# Patient Record
Sex: MALE | Race: WHITE | Employment: OTHER | URBAN - METROPOLITAN AREA
[De-identification: names, ages, dates, MRNs, and addresses within clinical notes are randomized per-mention and may not be internally consistent; named-entity substitution may affect disease eponyms.]

---

## 2017-03-29 DIAGNOSIS — E78.5 HYPERLIPIDEMIA LDL GOAL <130: ICD-10-CM

## 2017-03-29 RX ORDER — SIMVASTATIN 40 MG/1
TABLET, FILM COATED ORAL
Qty: 90 TAB | Refills: 0 | Status: SHIPPED | OUTPATIENT
Start: 2017-03-29 | End: 2017-06-30 | Stop reason: SDUPTHER

## 2017-06-30 DIAGNOSIS — E78.5 HYPERLIPIDEMIA LDL GOAL <130: ICD-10-CM

## 2017-06-30 RX ORDER — SIMVASTATIN 40 MG/1
TABLET, FILM COATED ORAL
Qty: 90 TAB | Refills: 0 | Status: SHIPPED | OUTPATIENT
Start: 2017-06-30 | End: 2017-07-31 | Stop reason: SDUPTHER

## 2017-06-30 NOTE — TELEPHONE ENCOUNTER
Pharmacy on file verified   *patient states that he has an appointment on 815/2017 but he is going to run out of the medication requested next week, patient Is requesting a gap prescription be sent to the pharmacy on file.

## 2017-07-01 DIAGNOSIS — E78.5 HYPERLIPIDEMIA LDL GOAL <130: ICD-10-CM

## 2017-07-03 RX ORDER — SIMVASTATIN 40 MG/1
TABLET, FILM COATED ORAL
Qty: 90 TAB | Refills: 0 | Status: SHIPPED | OUTPATIENT
Start: 2017-07-03 | End: 2018-02-05 | Stop reason: SDUPTHER

## 2017-07-25 ENCOUNTER — TELEPHONE (OUTPATIENT)
Dept: FAMILY MEDICINE CLINIC | Age: 62
End: 2017-07-25

## 2017-07-25 NOTE — TELEPHONE ENCOUNTER
----- Message from Urszula Garcia sent at 7/25/2017 11:37 AM EDT -----  Regarding: Barr/telephone  Pts wife Roslyn Cueto is requesting an appointment for Monday. Pt went to [facility] today for a fall. He will need stitches removed from his nose and lip on Monday. Wifes number is [phone].

## 2017-07-25 NOTE — TELEPHONE ENCOUNTER
Dr. Madeline Polanco will not be here next week, can you assist with this scheduling please as the patient would only like to see him?

## 2017-07-31 ENCOUNTER — OFFICE VISIT (OUTPATIENT)
Dept: FAMILY MEDICINE CLINIC | Age: 62
End: 2017-07-31

## 2017-07-31 VITALS
HEIGHT: 70 IN | SYSTOLIC BLOOD PRESSURE: 170 MMHG | WEIGHT: 208.6 LBS | OXYGEN SATURATION: 98 % | DIASTOLIC BLOOD PRESSURE: 100 MMHG | BODY MASS INDEX: 29.86 KG/M2 | TEMPERATURE: 98.4 F | HEART RATE: 61 BPM | RESPIRATION RATE: 18 BRPM

## 2017-07-31 DIAGNOSIS — S01.81XA LACERATION OF FACE, INITIAL ENCOUNTER: Primary | ICD-10-CM

## 2017-07-31 DIAGNOSIS — R03.0 ELEVATED BLOOD PRESSURE READING: ICD-10-CM

## 2017-07-31 NOTE — PROGRESS NOTES
HISTORY OF PRESENT ILLNESS  Ramona Chapman is a 59 y.o. male. HPI  Presents for suture removal.  Reports he fell down steps at home, striking face on the wall. Went to [facility] ED 6 days ago, treated with sutures. Denies any wound pain or drainage. No fever/chills. BP noted to be elevated today in office. States he has white coat HTN for years. Has BP monitor at home, inconsistent with monitoring. Patient Active Problem List   Diagnosis Code    Vitamin D deficiency E55.9    DDD,DJD cervicalC5-6,6-7foraminal encroachment on X ray-4/2013 M50.30    Hyperlipidemia LDL goal <130 E78.5    Chronic pain of both shoulders- worse in AM and loosens up with activity. M25.512, G89.29, M25.511    Allergic rhinitis due to pollen J30.1     Current Outpatient Prescriptions   Medication Sig    SAW PALMETTO FRUIT, BULK, by Does Not Apply route.  simvastatin (ZOCOR) 40 mg tablet TAKE 1 TABLET BY MOUTH EVERY DAY AT NIGHT *NEEDS FASTING OFFICE VISIT*    Cholecalciferol, Vitamin D3, (VITAMIN D3) 1,000 unit cap Take 2,000 Units by mouth daily (after breakfast).  aspirin 81 mg tablet Take  by mouth.  OTHER Indications: Super beta prostate     No current facility-administered medications for this visit. Social History   Substance Use Topics    Smoking status: Never Smoker    Smokeless tobacco: Never Used    Alcohol use Yes      Comment: 12oz beer1-2/wk     Visit Vitals    BP (!) 170/100 (BP 1 Location: Left arm, BP Patient Position: Sitting)    Pulse 61    Temp 98.4 °F (36.9 °C) (Oral)    Resp 18    Ht 5' 10\" (1.778 m)    Wt 208 lb 9.6 oz (94.6 kg)    SpO2 98%    BMI 29.93 kg/m2         Review of Systems   Constitutional: Negative for chills, fever and malaise/fatigue. Eyes: Negative. Respiratory: Negative for cough and shortness of breath. Cardiovascular: Negative for chest pain, palpitations and leg swelling. Skin:        Laceration of nose and upper lip.    Neurological: Negative for headaches. All other systems reviewed and are negative. Physical Exam   Constitutional: No distress. Cardiovascular: Normal rate, regular rhythm and normal heart sounds. Pulmonary/Chest: Effort normal and breath sounds normal.   Skin:   Laceration of bridge of nose. Small area about 4mm in center of laceration not fully approximated. No drainage. Laceration of right upper lip clean and dry. Wound edges approximated. No erythema. Mild edema of upper lip. ASSESSMENT and PLAN  Diagnoses and all orders for this visit:    1. Laceration of face, initial encounter  Sutures removed without difficulty. Wound edges approximated except for 4mm superficial open area bridge of nose. No drainage noted. Clean daily with soap and water. Monitor for signs of infection which were reviewed. 2. Elevated blood pressure reading  Recommend interim BP monitoring and record. Report if > 140/80 consistentl    Follow up as scheduled with PCP next month for BP recheck.

## 2017-07-31 NOTE — PROGRESS NOTES
\"Reviewed record in preparation for visit and have obtained the necessary documentation\"  Chief Complaint   Patient presents with    Suture Removal     lip and nose        Patient presents in the office today for a suture removal to nose and lip areas     Patient advises he was evaluated in [facility], last Tuesday 0725 for a fall and had sutures placed in nose and lip areas, advised to have the sutures emoved after 5 days     1. Have you been to the ER, urgent care clinic since your last visit? Hospitalized since your last visit? No    2. Have you seen or consulted any other health care providers outside of the [address] [place] [address] since your last visit? Include any pap smears or colon screening.  Yes When: 07/25/2017 Where: [facility] Reason for visit: Fall

## 2017-08-15 ENCOUNTER — OFFICE VISIT (OUTPATIENT)
Dept: FAMILY MEDICINE CLINIC | Age: 62
End: 2017-08-15

## 2017-08-15 VITALS
DIASTOLIC BLOOD PRESSURE: 84 MMHG | TEMPERATURE: 97.2 F | HEART RATE: 61 BPM | OXYGEN SATURATION: 97 % | WEIGHT: 206.6 LBS | SYSTOLIC BLOOD PRESSURE: 134 MMHG | BODY MASS INDEX: 29.58 KG/M2 | HEIGHT: 70 IN | RESPIRATION RATE: 18 BRPM

## 2017-08-15 DIAGNOSIS — M25.512 CHRONIC PAIN OF BOTH SHOULDERS: Chronic | ICD-10-CM

## 2017-08-15 DIAGNOSIS — M50.30 DDD (DEGENERATIVE DISC DISEASE), CERVICAL: ICD-10-CM

## 2017-08-15 DIAGNOSIS — E55.9 VITAMIN D DEFICIENCY: ICD-10-CM

## 2017-08-15 DIAGNOSIS — M25.511 CHRONIC PAIN OF BOTH SHOULDERS: Chronic | ICD-10-CM

## 2017-08-15 DIAGNOSIS — J30.1 SEASONAL ALLERGIC RHINITIS DUE TO POLLEN: ICD-10-CM

## 2017-08-15 DIAGNOSIS — N40.0 BENIGN PROSTATIC HYPERPLASIA WITHOUT LOWER URINARY TRACT SYMPTOMS, UNSPECIFIED MORPHOLOGY: ICD-10-CM

## 2017-08-15 DIAGNOSIS — G89.29 CHRONIC PAIN OF BOTH SHOULDERS: Chronic | ICD-10-CM

## 2017-08-15 DIAGNOSIS — E78.5 HYPERLIPIDEMIA LDL GOAL <130: ICD-10-CM

## 2017-08-15 DIAGNOSIS — Z00.00 ROUTINE PHYSICAL EXAMINATION: Primary | ICD-10-CM

## 2017-08-15 NOTE — PATIENT INSTRUCTIONS

## 2017-08-15 NOTE — PROGRESS NOTES
\"Reviewed record in preparation for visit and have obtained the necessary documentation\"  Chief Complaint   Patient presents with    Complete Physical    Rash     bilateral calfs x 3 days        1. Have you been to the ER, urgent care clinic since your last visit? Hospitalized since your last visit? No    2. Have you seen or consulted any other health care providers outside of the [address] [place] [address] since your last visit? Include any pap smears or colon screening.  No

## 2017-08-15 NOTE — PROGRESS NOTES
HISTORY OF PRESENT ILLNESS  HPI  Ramona Chapman is a 59 y.o. Male with history of hyperlipidemia and vitamin D deficiency who presents to office today for a complete physical. Pt reports an average BP of below 140/90. Pt states that he had a prostate biopsy with urologist Dr. Ky Wild due to an elevated PSA, which revealed inflammation but was negative for cancer. He is scheduled to follow up with Dr. Sharon Quinonez in 11/2017. Pt complains of intermittent urinary frequency and difficulty fully emptying his bladder. He states that he usually has a good urine stream.  Pt states that he hit his head three weeks ago. He received sutures at [facility] ER which were removed six days later in office. He denies syncope and concussion-type symptoms, and he states that a CT was negative. Pt complains of a non-itchy bilateral lower leg rash since 8/13/17. He notes that he did injure both knees in his fall three weeks ago. Past Medical History:   Diagnosis Date    Allergic rhinitis 2/27/2010    Chronic pain of both shoulders- worse in AM and loosens up with activity. 3/20/2016    DDD,DJD cervicalC5-6,6-7foraminal encroachment on X ray-4/2013 5/17/2013    Other and unspecified hyperlipidemia 2/27/2010    Other and unspecified hyperlipidemia 2/27/2010     Past Surgical History:   Procedure Laterality Date    HX CATARACT REMOVAL  87-88    inocente    HX CATARACT REMOVAL      HX TONSIL AND ADENOIDECTOMY  1960    SINUS SURGERY PROC UNLISTED  2001     Current Outpatient Prescriptions on File Prior to Visit   Medication Sig Dispense Refill    SAW PALMETTO FRUIT, BULK, by Does Not Apply route.  simvastatin (ZOCOR) 40 mg tablet TAKE 1 TABLET BY MOUTH EVERY DAY AT NIGHT *NEEDS FASTING OFFICE VISIT* 90 Tab 0    OTHER Indications: Super beta prostate      Cholecalciferol, Vitamin D3, (VITAMIN D3) 1,000 unit cap Take 2,000 Units by mouth daily (after breakfast).  aspirin 81 mg tablet Take  by mouth.        No current facility-administered medications on file prior to visit. Allergies   Allergen Reactions    Pcn [Penicillins] Rash     Family History   Problem Relation Age of Onset    Cancer Mother      ovarian    Cancer Father      prostate?  Elevated Lipids Father      Social History     Social History    Marital status:      Spouse name: N/A    Number of children: N/A    Years of education: N/A     Social History Main Topics    Smoking status: Never Smoker    Smokeless tobacco: Never Used    Alcohol use Yes      Comment: 12oz beer1-2/wk    Drug use: No    Sexual activity: Yes     Partners: Female     Other Topics Concern    None     Social History Narrative             Review of Systems   Constitutional: Negative for chills, diaphoresis, fever, malaise/fatigue and weight loss. HENT: Negative for congestion, ear discharge, ear pain, hearing loss, nosebleeds, sore throat and tinnitus. Eyes: Negative for blurred vision, double vision, photophobia, pain, discharge and redness. Respiratory: Negative for cough, hemoptysis, sputum production, shortness of breath, wheezing and stridor. Cardiovascular: Negative for chest pain, palpitations, orthopnea, claudication, leg swelling and PND. Gastrointestinal: Negative for abdominal pain, blood in stool, constipation, diarrhea, heartburn, melena, nausea and vomiting. Genitourinary: Positive for frequency. Negative for dysuria, flank pain, hematuria and urgency. Musculoskeletal: Negative for back pain, falls, joint pain, myalgias and neck pain. Skin: Positive for rash (bilateral lower legs). Negative for itching. Neurological: Negative for dizziness, tingling, tremors, sensory change, speech change, focal weakness, seizures, loss of consciousness, weakness and headaches. Endo/Heme/Allergies: Negative for environmental allergies and polydipsia. Does not bruise/bleed easily.    Psychiatric/Behavioral: Negative for depression, hallucinations, memory loss, substance abuse and suicidal ideas. The patient is not nervous/anxious and does not have insomnia. Results for orders placed or performed in visit on 08/15/17   LIPID PANEL   Result Value Ref Range    Cholesterol, total 160 100 - 199 mg/dL    Triglyceride 110 0 - 149 mg/dL    HDL Cholesterol 58 >39 mg/dL    VLDL, calculated 22 5 - 40 mg/dL    LDL, calculated 80 0 - 99 mg/dL   METABOLIC PANEL, COMPREHENSIVE   Result Value Ref Range    Glucose 92 65 - 99 mg/dL    BUN 14 8 - 27 mg/dL    Creatinine 1.18 0.76 - 1.27 mg/dL    GFR est non-AA 65 >59 mL/min/1.73    GFR est AA 75 >59 mL/min/1.73    BUN/Creatinine ratio 12 10 - 24    Sodium 143 134 - 144 mmol/L    Potassium 4.7 3.5 - 5.2 mmol/L    Chloride 104 96 - 106 mmol/L    CO2 22 18 - 29 mmol/L    Calcium 9.6 8.6 - 10.2 mg/dL    Protein, total 6.9 6.0 - 8.5 g/dL    Albumin 4.6 3.6 - 4.8 g/dL    GLOBULIN, TOTAL 2.3 1.5 - 4.5 g/dL    A-G Ratio 2.0 1.2 - 2.2    Bilirubin, total 0.5 0.0 - 1.2 mg/dL    Alk.  phosphatase 80 39 - 117 IU/L    AST (SGOT) 26 0 - 40 IU/L    ALT (SGPT) 18 0 - 44 IU/L   PSA W/ REFLX FREE PSA   Result Value Ref Range    Prostate Specific Ag 4.2 (H) 0.0 - 4.0 ng/mL    Reflex Criteria Comment    URINALYSIS W/MICROSCOPIC   Result Value Ref Range    Specific Gravity 1.023 1.005 - 1.030    pH (UA) 5.5 5.0 - 7.5    Color Yellow Yellow    Appearance Clear Clear    Leukocyte Esterase Negative Negative    Protein Negative Negative/Trace    Glucose Negative Negative    Ketone Negative Negative    Blood Negative Negative    Bilirubin Negative Negative    Urobilinogen 0.2 0.2 - 1.0 mg/dL    Nitrites Negative Negative    Microscopic Examination Comment     Microscopic exam See additional order    VITAMIN D, 25 HYDROXY   Result Value Ref Range    VITAMIN D, 25-HYDROXY 43.5 30.0 - 100.0 ng/mL   MICROSCOPIC EXAMINATION   Result Value Ref Range    WBC 0-5 0 - 5 /hpf    RBC 0-2 0 - 2 /hpf    Epithelial cells None seen 0 - 10 /hpf    Casts None seen None seen /lpf    Mucus Present Not Estab. Bacteria Few None seen/Few   PSA, FREE   Result Value Ref Range    PSA, Free 0.48 N/A ng/mL    % Free PSA 11.4 %   CVD REPORT   Result Value Ref Range    INTERPRETATION Note                Physical Exam  Visit Vitals    /84 (BP 1 Location: Left arm, BP Patient Position: Sitting)    Pulse 61    Temp 97.2 °F (36.2 °C) (Oral)    Resp 18    Ht 5' 10\" (1.778 m)    Wt 206 lb 9.6 oz (93.7 kg)    SpO2 97%    BMI 29.64 kg/m2     General:  Alert, cooperative, no distress, appears stated age. Head:  Normocephalic, without obvious abnormality, atraumatic. Eyes:  Conjunctivae/corneas clear. PERRL, EOMs intact. Fundi benign   Ears:  Normal TMs and external ear canals both ears. Nose: Nares normal. Septum midline. Mucosa normal. No drainage or sinus tenderness. Throat: Lips, mucosa, and tongue normal. Teeth and gums normal.   Neck: Supple, symmetrical, trachea midline, no adenopathy, thyroid: no enlargement/tenderness/nodules, no carotid bruit and no JVD. Back:   Symmetric, no curvature. ROM normal. No CVA tenderness. Lungs:   Clear to auscultation bilaterally. Chest wall:  No tenderness or deformity. Heart:  Regular rate and rhythm, S1, S2 normal, no murmur, click, rub or gallop. Abdomen:   Soft, non-tender. Bowel sounds normal. No masses,  No organomegaly. Genitalia:  Normal male without lesion, discharge or tenderness. Rectal:  Deferred to urologist Dr. Melodie quintana. Extremities: Extremities normal, atraumatic, no cyanosis or edema. Pulses: 2+ and symmetric all extremities. Skin: Skin color, texture, turgor normal. He has a non-blanching rash on his leg, some of it appears to be a reddish-purple discoloration that may be from blood seeping below the skin from his injuries; both knees have scabs that are healing, no signs of infection.  Down around the left ankle the rash is erythematous and does mendel a little bit, and it's slightly raised, whereas the purplish rash was not raised   Lymph nodes: Cervical, supraclavicular, and axillary nodes normal.   Neurologic: CNII-XII intact. Normal strength, sensation and reflexes throughout. ASSESSMENT and PLAN    ICD-10-CM ICD-9-CM    1. Routine physical examination Z00.00 V70.0 LIPID PANEL      METABOLIC PANEL, COMPREHENSIVE      PSA W/ REFLX FREE PSA      URINALYSIS W/MICROSCOPIC      AMB POC EKG ROUTINE W/ 12 LEADS, INTER & REP      VITAMIN D, 25 HYDROXY      MICROSCOPIC EXAMINATION      CVD REPORT   2. Hyperlipidemia LDL goal <130 E78.5 272.4    3. DDD,DJD cervicalC5-6,6-7foraminal encroachment on X ray-4/2013 M50.30 722.4    4. Vitamin D deficiency E55.9 268.9 VITAMIN D, 25 HYDROXY   5. Seasonal allergic rhinitis due to pollen J30.1 477.0    6. Chronic pain of both shoulders- worse in AM and loosens up with activity. M25.512 719.41     G89.29 338.29     M25.511     7. Benign prostatic hyperplasia without lower urinary tract symptoms, unspecified morphology N40.0 600.00      Diagnoses and all orders for this visit:    1. Routine physical examination  -     LIPID PANEL  -     METABOLIC PANEL, COMPREHENSIVE  -     PSA W/ REFLX FREE PSA  -     URINALYSIS W/MICROSCOPIC  -     AMB POC EKG ROUTINE W/ 12 LEADS, INTER & REP  -     VITAMIN D, 25 HYDROXY  -     MICROSCOPIC EXAMINATION  -     CVD REPORT    2. Hyperlipidemia LDL goal <130    3. DDD,DJD cervicalC5-6,6-7foraminal encroachment on X ray-4/2013    4. Vitamin D deficiency  -     VITAMIN D, 25 HYDROXY    5. Seasonal allergic rhinitis due to pollen    6. Chronic pain of both shoulders- worse in AM and loosens up with activity. 7. Benign prostatic hyperplasia without lower urinary tract symptoms, unspecified morphology    Other orders  -     PSA, FREE      Follow-up Disposition:  Return in about 6 months (around 2/15/2018), or if leg rashes/symptoms worsen or fail to improve, for F/U HTN and CHOL.      lab results and schedule of future lab studies reviewed with patient  reviewed diet, exercise and weight control  cardiovascular risk and specific lipid/LDL goals reviewed  reviewed medications and side effects in detail  Please call my office if there are any questions- [phone]. I will arrange for follow up after the lab tests done from today return    Call for refills on medications as needed. Discussed expected course/resolution/complications of diagnosis in detail with patient. Medication risks/benefits/costs/interactions/alternatives discussed with patient. Pt was given an after visit summary which includes diagnoses, current medications & vitals. Pt expressed understanding with the diagnosis and plan. Total 45 minutes,60 % counseling re: I encouraged him to eliminate citrus and tomato-based food from his diet, as well as artificial sweeteners and caffeine. As far as his prostate, I did not do the rectal exam- deferred to urology. We did do the PSA, and we'll send that to the urologist.     As far as the rash, since it's not bothering him we won't treat it. It's probably from some bleeding below the skin related to his recent injuries/ fall. and the erythematous rash may be a mild case of eczema. He'll call us back if it starts to itch or not resolve. Recommended a weekly \"heart check. \" I went into detail how to do this. He needs to check his BP a few more times, at least 4-5 more readings, but he should check 2-3 readings each time he does it and write down the reading that it drops to. He can send those readings to us through [address] Reviewed in detail the proper technique of checking the blood pressure- check it on an average day only, not on a stressful day, sitting, no exercise for at least 1 hour and not experiencing any new pain( chronic pain is OK).  Patient encouraged to check BP sitting and standing at least once a month and to report these readings to me if > 140/ 90 on average , or if the standing BP is >  15 points lower than the sitting. Reviewed symptoms, or lack thereof, of hypertension and elevated cholesterol. Also, discussed symptoms of concern that were noted today in the note above, treatment options( including doing nothing), when to follow up before recommended time frame. Also, answered all questions. This document was written by Julianna Chin, as dictated by Matty Palmer MD.  I have reviewed and agree with the above note and have made corrections where appropriate Ancelmo Cárdenas M.D.

## 2017-08-15 NOTE — MR AVS SNAPSHOT
Visit Information Date & Time Provider Department Dept. Phone Encounter #  
 8/15/2017  9:00 AM Janey Selby MD [address] [place] [address] [phone] [id number] Upcoming Health Maintenance Date Due INFLUENZA AGE 9 TO ADULT 8/1/2017 COLONOSCOPY 10/13/2024 DTaP/Tdap/Td series (3 - Td) 7/28/2025 Allergies as of 8/15/2017  Review Complete On: 8/15/2017 By: Damaris Biggs LPN Severity Noted Reaction Type Reactions Pcn [Penicillins]  02/27/2010    Rash Current Immunizations  Reviewed on 6/20/2013 Name Date TDAP Vaccine 4/27/2010 Tdap 7/28/2015  
 dT Vaccine 1/27/1999 Not reviewed this visit You Were Diagnosed With   
  
 Codes Comments Routine physical examination    -  Primary ICD-10-CM: Z00.00 ICD-9-CM: V70.0 Hyperlipidemia LDL goal <130     ICD-10-CM: E78.5 ICD-9-CM: 272.4 DDD (degenerative disc disease), cervical     ICD-10-CM: M50.30 ICD-9-CM: 722.4 Vitamin D deficiency     ICD-10-CM: E55.9 ICD-9-CM: 268.9 Seasonal allergic rhinitis due to pollen     ICD-10-CM: J30.1 ICD-9-CM: 477.0 Chronic pain of both shoulders     ICD-10-CM: M25.512, G89.29, M25.511 ICD-9-CM: 719.41, 338.29 Vitals BP Pulse Temp Resp Height(growth percentile) Weight(growth percentile) 134/84 (BP 1 Location: Left arm, BP Patient Position: Sitting) 61 97.2 °F (36.2 °C) (Oral) 18 5' 10\" (1.778 m) 206 lb 9.6 oz (93.7 kg) SpO2 BMI Smoking Status 97% 29.64 kg/m2 Never Smoker Vitals History BMI and BSA Data Body Mass Index Body Surface Area  
 29.64 kg/m 2 2.15 m 2 Preferred Pharmacy Pharmacy Name Phone [id number] [address] [phone] Your Updated Medication List  
  
   
This list is accurate as of: 8/15/17 10:50 AM.  Always use your most recent med list.  
  
  
  
  
 aspirin 81 mg tablet Take  by mouth. OTHER Indications: Super beta prostate SAW PALMETTO FRUIT (BULK)  
by Does Not Apply route. simvastatin 40 mg tablet Commonly known as:  ZOCOR  
TAKE 1 TABLET BY MOUTH EVERY DAY AT NIGHT *NEEDS FASTING OFFICE VISIT* VITAMIN D3 1,000 unit Cap Generic drug:  cholecalciferol Take 2,000 Units by mouth daily (after breakfast). We Performed the Following AMB POC EKG ROUTINE W/ 12 LEADS, INTER & REP [47971 CPT(R)] LIPID PANEL [76740 CPT(R)] METABOLIC PANEL, COMPREHENSIVE [46163 CPT(R)] PSA W/ REFLX FREE PSA [91158 CPT(R)] URINALYSIS W/MICROSCOPIC [78248 CPT(R)] VITAMIN D, 25 HYDROXY [id number] CPT(R)] Patient Instructions Well Visit, Men 48 to 72: Care Instructions Your Care Instructions Physical exams can help you stay healthy. Your doctor has checked your overall health and may have suggested ways to take good care of yourself. He or she also may have recommended tests. At home, you can help prevent illness with healthy eating, regular exercise, and other steps. Follow-up care is a key part of your treatment and safety. Be sure to make and go to all appointments, and call your doctor if you are having problems. It's also a good idea to know your test results and keep a list of the medicines you take. How can you care for yourself at home? · Reach and stay at a healthy weight. This will lower your risk for many problems, such as obesity, diabetes, heart disease, and high blood pressure. · Get at least 30 minutes of exercise on most days of the week. Walking is a good choice. You also may want to do other activities, such as running, swimming, cycling, or playing tennis or team sports. · Do not smoke. Smoking can make health problems worse. If you need help quitting, talk to your doctor about stop-smoking programs and medicines. These can increase your chances of quitting for good. · Protect your skin from too much sun. When you're outdoors from 10 a.m. to 4 p.m., stay in the shade or cover up with clothing and a hat with a wide brim. Wear sunglasses that block UV rays. Even when it's cloudy, put broad-spectrum sunscreen (SPF 30 or higher) on any exposed skin. · See a dentist one or two times a year for checkups and to have your teeth cleaned. · Wear a seat belt in the car. · Limit alcohol to 2 drinks a day. Too much alcohol can cause health problems. Follow your doctor's advice about when to have certain tests. These tests can spot problems early. · Cholesterol. Your doctor will tell you how often to have this done based on your overall health and other things that can increase your risk for heart attack and stroke. · Blood pressure. Have your blood pressure checked during a routine doctor visit. Your doctor will tell you how often to check your blood pressure based on your age, your blood pressure results, and other factors. · Prostate exam. Talk to your doctor about whether you should have a blood test (called a PSA test) for prostate cancer. Experts disagree on whether men should have this test. Some experts recommend that you discuss the benefits and risks of the test with your doctor. · Diabetes. Ask your doctor whether you should have tests for diabetes. · Vision. Some experts recommend that you have yearly exams for glaucoma and other age-related eye problems starting at age 48. · Hearing. Tell your doctor if you notice any change in your hearing. You can have tests to find out how well you hear. · Colon cancer. You should begin tests for colon cancer at age 48. You may have one of several tests. Your doctor will tell you how often to have tests based on your age and risk. Risks include whether you already had a precancerous polyp removed from your colon or whether your parent, brother, sister, or child has had colon cancer. · Heart attack and stroke risk. At least every 4 to 6 years, you should have your risk for heart attack and stroke assessed. Your doctor uses factors such as your age, blood pressure, cholesterol, and whether you smoke or have diabetes to show what your risk for a heart attack or stroke is over the next 10 years. · Abdominal aortic aneurysm. Ask your doctor whether you should have a test to check for an aneurysm. You may need a test if you ever smoked or if your parent, brother, sister, or child has had an aneurysm. When should you call for help? Watch closely for changes in your health, and be sure to contact your doctor if you have any problems or symptoms that concern you. Where can you learn more? Go to http://laly-malcolm.info/. Enter I400 in the search box to learn more about \"Well Visit, Men 48 to 72: Care Instructions. \" Current as of: July 19, 2016 Content Version: 11.3 © 2700-2355 [facility]. Care instructions adapted under license by [facility] (which disclaims liability or warranty for this information). If you have questions about a medical condition or this instruction, always ask your healthcare professional. [place] [address] any warranty or liability for your use of this information. Introducing [facility] & HEALTH SERVICES! Dear Sandoval Turner: Thank you for requesting a [employer] account. Our records indicate that you already have an active [employer] account. You can access your account anytime at [email or web address] Did you know that you can access your hospital and ER discharge instructions at any time in [employer]? You can also review all of your test results from your hospital stay or ER visit. Additional Information If you have questions, please visit the Frequently Asked Questions section of the [employer] website at [email or web address] Remember, MyChart is NOT to be used for urgent needs. For medical emergencies, dial 911. Now available from your iPhone and Android! Please provide this summary of care documentation to your next provider. Your primary care clinician is listed as [place] [address], [facility]/s . If you have any questions after today's visit, please call [phone].

## 2017-08-16 LAB
25(OH)D3+25(OH)D2 SERPL-MCNC: 43.5 NG/ML (ref 30–100)
ALBUMIN SERPL-MCNC: 4.6 G/DL (ref 3.6–4.8)
ALBUMIN/GLOB SERPL: 2 {RATIO} (ref 1.2–2.2)
ALP SERPL-CCNC: 80 IU/L (ref 39–117)
ALT SERPL-CCNC: 18 IU/L (ref 0–44)
APPEARANCE UR: CLEAR
AST SERPL-CCNC: 26 IU/L (ref 0–40)
BACTERIA #/AREA URNS HPF: NORMAL /[HPF]
BILIRUB SERPL-MCNC: 0.5 MG/DL (ref 0–1.2)
BILIRUB UR QL STRIP: NEGATIVE
BUN SERPL-MCNC: 14 MG/DL (ref 8–27)
BUN/CREAT SERPL: 12 (ref 10–24)
CALCIUM SERPL-MCNC: 9.6 MG/DL (ref 8.6–10.2)
CASTS URNS QL MICRO: NORMAL /LPF
CHLORIDE SERPL-SCNC: 104 MMOL/L (ref 96–106)
CHOLEST SERPL-MCNC: 160 MG/DL (ref 100–199)
CO2 SERPL-SCNC: 22 MMOL/L (ref 18–29)
COLOR UR: YELLOW
CREAT SERPL-MCNC: 1.18 MG/DL (ref 0.76–1.27)
EPI CELLS #/AREA URNS HPF: NORMAL /HPF
GLOBULIN SER CALC-MCNC: 2.3 G/DL (ref 1.5–4.5)
GLUCOSE SERPL-MCNC: 92 MG/DL (ref 65–99)
GLUCOSE UR QL: NEGATIVE
HDLC SERPL-MCNC: 58 MG/DL
HGB UR QL STRIP: NEGATIVE
INTERPRETATION, 910389: NORMAL
KETONES UR QL STRIP: NEGATIVE
LDLC SERPL CALC-MCNC: 80 MG/DL (ref 0–99)
LEUKOCYTE ESTERASE UR QL STRIP: NEGATIVE
MICRO URNS: NORMAL
MICRO URNS: NORMAL
MUCOUS THREADS URNS QL MICRO: PRESENT
NITRITE UR QL STRIP: NEGATIVE
PH UR STRIP: 5.5 [PH] (ref 5–7.5)
POTASSIUM SERPL-SCNC: 4.7 MMOL/L (ref 3.5–5.2)
PROT SERPL-MCNC: 6.9 G/DL (ref 6–8.5)
PROT UR QL STRIP: NEGATIVE
PSA FREE MFR SERPL: 11.4 %
PSA FREE SERPL-MCNC: 0.48 NG/ML
PSA SERPL-MCNC: 4.2 NG/ML (ref 0–4)
RBC #/AREA URNS HPF: NORMAL /HPF
REFLEX CRITERIA: ABNORMAL
SODIUM SERPL-SCNC: 143 MMOL/L (ref 134–144)
SP GR UR: 1.02 (ref 1–1.03)
TRIGL SERPL-MCNC: 110 MG/DL (ref 0–149)
UROBILINOGEN UR STRIP-MCNC: 0.2 MG/DL (ref 0.2–1)
VLDLC SERPL CALC-MCNC: 22 MG/DL (ref 5–40)
WBC #/AREA URNS HPF: NORMAL /HPF

## 2017-08-18 NOTE — PROGRESS NOTES
He called about the PSA so please call this to him. Thanks! PSA is unchanged which is good news. All of your recent lab tests are normal or at goal. I would continue everything the same and schedule your next fasting office visit in 6 months. In addition, a physical is recommended every year after the age of 61.

## 2017-08-28 ENCOUNTER — OFFICE VISIT (OUTPATIENT)
Dept: FAMILY MEDICINE CLINIC | Age: 62
End: 2017-08-28

## 2017-08-28 VITALS
TEMPERATURE: 98 F | OXYGEN SATURATION: 100 % | RESPIRATION RATE: 18 BRPM | HEIGHT: 70 IN | BODY MASS INDEX: 30.18 KG/M2 | HEART RATE: 60 BPM | SYSTOLIC BLOOD PRESSURE: 168 MMHG | WEIGHT: 210.8 LBS | DIASTOLIC BLOOD PRESSURE: 95 MMHG

## 2017-08-28 DIAGNOSIS — L08.9 SKIN INFECTION: Primary | ICD-10-CM

## 2017-08-28 DIAGNOSIS — R03.0 ELEVATED BLOOD PRESSURE READING: ICD-10-CM

## 2017-08-28 RX ORDER — DOXYCYCLINE 100 MG/1
100 TABLET ORAL 2 TIMES DAILY
Qty: 20 TAB | Refills: 0 | Status: SHIPPED | OUTPATIENT
Start: 2017-08-28 | End: 2017-09-07

## 2017-08-28 NOTE — PROGRESS NOTES
1. Have you been to the ER, urgent care clinic since your last visit? Hospitalized since your last visit? No    2. Have you seen or consulted any other health care providers outside of the [address] [place] [address] since your last visit? Include any pap smears or colon screening. No     Chief Complaint   Patient presents with    Leg Pain     PATIENT here for sore on leg not painful     Learning assessment complete  Abuse Screening Questionnaire 6/29/2016   Do you ever feel afraid of your partner? N   Are you in a relationship with someone who physically or mentally threatens you? N   Is it safe for you to go home? Y       Fall Risk Assessment, last 12 mths 6/29/2016   Able to walk? Yes   Fall in past 12 months?  No

## 2017-08-28 NOTE — PROGRESS NOTES
HISTORY OF PRESENT ILLNESS  Julia Corrales is a 59 y.o. male. HPI: Pt reports he fell down steps striking face and knees a month ago. Had cut on his face and minor abrasions on his both knees. Went to [facility] [place] and his face was sutured. His knee were healing until he went under the water in [facility]. Two days later he noticed redness and fluid filled bubbles in his left knee. Right knee is normal  His blood pressure is elevated, he reports he has white coat syndrome and that his blood pressures are low at him. He brought in three readings they were 138/75, 142/83 and 134/80. He doesn't want to start on blood pressure medication. Past Medical History:   Diagnosis Date    Allergic rhinitis 2/27/2010    Chronic pain of both shoulders- worse in AM and loosens up with activity. 3/20/2016    DDD,DJD cervicalC5-6,6-7foraminal encroachment on X ray-4/2013 5/17/2013    Other and unspecified hyperlipidemia 2/27/2010    Other and unspecified hyperlipidemia 2/27/2010     Past Surgical History:   Procedure Laterality Date    HX CATARACT REMOVAL  87-88    inocente    HX CATARACT REMOVAL      HX TONSIL AND ADENOIDECTOMY  1960    SINUS SURGERY PROC UNLISTED  2001     Allergies   Allergen Reactions    Pcn [Penicillins] Rash     Current Outpatient Prescriptions:     doxycycline (ADOXA) 100 mg tablet, Take 1 Tab by mouth two (2) times a day for 10 days. , Disp: 20 Tab, Rfl: 0    SAW PALMETTO FRUIT, BULK,, by Does Not Apply route., Disp: , Rfl:     simvastatin (ZOCOR) 40 mg tablet, TAKE 1 TABLET BY MOUTH EVERY DAY AT NIGHT *NEEDS FASTING OFFICE VISIT*, Disp: 90 Tab, Rfl: 0    OTHER, Indications: Super beta prostate, Disp: , Rfl:     Cholecalciferol, Vitamin D3, (VITAMIN D3) 1,000 unit cap, Take 2,000 Units by mouth daily (after breakfast). , Disp: , Rfl:     aspirin 81 mg tablet, Take  by mouth., Disp: , Rfl:   Review of Systems   Constitutional: Negative. Respiratory: Negative. Cardiovascular: Negative. Gastrointestinal: Negative. Blood pressure (!) 168/95, pulse 60, temperature 98 °F (36.7 °C), temperature source Oral, resp. rate 18, height 5' 10\" (1.778 m), weight 210 lb 12.8 oz (95.6 kg), SpO2 100 %. Physical Exam   Constitutional: No distress. HENT:   Mouth/Throat: Oropharynx is clear and moist.   Neck: Normal range of motion. Neck supple. Cardiovascular: Normal rate and regular rhythm. No murmur heard. Pulmonary/Chest: Effort normal and breath sounds normal.   Abdominal: Soft. Bowel sounds are normal.   Skin:   Redness with fluid filled bubbles in left knee   Nursing note and vitals reviewed. ASSESSMENT and PLAN    ICD-10-CM ICD-9-CM    1. Skin infection L08.9 686.9 doxycycline (ADOXA) 100 mg tablet   2. Elevated blood pressure reading R03.0 796.2    Advised to clean the knee with hydrogen peroxide  Check blood pressure readings and report.   Pt was given an after visit summary which includes diagnosis, current medicines and vital and voiced understanding of treatment plan

## 2017-08-28 NOTE — MR AVS SNAPSHOT
Visit Information Date & Time Provider Department Dept. Phone Encounter #  
 8/28/2017 10:30 AM Jeronimo Trinidad  [facility] [address] [phone] [id number] Your Appointments 8/16/2018  9:00 AM  
COMPLETE PHYSICAL with Chrissie Cosby MD  
[facility]) Appt Note: MyChart- CPE, w/ fasting labs / 8/17 bw  
 [address] [address]  
[phone]  
  
   
 [address] [address] Upcoming Health Maintenance Date Due INFLUENZA AGE 9 TO ADULT 8/1/2017 COLONOSCOPY 10/13/2024 DTaP/Tdap/Td series (3 - Td) 7/28/2025 Allergies as of 8/28/2017  Review Complete On: 8/28/2017 By: Jeronimo Trinidad NP Severity Noted Reaction Type Reactions Pcn [Penicillins]  02/27/2010    Rash Current Immunizations  Reviewed on 6/20/2013 Name Date TDAP Vaccine 4/27/2010 Tdap 7/28/2015  
 dT Vaccine 1/27/1999 Not reviewed this visit You Were Diagnosed With   
  
 Codes Comments Skin infection    -  Primary ICD-10-CM: L08.9 ICD-9-CM: 854. 9 Vitals BP Pulse Temp Resp Height(growth percentile) Weight(growth percentile) (!) 168/95 (BP 1 Location: Left arm, BP Patient Position: Sitting) (!) 53 98 °F (36.7 °C) (Oral) 18 5' 10\" (1.778 m) 210 lb 12.8 oz (95.6 kg) SpO2 BMI Smoking Status 100% 30.25 kg/m2 Never Smoker Vitals History BMI and BSA Data Body Mass Index Body Surface Area  
 30.25 kg/m 2 2.17 m 2 Preferred Pharmacy Pharmacy Name Phone [id number] [id number] [address] [facility] [phone] Your Updated Medication List  
  
   
This list is accurate as of: 8/28/17 11:00 AM.  Always use your most recent med list.  
  
  
  
  
 aspirin 81 mg tablet Take  by mouth. doxycycline 100 mg tablet Commonly known as:  ADOXA Take 1 Tab by mouth two (2) times a day for 10 days. OTHER Indications: Super beta prostate SAW PALMETTO FRUIT (BULK)  
by Does Not Apply route. simvastatin 40 mg tablet Commonly known as:  ZOCOR  
TAKE 1 TABLET BY MOUTH EVERY DAY AT NIGHT *NEEDS FASTING OFFICE VISIT* VITAMIN D3 1,000 unit Cap Generic drug:  cholecalciferol Take 2,000 Units by mouth daily (after breakfast). Prescriptions Sent to Pharmacy Refills  
 doxycycline (ADOXA) 100 mg tablet 0 Sig: Take 1 Tab by mouth two (2) times a day for 10 days. Class: Normal  
 Pharmacy: [facility] #[id number], [address] [place] [address] #: [phone] Route: Oral  
  
Introducing [facility] & [facility] SERVICES! Dear Toni Grande: Thank you for requesting a Blip account. Our records indicate that you already have an active Blip account. You can access your account anytime at [email or web address] Did you know that you can access your hospital and ER discharge instructions at any time in Blip? You can also review all of your test results from your hospital stay or ER visit. Additional Information If you have questions, please visit the Frequently Asked Questions section of the Blip website at [email or web address] Remember, Blip is NOT to be used for urgent needs. For medical emergencies, dial 911. Now available from your iPhone and Android! Please provide this summary of care documentation to your next provider. Your primary care clinician is listed as [place] [address], [facility]/s . If you have any questions after today's visit, please call [phone].

## 2018-01-02 DIAGNOSIS — E78.5 HYPERLIPIDEMIA LDL GOAL <130: ICD-10-CM

## 2018-01-02 RX ORDER — SIMVASTATIN 40 MG/1
TABLET, FILM COATED ORAL
Qty: 90 TAB | Refills: 0 | Status: SHIPPED | OUTPATIENT
Start: 2018-01-02 | End: 2018-02-05 | Stop reason: SDUPTHER

## 2018-02-05 ENCOUNTER — OFFICE VISIT (OUTPATIENT)
Dept: FAMILY MEDICINE CLINIC | Age: 63
End: 2018-02-05

## 2018-02-05 VITALS
OXYGEN SATURATION: 97 % | HEART RATE: 66 BPM | TEMPERATURE: 97.8 F | DIASTOLIC BLOOD PRESSURE: 100 MMHG | BODY MASS INDEX: 31.01 KG/M2 | WEIGHT: 216.6 LBS | RESPIRATION RATE: 18 BRPM | HEIGHT: 70 IN | SYSTOLIC BLOOD PRESSURE: 167 MMHG

## 2018-02-05 DIAGNOSIS — M50.30 DDD (DEGENERATIVE DISC DISEASE), CERVICAL: ICD-10-CM

## 2018-02-05 DIAGNOSIS — E66.9 OBESITY (BMI 30.0-34.9): ICD-10-CM

## 2018-02-05 DIAGNOSIS — E78.5 HYPERLIPIDEMIA LDL GOAL <130: Primary | ICD-10-CM

## 2018-02-05 DIAGNOSIS — E55.9 VITAMIN D DEFICIENCY: ICD-10-CM

## 2018-02-05 DIAGNOSIS — R97.20 ELEVATED PSA MEASUREMENT: ICD-10-CM

## 2018-02-05 DIAGNOSIS — J30.1 CHRONIC SEASONAL ALLERGIC RHINITIS DUE TO POLLEN: ICD-10-CM

## 2018-02-05 RX ORDER — SIMVASTATIN 40 MG/1
TABLET, FILM COATED ORAL
Qty: 90 TAB | Refills: 3 | Status: SHIPPED | OUTPATIENT
Start: 2018-02-05 | End: 2019-01-22 | Stop reason: SDUPTHER

## 2018-02-05 RX ORDER — ACETAMINOPHEN 500 MG
1000 TABLET ORAL
COMMUNITY
End: 2018-05-23 | Stop reason: ALTCHOICE

## 2018-02-05 NOTE — MR AVS SNAPSHOT
[address] [facility] [address] 
 
 
 [address] [place] [address] 
[phone] Patient: Sincere Wellington MRN: [id number] YEM:[id number] Visit Information Date & Time Provider Department Dept. Phone Encounter #  
 2/5/2018  8:30 AM Daja Arroyo MD [address] [place] [address] [phone] [id number] Your Appointments 8/16/2018  9:00 AM  
COMPLETE PHYSICAL with Daja Arroyo MD  
[facility]) Appt Note: MyChart- CPE, w/ fasting labs / 8/17 bw  
 [address] [address]  
[phone]  
  
   
 [address] [address] Upcoming Health Maintenance Date Due COLONOSCOPY 10/13/2024 DTaP/Tdap/Td series (3 - Td) 7/28/2025 Allergies as of 2/5/2018  Review Complete On: 2/5/2018 By: Fariha Young LPN Severity Noted Reaction Type Reactions Pcn [Penicillins]  02/27/2010    Rash Current Immunizations  Reviewed on 6/20/2013 Name Date TDAP Vaccine 4/27/2010 Tdap 2/3/2018, 7/28/2015  
 dT Vaccine 1/27/1999 Not reviewed this visit You Were Diagnosed With   
  
 Codes Comments Hyperlipidemia LDL goal <130    -  Primary ICD-10-CM: E78.5 ICD-9-CM: 272.4 Vitamin D deficiency     ICD-10-CM: E55.9 ICD-9-CM: 268.9 Chronic seasonal allergic rhinitis due to pollen     ICD-10-CM: J30.1 ICD-9-CM: 477.0 Vitals BP Pulse Temp Resp Height(growth percentile) Weight(growth percentile) (!) 167/100 (BP 1 Location: Right arm, BP Patient Position: Sitting) 66 97.8 °F (36.6 °C) (Oral) 18 5' 10\" (1.778 m) 216 lb 9.6 oz (98.2 kg) SpO2 BMI Smoking Status 97% 31.08 kg/m2 Never Smoker Vitals History BMI and BSA Data Body Mass Index Body Surface Area 31.08 kg/m 2 2.2 m 2 Preferred Pharmacy Pharmacy Name Phone  [id number] [place] [facility] [place] [phone] Your Updated Medication List  
  
   
This list is accurate as of: 2/5/18  9:44 AM.  Always use your most recent med list.  
  
  
  
  
 aspirin 81 mg tablet Take  by mouth. OTHER Indications: Super beta prostate SAW PALMETTO FRUIT (BULK)  
by Does Not Apply route. simvastatin 40 mg tablet Commonly known as:  ZOCOR  
TAKE 1 TABLET BY MOUTH EVERY DAY AT NIGHT *NEEDS FASTING OFFICE VISIT* VITAMIN D3 1,000 unit Cap Generic drug:  cholecalciferol Take 2,000 Units by mouth daily (after breakfast). Prescriptions Sent to Pharmacy Refills  
 simvastatin (ZOCOR) 40 mg tablet 3 Sig: TAKE 1 TABLET BY MOUTH EVERY DAY AT NIGHT *NEEDS FASTING OFFICE VISIT* Class: Normal  
 Pharmacy: [facility]/pharmacy #[id number], [address] [place] [address] #: [phone] We Performed the Following LIPID PANEL [44580 CPT(R)] METABOLIC PANEL, COMPREHENSIVE [04502 CPT(R)] Introducing [facility] & HEALTH SERVICES! Dear Lesley Vizcaino: Thank you for requesting a [employer] account. Our records indicate that you already have an active [employer] account. You can access your account anytime at [email or web address] Did you know that you can access your hospital and ER discharge instructions at any time in [employer]? You can also review all of your test results from your hospital stay or ER visit. Additional Information If you have questions, please visit the Frequently Asked Questions section of the [employer] website at [email or web address] Remember, [employer] Systems is NOT to be used for urgent needs. For medical emergencies, dial 911. Now available from your iPhone and Android! Please provide this summary of care documentation to your next provider. Your primary care clinician is listed as [place] [address], [facility]/s . If you have any questions after today's visit, please call [phone].

## 2018-02-05 NOTE — PROGRESS NOTES
Chief Complaint   Patient presents with    Cholesterol Problem     1. Have you been to the ER, urgent care clinic since your last visit? Hospitalized since your last visit? No    2. Have you seen or consulted any other health care providers outside of the [address] [place] [address] since your last visit? Include any pap smears or colon screening.  No

## 2018-02-05 NOTE — LETTER
4/19/2018 10:28 AM 
 
Mr. Jayne Serrato 
[address] [facility] [id number] Dear Jayne Serrato: 
 
Please find your most recent results below. Resulted Orders METABOLIC PANEL, COMPREHENSIVE Result Value Ref Range Glucose 94 65 - 99 mg/dL BUN 12 8 - 27 mg/dL Creatinine 1.09 0.76 - 1.27 mg/dL GFR est non-AA 71 >59 mL/min/1.73 GFR est AA 82 >59 mL/min/1.73  
 BUN/Creatinine ratio 11 10 - 24 Sodium 141 134 - 144 mmol/L Potassium 4.8 3.5 - 5.2 mmol/L Chloride 103 96 - 106 mmol/L  
 CO2 24 18 - 29 mmol/L Calcium 9.5 8.6 - 10.2 mg/dL Protein, total 6.5 6.0 - 8.5 g/dL Albumin 4.3 3.6 - 4.8 g/dL GLOBULIN, TOTAL 2.2 1.5 - 4.5 g/dL A-G Ratio 2.0 1.2 - 2.2 Bilirubin, total 0.4 0.0 - 1.2 mg/dL Alk. phosphatase 74 39 - 117 IU/L  
 AST (SGOT) 33 0 - 40 IU/L  
 ALT (SGPT) 30 0 - 44 IU/L Narrative Performed at:  [address]  [id number] : Santhosh Jose MD, Phone:  [phone] LIPID PANEL Result Value Ref Range Cholesterol, total 158 100 - 199 mg/dL Triglyceride 138 0 - 149 mg/dL HDL Cholesterol 47 >39 mg/dL VLDL, calculated 28 5 - 40 mg/dL LDL, calculated 83 0 - 99 mg/dL Narrative Performed at:  [address]  [id number] : Santhosh Jose MD, Phone:  [phone] CVD REPORT Result Value Ref Range INTERPRETATION Note Comment:  
   Supplemental report is available. Narrative Performed at:  [address] [place] [address]  [id number] : Deb Brandt PhD, Phone:  [phone] RECOMMENDATIONS: 
 
GREAT NEWS!! All of your recent lab tests are normal or at goal. I would continue everything the same and schedule your next fasting office visit in 6 months.  In addition, a physical is recommended every year after the age of 61.  Don't forget to do your \"heart check\" weekly. Please call me if you have any questions: [phone] Sincerely, 
 
 
Erendira Hernandez MD

## 2018-02-05 NOTE — PROGRESS NOTES
HISTORY OF PRESENT ILLNESS  HPI  July Peck is a 57 y.o. male with history of DDD/DJD, hyperlipidemia, and vitamin D deficiency who presents to office today for fasting follow-up. He denies unusual SOB, chest pain, and any recent ER visits or hospitalizations. Pt saw Dr. Fanta Nguyễn at [facility] on 10/18 to evaluate his prostate inflammation and elevated PSA; his PSA was 4.1, decreased from 4.2 on 8/15. He believes he will be following up in one year. He notes a family history of prostate cancer with his father. Pt notes that he is still experiencing some head pain from his fall in July. Past Medical History:   Diagnosis Date    Allergic rhinitis 2/27/2010    Chronic pain of both shoulders- worse in AM and loosens up with activity. 3/20/2016    DDD,DJD cervicalC5-6,6-7foraminal encroachment on X ray-4/2013 5/17/2013    Obesity (BMI 30.0-34.9) 2/5/2018    Other and unspecified hyperlipidemia 2/27/2010    Other and unspecified hyperlipidemia 2/27/2010     Past Surgical History:   Procedure Laterality Date    HX CATARACT REMOVAL  87-88    inocente    HX CATARACT REMOVAL      HX TONSIL AND ADENOIDECTOMY  1960    SINUS SURGERY PROC UNLISTED  2001     Current Outpatient Prescriptions on File Prior to Visit   Medication Sig Dispense Refill    Cholecalciferol, Vitamin D3, (VITAMIN D3) 1,000 unit cap Take 2,000 Units by mouth daily (after breakfast).  aspirin 81 mg tablet Take  by mouth. No current facility-administered medications on file prior to visit. Allergies   Allergen Reactions    Pcn [Penicillins] Rash     Family History   Problem Relation Age of Onset    Cancer Mother      ovarian    Cancer Father      prostate?     Elevated Lipids Father      Social History     Social History    Marital status:      Spouse name: N/A    Number of children: N/A    Years of education: N/A     Social History Main Topics    Smoking status: Never Smoker    Smokeless tobacco: Never Used  Alcohol use Yes      Comment: 12oz beer once monthly     Drug use: No    Sexual activity: Yes     Partners: Female     Other Topics Concern    None     Social History Narrative             Review of Systems   Constitutional: Negative for chills, diaphoresis, fever, malaise/fatigue and weight loss. Eyes: Negative for blurred vision, double vision, pain and redness. Respiratory: Negative for cough, shortness of breath and wheezing. Cardiovascular: Negative for chest pain, palpitations, orthopnea, claudication, leg swelling and PND. Skin: Negative for itching and rash. Neurological: Negative for dizziness, tingling, tremors, sensory change, speech change, focal weakness, seizures, loss of consciousness, weakness and headaches. Results for orders placed or performed in visit on 08/15/17   LIPID PANEL   Result Value Ref Range    Cholesterol, total 160 100 - 199 mg/dL    Triglyceride 110 0 - 149 mg/dL    HDL Cholesterol 58 >39 mg/dL    VLDL, calculated 22 5 - 40 mg/dL    LDL, calculated 80 0 - 99 mg/dL   METABOLIC PANEL, COMPREHENSIVE   Result Value Ref Range    Glucose 92 65 - 99 mg/dL    BUN 14 8 - 27 mg/dL    Creatinine 1.18 0.76 - 1.27 mg/dL    GFR est non-AA 65 >59 mL/min/1.73    GFR est AA 75 >59 mL/min/1.73    BUN/Creatinine ratio 12 10 - 24    Sodium 143 134 - 144 mmol/L    Potassium 4.7 3.5 - 5.2 mmol/L    Chloride 104 96 - 106 mmol/L    CO2 22 18 - 29 mmol/L    Calcium 9.6 8.6 - 10.2 mg/dL    Protein, total 6.9 6.0 - 8.5 g/dL    Albumin 4.6 3.6 - 4.8 g/dL    GLOBULIN, TOTAL 2.3 1.5 - 4.5 g/dL    A-G Ratio 2.0 1.2 - 2.2    Bilirubin, total 0.5 0.0 - 1.2 mg/dL    Alk.  phosphatase 80 39 - 117 IU/L    AST (SGOT) 26 0 - 40 IU/L    ALT (SGPT) 18 0 - 44 IU/L   PSA W/ REFLX FREE PSA   Result Value Ref Range    Prostate Specific Ag 4.2 (H) 0.0 - 4.0 ng/mL    Reflex Criteria Comment    URINALYSIS W/MICROSCOPIC   Result Value Ref Range    Specific Gravity 1.023 1.005 - 1.030    pH (UA) 5.5 5.0 - 7.5 Color Yellow Yellow    Appearance Clear Clear    Leukocyte Esterase Negative Negative    Protein Negative Negative/Trace    Glucose Negative Negative    Ketone Negative Negative    Blood Negative Negative    Bilirubin Negative Negative    Urobilinogen 0.2 0.2 - 1.0 mg/dL    Nitrites Negative Negative    Microscopic Examination Comment     Microscopic exam See additional order    VITAMIN D, 25 HYDROXY   Result Value Ref Range    VITAMIN D, 25-HYDROXY 43.5 30.0 - 100.0 ng/mL   MICROSCOPIC EXAMINATION   Result Value Ref Range    WBC 0-5 0 - 5 /hpf    RBC 0-2 0 - 2 /hpf    Epithelial cells None seen 0 - 10 /hpf    Casts None seen None seen /lpf    Mucus Present Not Estab. Bacteria Few None seen/Few   PSA, FREE   Result Value Ref Range    PSA, Free 0.48 N/A ng/mL    % Free PSA 11.4 %   CVD REPORT   Result Value Ref Range    INTERPRETATION Note                Physical Exam  Visit Vitals    BP (!) 167/100 (BP 1 Location: Right arm, BP Patient Position: Sitting)    Pulse 66    Temp 97.8 °F (36.6 °C) (Oral)    Resp 18    Ht 5' 10\" (1.778 m)    Wt 216 lb 9.6 oz (98.2 kg)    SpO2 97%    BMI 31.08 kg/m2     Head: Normocephalic, without obvious abnormality, atraumatic  Eyes: conjunctivae/corneas clear. PERRL, EOM's intact. Neck: supple, symmetrical, trachea midline, no adenopathy, thyroid: not enlarged, symmetric, no tenderness/mass/nodules, no carotid bruit and no JVD  Lungs: clear to auscultation bilaterally  Heart: regular rate and rhythm, S1, S2 normal, no murmur, click, rub or gallop  Extremities: extremities normal, atraumatic, no cyanosis or edema  Pulses: 2+ and symmetric  Lymph nodes: Cervical, supraclavicular, and axillary nodes normal.  Neurologic: Grossly normal            ASSESSMENT and PLAN    ICD-10-CM ICD-9-CM    1. Hyperlipidemia LDL goal <130 L16.2 939.6 METABOLIC PANEL, COMPREHENSIVE      LIPID PANEL      simvastatin (ZOCOR) 40 mg tablet   2. Vitamin D deficiency E55.9 268.9    3.  Chronic seasonal allergic rhinitis due to pollen J30.1 477.0    4. Elevated PSA measurement R97.20 790.93    5. DDD,DJD cervicalC5-6,6-7foraminal encroachment on X ray-4/2013 M50.30 722.4 acetaminophen (TYLENOL EXTRA STRENGTH) 500 mg tablet   6. Obesity (BMI 30.0-34. 9) E66.9 278.00      Diagnoses and all orders for this visit:    1. Hyperlipidemia LDL goal <462  -     METABOLIC PANEL, COMPREHENSIVE  -     LIPID PANEL  -     simvastatin (ZOCOR) 40 mg tablet; TAKE 1 TABLET BY MOUTH EVERY DAY AT NIGHT *NEEDS FASTING OFFICE VISIT*    2. Vitamin D deficiency    3. Chronic seasonal allergic rhinitis due to pollen    4. Elevated PSA measurement    5. DDD,DJD cervicalC5-6,6-7foraminal encroachment on X ray-4/2013    6. Obesity (BMI 30.0-34. 9)      Follow-up Disposition:  Return in about 6 months (around 8/5/2018), or if joint pain or any BPH symptoms worsen or fail to improve, for F/U cholesterol. lab results and schedule of future lab studies reviewed with patient  reviewed diet, exercise and weight control  cardiovascular risk and specific lipid/LDL goals reviewed  reviewed medications and side effects in detail  Please call my office if there are any questions- [phone]. I will arrange for follow up after the lab tests done from today return  Recommended a weekly \"heart check. \" I went into detail how to do this. Call for refills on medications as needed. Discussed expected course/resolution/complications of diagnosis in detail with patient. Medication risks/benefits/costs/interactions/alternatives discussed with patient. Pt was given an after visit summary which includes diagnoses, current medications & vitals. Pt expressed understanding with the diagnosis and plan. Reviewed symptoms, or lack thereof, of  elevated cholesterol. I encouraged pt to work a \"heart check\" into his routine once a week. He has done it in the past but has gotten lazy about doing it recently.      He's gained a few lbs; he's aware of that and is going to work on that as well. He asked about his PSA. I told him that whatever Dr. Melodie quintana recommended we would follow, but we'd be glad to check his prostate in August when he comes in for his physical.    BMI is significantly elevated- in the obese range. I reviewed diet, exercise and weight control. Discussed weight control in detail, the importance of mainly decreased carbs, and for weight maintenance, exercise; discussed different diets and that it isn't as important to watch the type of foods as it is to decrease calorie intake no matter what type of diet you do, etc.     This document was written by Alexandro Bennett, as dictated by Donell Castro MD.  I have reviewed and agree with the above note and have made corrections where appropriate Ancelmo Mitchell M.D.

## 2018-02-06 LAB
ALBUMIN SERPL-MCNC: 4.3 G/DL (ref 3.6–4.8)
ALBUMIN/GLOB SERPL: 2 {RATIO} (ref 1.2–2.2)
ALP SERPL-CCNC: 74 IU/L (ref 39–117)
ALT SERPL-CCNC: 30 IU/L (ref 0–44)
AST SERPL-CCNC: 33 IU/L (ref 0–40)
BILIRUB SERPL-MCNC: 0.4 MG/DL (ref 0–1.2)
BUN SERPL-MCNC: 12 MG/DL (ref 8–27)
BUN/CREAT SERPL: 11 (ref 10–24)
CALCIUM SERPL-MCNC: 9.5 MG/DL (ref 8.6–10.2)
CHLORIDE SERPL-SCNC: 103 MMOL/L (ref 96–106)
CHOLEST SERPL-MCNC: 158 MG/DL (ref 100–199)
CO2 SERPL-SCNC: 24 MMOL/L (ref 18–29)
CREAT SERPL-MCNC: 1.09 MG/DL (ref 0.76–1.27)
GFR SERPLBLD CREATININE-BSD FMLA CKD-EPI: 71 ML/MIN/1.73
GFR SERPLBLD CREATININE-BSD FMLA CKD-EPI: 82 ML/MIN/1.73
GLOBULIN SER CALC-MCNC: 2.2 G/DL (ref 1.5–4.5)
GLUCOSE SERPL-MCNC: 94 MG/DL (ref 65–99)
HDLC SERPL-MCNC: 47 MG/DL
INTERPRETATION, 910389: NORMAL
LDLC SERPL CALC-MCNC: 83 MG/DL (ref 0–99)
POTASSIUM SERPL-SCNC: 4.8 MMOL/L (ref 3.5–5.2)
PROT SERPL-MCNC: 6.5 G/DL (ref 6–8.5)
SODIUM SERPL-SCNC: 141 MMOL/L (ref 134–144)
TRIGL SERPL-MCNC: 138 MG/DL (ref 0–149)
VLDLC SERPL CALC-MCNC: 28 MG/DL (ref 5–40)

## 2018-04-19 NOTE — PROGRESS NOTES
GREAT NEWS!! All of your recent lab tests are normal or at goal. I would continue everything the same and schedule your next fasting office visit in 6 months. In addition, a physical is recommended every year after the age of 61. Don't forget to do your \"heart check\" weekly.

## 2018-05-22 ENCOUNTER — OFFICE VISIT (OUTPATIENT)
Dept: FAMILY MEDICINE CLINIC | Age: 63
End: 2018-05-22

## 2018-05-22 VITALS
HEIGHT: 70 IN | DIASTOLIC BLOOD PRESSURE: 97 MMHG | TEMPERATURE: 97.9 F | OXYGEN SATURATION: 96 % | BODY MASS INDEX: 30.55 KG/M2 | SYSTOLIC BLOOD PRESSURE: 151 MMHG | WEIGHT: 213.4 LBS | HEART RATE: 76 BPM | RESPIRATION RATE: 18 BRPM

## 2018-05-22 DIAGNOSIS — M25.511 CHRONIC PAIN OF BOTH SHOULDERS: Chronic | ICD-10-CM

## 2018-05-22 DIAGNOSIS — S76.212A STRAIN OF ADDUCTOR MAGNUS MUSCLE OF LEFT LOWER EXTREMITY, INITIAL ENCOUNTER: Primary | ICD-10-CM

## 2018-05-22 DIAGNOSIS — G89.29 CHRONIC PAIN OF BOTH SHOULDERS: Chronic | ICD-10-CM

## 2018-05-22 DIAGNOSIS — E55.9 VITAMIN D DEFICIENCY: ICD-10-CM

## 2018-05-22 DIAGNOSIS — E66.9 OBESITY (BMI 30.0-34.9): ICD-10-CM

## 2018-05-22 DIAGNOSIS — E78.5 HYPERLIPIDEMIA LDL GOAL <130: ICD-10-CM

## 2018-05-22 DIAGNOSIS — M25.512 CHRONIC PAIN OF BOTH SHOULDERS: Chronic | ICD-10-CM

## 2018-05-22 RX ORDER — IBUPROFEN 200 MG
200 TABLET ORAL
COMMUNITY
End: 2019-08-23

## 2018-05-22 NOTE — MR AVS SNAPSHOT
[address] [facility] 
 
 
 [address] [place] [address] 
[phone] Patient: Jordan Blount MRN: [id number]:[id number] Visit Information Date & Time Provider Department Dept. Phone Encounter #  
 5/22/2018 12:15 PM Chrissie Cosby MD [address] [place] [address] [phone] [id number] Your Appointments 8/16/2018  9:00 AM  
Complete Physical with Chrissie Cosby MD  
[facility]) Appt Note: MyChart- CPE, w/ fasting labs / 8/17 bw  
 [address] [address]  
[phone]  
  
   
 [address] [address] Upcoming Health Maintenance Date Due Influenza Age 5 to Adult 8/1/2018 COLONOSCOPY 10/13/2024 DTaP/Tdap/Td series (4 - Td) 2/3/2028 Allergies as of 5/22/2018  Review Complete On: 5/22/2018 By: Darrian Helms LPN Severity Noted Reaction Type Reactions Pcn [Penicillins]  02/27/2010    Rash Current Immunizations  Reviewed on 6/20/2013 Name Date TDAP Vaccine 4/27/2010 Tdap 2/3/2018, 7/28/2015  
 dT Vaccine 1/27/1999 Not reviewed this visit Vitals BP Pulse Temp Resp Height(growth percentile) Weight(growth percentile) (!) 151/97 (BP 1 Location: Left arm, BP Patient Position: Sitting) 76 97.9 °F (36.6 °C) (Oral) 18 5' 10\" (1.778 m) 213 lb 6.4 oz (96.8 kg) SpO2 BMI Smoking Status 96% 30.62 kg/m2 Never Smoker Vitals History BMI and BSA Data Body Mass Index Body Surface Area  
 30.62 kg/m 2 2.19 m 2 Preferred Pharmacy Pharmacy Name Phone [id number] [place], [address] [facility] [phone] Your Updated Medication List  
  
   
This list is accurate as of 5/22/18 12:57 PM.  Always use your most recent med list. ADVIL 200 mg tablet Generic drug:  ibuprofen Take  by mouth. aspirin 81 mg tablet Take  by mouth. simvastatin 40 mg tablet Commonly known as:  ZOCOR  
TAKE 1 TABLET BY MOUTH EVERY DAY AT NIGHT *NEEDS FASTING OFFICE VISIT* TYLENOL EXTRA STRENGTH 500 mg tablet Generic drug:  acetaminophen Take 2 Tabs by mouth every six (6) hours as needed for Pain. VITAMIN D3 1,000 unit Cap Generic drug:  cholecalciferol Take 2,000 Units by mouth daily (after breakfast). Introducing [facility]! Dear Valery Lewis: Thank you for requesting a [employer] account. Our records indicate that you already have an active [employer] account. You can access your account anytime at [email or web address]. [email or web address] Did you know that you can access your hospital and ER discharge instructions at any time in [employer]? You can also review all of your test results from your hospital stay or ER visit. Additional Information If you have questions, please visit the Frequently Asked Questions section of the [employer] website at [email or web address] Remember, [employer] is NOT to be used for urgent needs. For medical emergencies, dial 911. Now available from your iPhone and Android! Please provide this summary of care documentation to your next provider. Your primary care clinician is listed as [address] [id number], [facility]/s . If you have any questions after today's visit, please call [phone].

## 2018-05-22 NOTE — PROGRESS NOTES
Chief Complaint   Patient presents with    Groin Pain     Patient states that he pulled his groin - left - in April and aggravated it a week ago. 1. Have you been to the ER, urgent care clinic since your last visit? Hospitalized since your last visit? No    2. Have you seen or consulted any other health care providers outside of the [address] [place] [address] since your last visit? Include any pap smears or colon screening.  No

## 2018-05-22 NOTE — PROGRESS NOTES
HISTORY OF PRESENT ILLNESS  HPI  Yefri Carter is a 57 y.o. Male with a history of DDD, DJD, vitamin D deficiency and hyperlipidemia with LDL goal <130, who presents at the office today for groin pain. Pt pulled his groin on the left side on 4/16. He states that the pain was mostly on the left of his groin and did not seem to radiate. Pt aggravated the pain 1 week ago. Past Medical History:   Diagnosis Date    Allergic rhinitis 2/27/2010    Chronic pain of both shoulders- worse in AM and loosens up with activity. 3/20/2016    DDD,DJD cervicalC5-6,6-7foraminal encroachment on X ray-4/2013 5/17/2013    Obesity (BMI 30.0-34.9) 2/5/2018    Other and unspecified hyperlipidemia 2/27/2010    Other and unspecified hyperlipidemia 2/27/2010     Past Surgical History:   Procedure Laterality Date    HX CATARACT REMOVAL  87-88    inocente    HX CATARACT REMOVAL      HX TONSIL AND ADENOIDECTOMY  1960    SINUS SURGERY PROC UNLISTED  2001     Current Outpatient Prescriptions on File Prior to Visit   Medication Sig Dispense Refill    simvastatin (ZOCOR) 40 mg tablet TAKE 1 TABLET BY MOUTH EVERY DAY AT NIGHT *NEEDS FASTING OFFICE VISIT* 90 Tab 3    Cholecalciferol, Vitamin D3, (VITAMIN D3) 1,000 unit cap Take 2,000 Units by mouth daily (after breakfast).  aspirin 81 mg tablet Take  by mouth. No current facility-administered medications on file prior to visit. Allergies   Allergen Reactions    Pcn [Penicillins] Rash     Family History   Problem Relation Age of Onset    Cancer Mother      ovarian    Cancer Father      prostate?     Elevated Lipids Father      Social History     Social History    Marital status:      Spouse name: N/A    Number of children: N/A    Years of education: N/A     Social History Main Topics    Smoking status: Never Smoker    Smokeless tobacco: Never Used    Alcohol use Yes      Comment: 12oz beer once monthly     Drug use: No    Sexual activity: Yes Partners: Female     Other Topics Concern    None     Social History Narrative             Review of Systems   Constitutional: Negative for chills, diaphoresis, fever, malaise/fatigue and weight loss. Eyes: Negative for blurred vision, double vision, pain and redness. Respiratory: Negative for cough, shortness of breath and wheezing. Cardiovascular: Negative for chest pain, palpitations, orthopnea, claudication, leg swelling and PND. Musculoskeletal:        Pain at left groin   Skin: Negative for itching and rash. Neurological: Negative for dizziness, tingling, tremors, sensory change, speech change, focal weakness, seizures, loss of consciousness, weakness and headaches. Results for orders placed or performed in visit on 30/42/98   METABOLIC PANEL, COMPREHENSIVE   Result Value Ref Range    Glucose 94 65 - 99 mg/dL    BUN 12 8 - 27 mg/dL    Creatinine 1.09 0.76 - 1.27 mg/dL    GFR est non-AA 71 >59 mL/min/1.73    GFR est AA 82 >59 mL/min/1.73    BUN/Creatinine ratio 11 10 - 24    Sodium 141 134 - 144 mmol/L    Potassium 4.8 3.5 - 5.2 mmol/L    Chloride 103 96 - 106 mmol/L    CO2 24 18 - 29 mmol/L    Calcium 9.5 8.6 - 10.2 mg/dL    Protein, total 6.5 6.0 - 8.5 g/dL    Albumin 4.3 3.6 - 4.8 g/dL    GLOBULIN, TOTAL 2.2 1.5 - 4.5 g/dL    A-G Ratio 2.0 1.2 - 2.2    Bilirubin, total 0.4 0.0 - 1.2 mg/dL    Alk.  phosphatase 74 39 - 117 IU/L    AST (SGOT) 33 0 - 40 IU/L    ALT (SGPT) 30 0 - 44 IU/L   LIPID PANEL   Result Value Ref Range    Cholesterol, total 158 100 - 199 mg/dL    Triglyceride 138 0 - 149 mg/dL    HDL Cholesterol 47 >39 mg/dL    VLDL, calculated 28 5 - 40 mg/dL    LDL, calculated 83 0 - 99 mg/dL   CVD REPORT   Result Value Ref Range    INTERPRETATION Note            Physical Exam  Visit Vitals    BP (!) 151/97 (BP 1 Location: Left arm, BP Patient Position: Sitting)    Pulse 76    Temp 97.9 °F (36.6 °C) (Oral)    Resp 18    Ht 5' 10\" (1.778 m)    Wt 213 lb 6.4 oz (96.8 kg)    SpO2 96%  BMI 30.62 kg/m2     Left Groin: There is bruising at the crease where the left leg meets the torso medially, and extends outward for about 4-5 cm. Slightly tender to palpation. Has full ROM of the hip with only mild discomfort to abduction. Use of the muscle in different positions causes only mild discomfort. ASSESSMENT and PLAN    ICD-10-CM ICD-9-CM    1. Strain of adductor josué muscle of left lower extremity, initial encounter S76.212A 843.8 ibuprofen (ADVIL) 200 mg tablet   2. Chronic pain of both shoulders- worse in AM and loosens up with activity. M25.511 719.41     G89.29 338.29     M25.512     3. Obesity (BMI 30.0-34. 9) E66.9 278.00    4. Hyperlipidemia LDL goal <130 E78.5 272.4    5. Vitamin D deficiency E55.9 268.9      Diagnoses and all orders for this visit:    1. Strain of adductor josué muscle of left lower extremity, initial encounter    2. Chronic pain of both shoulders- worse in AM and loosens up with activity. 3. Obesity (BMI 30.0-34.9)    4. Hyperlipidemia LDL goal <130    5. Vitamin D deficiency      Follow-up Disposition:  Return in about 3 months (around 8/22/2018), or if symptoms worsen or fail to improve, for F/U cholesterol. reviewed medications and side effects in detail  Please call my office if there are any questions- [phone]. Discussed expected course/resolution/complications of diagnosis in detail with patient. Medication risks/benefits/costs/interactions/alternatives discussed with patient. Pt was given an after visit summary which includes diagnoses, current medications & vitals. Pt expressed understanding with the diagnosis and plan. Patient to call if no better in 3 -4 days and prn new problems. Told pt that he has strained the muscle of his adductor. He has some exercises that someone has given him, so I reviewed them and determined that they are strengthening and stretching exercises of this muscle.  Encouraged him to continue with those and gave additional exercises to do. Suggested as he gets better to incorporate hitting a tennis ball against the wall into his routine and to slowly increase the speed of his change in direction from right to left and left to right before he gets back into tennis. This document was written by Kristy Jimenes, as dictated by Karen Lawrence MD.  I have reviewed and agree with the above note and have made corrections where appropriate Ancelmo Bowling M.D.

## 2018-08-08 ENCOUNTER — OFFICE VISIT (OUTPATIENT)
Dept: FAMILY MEDICINE CLINIC | Age: 63
End: 2018-08-08

## 2018-08-08 VITALS
BODY MASS INDEX: 30.92 KG/M2 | HEART RATE: 63 BPM | TEMPERATURE: 97.5 F | RESPIRATION RATE: 16 BRPM | SYSTOLIC BLOOD PRESSURE: 142 MMHG | WEIGHT: 216 LBS | OXYGEN SATURATION: 97 % | DIASTOLIC BLOOD PRESSURE: 80 MMHG | HEIGHT: 70 IN

## 2018-08-08 DIAGNOSIS — E66.9 OBESITY (BMI 30.0-34.9): ICD-10-CM

## 2018-08-08 DIAGNOSIS — M25.512 CHRONIC PAIN OF BOTH SHOULDERS: Chronic | ICD-10-CM

## 2018-08-08 DIAGNOSIS — M25.511 CHRONIC PAIN OF BOTH SHOULDERS: Chronic | ICD-10-CM

## 2018-08-08 DIAGNOSIS — G89.29 CHRONIC PAIN OF BOTH SHOULDERS: Chronic | ICD-10-CM

## 2018-08-08 DIAGNOSIS — E78.5 HYPERLIPIDEMIA LDL GOAL <130: ICD-10-CM

## 2018-08-08 DIAGNOSIS — M50.30 DDD (DEGENERATIVE DISC DISEASE), CERVICAL: ICD-10-CM

## 2018-08-08 DIAGNOSIS — E55.9 VITAMIN D DEFICIENCY: ICD-10-CM

## 2018-08-08 DIAGNOSIS — Z00.00 ANNUAL PHYSICAL EXAM: Primary | ICD-10-CM

## 2018-08-08 DIAGNOSIS — J30.1 SEASONAL ALLERGIC RHINITIS DUE TO POLLEN: ICD-10-CM

## 2018-08-08 NOTE — PROGRESS NOTES
HISTORY OF PRESENT ILLNESS  MADELYN Mckeon is a 57 y.o. Male with a history of DDD, DJD, vitamin D deficiency and hyperlipidemia with LDL goal <130, who presents at the office today for a complete physical. Pt's groin injury has improved, but notes that he still occasionally has some pain when walking. Past Medical History:   Diagnosis Date    Allergic rhinitis 2/27/2010    Chronic pain of both shoulders- worse in AM and loosens up with activity. 3/20/2016    DDD,DJD cervicalC5-6,6-7foraminal encroachment on X ray-4/2013 5/17/2013    Obesity (BMI 30.0-34.9) 2/5/2018    Other and unspecified hyperlipidemia 2/27/2010    Other and unspecified hyperlipidemia 2/27/2010     Past Surgical History:   Procedure Laterality Date    HX CATARACT REMOVAL  87-88    inocente    HX CATARACT REMOVAL      HX TONSIL AND ADENOIDECTOMY  1960    SINUS SURGERY PROC UNLISTED  2001     Current Outpatient Prescriptions on File Prior to Visit   Medication Sig Dispense Refill    ibuprofen (ADVIL) 200 mg tablet Take  by mouth.  simvastatin (ZOCOR) 40 mg tablet TAKE 1 TABLET BY MOUTH EVERY DAY AT NIGHT *NEEDS FASTING OFFICE VISIT* 90 Tab 3    Cholecalciferol, Vitamin D3, (VITAMIN D3) 1,000 unit cap Take 2,000 Units by mouth daily (after breakfast).  aspirin 81 mg tablet Take  by mouth. No current facility-administered medications on file prior to visit. Allergies   Allergen Reactions    Pcn [Penicillins] Rash     Family History   Problem Relation Age of Onset    Cancer Mother      ovarian    Cancer Father      prostate?     Elevated Lipids Father      Social History     Social History    Marital status:      Spouse name: N/A    Number of children: N/A    Years of education: N/A     Social History Main Topics    Smoking status: Never Smoker    Smokeless tobacco: Never Used    Alcohol use Yes      Comment: 12oz beer once monthly     Drug use: No    Sexual activity: Yes     Partners: Female     Other Topics Concern    None     Social History Narrative             Review of Systems   Constitutional: Negative for chills, diaphoresis, fever, malaise/fatigue and weight loss. HENT: Negative for congestion, ear discharge, ear pain, hearing loss, nosebleeds, sore throat and tinnitus. Eyes: Negative for blurred vision, double vision, photophobia, pain, discharge and redness. Respiratory: Negative for cough, hemoptysis, sputum production, shortness of breath, wheezing and stridor. Cardiovascular: Negative for chest pain, palpitations, orthopnea, claudication, leg swelling and PND. Gastrointestinal: Negative for abdominal pain, blood in stool, constipation, diarrhea, heartburn, melena, nausea and vomiting. Genitourinary: Positive for urgency. Negative for dysuria, flank pain, frequency and hematuria. Musculoskeletal: Negative for back pain, falls, joint pain, myalgias and neck pain. Occasional groin pain   Skin: Negative for itching and rash. Neurological: Negative for dizziness, tingling, tremors, sensory change, speech change, focal weakness, seizures, loss of consciousness, weakness and headaches. Endo/Heme/Allergies: Negative for environmental allergies and polydipsia. Does not bruise/bleed easily. Psychiatric/Behavioral: Negative for depression, hallucinations, memory loss, substance abuse and suicidal ideas. The patient is not nervous/anxious and does not have insomnia.       Results for orders placed or performed in visit on 08/08/18   URINALYSIS W/ RFLX MICROSCOPIC   Result Value Ref Range    Specific Gravity 1.019 1.005 - 1.030    pH (UA) 5.5 5.0 - 7.5    Color Yellow Yellow    Appearance Clear Clear    Leukocyte Esterase Negative Negative    Protein Negative Negative/Trace    Glucose Negative Negative    Ketone Negative Negative    Blood Negative Negative    Bilirubin Negative Negative    Urobilinogen 0.2 0.2 - 1.0 mg/dL    Nitrites Negative Negative    Microscopic Examination Comment          Physical Exam  Visit Vitals    /80 (BP 1 Location: Right arm, BP Patient Position: Sitting)    Pulse 63    Temp 97.5 °F (36.4 °C) (Oral)    Resp 16    Ht 5' 10\" (1.778 m)    Wt 216 lb (98 kg)    SpO2 97%    BMI 30.99 kg/m2     General:  Alert, cooperative, no distress, appears stated age. Head:  Normocephalic, without obvious abnormality, atraumatic. Eyes:  Conjunctivae/corneas clear. PERRL, EOMs intact. Fundi benign   Ears:  Normal TMs and external ear canals both ears. Nose: Nares normal. Septum midline. Mucosa normal. No drainage or sinus tenderness. Throat: Lips, mucosa, and tongue normal. Teeth and gums normal.   Neck: Supple, symmetrical, trachea midline, no adenopathy, thyroid: no enlargement/tenderness/nodules, no carotid bruit and no JVD. Back:   Symmetric, no curvature. ROM normal. No CVA tenderness. Lungs:   Clear to auscultation bilaterally. Chest wall:  No tenderness or deformity. Heart:  Regular rate and rhythm, S1, S2 normal, no murmur, click, rub or gallop. Abdomen:   Soft, non-tender. Bowel sounds normal. No masses,  No organomegaly. Genitalia:  Normal male without lesion, discharge or tenderness. Rectal:  Normal tone, trace enlarged prostate, no masses or tenderness  Guaiac negative stool. Extremities: Extremities normal, atraumatic, no cyanosis or edema. Pulses: 2+ and symmetric all extremities. Skin: Skin color, texture, turgor normal. No rashes or lesions   Lymph nodes: Cervical, supraclavicular, and axillary nodes normal.   Neurologic: CNII-XII intact. Normal strength, sensation and reflexes throughout. ASSESSMENT and PLAN    ICD-10-CM ICD-9-CM    1. Annual physical exam Z00.00 V70.0 LIPID PANEL      CBC W/O DIFF      METABOLIC PANEL, COMPREHENSIVE      URINALYSIS W/ RFLX MICROSCOPIC      PSA, DIAGNOSTIC (PROSTATE SPECIFIC AG)      VITAMIN D, 25 HYDROXY      AMB POC EKG ROUTINE W/ 12 LEADS, INTER & REP   2. Hyperlipidemia LDL goal <130 E78.5 272.4 LIPID PANEL      METABOLIC PANEL, COMPREHENSIVE   3. Vitamin D deficiency E55.9 268.9 VITAMIN D, 25 HYDROXY   4. Chronic pain of both shoulders- worse in AM and loosens up with activity. M25.511 719.41     G89.29 338.29     M25.512     5. Obesity (BMI 30.0-34. 9) E66.9 278.00    6. Seasonal allergic rhinitis due to pollen J30.1 477.0    7. DDD,DJD cervicalC5-6,6-7foraminal encroachment on X ray-4/2013 M50.30 722.4      Diagnoses and all orders for this visit:    1. Annual physical exam  -     LIPID PANEL  -     CBC W/O DIFF  -     METABOLIC PANEL, COMPREHENSIVE  -     URINALYSIS W/ RFLX MICROSCOPIC  -     PROSTATE SPECIFIC AG  -     VITAMIN D, 25 HYDROXY  -     AMB POC EKG ROUTINE W/ 12 LEADS, INTER & REP    2. Hyperlipidemia LDL goal <130  -     LIPID PANEL  -     METABOLIC PANEL, COMPREHENSIVE    3. Vitamin D deficiency  -     VITAMIN D, 25 HYDROXY    4. Chronic pain of both shoulders- worse in AM and loosens up with activity. 5. Obesity (BMI 30.0-34.9)    6. Seasonal allergic rhinitis due to pollen    7. DDD,DJD cervicalC5-6,6-7foraminal encroachment on X ray-4/2013      Follow-up Disposition:  Return in about 6 months (around 2/8/2019), or if bladder symptoms worsen or fail to improve, for F/U cholesterol, F/U of obesity. lab results and schedule of future lab studies reviewed with patient  reviewed diet, exercise and weight control  cardiovascular risk and specific lipid/LDL goals reviewed  reviewed medications and side effects in detail  Please call my office if there are any questions- [phone]. I will arrange for follow up after the lab tests done from today return    Call for refills on medications as needed. Discussed expected course/resolution/complications of diagnosis in detail with patient. Medication risks/benefits/costs/interactions/alternatives discussed with patient.    Pt was given an after visit summary which includes diagnoses, current medications & vitals. Pt expressed understanding with the diagnosis and plan. Total 25 minutes,60 % counseling re: . BMI is significantly elevated- in the obese range. I reviewed diet, exercise and weight control. Discussed weight control in detail, the importance of mainly decreased carbs, and for weight maintenance,  The importance of exercise; discussed different diets and that it isn't as important to watch the type of foods as it is to decrease calorie intake no matter what type of diet you do, etc.      Regular exercise is very important to your health; it helps mentally, physically, socially; it prevents injuries if done properly. Exercise, even as simple as walking 20-30 minutes daily has major benefits to your health even though your \"numbers\" are the same in the lab. See if you can add this into your daily regimen and after a few months it will become a regular habit-\"just something you do,\" like brushing your teeth. A combination of aerobic exercise and strengthening and stretching is felt to be the best for you, so this should be your ultimate goal.   This can be done in the privacy of your home or in a group setting as at the gym  Some prefer having a , others prefer to do exercise in groups or individually. Do what \"works\" for you. You need to make it simple and \"fun,\" or you most likely you will not continue it. Reviewed symptoms, or lack thereof, of hypertension and elevated cholesterol. His BP in the past has been elevated due to white coat syndrome, but he admits to not checking his BP at home recently. I encouraged him to start doing that. Reviewed in detail the proper technique of checking the blood pressure- check it on an average day only, not on a stressful day, sitting, no exercise for at least 1 hour and not experiencing any new pain( chronic pain is OK).  Patient encouraged to check BP sitting and standing at least once a month and to report these readings to me if > 130/ 80 on average , or if the standing BP is >  15 points lower than the sitting. Try stopping bladder irritants( artificial sweeteners, caffeine, citrus and tomato-based products) for 3 days to see if it improves the bladder symptoms he is having. Discussed the importance of weight loss, as well as the difference between calorie restriction and exercise as far as weight loss and as far as overall health. He also has intermittent bladder urgency that is severe enough for him to immediately stop to use the bathroom, otherwise he will have incontinence. He will follow up with us if his bladder symptoms do not improve with his changes to his diet. Also, discussed symptoms of concern that were noted today in the note above, treatment options( including doing nothing), when to follow up before recommended time frame. Also, answered all questions. Recommended a weekly \"heart check. \" I went into detail how to do this. This document was written by Uzma Powell, as dictated by Sera Brand MD.  I have reviewed and agree with the above note and have made corrections where appropriate Ancelmo Mckeon M.D.

## 2018-08-08 NOTE — PROGRESS NOTES
1. Have you been to the ER, urgent care clinic since your last visit? Hospitalized since your last visit? No    2. Have you seen or consulted any other health care providers outside of the [address] [place] [address] since your last visit? Include any pap smears or colon screening.  No     Chief Complaint   Patient presents with    Complete Physical    Cholesterol Problem     follow up    Vitamin D Deficiency     fasting

## 2018-08-08 NOTE — MR AVS SNAPSHOT
[facility] 
 
 
 [address] [place] [address] 
[phone] Patient: Jonathon Neal MRN: [id number] Visit Information Date & Time Provider Department Dept. Phone Encounter #  
 8/8/2018 11:00 AM Aydee Mckee  [facility] [phone] [id number] Your Appointments 2/11/2019  8:45 AM  
ROUTINE CARE with Aydee Mckee MD  
[facility]) Appt Note: 6 month chol follow up  
 [address] [address]  
[phone]  
  
   
 [address] [address] Upcoming Health Maintenance Date Due Influenza Age 5 to Adult 8/1/2018 COLONOSCOPY 10/13/2024 DTaP/Tdap/Td series (4 - Td) 2/3/2028 Allergies as of 8/8/2018  Review Complete On: 8/8/2018 By: Stephanie Bingham LPN Severity Noted Reaction Type Reactions Pcn [Penicillins]  02/27/2010    Rash Current Immunizations  Reviewed on 6/20/2013 Name Date TDAP Vaccine 4/27/2010 Tdap 2/3/2018, 7/28/2015  
 dT Vaccine 1/27/1999 Not reviewed this visit You Were Diagnosed With   
  
 Codes Comments Annual physical exam    -  Primary ICD-10-CM: Z00.00 ICD-9-CM: V70.0 Hyperlipidemia LDL goal <130     ICD-10-CM: E78.5 ICD-9-CM: 272.4 Vitamin D deficiency     ICD-10-CM: E55.9 ICD-9-CM: 268.9 Chronic pain of both shoulders     ICD-10-CM: M25.511, G89.29, M25.512 ICD-9-CM: 719.41, 338.29 Obesity (BMI 30.0-34.9)     ICD-10-CM: H42.1 ICD-9-CM: 278.00 Seasonal allergic rhinitis due to pollen     ICD-10-CM: J30.1 ICD-9-CM: 477.0   
 DDD (degenerative disc disease), cervical     ICD-10-CM: M50.30 ICD-9-CM: 722.4 Vitals BP Pulse Temp Resp Height(growth percentile) Weight(growth percentile) 142/80 (BP 1 Location: Right arm, BP Patient Position: Sitting) 63 97.5 °F (36.4 °C) (Oral) 16 5' 10\" (1.778 m) 216 lb (98 kg) SpO2 BMI Smoking Status 97% 30.99 kg/m2 Never Smoker Vitals History BMI and BSA Data Body Mass Index Body Surface Area 30.99 kg/m 2 2.2 m 2 Preferred Pharmacy Pharmacy Name Phone [id number] [address] [facility] [phone] Your Updated Medication List  
  
   
This list is accurate as of 8/8/18  1:14 PM.  Always use your most recent med list. ADVIL 200 mg tablet Generic drug:  ibuprofen Take  by mouth. aspirin 81 mg tablet Take  by mouth. simvastatin 40 mg tablet Commonly known as:  ZOCOR  
TAKE 1 TABLET BY MOUTH EVERY DAY AT NIGHT *NEEDS FASTING OFFICE VISIT* VITAMIN D3 1,000 unit Cap Generic drug:  cholecalciferol Take 2,000 Units by mouth daily (after breakfast). We Performed the Following AMB POC EKG ROUTINE W/ 12 LEADS, INTER & REP [55230 CPT(R)] CBC W/O DIFF [87154 CPT(R)] LIPID PANEL [27091 CPT(R)] METABOLIC PANEL, COMPREHENSIVE [33919 CPT(R)] PSA, DIAGNOSTIC (PROSTATE SPECIFIC AG) [id number] CPT(R)] URINALYSIS W/ RFLX MICROSCOPIC [23432 CPT(R)] VITAMIN D, 25 HYDROXY [id number] CPT(R)] Introducing [facility]! Dear Dominic Almanza: Thank you for requesting a [employer] account. Our records indicate that you already have an active [employer] account. You can access your account anytime at [email or web address] Did you know that you can access your hospital and ER discharge instructions at any time in [employer]? You can also review all of your test results from your hospital stay or ER visit. Additional Information If you have questions, please visit the Frequently Asked Questions section of the [employer] website at [email or web address] Remember, [employer] is NOT to be used for urgent needs. For medical emergencies, dial 911. Now available from your iPhone and Android! Please provide this summary of care documentation to your next provider. Your primary care clinician is listed as [address] [address], [facility]/s . If you have any questions after today's visit, please call [phone].

## 2018-08-09 LAB
25(OH)D3+25(OH)D2 SERPL-MCNC: 44.4 NG/ML (ref 30–100)
ALBUMIN SERPL-MCNC: 4.6 G/DL (ref 3.6–4.8)
ALBUMIN/GLOB SERPL: 2.1 {RATIO} (ref 1.2–2.2)
ALP SERPL-CCNC: 81 IU/L (ref 39–117)
ALT SERPL-CCNC: 18 IU/L (ref 0–44)
APPEARANCE UR: CLEAR
AST SERPL-CCNC: 22 IU/L (ref 0–40)
BILIRUB SERPL-MCNC: 0.5 MG/DL (ref 0–1.2)
BILIRUB UR QL STRIP: NEGATIVE
BUN SERPL-MCNC: 10 MG/DL (ref 8–27)
BUN/CREAT SERPL: 10 (ref 10–24)
CALCIUM SERPL-MCNC: 9.3 MG/DL (ref 8.6–10.2)
CHLORIDE SERPL-SCNC: 104 MMOL/L (ref 96–106)
CHOLEST SERPL-MCNC: 154 MG/DL (ref 100–199)
CO2 SERPL-SCNC: 20 MMOL/L (ref 20–29)
COLOR UR: YELLOW
CREAT SERPL-MCNC: 1.04 MG/DL (ref 0.76–1.27)
ERYTHROCYTE [DISTWIDTH] IN BLOOD BY AUTOMATED COUNT: 14.5 % (ref 12.3–15.4)
GLOBULIN SER CALC-MCNC: 2.2 G/DL (ref 1.5–4.5)
GLUCOSE SERPL-MCNC: 78 MG/DL (ref 65–99)
GLUCOSE UR QL: NEGATIVE
HCT VFR BLD AUTO: 45.2 % (ref 37.5–51)
HDLC SERPL-MCNC: 51 MG/DL
HGB BLD-MCNC: 15.3 G/DL (ref 13–17.7)
HGB UR QL STRIP: NEGATIVE
INTERPRETATION, 910389: NORMAL
KETONES UR QL STRIP: NEGATIVE
LDLC SERPL CALC-MCNC: 80 MG/DL (ref 0–99)
LEUKOCYTE ESTERASE UR QL STRIP: NEGATIVE
MCH RBC QN AUTO: 29.4 PG (ref 26.6–33)
MCHC RBC AUTO-ENTMCNC: 33.8 G/DL (ref 31.5–35.7)
MCV RBC AUTO: 87 FL (ref 79–97)
MICRO URNS: NORMAL
NITRITE UR QL STRIP: NEGATIVE
PH UR STRIP: 5.5 [PH] (ref 5–7.5)
PLATELET # BLD AUTO: 237 X10E3/UL (ref 150–379)
POTASSIUM SERPL-SCNC: 4.5 MMOL/L (ref 3.5–5.2)
PROT SERPL-MCNC: 6.8 G/DL (ref 6–8.5)
PROT UR QL STRIP: NEGATIVE
PSA SERPL-MCNC: 3.1 NG/ML (ref 0–4)
RBC # BLD AUTO: 5.2 X10E6/UL (ref 4.14–5.8)
SODIUM SERPL-SCNC: 142 MMOL/L (ref 134–144)
SP GR UR: 1.02 (ref 1–1.03)
TRIGL SERPL-MCNC: 116 MG/DL (ref 0–149)
UROBILINOGEN UR STRIP-MCNC: 0.2 MG/DL (ref 0.2–1)
VLDLC SERPL CALC-MCNC: 23 MG/DL (ref 5–40)
WBC # BLD AUTO: 6.3 X10E3/UL (ref 3.4–10.8)

## 2018-10-05 ENCOUNTER — OFFICE VISIT (OUTPATIENT)
Dept: FAMILY MEDICINE CLINIC | Age: 63
End: 2018-10-05

## 2018-10-05 VITALS
WEIGHT: 213 LBS | OXYGEN SATURATION: 98 % | RESPIRATION RATE: 14 BRPM | TEMPERATURE: 97.6 F | HEART RATE: 66 BPM | DIASTOLIC BLOOD PRESSURE: 80 MMHG | SYSTOLIC BLOOD PRESSURE: 128 MMHG | HEIGHT: 70 IN | BODY MASS INDEX: 30.49 KG/M2

## 2018-10-05 DIAGNOSIS — J01.10 ACUTE NON-RECURRENT FRONTAL SINUSITIS: ICD-10-CM

## 2018-10-05 DIAGNOSIS — J06.9 VIRAL URI WITH COUGH: Primary | ICD-10-CM

## 2018-10-05 RX ORDER — AZITHROMYCIN 250 MG/1
TABLET, FILM COATED ORAL
Qty: 6 TAB | Refills: 0 | Status: SHIPPED | OUTPATIENT
Start: 2018-10-05 | End: 2018-10-10

## 2018-10-05 NOTE — PROGRESS NOTES
Chief Complaint   Patient presents with    Cold Symptoms     1. Have you been to the ER, urgent care clinic since your last visit? Hospitalized since your last visit? No    2. Have you seen or consulted any other health care providers outside of the [facility] since your last visit? Include any pap smears or colon screening.  No     Flu Vaccine- Pt would like to recieve    Shingrex Vaccine- Overdue

## 2018-10-05 NOTE — PROGRESS NOTES
HISTORY OF PRESENT ILLNESS  HPI  Aly Rankin is a 57 y.o. Male with a history of DDD, DJD, vitamin D deficiency and hyperlipidemia with LDL goal <130, who presents at the office today for cold symptoms. Pt states that he started coughing on 9/26, and that he has since started having symptoms of nasal drainage and sore throat. He notes that the sore throat has remained mostly resolved. Pt has started developing a productive cough. Pt admits that he has been refurbishing a room in his house. Pt had surgery for sinusitis approximately 20 years ago. Pt denies ear ache or fever. Past Medical History:   Diagnosis Date    Allergic rhinitis 2/27/2010    Chronic pain of both shoulders- worse in AM and loosens up with activity. 3/20/2016    DDD,DJD cervicalC5-6,6-7foraminal encroachment on X ray-4/2013 5/17/2013    Obesity (BMI 30.0-34.9) 2/5/2018    Other and unspecified hyperlipidemia 2/27/2010    Other and unspecified hyperlipidemia 2/27/2010     Past Surgical History:   Procedure Laterality Date    HX CATARACT REMOVAL  87-88    inocente    HX CATARACT REMOVAL      HX TONSIL AND ADENOIDECTOMY  1960    SINUS SURGERY PROC UNLISTED  2001     Current Outpatient Prescriptions on File Prior to Visit   Medication Sig Dispense Refill    ibuprofen (ADVIL) 200 mg tablet Take  by mouth.  simvastatin (ZOCOR) 40 mg tablet TAKE 1 TABLET BY MOUTH EVERY DAY AT NIGHT *NEEDS FASTING OFFICE VISIT* 90 Tab 3    Cholecalciferol, Vitamin D3, (VITAMIN D3) 1,000 unit cap Take 2,000 Units by mouth daily (after breakfast).  aspirin 81 mg tablet Take  by mouth. No current facility-administered medications on file prior to visit. Allergies   Allergen Reactions    Pcn [Penicillins] Rash     Family History   Problem Relation Age of Onset    Cancer Mother      ovarian    Cancer Father      prostate?     Elevated Lipids Father      Social History     Social History    Marital status:      Spouse name: N/A    Number of children: N/A    Years of education: N/A     Social History Main Topics    Smoking status: Never Smoker    Smokeless tobacco: Never Used    Alcohol use Yes      Comment: 12oz beer once monthly     Drug use: No    Sexual activity: Yes     Partners: Female     Other Topics Concern    None     Social History Narrative             Review of Systems   Constitutional: Negative for chills, diaphoresis, fever, malaise/fatigue and weight loss. HENT: Positive for sore throat. Negative for ear pain. Nasal drainage   Eyes: Negative for blurred vision, double vision, pain and redness. Respiratory: Positive for cough and sputum production (green). Negative for shortness of breath and wheezing. Cardiovascular: Negative for chest pain, palpitations, orthopnea, claudication, leg swelling and PND. Skin: Negative for itching and rash. Neurological: Negative for dizziness, tingling, tremors, sensory change, speech change, focal weakness, seizures, loss of consciousness, weakness and headaches. Results for orders placed or performed in visit on 08/08/18   LIPID PANEL   Result Value Ref Range    Cholesterol, total 154 100 - 199 mg/dL    Triglyceride 116 0 - 149 mg/dL    HDL Cholesterol 51 >39 mg/dL    VLDL, calculated 23 5 - 40 mg/dL    LDL, calculated 80 0 - 99 mg/dL   CBC W/O DIFF   Result Value Ref Range    WBC 6.3 3.4 - 10.8 x10E3/uL    RBC 5.20 4. 14 - 5.80 x10E6/uL    HGB 15.3 13.0 - 17.7 g/dL    HCT 45.2 37.5 - 51.0 %    MCV 87 79 - 97 fL    MCH 29.4 26.6 - 33.0 pg    MCHC 33.8 31.5 - 35.7 g/dL    RDW 14.5 12.3 - 15.4 %    PLATELET 844 586 - 316 K01V8/HD   METABOLIC PANEL, COMPREHENSIVE   Result Value Ref Range    Glucose 78 65 - 99 mg/dL    BUN 10 8 - 27 mg/dL    Creatinine 1.04 0.76 - 1.27 mg/dL    GFR est non-AA 76 >59 mL/min/1.73    GFR est AA 87 >59 mL/min/1.73    BUN/Creatinine ratio 10 10 - 24    Sodium 142 134 - 144 mmol/L    Potassium 4.5 3.5 - 5.2 mmol/L    Chloride 104 96 - 106 mmol/L    CO2 20 20 - 29 mmol/L    Calcium 9.3 8.6 - 10.2 mg/dL    Protein, total 6.8 6.0 - 8.5 g/dL    Albumin 4.6 3.6 - 4.8 g/dL    GLOBULIN, TOTAL 2.2 1.5 - 4.5 g/dL    A-G Ratio 2.1 1.2 - 2.2    Bilirubin, total 0.5 0.0 - 1.2 mg/dL    Alk. phosphatase 81 39 - 117 IU/L    AST (SGOT) 22 0 - 40 IU/L    ALT (SGPT) 18 0 - 44 IU/L   URINALYSIS W/ RFLX MICROSCOPIC   Result Value Ref Range    Specific Gravity 1.019 1.005 - 1.030    pH (UA) 5.5 5.0 - 7.5    Color Yellow Yellow    Appearance Clear Clear    Leukocyte Esterase Negative Negative    Protein Negative Negative/Trace    Glucose Negative Negative    Ketone Negative Negative    Blood Negative Negative    Bilirubin Negative Negative    Urobilinogen 0.2 0.2 - 1.0 mg/dL    Nitrites Negative Negative    Microscopic Examination Comment    PSA, DIAGNOSTIC (PROSTATE SPECIFIC AG)   Result Value Ref Range    Prostate Specific Ag 3.1 0.0 - 4.0 ng/mL   VITAMIN D, 25 HYDROXY   Result Value Ref Range    VITAMIN D, 25-HYDROXY 44.4 30.0 - 100.0 ng/mL   CVD REPORT   Result Value Ref Range    INTERPRETATION Note          Physical Exam  Visit Vitals    /80 (BP 1 Location: Left arm, BP Patient Position: Sitting)    Pulse 66    Temp 97.6 °F (36.4 °C) (Oral)    Resp 14    Ht 5' 10\" (1.778 m)    Wt 213 lb (96.6 kg)    SpO2 98%    BMI 30.56 kg/m2     General appearance: alert, cooperative, no distress, appears stated age  Head: Normocephalic, without obvious abnormality, atraumatic  Eyes: conjunctivae/corneas clear. PERRL, EOM's intact.    Ears: normal TM's and external ear canals AU  Nose: purulent, scant discharge, moderate congestion, turbinates red, swollen, inflamed, no sinus tenderness, no polyps, no crusting or bleeding points  Throat: Lips, mucosa, and tongue normal. Teeth and gums normal  Neck: supple, symmetrical, trachea midline, no adenopathy, thyroid: not enlarged, symmetric, no tenderness/mass/nodules, no carotid bruit and no JVD  Lungs: clear to auscultation bilaterally  Heart: regular rate and rhythm, S1, S2 normal, no murmur, click, rub or gallop  Extremities: extremities normal, atraumatic, no cyanosis or edema  Lymph nodes: Cervical, supraclavicular, and axillary nodes normal.  Neurologic: Grossly normal      ASSESSMENT and PLAN    ICD-10-CM ICD-9-CM    1. Viral URI with cough J06.9 465.9     B97.89     2. Acute non-recurrent frontal sinusitis J01.10 461.1 azithromycin (ZITHROMAX) 250 mg tablet     Diagnoses and all orders for this visit:    1. Viral URI with cough    2. Acute non-recurrent frontal sinusitis  -     azithromycin (ZITHROMAX) 250 mg tablet; Take 2 tablets today, then take 1 tablet daily      Follow-up Disposition:  Return in about 4 months (around 2/5/2019), or if symptoms worsen or fail to improve, for f/u Vitamin D deficiency, F/U cholesterol. reviewed medications and side effects in detail  Please call my office if there are any questions- [phone]. Discussed expected course/resolution/complications of diagnosis in detail with patient. Medication risks/benefits/costs/interactions/alternatives discussed with patient. Pt was given an after visit summary which includes diagnoses, current medications & vitals. Pt expressed understanding with the diagnosis and plan. Patient to call if no better in 3 -4 days and prn new problems. Informed pt that this initially sounds like a viral infection, though it is possible that it is due to allergies. He is on the verge of a sinus infection as well. I suggested that he try Afrin BID for no more than 3 days. If it is not resolved at that point he can try OTC Sudafed and saline nose spray. If the drainage becomes thicker or has a bad taste to it he should start the Z-deanne.     Saline nose spray regularly, at least 4 times a day    This document was written by Artice Homans, as dictated by Samira Chand MD.  I have reviewed and agree with the above note and have made corrections where appropriate Ancelmo Acevedo M.D.

## 2018-10-28 NOTE — PROGRESS NOTES
GREAT NEWS!! All of your recent lab tests are normal or at goal.   No diabetes, normal liver, Vitamin D and kidney tests. I would continue everything the same and schedule your next fasting office visit in 6 months. In addition, a physical/ Annual Wellness Visit is recommended every year after the age of 61. Pt has viewed these results in [employer] and we also discussed at most recent office visit 10/5; recheck every 6 months fasting office visit.  He has appt inn Feb.

## 2019-01-22 DIAGNOSIS — E78.5 HYPERLIPIDEMIA LDL GOAL <130: ICD-10-CM

## 2019-01-22 NOTE — TELEPHONE ENCOUNTER
Pharmacy requesting medication refill for a 90 day supply     Requested Prescriptions     Pending Prescriptions Disp Refills    simvastatin (ZOCOR) 40 mg tablet 90 Tab 3     Sig: TAKE 1 TABLET BY MOUTH EVERY DAY AT NIGHT *NEEDS FASTING OFFICE VISIT*     Pharmacy on file verified

## 2019-01-23 RX ORDER — SIMVASTATIN 40 MG/1
TABLET, FILM COATED ORAL
Qty: 90 TAB | Refills: 0 | Status: SHIPPED | OUTPATIENT
Start: 2019-01-23 | End: 2019-04-25 | Stop reason: SDUPTHER

## 2019-02-11 ENCOUNTER — HOSPITAL ENCOUNTER (OUTPATIENT)
Dept: LAB | Age: 64
Discharge: HOME | End: 2019-02-11
Payer: MEDICARE

## 2019-02-11 ENCOUNTER — OFFICE VISIT (OUTPATIENT)
Dept: FAMILY MEDICINE CLINIC | Age: 64
End: 2019-02-11

## 2019-02-11 VITALS
SYSTOLIC BLOOD PRESSURE: 138 MMHG | BODY MASS INDEX: 31.35 KG/M2 | HEIGHT: 70 IN | OXYGEN SATURATION: 99 % | HEART RATE: 70 BPM | RESPIRATION RATE: 18 BRPM | TEMPERATURE: 97.5 F | WEIGHT: 219 LBS | DIASTOLIC BLOOD PRESSURE: 76 MMHG

## 2019-02-11 DIAGNOSIS — M25.512 CHRONIC PAIN OF BOTH SHOULDERS: Chronic | ICD-10-CM

## 2019-02-11 DIAGNOSIS — M25.511 CHRONIC PAIN OF BOTH SHOULDERS: Chronic | ICD-10-CM

## 2019-02-11 DIAGNOSIS — G89.29 CHRONIC PAIN OF BOTH SHOULDERS: Chronic | ICD-10-CM

## 2019-02-11 DIAGNOSIS — E66.9 OBESITY (BMI 30.0-34.9): ICD-10-CM

## 2019-02-11 DIAGNOSIS — R35.1 NOCTURIA MORE THAN TWICE PER NIGHT: ICD-10-CM

## 2019-02-11 DIAGNOSIS — M50.30 DDD (DEGENERATIVE DISC DISEASE), CERVICAL: ICD-10-CM

## 2019-02-11 DIAGNOSIS — E55.9 VITAMIN D DEFICIENCY: ICD-10-CM

## 2019-02-11 DIAGNOSIS — E78.5 HYPERLIPIDEMIA LDL GOAL <130: Primary | ICD-10-CM

## 2019-02-11 DIAGNOSIS — J30.1 SEASONAL ALLERGIC RHINITIS DUE TO POLLEN: ICD-10-CM

## 2019-02-11 PROCEDURE — 80053 COMPREHEN METABOLIC PANEL: CPT

## 2019-02-11 PROCEDURE — 80061 LIPID PANEL: CPT

## 2019-02-11 NOTE — PROGRESS NOTES
HISTORY OF PRESENT ILLNESS 
HPI Yefri Carter is a 70 y.o. Male with a history of cervical DDD and DJD, bilateral shoulder pain, vitamin D deficiency and hyperlipidemia with LDL goal <130, who presents at the office today for a follow up. Pt is taking 40 mg simvastatin every day. Pt denies unusual SOB, chest pain, and any recent ER visits or hospitalizations. Pt complains of frequent nocturia, 1-3 times per night. Pt admits that he has cut out some bladder irritants, but is still eating tomato based products. Pt denies weak stream.  
 
 
 
 
Past Medical History:  
Diagnosis Date  Allergic rhinitis 2/27/2010  Chronic pain of both shoulders- worse in AM and loosens up with activity. 3/20/2016  DDD,DJD cervicalC5-6,6-7foraminal encroachment on X ray-4/2013 5/17/2013  Obesity (BMI 30.0-34.9) 2/5/2018  Other and unspecified hyperlipidemia 2/27/2010 Past Surgical History:  
Procedure Laterality Date  HX CATARACT REMOVAL  87-88  
 inocente  HX CATARACT REMOVAL    
 HX TONSIL AND ADENOIDECTOMY  1960  
 SINUS SURGERY [address] UNLISTED  2001 Current Outpatient Medications on File Prior to Visit Medication Sig Dispense Refill  simvastatin (ZOCOR) 40 mg tablet TAKE 1 TABLET BY MOUTH EVERY DAY AT NIGHT *NEEDS FASTING OFFICE VISIT* 90 Tab 0  ibuprofen (ADVIL) 200 mg tablet Take  by mouth.  Cholecalciferol, Vitamin D3, (VITAMIN D3) 1,000 unit cap Take 2,000 Units by mouth daily (after breakfast).  aspirin 81 mg tablet Take  by mouth. No current facility-administered medications on file prior to visit. Allergies Allergen Reactions  Pcn [Penicillins] Rash Family History Problem Relation Age of Onset  Cancer Mother   
     ovarian  Cancer Father   
     prostate?  Elevated Lipids Father Social History Socioeconomic History  Marital status:  Spouse name: Not on file  Number of children: Not on file  Years of education: Not on file  Highest education level: Not on file Tobacco Use  Smoking status: Never Smoker  Smokeless tobacco: Never Used Substance and Sexual Activity  Alcohol use: Yes Comment: 12oz beer once monthly  Drug use: No  
 Sexual activity: Yes  
  Partners: Female Review of Systems Constitutional: Negative for chills, diaphoresis, fever, malaise/fatigue and weight loss. Eyes: Negative for blurred vision, double vision, pain and redness. Respiratory: Negative for cough, shortness of breath and wheezing. Cardiovascular: Negative for chest pain, palpitations, orthopnea, claudication, leg swelling and PND. Genitourinary: Positive for frequency (nocturia). Skin: Negative for itching and rash. Neurological: Negative for dizziness, tingling, tremors, sensory change, speech change, focal weakness, seizures, loss of consciousness, weakness and headaches. Results for orders placed or performed in visit on 02/11/19 LIPID PANEL Result Value Ref Range Cholesterol, total 149 100 - 199 mg/dL Triglyceride 104 0 - 149 mg/dL HDL Cholesterol 47 >39 mg/dL VLDL, calculated 21 5 - 40 mg/dL LDL, calculated 81 0 - 99 mg/dL METABOLIC PANEL, COMPREHENSIVE Result Value Ref Range Glucose 83 65 - 99 mg/dL BUN 12 8 - 27 mg/dL Creatinine 1.12 0.76 - 1.27 mg/dL GFR est non-AA 69 >59 mL/min/1.73 GFR est AA 79 >59 mL/min/1.73  
 BUN/Creatinine ratio 11 10 - 24 Sodium 142 134 - 144 mmol/L Potassium 4.7 3.5 - 5.2 mmol/L Chloride 106 96 - 106 mmol/L  
 CO2 22 20 - 29 mmol/L Calcium 9.2 8.6 - 10.2 mg/dL Protein, total 6.5 6.0 - 8.5 g/dL Albumin 4.1 3.6 - 4.8 g/dL GLOBULIN, TOTAL 2.4 1.5 - 4.5 g/dL A-G Ratio 1.7 1.2 - 2.2 Bilirubin, total 0.5 0.0 - 1.2 mg/dL Alk. phosphatase 70 39 - 117 IU/L  
 AST (SGOT) 24 0 - 40 IU/L  
 ALT (SGPT) 23 0 - 44 IU/L  
CVD REPORT Result Value Ref Range INTERPRETATION Note Physical Exam 
Visit Vitals /76 (BP 1 Location: Right arm, BP Patient Position: Sitting) Pulse 70 Temp 97.5 °F (36.4 °C) (Oral) Resp 18 Ht 5' 10\" (1.778 m) Wt 219 lb (99.3 kg) SpO2 99% BMI 31.42 kg/m² Head: Normocephalic, without obvious abnormality, atraumatic Eyes: conjunctivae/corneas clear. PERRL, EOM's intact. Neck: supple, symmetrical, trachea midline, no adenopathy, thyroid: not enlarged, symmetric, no tenderness/mass/nodules, no carotid bruit and no JVD Lungs: clear to auscultation bilaterally Heart: regular rate and rhythm, S1, S2 normal, no murmur, click, rub or gallop Extremities: extremities normal, atraumatic, no cyanosis or edema Pulses: 2+ and symmetric Lymph nodes: Cervical, supraclavicular, and axillary nodes normal. 
Neurologic: Grossly normal 
 
 
ASSESSMENT and PLAN 
  ICD-10-CM ICD-9-CM 1. Hyperlipidemia LDL goal <130 E78.5 272.4 LIPID PANEL  
   METABOLIC PANEL, COMPREHENSIVE 2. Vitamin D deficiency E55.9 268.9 3. Obesity (BMI 30.0-34. 9) E66.9 278.00   
4. Seasonal allergic rhinitis due to pollen J30.1 477.0 5. DDD,DJD cervicalC5-6,6-7foraminal encroachment on X ray-4/2013 M50.30 722.4 6. Chronic pain of both shoulders- worse in AM and loosens up with activity. M25.511 719.41   
 G89.29 338.29   
 M25.512    
7. Nocturia more than twice per night R35.1 788.43 Diagnoses and all orders for this visit: 
 
1. Hyperlipidemia LDL goal <130 
-     LIPID PANEL 
-     METABOLIC PANEL, COMPREHENSIVE 2. Vitamin D deficiency 3. Obesity (BMI 30.0-34.9) 4. Seasonal allergic rhinitis due to pollen 5. DDD,DJD cervicalC5-6,6-7foraminal encroachment on X ray-4/2013 6. Chronic pain of both shoulders- worse in AM and loosens up with activity. 7. Nocturia more than twice per night Other orders -     CVD REPORT Follow-up Disposition: 
Return in about 6 months (around 8/11/2019), or BP > 140/90, for F/U cholesterol, F/U of obesity. lab results and schedule of future lab studies reviewed with patient 
reviewed diet, exercise and weight control 
cardiovascular risk and specific lipid/LDL goals reviewed 
reviewed medications and side effects in detail Please call my office if there are any questions- [phone]. I will arrange for follow up after the lab tests done from today return Recommended a weekly \"heart check. \" I went into detail how to do this. Call for refills on medications as needed. Discussed expected course/resolution/complications of diagnosis in detail with patient. Medication risks/benefits/costs/interactions/alternatives discussed with patient. Pt was given an after visit summary which includes diagnoses, current medications & vitals. Pt expressed understanding with the diagnosis and plan. Total 25 minutes,60 % counseling re:  
Recommended a weekly \"heart check. \" I went into detail how to do this. Regular exercise is very important to your health; it helps mentally, physically, socially; it prevents injuries if done properly. Exercise, even as simple as walking 20-30 minutes daily has major benefits to your health even though your \"numbers\" are the same in the lab. See if you can add this into your daily regimen and after a few months it will become a regular habit-\"just something you do,\" like brushing your teeth. A combination of aerobic exercise and strengthening and stretching is felt to be the best for you, so this should be your ultimate goal.  
This can be done in the privacy of your home or in a group setting as at the gym  Some prefer having a , others prefer to do exercise in groups or individually. Do what \"works\" for you. You need to make it simple and \"fun,\" or you most likely you will not continue it. Reviewed symptoms, or lack thereof, of hypertension and elevated cholesterol. Try stopping bladder irritants( artificial sweeteners, caffeine, citrus and tomato-based products) To prevent arthritis pain, keep moving: for the knees: need to cycle regularly( indoor exercise bike or outside biking are both effective), working your way up to 30 minutes 3 times a week and continue lifelong. It is very important to put the seat up high enough that your leg is almost straight at the bottom of each stroke of the pedal. 
For the back: Back exercise sheet given with instructions to do 2 sets of 10 of each twice a day laying on a flat/ firm surface. Discussed his weight gain and the importance of getting that under control. We talked about various diets, such as [employer]kins, [employer] and [employer]. Also, discussed symptoms of concern that were noted today in the note above, treatment options( including doing nothing), when to follow up before recommended time frame. Also, answered all questions. This document was written by Mahad Molina, as dictated by Darby Hidalgo MD. 
I have reviewed and agree with the above note and have made corrections where appropriate Ancelmo Michele M.D.

## 2019-02-11 NOTE — PROGRESS NOTES
Chief Complaint Patient presents with  Cholesterol Problem  
  fasting follow up 1. Have you been to the ER, urgent care clinic since your last visit? Hospitalized since your last visit? No 
 
2. Have you seen or consulted any other health care providers outside of the [address] [place] [address] since your last visit? Include any pap smears or colon screening.  No

## 2019-02-12 LAB
ALBUMIN SERPL-MCNC: 4.1 G/DL (ref 3.6–4.8)
ALBUMIN/GLOB SERPL: 1.7 {RATIO} (ref 1.2–2.2)
ALP SERPL-CCNC: 70 IU/L (ref 39–117)
ALT SERPL-CCNC: 23 IU/L (ref 0–44)
AST SERPL-CCNC: 24 IU/L (ref 0–40)
BILIRUB SERPL-MCNC: 0.5 MG/DL (ref 0–1.2)
BUN SERPL-MCNC: 12 MG/DL (ref 8–27)
BUN/CREAT SERPL: 11 (ref 10–24)
CALCIUM SERPL-MCNC: 9.2 MG/DL (ref 8.6–10.2)
CHLORIDE SERPL-SCNC: 106 MMOL/L (ref 96–106)
CHOLEST SERPL-MCNC: 149 MG/DL (ref 100–199)
CO2 SERPL-SCNC: 22 MMOL/L (ref 20–29)
CREAT SERPL-MCNC: 1.12 MG/DL (ref 0.76–1.27)
GLOBULIN SER CALC-MCNC: 2.4 G/DL (ref 1.5–4.5)
GLUCOSE SERPL-MCNC: 83 MG/DL (ref 65–99)
HDLC SERPL-MCNC: 47 MG/DL
INTERPRETATION, 910389: NORMAL
LDLC SERPL CALC-MCNC: 81 MG/DL (ref 0–99)
POTASSIUM SERPL-SCNC: 4.7 MMOL/L (ref 3.5–5.2)
PROT SERPL-MCNC: 6.5 G/DL (ref 6–8.5)
SODIUM SERPL-SCNC: 142 MMOL/L (ref 134–144)
TRIGL SERPL-MCNC: 104 MG/DL (ref 0–149)
VLDLC SERPL CALC-MCNC: 21 MG/DL (ref 5–40)

## 2019-03-19 ENCOUNTER — OFFICE VISIT (OUTPATIENT)
Dept: FAMILY MEDICINE CLINIC | Age: 64
End: 2019-03-19

## 2019-03-19 VITALS
DIASTOLIC BLOOD PRESSURE: 88 MMHG | OXYGEN SATURATION: 98 % | WEIGHT: 218.8 LBS | BODY MASS INDEX: 31.32 KG/M2 | RESPIRATION RATE: 16 BRPM | HEART RATE: 60 BPM | TEMPERATURE: 97.4 F | HEIGHT: 70 IN | SYSTOLIC BLOOD PRESSURE: 136 MMHG

## 2019-03-19 DIAGNOSIS — J30.1 SEASONAL ALLERGIC RHINITIS DUE TO POLLEN: ICD-10-CM

## 2019-03-19 DIAGNOSIS — E78.5 HYPERLIPIDEMIA LDL GOAL <130: ICD-10-CM

## 2019-03-19 DIAGNOSIS — E55.9 VITAMIN D DEFICIENCY: ICD-10-CM

## 2019-03-19 DIAGNOSIS — J01.90 ACUTE BACTERIAL SINUSITIS: Primary | ICD-10-CM

## 2019-03-19 DIAGNOSIS — B96.89 ACUTE BACTERIAL SINUSITIS: Primary | ICD-10-CM

## 2019-03-19 RX ORDER — AZITHROMYCIN 250 MG/1
TABLET, FILM COATED ORAL
Qty: 6 TAB | Refills: 0 | Status: SHIPPED | OUTPATIENT
Start: 2019-03-19 | End: 2019-05-02 | Stop reason: ALTCHOICE

## 2019-03-19 NOTE — PROGRESS NOTES
Randee Farris is a 70 y.o. male Chief Complaint Patient presents with  Sore Throat  
  for the past 4 days and has been coughing and sneezing. Visit Vitals /88 (BP 1 Location: Left arm, BP Patient Position: Sitting) Pulse 60 Temp 97.4 °F (36.3 °C) (Oral) Resp 16 Ht 5' 10\" (1.778 m) Wt 218 lb 12.8 oz (99.2 kg) SpO2 98% BMI 31.39 kg/m² Health Maintenance Due Topic Date Due  Shingrix Vaccine Age 50> (1 of 2) 11/09/2003  GLAUCOMA SCREENING Q2Y  11/09/2018  MEDICARE YEARLY EXAM  02/11/2019 1. Have you been to the ER, urgent care clinic since your last visit? Hospitalized since your last visit? No 
 
2. Have you seen or consulted any other health care providers outside of the [address] [place] [address] since your last visit? Include any pap smears or colon screening.  No

## 2019-03-19 NOTE — PROGRESS NOTES
HISTORY OF PRESENT ILLNESS 
MADELYN Suero is a 70 y.o. Male with a history of cervical DDD and DJD, bilateral shoulder pain, vitamin D deficiency and hyperlipidemia with LDL goal <130, who presents at the office today for cold symptoms. Pt started having sore throat around 4 days ago, followed by symptoms of sneezing and occasionally productive cough. Pt was taking Sudafed. Pt denies unusual SOB, chest pain, and any recent ER visits or hospitalizations. Past Medical History:  
Diagnosis Date  Allergic rhinitis 2/27/2010  Chronic pain of both shoulders- worse in AM and loosens up with activity. 3/20/2016  DDD,DJD cervicalC5-6,6-7foraminal encroachment on X ray-4/2013 5/17/2013  Obesity (BMI 30.0-34.9) 2/5/2018  Other and unspecified hyperlipidemia 2/27/2010 Past Surgical History:  
Procedure Laterality Date  HX CATARACT REMOVAL  87-88  
 inocente  HX CATARACT REMOVAL    
 HX TONSIL AND ADENOIDECTOMY  1960  
 SINUS SURGERY [address] UNLISTED  2001 Current Outpatient Medications on File Prior to Visit Medication Sig Dispense Refill  simvastatin (ZOCOR) 40 mg tablet TAKE 1 TABLET BY MOUTH EVERY DAY AT NIGHT *NEEDS FASTING OFFICE VISIT* 90 Tab 0  ibuprofen (ADVIL) 200 mg tablet Take  by mouth.  Cholecalciferol, Vitamin D3, (VITAMIN D3) 1,000 unit cap Take 2,000 Units by mouth daily (after breakfast).  aspirin 81 mg tablet Take  by mouth. No current facility-administered medications on file prior to visit. Allergies Allergen Reactions  Pcn [Penicillins] Rash Family History Problem Relation Age of Onset  Cancer Mother   
     ovarian  Cancer Father   
     prostate?  Elevated Lipids Father Social History Socioeconomic History  Marital status:  Spouse name: Not on file  Number of children: Not on file  Years of education: Not on file  Highest education level: Not on file Tobacco Use  
  Smoking status: Never Smoker  Smokeless tobacco: Never Used Substance and Sexual Activity  Alcohol use: Yes Comment: 12oz beer once monthly  Drug use: No  
 Sexual activity: Yes  
  Partners: Female Review of Systems Constitutional: Negative for chills, diaphoresis, fever, malaise/fatigue and weight loss. HENT: Positive for sore throat. Eyes: Negative for blurred vision, double vision, pain and redness. Respiratory: Positive for cough (occasionally productive). Negative for shortness of breath and wheezing. Cardiovascular: Negative for chest pain, palpitations, orthopnea, claudication, leg swelling and PND. Skin: Negative for itching and rash. Neurological: Negative for dizziness, tingling, tremors, sensory change, speech change, focal weakness, seizures, loss of consciousness, weakness and headaches. Results for orders placed or performed in visit on 02/11/19 LIPID PANEL Result Value Ref Range Cholesterol, total 149 100 - 199 mg/dL Triglyceride 104 0 - 149 mg/dL HDL Cholesterol 47 >39 mg/dL VLDL, calculated 21 5 - 40 mg/dL LDL, calculated 81 0 - 99 mg/dL METABOLIC PANEL, COMPREHENSIVE Result Value Ref Range Glucose 83 65 - 99 mg/dL BUN 12 8 - 27 mg/dL Creatinine 1.12 0.76 - 1.27 mg/dL GFR est non-AA 69 >59 mL/min/1.73 GFR est AA 79 >59 mL/min/1.73  
 BUN/Creatinine ratio 11 10 - 24 Sodium 142 134 - 144 mmol/L Potassium 4.7 3.5 - 5.2 mmol/L Chloride 106 96 - 106 mmol/L  
 CO2 22 20 - 29 mmol/L Calcium 9.2 8.6 - 10.2 mg/dL Protein, total 6.5 6.0 - 8.5 g/dL Albumin 4.1 3.6 - 4.8 g/dL GLOBULIN, TOTAL 2.4 1.5 - 4.5 g/dL A-G Ratio 1.7 1.2 - 2.2 Bilirubin, total 0.5 0.0 - 1.2 mg/dL Alk. phosphatase 70 39 - 117 IU/L  
 AST (SGOT) 24 0 - 40 IU/L  
 ALT (SGPT) 23 0 - 44 IU/L  
CVD REPORT Result Value Ref Range INTERPRETATION Note Physical Exam 
Visit Vitals /88 (BP 1 Location: Left arm, BP Patient Position: Sitting) Pulse 60 Temp 97.4 °F (36.3 °C) (Oral) Resp 16 Ht 5' 10\" (1.778 m) Wt 218 lb 12.8 oz (99.2 kg) SpO2 98% BMI 31.39 kg/m² General appearance: alert, cooperative, no distress, appears stated age Head: Normocephalic, without obvious abnormality, atraumatic Eyes: conjunctivae/corneas clear. PERRL, EOM's intact. Ears: normal TM's and external ear canals AU Nose: purulent, scant discharge, moderate congestion, turbinates red, swollen, inflamed, no sinus tenderness, no polyps, no crusting or bleeding points Throat: Lips, mucosa, and tongue normal. Teeth and gums normal. Moderate erythema of the posterior pharynx, but nowhere else. No exudate. Neck: supple, symmetrical, trachea midline, no adenopathy, thyroid: not enlarged, symmetric, no tenderness/mass/nodules, no carotid bruit and no JVD Lungs: clear to auscultation bilaterally Heart: regular rate and rhythm, S1, S2 normal, no murmur, click, rub or gallop Extremities: extremities normal, atraumatic, no cyanosis or edema Lymph nodes: Cervical, supraclavicular, and axillary nodes normal. 
Neurologic: Grossly normal 
 
 
ASSESSMENT and PLAN 
  ICD-10-CM ICD-9-CM 1. Acute bacterial sinusitis J01.90 461.9 B96.89    
2. Seasonal allergic rhinitis due to pollen J30.1 477.0 3. Hyperlipidemia LDL goal <130 E78.5 272.4 4. Vitamin D deficiency E55.9 268.9 Diagnoses and all orders for this visit: 
 
1. Acute bacterial sinusitis 2. Seasonal allergic rhinitis due to pollen 3. Hyperlipidemia LDL goal <130 
 
4. Vitamin D deficiency Other orders 
-     azithromycin (ZITHROMAX) 250 mg tablet; Take 2 today and then 1 a day for the next 4 days Follow-up and Dispositions · Return if symptoms worsen or fail to improve. reviewed medications and side effects in detail Please call my office if there are any questions- [phone]. Discussed expected course/resolution/complications of diagnosis in detail with patient. Medication risks/benefits/costs/interactions/alternatives discussed with patient. Pt was given an after visit summary which includes diagnoses, current medications & vitals. Pt expressed understanding with the diagnosis and plan. Patient to call if no better in 3 -4 days and prn new problems. Saline nose spray regularly, at least 4 times a day I recommended that he gargle with warm salt water and use chloraseptic spray. Because he is starting to notice some thick drainage in the back of his throat, we started him on a Z-deanne today, which he has taken before. This document was written by Javed Mccrary, as dictated by Krishna Mak MD. 
I have reviewed and agree with the above note and have made corrections where appropriate Ancelmo Bishop M.D.

## 2019-04-25 DIAGNOSIS — E78.5 HYPERLIPIDEMIA LDL GOAL <130: ICD-10-CM

## 2019-04-26 RX ORDER — SIMVASTATIN 40 MG/1
TABLET, FILM COATED ORAL
Qty: 90 TAB | Refills: 1 | Status: SHIPPED | OUTPATIENT
Start: 2019-04-26 | End: 2019-07-31 | Stop reason: SDUPTHER

## 2019-05-02 ENCOUNTER — OFFICE VISIT (OUTPATIENT)
Dept: FAMILY MEDICINE CLINIC | Age: 64
End: 2019-05-02

## 2019-05-02 VITALS
RESPIRATION RATE: 18 BRPM | SYSTOLIC BLOOD PRESSURE: 146 MMHG | BODY MASS INDEX: 30.55 KG/M2 | OXYGEN SATURATION: 98 % | TEMPERATURE: 97.7 F | HEART RATE: 64 BPM | HEIGHT: 70 IN | WEIGHT: 213.4 LBS | DIASTOLIC BLOOD PRESSURE: 90 MMHG

## 2019-05-02 DIAGNOSIS — R25.2 MUSCLE CRAMPING: Primary | ICD-10-CM

## 2019-05-02 RX ORDER — CHOLECALCIFEROL (VITAMIN D3) 125 MCG
220 CAPSULE ORAL AS NEEDED
COMMUNITY
End: 2019-08-23

## 2019-05-02 NOTE — PROGRESS NOTES
Chief Complaint   Patient presents with    Leg Pain     left calf/left buttocks x 2-3 weeks     1. Have you been to the ER, urgent care clinic since your last visit? Hospitalized since your last visit? No    2. Have you seen or consulted any other health care providers outside of the [address] [place] [address] since your last visit? Include any pap smears or colon screening.  No

## 2019-05-02 NOTE — PROGRESS NOTES
Patient Name: Maryjane Melendez   MRN: [id number]    [address] Rina Arvizu is a 70 y.o. male who presents with the following:     Reports 3-week history of left calf pain as well as left buttock pain. States that he plays tennis about 3 times a week and thought he may have pulled a muscle which he has done so in the past; however, pain is not improving as fast as it normally does. Has been taking PRN naproxen which has been helpful and drinking lots of water. States that his brother has factor V Leiden and was diagnosed with a blood clot in his leg several years ago once his legs appeared to be very swollen. Patient has no personal history of blood clots. Has been using a sleeve to wear around his left thigh while playing tennis which is helped. Review of Systems   Constitutional: Negative for fever, malaise/fatigue and weight loss. Respiratory: Negative for cough, hemoptysis, shortness of breath and wheezing. Cardiovascular: Negative for chest pain, palpitations, leg swelling and PND. Gastrointestinal: Negative for abdominal pain, constipation, diarrhea, nausea and vomiting. Musculoskeletal: Positive for myalgias. The patient's medications, allergies, past medical history, surgical history, family history and social history were reviewed and updated where appropriate. Prior to Admission medications    Medication Sig Start Date End Date Taking? Authorizing Provider   naproxen sodium (ALEVE) 220 mg cap Take 220 mg by mouth as needed. Yes Provider, Historical   simvastatin (ZOCOR) 40 mg tablet TAKE 1 TABLET BY MOUTH EVERY DAY AT NIGHT 4/26/19  Yes Kolby Cisneros MD   ibuprofen (ADVIL) 200 mg tablet Take 200 mg by mouth every six (6) hours as needed. Yes Provider, Historical   Cholecalciferol, Vitamin D3, (VITAMIN D3) 1,000 unit cap Take 2,000 Units by mouth daily (after breakfast). 2/20/13  Yes Kolby Cisneros MD   aspirin 81 mg tablet Take 81 mg by mouth daily.    Yes Provider, Historical       Allergies   Allergen Reactions    Pcn [Penicillins] Rash           OBJECTIVE    Visit Vitals  /90 (BP 1 Location: Right arm, BP Patient Position: Sitting)   Pulse 64   Temp 97.7 °F (36.5 °C) (Oral)   Resp 18   Ht 5' 10\" (1.778 m)   Wt 213 lb 6.4 oz (96.8 kg)   SpO2 98%   BMI 30.62 kg/m²       Physical Exam   Constitutional: He is oriented to person, place, and time and well-developed, well-nourished, and in no distress. No distress. Eyes: Pupils are equal, round, and reactive to light. Conjunctivae and EOM are normal.   Musculoskeletal:        Left hip: He exhibits normal range of motion, normal strength, no tenderness, no bony tenderness, no swelling and no crepitus. Right lower leg: Normal.        Left lower leg: He exhibits tenderness (pt points out that cramping occurs along lateral aspect of posterior calf). He exhibits no bony tenderness, no swelling and no edema. Bilateral calf circumferences are equal   Neurological: He is alert and oriented to person, place, and time. Gait normal.   Skin: Skin is warm and dry. He is not diaphoretic. Psychiatric: Mood, memory, affect and judgment normal.   Nursing note and vitals reviewed. ASSESSMENT AND PLAN  Dionne Sanchez is a 70 y.o. male who presents today for:    1. Muscle cramping  Likely muscle strain. Encourage patient to remain well-hydrated and may use PRN NSAIDs as well as compression stockings. Exercises given to patient. Discussed that if symptoms do not improve, come by the lab to obtain some blood work to rule out statin induced myalgias versus electrolyte abnormalities. Will score is low therefore DVT is less likely; exam appears to be otherwise normal today. Reviewed signs and symptoms that would indicate a worsening medical condition which would require immediate evaluation and treatment; patient expressed understanding of plan.   - CBC W/O DIFF  - METABOLIC PANEL, COMPREHENSIVE  - MAGNESIUM  - PHOSPHORUS  - CK       Medications Discontinued During This Encounter   Medication Reason    azithromycin (ZITHROMAX) 250 mg tablet Therapy Completed     Medication risks/benefits/costs/interactions/alternatives discussed with patient. Advised patient to call back or return to office if symptoms worsen/change/persist. If patient cannot reach us or should anything more severe/urgent arise he/she should proceed directly to the nearest emergency department. Discussed expected course/resolution/complications of diagnosis in detail with patient. Patient given a written after visit summary which includes his/her diagnoses, current medications and vitals. Patient expressed understanding with the diagnosis and plan. Bel Whitt M.D.

## 2019-05-02 NOTE — PATIENT INSTRUCTIONS
Piriformis Syndrome: Exercises  Your Care Instructions  Here are some examples of typical rehabilitation exercises for your condition. Start each exercise slowly. Ease off the exercise if you start to have pain. Your doctor or physical therapist will tell you when you can start these exercises and which ones will work best for you. How to do the exercises  Hip rotator stretch    1. Lie on your back with both knees bent and your feet flat on the floor. 2. Put the ankle of your affected leg on your opposite thigh near your knee. 3. Use your hand to gently push your knee (on your affected leg) away from your body until you feel a gentle stretch around your hip. 4. Hold the stretch for 15 to 30 seconds. 5. Repeat 2 to 4 times. 6. Switch legs and repeat steps 1 through 5. Piriformis stretch    1. Lie on your back with your legs straight. 2. Lift your affected leg and bend your knee. With your opposite hand, reach across your body, and then gently pull your knee toward your opposite shoulder. 3. Hold the stretch for 15 to 30 seconds. 4. Repeat with your other leg. 5. Repeat 2 to 4 times on each side. Lower abdominal strengthening    1. Lie on your back with your knees bent and your feet flat on the floor. 2. Tighten your belly muscles by pulling your belly button in toward your spine. 3. Lift one foot off the floor and bring your knee toward your chest, so that your knee is straight above your hip and your leg is bent like the letter \"L. \"  4. Lift the other knee up to the same position. 5. Lower one leg at a time to the starting position. 6. Keep alternating legs until you have lifted each leg 8 to 12 times. 7. Be sure to keep your belly muscles tight and your back still as you are moving your legs. Be sure to breathe normally. Follow-up care is a key part of your treatment and safety. Be sure to make and go to all appointments, and call your doctor if you are having problems.  It's also a good idea to know your test results and keep a list of the medicines you take. Current as of: September 20, 2018  Content Version: 11.9  © 2006-2018 [employer]. Care instructions adapted under license by [employer] (which disclaims liability or warranty for this information). If you have questions about a medical condition or this instruction, always ask your healthcare professional. [address] any warranty or liability for your use of this information. Calf Strain: Rehab Exercises  Your Care Instructions  Here are some examples of typical rehabilitation exercises for your condition. Start each exercise slowly. Ease off the exercise if you start to have pain. Your doctor or physical therapist will tell you when you can start these exercises and which ones will work best for you. How to do the exercises  Calf wall stretch (back knee straight)    1. Stand facing a wall with your hands on the wall at about eye level. Put your affected leg about a step behind your other leg. 2. Keeping your back leg straight and your back heel on the floor, bend your front knee and gently bring your hip and chest toward the wall until you feel a stretch in the calf of your back leg. 3. Hold the stretch for at least 15 to 30 seconds. 4. Repeat 2 to 4 times. Calf wall stretch (knees bent)    1. Stand facing a wall with your hands on the wall at about eye level. Put your affected leg about a step behind your other leg. 2. Keeping both heels on the floor, bend both knees. Then gently bring your hip and chest toward the wall until you feel a stretch in the calf of your back leg. 3. Hold the stretch for at least 15 to 30 seconds. 4. Repeat 2 to 4 times. Bilateral calf stretch (knees straight)    1. Place a book on the floor a few inches from a wall or countertop, and put the balls of your feet on it. Your heels should be on the floor.  The book needs to be thick enough so that you can feel a gentle stretch in each calf. If you are not steady on your feet, hold on to a chair, counter, or wall while you do this stretch. 2. Keep your knees straight, and lean forward until you feel a stretch in each calf. 3. To get more stretch, add another book or use a thicker book, such as a phone book, a dictionary, or an encyclopedia. 4. Hold the stretch for at least 15 to 30 seconds. 5. Repeat 2 to 4 times. Bilateral calf stretch (knees bent)    1. Place a book on the floor a few inches from a wall or countertop, and put the balls of your feet on it. Your heels should be on the floor. The book needs to be thick enough so that you can feel a gentle stretch in each calf. If you are not steady on your feet, hold on to a chair, counter, or wall while you do this stretch. 2. Bend your knees, and lean forward until you feel a stretch in each calf. 3. To get more stretch, add another book or use a thicker book, such as a phone book, a dictionary, or an encyclopedia. 4. Hold the stretch for at least 15 to 30 seconds. 5. Repeat 2 to 4 times. Ankle plantarflexion    1. Sit with your affected leg straight and supported on the floor. Your other leg should be bent, with that foot flat on the floor. 2. Keeping your affected leg straight, gently flex your foot downward so your toes are pointed away from your body. Then slowly relax your foot to the starting position. 3. Repeat 8 to 12 times. Ankle dorsiflexion    1. Sit with your affected leg straight and supported on the floor. Your other leg should be bent, with that foot flat on the floor. 2. Keeping your leg straight, gently flex your foot back so your toes point upward. Then slowly relax your foot to the starting position. 3. Repeat 8 to 12 times. Bilateral heel raises on step    1. Stand on the bottom step of a staircase, facing up toward the stairs. Put the balls of your feet on the step.  If you are not steady on your feet, hold on to the banister or wall. 2. Keeping both knees straight, slowly lift your heels above the step so that you are standing on your toes. Then slowly lower your heels below the step and toward the floor. 3. Return to the starting position, with your feet even with the step. 4. Repeat 8 to 12 times. Follow-up care is a key part of your treatment and safety. Be sure to make and go to all appointments, and call your doctor if you are having problems. It's also a good idea to know your test results and keep a list of the medicines you take. Where can you learn more? Go to http://laly-malcolm.info/. Enter W526 in the search box to learn more about \"Calf Strain: Rehab Exercises. \"  Current as of: September 20, 2018  Content Version: 11.9  © 2327-7782 [employer]. Care instructions adapted under license by [employer] (which disclaims liability or warranty for this information). If you have questions about a medical condition or this instruction, always ask your healthcare professional. [address] any warranty or liability for your use of this information.

## 2019-05-13 ENCOUNTER — HOSPITAL ENCOUNTER (OUTPATIENT)
Dept: LAB | Age: 64
Discharge: HOME | End: 2019-05-13
Payer: MEDICARE

## 2019-05-13 ENCOUNTER — TELEPHONE (OUTPATIENT)
Dept: FAMILY MEDICINE CLINIC | Age: 64
End: 2019-05-13

## 2019-05-13 ENCOUNTER — OFFICE VISIT (OUTPATIENT)
Dept: FAMILY MEDICINE CLINIC | Age: 64
End: 2019-05-13

## 2019-05-13 VITALS
RESPIRATION RATE: 18 BRPM | BODY MASS INDEX: 30.75 KG/M2 | OXYGEN SATURATION: 98 % | SYSTOLIC BLOOD PRESSURE: 156 MMHG | WEIGHT: 214.8 LBS | HEIGHT: 70 IN | HEART RATE: 75 BPM | TEMPERATURE: 98 F | DIASTOLIC BLOOD PRESSURE: 88 MMHG

## 2019-05-13 DIAGNOSIS — R20.2 NUMBNESS AND TINGLING OF LEFT LEG: Primary | ICD-10-CM

## 2019-05-13 DIAGNOSIS — R20.0 NUMBNESS AND TINGLING OF LEFT LEG: Primary | ICD-10-CM

## 2019-05-13 PROCEDURE — 80053 COMPREHEN METABOLIC PANEL: CPT

## 2019-05-13 PROCEDURE — 83735 ASSAY OF MAGNESIUM: CPT

## 2019-05-13 PROCEDURE — 84100 ASSAY OF PHOSPHORUS: CPT

## 2019-05-13 PROCEDURE — 82550 ASSAY OF CK (CPK): CPT

## 2019-05-13 PROCEDURE — 85027 COMPLETE CBC AUTOMATED: CPT

## 2019-05-13 NOTE — TELEPHONE ENCOUNTER
----- Message from Jordan Kaye sent at 5/13/2019  8:29 AM EDT -----  Regarding:  Dr. Mary Bradley       /   Telephone  Patient called to schedule an  appointment with Dr. Mary Bradley for  5/15/19 for  back pain radiating down his leg. There is no appointment available for Dr. Mary Bradley. Scheduled same day appointment with Dr. Keith Saini at 1:15pm.  Patient would like for Dr. Jose Ventura nurse to return his call. Best contact number for patient is [phone].

## 2019-05-13 NOTE — PROGRESS NOTES
Chief Complaint   Patient presents with    Leg Pain     pain/tingling on left calf      1. Have you been to the ER, urgent care clinic since your last visit? Hospitalized since your last visit? No    2. Have you seen or consulted any other health care providers outside of the [address] [place] [address] since your last visit? Include any pap smears or colon screening.  No

## 2019-05-13 NOTE — PROGRESS NOTES
Patient Name: Alisa Ramires   MRN: [id number]    Dion Luevano is a 70 y.o. male who presents with the following:     Continues to have a cramping sensation along his left buttock and left calf. States that he felt like there was here prickling along his left calf which reminded him of nerve pain. 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 Has not had updated lumbar x-rays done. Occasionally has back pain after playing tennis. Review of Systems   Constitutional: Negative for fever, malaise/fatigue and weight loss. Respiratory: Negative for cough, hemoptysis, shortness of breath and wheezing. Cardiovascular: Negative for chest pain, palpitations, leg swelling and PND. Gastrointestinal: Negative for abdominal pain, constipation, diarrhea, nausea and vomiting. Musculoskeletal: Positive for myalgias. The patient's medications, allergies, past medical history, surgical history, family history and social history were reviewed and updated where appropriate. Prior to Admission medications    Medication Sig Start Date End Date Taking? Authorizing Provider   naproxen sodium (ALEVE) 220 mg cap Take 220 mg by mouth as needed. Yes Provider, Historical   simvastatin (ZOCOR) 40 mg tablet TAKE 1 TABLET BY MOUTH EVERY DAY AT NIGHT 4/26/19  Yes Tatiana Barrett MD   ibuprofen (ADVIL) 200 mg tablet Take 200 mg by mouth every six (6) hours as needed. Yes Provider, Historical   Cholecalciferol, Vitamin D3, (VITAMIN D3) 1,000 unit cap Take 2,000 Units by mouth daily (after breakfast). 2/20/13  Yes Tatiana Barrett MD   aspirin 81 mg tablet Take 81 mg by mouth daily.    Yes Provider, Historical       Allergies   Allergen Reactions    Pcn [Penicillins] Rash           OBJECTIVE    Visit Vitals  /88 (BP 1 Location: Left arm, BP Patient Position: Sitting)   Pulse 75   Temp 98 °F (36.7 °C) (Oral)   Resp 18   Ht 5' 10\" (1.778 m)   Wt 214 lb 12.8 oz (97.4 kg)   SpO2 98%   BMI 30.82 kg/m²       Physical Exam   Constitutional: He is oriented to person, place, and time and well-developed, well-nourished, and in no distress. No distress. Musculoskeletal: Normal range of motion. He exhibits no edema, tenderness or deformity. Neurological: He is alert and oriented to person, place, and time. Gait normal.   Reflex Scores:       Patellar reflexes are 2+ on the right side and 2+ on the left side. Straight leg raise was Negative in Bilateral LE. Strength is 5/5 in lower extremities. Sensation is intact to light touch in lower extremities. Skin: He is not diaphoretic. Psychiatric: Mood, memory, affect and judgment normal.   Nursing note and vitals reviewed. ASSESSMENT AND PLAN  Americo Merrill is a 70 y.o. male who presents today for:    1. Numbness and tingling of left leg  Will obtain updated lumbar x-ray to evaluate presence of DJD lumbar spine which may be contributing to possible sciatica. Also recommend patient complete prior blood work to rule out other etiologies. Could consider physical therapy if symptoms persist.  - XR SPINE LUMB 2 OR 3 V; Future       There are no discontinued medications. Medication risks/benefits/costs/interactions/alternatives discussed with patient. Advised patient to call back or return to office if symptoms worsen/change/persist. If patient cannot reach us or should anything more severe/urgent arise he/she should proceed directly to the nearest emergency department. Discussed expected course/resolution/complications of diagnosis in detail with patient. Patient given a written after visit summary which includes his/her diagnoses, current medications and vitals. Patient expressed understanding with the diagnosis and plan. Bel Martinez M.D.

## 2019-05-13 NOTE — PATIENT INSTRUCTIONS

## 2019-05-14 DIAGNOSIS — M54.32 SCIATICA, LEFT SIDE: Primary | ICD-10-CM

## 2019-05-14 LAB
ALBUMIN SERPL-MCNC: 4.3 G/DL (ref 3.6–4.8)
ALBUMIN/GLOB SERPL: 2 {RATIO} (ref 1.2–2.2)
ALP SERPL-CCNC: 84 IU/L (ref 39–117)
ALT SERPL-CCNC: 26 IU/L (ref 0–44)
AST SERPL-CCNC: 26 IU/L (ref 0–40)
BILIRUB SERPL-MCNC: 0.5 MG/DL (ref 0–1.2)
BUN SERPL-MCNC: 13 MG/DL (ref 8–27)
BUN/CREAT SERPL: 13 (ref 10–24)
CALCIUM SERPL-MCNC: 9.1 MG/DL (ref 8.6–10.2)
CHLORIDE SERPL-SCNC: 106 MMOL/L (ref 96–106)
CK SERPL-CCNC: 63 U/L (ref 24–204)
CO2 SERPL-SCNC: 23 MMOL/L (ref 20–29)
CREAT SERPL-MCNC: 1.03 MG/DL (ref 0.76–1.27)
ERYTHROCYTE [DISTWIDTH] IN BLOOD BY AUTOMATED COUNT: 13.9 % (ref 12.3–15.4)
GLOBULIN SER CALC-MCNC: 2.2 G/DL (ref 1.5–4.5)
GLUCOSE SERPL-MCNC: 91 MG/DL (ref 65–99)
HCT VFR BLD AUTO: 49.4 % (ref 37.5–51)
HGB BLD-MCNC: 16.3 G/DL (ref 13–17.7)
MAGNESIUM SERPL-MCNC: 2.1 MG/DL (ref 1.6–2.3)
MCH RBC QN AUTO: 29.3 PG (ref 26.6–33)
MCHC RBC AUTO-ENTMCNC: 33 G/DL (ref 31.5–35.7)
MCV RBC AUTO: 89 FL (ref 79–97)
PHOSPHATE SERPL-MCNC: 2.4 MG/DL (ref 2.5–4.5)
PLATELET # BLD AUTO: 240 X10E3/UL (ref 150–379)
POTASSIUM SERPL-SCNC: 4.3 MMOL/L (ref 3.5–5.2)
PROT SERPL-MCNC: 6.5 G/DL (ref 6–8.5)
RBC # BLD AUTO: 5.56 X10E6/UL (ref 4.14–5.8)
SODIUM SERPL-SCNC: 142 MMOL/L (ref 134–144)
WBC # BLD AUTO: 6.1 X10E3/UL (ref 3.4–10.8)

## 2019-05-20 ENCOUNTER — HOSPITAL ENCOUNTER (OUTPATIENT)
Dept: PHYSICAL THERAPY | Age: 64
Discharge: HOME | End: 2019-05-20
Payer: MEDICARE

## 2019-05-20 PROCEDURE — 97161 PT EVAL LOW COMPLEX 20 MIN: CPT | Performed by: PHYSICAL THERAPIST

## 2019-05-20 PROCEDURE — 97110 THERAPEUTIC EXERCISES: CPT | Performed by: PHYSICAL THERAPIST

## 2019-05-20 PROCEDURE — 97140 MANUAL THERAPY 1/> REGIONS: CPT | Performed by: PHYSICAL THERAPIST

## 2019-05-20 NOTE — PROGRESS NOTES
[employer] [facility] [address] [facility], [address] Phone: [phone]  Fax: [phone] PT INITIAL EVALUATION NOTE - [facility] 2-15 Patient Name: Tanja Kaur Date:2019 : 1955 [x]  Patient  Verified Payor: [place] MED[place]ARE / Plan: [address] [place], [place] [address]y / Product Type: Medicare / In time: 200 PM  Out time:300 PM 
Total Treatment Time (min): 60 Total Timed Codes (min): 25 
1:1 Treatment Time ( only): 60 Visit #: 1 Treatment Area: Low back pain [M54.5] SUBJECTIVE Any medication changes, allergies to medications, adverse drug reactions, diagnosis change, or new procedure performed?: [] No    [x] Yes (see summary sheet for update) Current symptoms/chief complaint and date of onset/injury: Pt presents with referral for L sciatica. He states he pulled his \"groin\" 2018 on L-- states he had a bruise-- points from L glute to posterior L knee. He currently wears a sleeve over his L thigh when he plays tennis-- states he gets around the court without any pain. 1 month ago he started having inc'd numbness/tingling in lateral, posterior aspect of L calf, also a \"sorenes\" in L glute. \"feels nerve-like\". He will occasionally get lower back pain after playing tennis. Aleve helps with pain relief. He states minimal to no symptoms after playing tennis. Aggravated by:  Prolonged sitting- \"I know I have bad posture\" Eased by: Aleve Pain Level (0-10 scale): 8/10 max  1-2/min  2/10 today Location of symptoms: lower back, calf tightness, glute soreness, numbness lat aspect of L calf PMH: Significant for n/a. 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 Enjoys playing tennis, golf Prior level of function: inc'd L LE radicular symptoms approx 1 month ago Patient goal(s): \"get back to 100%\" Objective:   
 
Posture: Kyphosis: [x] Increased [] Decreased   []  WNL Lordosis:  [] Increased [x] Decreased   [] WNL Poor standing and sitting posture, forward head posture Gait:  
inc'd trunk flexion, wide TAE, slight knee varus bilat Lumbar Active Movements: ROM  AROM Comments:pain, area Forward flexion  Fingers to ankles \"tight\" through bilat HS-- pt continued to demonstrate slight knee flex despite therapist cueing Extension  25% No change in symptoms SB right 50% No change in symptoms SB left 50% inc'd symptoms in L calf Strength:    
  R (0-5) L (0-5) Hip Flexion (L1,2) 5 4 Knee Extension (L3,4) 5 5 Knee Flexion (S1,2) 5 5 Ankle Dorsiflexion (L4) 5 5 Sidelying hip abduction 4 4 Pt able to perform supine bridge without inc'd symptoms Unilat HR on R x10: 75% of full motion Unilat HR on L x10: 75% of full motion \"feels harder on this side\" SLS on R: >10 sec SLS on L: 8-9 sec Neurosensory: 
Sensory examination reveals dec'd sensation to light touch lateral, postertior aspect of L calf (consistent with L5, S1 dermatomal pattern) Palpation:   
No TTP L glute or lumbar paraspinals Special Tests: 
 
Dural Mobility: SLR Supine: [] R    [x] L    [x] +    [] -   (inc'd symptoms in L calf) Crossed SLR  [] R    [] L    [] +    [x] - Slump Test: [] R    [x] L    [x] +    [] - Stabilization Tests ASLR Test:   [x] Pos  [] Neg 
 
Prone pressup x10: pt reports slight relief of symptoms- \"better\" : + for complete relief of symptoms in L calf Hip internal and external rotators tight bilat Outcome Measure: Patient presents with a FOTO score of next visit. 15 min Therapeutic Exercise:  [x] See flow sheet : instructed in HEP Rationale: increase ROM, increase strength, improve coordination, improve balance and increase proprioception to improve the patients ability to perform all ADL's with dec'd radicular symptoms 10 min Manual Therapy:   Supine bilat LE long axis distraction Rationale: decrease pain and pain to improve the patients ability to play tennis, exercise, walk with dec'd symptoms With 
 [x] TE 
 [] TA 
 [] neuro 
 [] other: Patient Education: [x] Review HEP [] Progressed/Changed HEP based on:  
[] positioning   [] body mechanics   [] transfers   [x] heat/ice application   
[x] other: camille/phys; PT POC; importance of performing HEP; advised on importance of proper posture Pain Level (0-10 scale) post treatment: not reported Assessment:  
[x] See POC [] Other: 
Plan:  
[x] See POC [] Other 
[] Discharge due to:  
 
Aparna Grant, PT, DPT    
5/20/2019     2:06 PM

## 2019-05-20 NOTE — PROGRESS NOTES
[facility] Physical Therapy [address] [place], [address] [facility] Phone: [phone]  Fax: [phone] Plan of Care/Statement of Necessity for Physical Therapy Services  2-15 Patient name: Esha Collier  : 1955  Provider#: [id number] Referral source: Stas Leyva MD     
Medical/Treatment Diagnosis: Low back pain [M54.5] Prior Hospitalization: see medical history Comorbidities: see evaluation Prior Level of Function: see evaluation Medications: Verified on Patient Summary List 
Start of Care: 19      Onset Date: 1 month ago The Plan of Care and following information is based on the information from the initial evaluation. Assessment/ key information: Pt is a 70year old male presenting with occasional LBP, L LE radicular symptoms. He will benefit from PT to address problem list below Problem List: pain affecting function, decrease ROM, decrease strength, impaired gait/ balance, decrease ADL/ functional abilitiies, decrease activity tolerance and decrease flexibility/ joint mobility Treatment Plan may include any combination of the following: Therapeutic exercise, Therapeutic activities, Neuromuscular re-education, Physical agent/modality, Gait/balance training, Manual therapy, Patient education, Self Care training, Functional mobility training, Home safety training and Stair training Patient / Family readiness to learn indicated by: asking questions, trying to perform skills and interest 
Persons(s) to be included in education: patient (P) Barriers to Learning/Limitations: None Patient Goal (s): see evaluation Patient Self Reported Health Status: good Rehabilitation Potential: good Short Term Goals: To be accomplished in 2-3 weeks: 
1) Pt will be independent in initial HEP 
2) Pt will report >/=25% decrease in exacerbation of symptoms 3) Pt will report compliance with ice regimen Long Term Goals: To be accomplished in 6-8 weeks: 1) Pt will report >/=75% relief of L calf symptoms 2) Pt will improve L hip abduction strength to >/=4+/5 in order to aid in exercise routine 3) Pt will perform lumbar AROM all directions without inc'd symptoms Frequency / Duration: Patient to be seen 1-2 times per week for 6-8 weeks. Patient/ Caregiver education and instruction: self care, activity modification and exercises 
 
[x]  Plan of care has been reviewed with PTA Certification Period: 5/20/19-8/15/19 Live Ross, PT 5/20/2019 
 
________________________________________________________________________ I certify that the above Therapy Services are being furnished while the patient is under my care. I agree with the treatment plan and certify that this therapy is necessary. [de-identified] Signature:____________________  Date:____________Time: _________

## 2019-05-22 ENCOUNTER — HOSPITAL ENCOUNTER (OUTPATIENT)
Dept: PHYSICAL THERAPY | Age: 64
Discharge: HOME | End: 2019-05-22
Payer: MEDICARE

## 2019-05-22 PROCEDURE — 97110 THERAPEUTIC EXERCISES: CPT | Performed by: PHYSICAL THERAPIST

## 2019-05-22 PROCEDURE — 97140 MANUAL THERAPY 1/> REGIONS: CPT | Performed by: PHYSICAL THERAPIST

## 2019-05-22 NOTE — PROGRESS NOTES
PT DAILY TREATMENT NOTE - [facility] 2-15    Patient Name: Tc Stern  Date:2019  : 1955  [x]  Patient  Verified  Payor: Alexei Richardson / Plan: [place] MEDICARE PART A & B / Product Type: Medicare /    In time:100 P  Out time:200 P  Total Treatment Time (min): 60  Total Timed Codes (min): 60  1:1 Treatment Time (MC/Tenkiller): 60  Visit #: 2    Treatment Area: Low back pain [M54.5]    SUBJECTIVE  Pain Level (0-10 scale): 0  Any medication changes, allergies to medications, adverse drug reactions, diagnosis change, or new procedure performed?: [x] No    [] Yes (see summary sheet for update)  Subjective functional status/changes:     Pt reports he has been doing his exercises and icing. Has been feeling better. Still complaining of numbness along lateral aspect of gastroc    OBJECTIVE  [x] Skin assessment post-treatment:  [x]intact []redness- no adverse reaction    []redness  adverse reaction:   declined min []  Ice     []  Heat  Position:  Location:    Rationale: decrease edema, decrease inflammation, decrease pain and reduce soreness post therapy in order to improve the patients ability to perform ADL's. 45 min Therapeutic Exercise:  [x] See flow sheet :   Rationale: increase ROM, increase strength and improve functional mobility to improve the patients ability to get in and out of car, play tennis. 15 min Manual Therapy:supine long axis distraction through left LE using towel   Rationale: decrease pain, increase ROM, increase tissue extensibility, decrease trigger points and improve joint mobility to improve the patients ability to sit without pain    With   [x] TE   [] manual   [] self care Patient Education: [x] Review HEP    [] Progressed/Changed HEP based on:   [] positioning   [] body mechanics   [] transfers   [] heat/ice application    [x] other: pt with questions on lying prone. Advised it should be ok for him to sleep prone so long as it does not exacerbate LE radicular symptoms. Other Objective/Functional Measures:     Pain Level (0-10 scale) post treatment: 0    ASSESSMENT    Patient will continue to benefit from skilled PT services to modify and progress therapeutic interventions, address functional mobility deficits, address ROM deficits, address strength deficits and analyze and address soft tissue restrictions to attain remaining goals. Progress towards goals / Updated goals:      PLAN  []  Upgrade activities as tolerated     [x]  Continue plan of care  []  Update interventions per flow sheet       []  Discharge due to:_  [x]  Other:_ progress core program      Rossana Rome, PT, DPT, CMTPT    4/82/0211    PT License Number: [id number]

## 2019-05-29 ENCOUNTER — HOSPITAL ENCOUNTER (OUTPATIENT)
Dept: PHYSICAL THERAPY | Age: 64
Discharge: HOME | End: 2019-05-29
Payer: MEDICARE

## 2019-05-29 PROCEDURE — 97140 MANUAL THERAPY 1/> REGIONS: CPT | Performed by: PHYSICAL THERAPIST

## 2019-05-29 PROCEDURE — 97110 THERAPEUTIC EXERCISES: CPT | Performed by: PHYSICAL THERAPIST

## 2019-05-29 NOTE — PROGRESS NOTES
PT DAILY TREATMENT NOTE - [facility] 2-15    Patient Name: Tanja Kaur  Date:2019  : 1955  [x]  Patient  Verified  Payor: Cait Perish / Plan: [place] & [place] / Product Type: Medicare /    In time:200 P  Out time:  250 P  Total Treatment Time (min): 50  Total Timed Codes (min):50  1:1 Treatment Time (MC/[place]): 40  Visit #: 3    Treatment Area: Low back pain [M54.5]    SUBJECTIVE  Pain Level (0-10 scale): 0  Any medication changes, allergies to medications, adverse drug reactions, diagnosis change, or new procedure performed?: [x] No    [] Yes (see summary sheet for update)  Subjective functional status/changes:     Pt reports he always feels better after doing the exercises. Noticing he is having longer bouts without the sharpness in his left lower leg. OBJECTIVE  [x] Skin assessment post-treatment:  [x]intact []redness- no adverse reaction    []redness  adverse reaction:   declined min []  Ice     []  Heat  Position:  Location:    Rationale: decrease edema, decrease inflammation, decrease pain and reduce soreness post therapy in order to improve the patients ability to perform ADL's. 35 min Therapeutic Exercise:  [x] See flow sheet :   Rationale: increase ROM, increase strength and improve functional mobility to improve the patients ability to get in and out of car, play tennis. 15 min Manual Therapy:Prone MFR left glute med, glute max, glute min. Lumbar CPA grade III-IV L1-L5 and grade III-IV inocente sacral base. Rationale: decrease pain, increase ROM, increase tissue extensibility, decrease trigger points and improve joint mobility to improve the patients ability to sit without pain    With   [x] TE   [] manual   [] self care Patient Education: [x] Review HEP    [] Progressed/Changed HEP based on:   [] positioning   [] body mechanics   [] transfers   [] heat/ice application    [x] other: discussion re: vertebral and disc mechanics in relation to DDD.   Pt advised to continue HEP as directed by primary therapist, Rosangela Stephens, PT, DPT     Other Objective/Functional Measures:   TTP (L) glute max, glute med  Dec jt mobility L1-L5 and inocente sacral base, no pain with testing. Pain Level (0-10 scale) post treatment: 0    ASSESSMENT    Patient will continue to benefit from skilled PT services to modify and progress therapeutic interventions, address functional mobility deficits, address ROM deficits, address strength deficits and analyze and address soft tissue restrictions to attain remaining goals. Progress towards goals / Updated goals:      PLAN  []  Upgrade activities as tolerated     [x]  Continue plan of care  []  Update interventions per flow sheet       []  Discharge due to:_  [x]  Other:_assess addition of joint mobs and STM as well as three new exercises. Luz Soto.  Wild PT, DPT, CMTPT    1/91/0995    PT License Number: [id number]

## 2019-05-31 ENCOUNTER — HOSPITAL ENCOUNTER (OUTPATIENT)
Dept: PHYSICAL THERAPY | Age: 64
Discharge: HOME | End: 2019-05-31
Payer: MEDICARE

## 2019-05-31 PROCEDURE — 97110 THERAPEUTIC EXERCISES: CPT | Performed by: PHYSICAL THERAPIST

## 2019-05-31 PROCEDURE — 97140 MANUAL THERAPY 1/> REGIONS: CPT | Performed by: PHYSICAL THERAPIST

## 2019-05-31 NOTE — PROGRESS NOTES
PT DAILY TREATMENT NOTE - [facility] 2-15    Patient Name: Americo Press  Date:2019  : 1955  [x]  Patient  Verified  Payor: Jose Arora / Plan: [place] MEDICARE PART A & B / Product Type: Medicare /    In time: 9867 P  Out time:  1:45 P  Total Treatment Time (min): 70  Total Timed Codes (min): 70  1:1 Treatment Time (MC/[place]): 40  Visit #: 4    Treatment Area: Low back pain [M54.5]    SUBJECTIVE  Pain Level (0-10 scale): 3  Any medication changes, allergies to medications, adverse drug reactions, diagnosis change, or new procedure performed?: [x] No    [] Yes (see summary sheet for update)  Subjective functional status/changes:     Pt states that most of his symptoms in AM when he wakes up. He feels best after tennis (although also takes Aleve before playing so is unsure if the Aleve is what is causing dec'd symptoms). He reports overall dec'd radicular symptoms after performing exercises    OBJECTIVE  [x] Skin assessment post-treatment:  [x]intact []redness- no adverse reaction    []redness  adverse reaction:   declined min []  Ice     []  Heat  Position:  Location:    Rationale: decrease edema, decrease inflammation, decrease pain and reduce soreness post therapy in order to improve the patients ability to perform ADL's.     55 min Therapeutic Exercise:  [x] See flow sheet :   Rationale: increase ROM, increase strength and improve functional mobility to improve the patients ability to get in and out of car, play tennis. 15 min Manual Therapy:Prone MFR left glute med, glute max, glute min.   Lumbar CPA grade III-IV L1-L5 and grade III-IV inocente sacral base   Supine bilat LE long axis distraction   Rationale: decrease pain, increase ROM, increase tissue extensibility, decrease trigger points and improve joint mobility to improve the patients ability to sit without pain    With   [x] TE   [] manual   [] self care Patient Education: [x] Review HEP    [] Progressed/Changed HEP based on:   [] positioning [] body mechanics   [] transfers   [] heat/ice application    [x] other:      Other Objective/Functional Measures:   No inc'd symptoms during MFR of glutes  Complete relief of symptoms with supine long axis distraction    Pain Level (0-10 scale) post treatment: 0    ASSESSMENT  Continue to progress core strengthening as blu. Overall tolt therapy session well-- dec'd symptoms at end of visit    Patient will continue to benefit from skilled PT services to modify and progress therapeutic interventions, address functional mobility deficits, address ROM deficits, address strength deficits and analyze and address soft tissue restrictions to attain remaining goals.          Progress towards goals / Updated goals:      PLAN  []  Upgrade activities as tolerated     [x]  Continue plan of care  []  Update interventions per flow sheet       []  Discharge due to:_  [x]  Other:_    Rusty Kaye PT, DPT  5/31/2019

## 2019-06-03 ENCOUNTER — HOSPITAL ENCOUNTER (OUTPATIENT)
Dept: PHYSICAL THERAPY | Age: 64
Discharge: HOME | End: 2019-06-03
Payer: MEDICARE

## 2019-06-03 PROCEDURE — 97140 MANUAL THERAPY 1/> REGIONS: CPT | Performed by: PHYSICAL THERAPIST

## 2019-06-03 PROCEDURE — 97110 THERAPEUTIC EXERCISES: CPT | Performed by: PHYSICAL THERAPIST

## 2019-06-03 NOTE — PROGRESS NOTES
PT DAILY TREATMENT NOTE - [facility] 2-15    Patient Name: George Richmond  Date:6/3/2019  : 1955  [x]  Patient  Verified  Payor: [place] MEDICARE / Plan: [place] MEDICARE PART A & B / Product Type: Medicare /    In time: 200 P  Out time: 300 P  Total Treatment Time (min): 60  Total Timed Codes (min): 60  1:1 Treatment Time (MC/[place]): 60  Visit #: 5    Treatment Area: Low back pain [M54.5]    SUBJECTIVE  Pain Level (0-10 scale): 5  Any medication changes, allergies to medications, adverse drug reactions, diagnosis change, or new procedure performed?: [x] No    [] Yes (see summary sheet for update)  Subjective functional status/changes:     Pt is unsure whether or not he has a change in symptoms since beginning PT. \"It goes away when I take two Aleve. \" He states \"I'm not convinced this is just going to go away\"    OBJECTIVE  [x] Skin assessment post-treatment:  [x]intact []redness- no adverse reaction    []redness  adverse reaction:   declined min []  Ice     []  Heat  Position:  Location:    Rationale: decrease edema, decrease inflammation, decrease pain and reduce soreness post therapy in order to improve the patients ability to perform ADL's. 50 min Therapeutic Exercise:  [x] See flow sheet :   Rationale: increase ROM, increase strength and improve functional mobility to improve the patients ability to get in and out of car, play tennis. 10 min Manual Therapy:Prone MFR left glute med, glute max, glute min.   Lumbar CPA grade III-IV L1-L5 and grade III-IV inocente sacral base   Supine bilat LE long axis distraction   Rationale: decrease pain, increase ROM, increase tissue extensibility, decrease trigger points and improve joint mobility to improve the patients ability to sit without pain    With   [x] TE   [] manual   [] self care Patient Education: [x] Review HEP    [] Progressed/Changed HEP based on:   [] positioning   [] body mechanics   [] transfers   [] heat/ice application    [x] other:      Other Objective/Functional Measures:   n/a    Pain Level (0-10 scale) post treatment: \"a little better\" states he still has some symptoms in L calf    ASSESSMENT  Pt with inconsistent subjective information in regards to improvement of symptoms. Progress core strengthening as blu. Continues to report relief of symptoms during bilat LE long axis distraction    Patient will continue to benefit from skilled PT services to modify and progress therapeutic interventions, address functional mobility deficits, address ROM deficits, address strength deficits and analyze and address soft tissue restrictions to attain remaining goals.          Progress towards goals / Updated goals:      PLAN  []  Upgrade activities as tolerated     [x]  Continue plan of care  []  Update interventions per flow sheet       []  Discharge due to:_  [x]  Other:_    Dariusz Morrison, PT, DPT  6/3/2019

## 2019-06-05 ENCOUNTER — HOSPITAL ENCOUNTER (OUTPATIENT)
Dept: PHYSICAL THERAPY | Age: 64
Discharge: HOME | End: 2019-06-05
Payer: MEDICARE

## 2019-06-05 PROCEDURE — 97140 MANUAL THERAPY 1/> REGIONS: CPT | Performed by: PHYSICAL THERAPIST

## 2019-06-05 PROCEDURE — 97110 THERAPEUTIC EXERCISES: CPT | Performed by: PHYSICAL THERAPIST

## 2019-06-05 NOTE — PROGRESS NOTES
PT DAILY TREATMENT NOTE - [facility] 2-15    Patient Name: Tanja Kaur  Date:2019  : 1955  [x]  Patient  Verified  Payor: [place] MEDICARE / Plan: [place] MEDICARE PART A & B / Product Type: Medicare /    In time: 205 P  Out time: 2:55 P  Total Treatment Time (min): 50  Total Timed Codes (min): 50  1:1 Treatment Time (MC/[place]): 45  Visit #: 6    Treatment Area: Low back pain [M54.5]    SUBJECTIVE  Pain Level (0-10 scale): 7-8  Any medication changes, allergies to medications, adverse drug reactions, diagnosis change, or new procedure performed?: [x] No    [] Yes (see summary sheet for update)  Subjective functional status/changes:     Pt reports inc'd symptoms in calf, upper leg today; unable to attribute to any specific activity. He does not feel that PT has been reducing his symptoms- \"some short term relief right after I do the exercises but that's about it\" He has MD appointment scheduled for next Friday    OBJECTIVE  [x] Skin assessment post-treatment:  [x]intact []redness- no adverse reaction    []redness  adverse reaction:   declined min []  Ice     []  Heat  Position:  Location:    Rationale: decrease edema, decrease inflammation, decrease pain and reduce soreness post therapy in order to improve the patients ability to perform ADL's. 40 min Therapeutic Exercise:  [x] See flow sheet :   Rationale: increase ROM, increase strength and improve functional mobility to improve the patients ability to get in and out of car, play tennis. 10 min Manual Therapy:Prone MFR left glute med, glute max, glute min.   Lumbar CPA grade III-IV L1-L5 and grade III-IV inocente sacral base   Supine bilat LE long axis distraction   Rationale: decrease pain, increase ROM, increase tissue extensibility, decrease trigger points and improve joint mobility to improve the patients ability to sit without pain    With   [x] TE   [] manual   [] self care Patient Education: [x] Review HEP    [] Progressed/Changed HEP based on: [] positioning   [] body mechanics   [] transfers   [] heat/ice application    [x] other:      Other Objective/Functional Measures:   N/a    Pain Level (0-10 scale) post treatment: no change    ASSESSMENT  Pt has completed 6 skilled PT visits. Pt continuing to have inc'd L LE radicular symptoms despite manual techniques, progression of therapeutic exercises, and compliance with HEP. Recommend hold on PT (pt to continue HEP) until after MD visit to discuss next steps in POC    Patient will continue to benefit from skilled PT services to modify and progress therapeutic interventions, address functional mobility deficits, address ROM deficits, address strength deficits and analyze and address soft tissue restrictions to attain remaining goals. Progress towards goals / Updated goals:  Short Term Goals: To be accomplished in 2-3 weeks:  1) Pt will be independent in initial HEP MET  2) Pt will report >/=25% decrease in exacerbation of symptoms not met  3) Pt will report compliance with ice regimen MET     Long Term Goals:  To be accomplished in 6-8 weeks:  1) Pt will report >/=75% relief of L calf symptoms not met  2) Pt will improve L hip abduction strength to >/=4+/5 in order to aid in exercise routine  3) Pt will perform lumbar AROM all directions without inc'd symptoms not met    PLAN  []  Upgrade activities as tolerated     [x]  Continue plan of care  []  Update interventions per flow sheet       []  Discharge due to:_  [x]  Other:_ Return to MD, hold on Elver Headley PT, DPT  6/5/2019

## 2019-06-10 ENCOUNTER — APPOINTMENT (OUTPATIENT)
Dept: PHYSICAL THERAPY | Age: 64
End: 2019-06-10
Payer: MEDICARE

## 2019-06-12 ENCOUNTER — APPOINTMENT (OUTPATIENT)
Dept: PHYSICAL THERAPY | Age: 64
End: 2019-06-12
Payer: MEDICARE

## 2019-06-14 ENCOUNTER — TELEPHONE (OUTPATIENT)
Dept: FAMILY MEDICINE CLINIC | Age: 64
End: 2019-06-14

## 2019-06-14 ENCOUNTER — OFFICE VISIT (OUTPATIENT)
Dept: FAMILY MEDICINE CLINIC | Age: 64
End: 2019-06-14

## 2019-06-14 VITALS
DIASTOLIC BLOOD PRESSURE: 82 MMHG | TEMPERATURE: 97.8 F | BODY MASS INDEX: 30.92 KG/M2 | HEART RATE: 69 BPM | SYSTOLIC BLOOD PRESSURE: 136 MMHG | RESPIRATION RATE: 18 BRPM | WEIGHT: 216 LBS | HEIGHT: 70 IN | OXYGEN SATURATION: 98 %

## 2019-06-14 DIAGNOSIS — E78.5 HYPERLIPIDEMIA LDL GOAL <130: ICD-10-CM

## 2019-06-14 DIAGNOSIS — E55.9 VITAMIN D DEFICIENCY: ICD-10-CM

## 2019-06-14 DIAGNOSIS — R20.0 NUMBNESS AND TINGLING OF LEFT LEG: Primary | ICD-10-CM

## 2019-06-14 DIAGNOSIS — J30.1 SEASONAL ALLERGIC RHINITIS DUE TO POLLEN: ICD-10-CM

## 2019-06-14 DIAGNOSIS — E66.9 OBESITY (BMI 30.0-34.9): ICD-10-CM

## 2019-06-14 DIAGNOSIS — R20.2 NUMBNESS AND TINGLING OF LEFT LEG: Primary | ICD-10-CM

## 2019-06-14 NOTE — PROGRESS NOTES
Chief Complaint   Patient presents with    Back Pain     f/u from LOV- Numbness and tingling of left leg     1. Have you been to the ER, urgent care clinic since your last visit? Hospitalized since your last visit? No    2. Have you seen or consulted any other health care providers outside of the [address] [place] [address] since your last visit? Include any pap smears or colon screening.  No

## 2019-06-14 NOTE — PROGRESS NOTES
HISTORY OF PRESENT ILLNESS  HPI  Alexei Mckenna is a 70 y.o. Male with a history of DDD and DJD of cervical spine, bilateral shoulder pain, vitamin D deficiency and hyperlipidemia with LDL goal <130, who presents at the office today for a follow up of his back pain. Pt saw Dr. Natali Hart on 5/13 for pain in his left buttock and calf, with occasional tingling in left calf. Pt had lumbar spine XR done on 5/13, which showed DDD in the lumbar spine. Pt states that he has some slight discomfort before going to bed, but his pain is greatest when he gets out of bed in the morning. Pt notes that the pain started suddenly when getting out of bed approximately 6 weeks ago. Pt reports that the pain is similar to cramping, but is not as severe. Pt has had little relief from PT. Pt denies unusual SOB, chest pain, and any recent ER visits or hospitalizations. Until this year, no problems with Lumbar spine; does have a long history of intermittent cervical spine symptoms, mainly DJD with radiculopathy to the scalp. Past Medical History:   Diagnosis Date    Allergic rhinitis 2/27/2010    Chronic pain of both shoulders- worse in AM and loosens up with activity. 3/20/2016    DDD,DJD cervicalC5-6,6-7foraminal encroachment on X ray-4/2013 5/17/2013    Obesity (BMI 30.0-34.9) 2/5/2018    Other and unspecified hyperlipidemia 2/27/2010     Past Surgical History:   Procedure Laterality Date    HX CATARACT REMOVAL  87-88    inocente    HX CATARACT REMOVAL      HX TONSIL AND ADENOIDECTOMY  1960    SINUS SURGERY PROC UNLISTED  2001     Current Outpatient Medications on File Prior to Visit   Medication Sig Dispense Refill    naproxen sodium (ALEVE) 220 mg cap Take 220 mg by mouth as needed.  simvastatin (ZOCOR) 40 mg tablet TAKE 1 TABLET BY MOUTH EVERY DAY AT NIGHT 90 Tab 1    ibuprofen (ADVIL) 200 mg tablet Take 200 mg by mouth every six (6) hours as needed.       Cholecalciferol, Vitamin D3, (VITAMIN D3) 1,000 unit cap Take 2,000 Units by mouth daily (after breakfast).  aspirin 81 mg tablet Take 81 mg by mouth daily. No current facility-administered medications on file prior to visit. Allergies   Allergen Reactions    Pcn [Penicillins] Rash     Family History   Problem Relation Age of Onset    Cancer Mother         ovarian    Cancer Father         prostate?  Elevated Lipids Father     Other Brother         factor 5      Social History     Socioeconomic History    Marital status:      Spouse name: Not on file    Number of children: Not on file    Years of education: Not on file    Highest education level: Not on file   Tobacco Use    Smoking status: Never Smoker    Smokeless tobacco: Never Used   Substance and Sexual Activity    Alcohol use: Yes     Comment: 12oz beer once monthly     Drug use: No    Sexual activity: Yes     Partners: Female           Review of Systems   Constitutional: Negative for chills, diaphoresis, fever, malaise/fatigue and weight loss. Eyes: Negative for blurred vision, double vision, pain and redness. Respiratory: Negative for cough, shortness of breath and wheezing. Cardiovascular: Negative for chest pain, palpitations, orthopnea, claudication, leg swelling and PND. Musculoskeletal:        Pain in left buttock and left calf   Skin: Negative for itching and rash. Neurological: Negative for dizziness, tingling, tremors, sensory change, speech change, focal weakness, seizures, loss of consciousness, weakness and headaches.      Results for orders placed or performed in visit on 05/02/19   CBC W/O DIFF   Result Value Ref Range    WBC 6.1 3.4 - 10.8 x10E3/uL    RBC 5.56 4.14 - 5.80 x10E6/uL    HGB 16.3 13.0 - 17.7 g/dL    HCT 49.4 37.5 - 51.0 %    MCV 89 79 - 97 fL    MCH 29.3 26.6 - 33.0 pg    MCHC 33.0 31.5 - 35.7 g/dL    RDW 13.9 12.3 - 15.4 %    PLATELET 765 199 - 035 U40R8/FX   METABOLIC PANEL, COMPREHENSIVE   Result Value Ref Range    Glucose 91 65 - 99 mg/dL    BUN 13 8 - 27 mg/dL    Creatinine 1.03 0.76 - 1.27 mg/dL    GFR est non-AA 76 >59 mL/min/1.73    GFR est AA 88 >59 mL/min/1.73    BUN/Creatinine ratio 13 10 - 24    Sodium 142 134 - 144 mmol/L    Potassium 4.3 3.5 - 5.2 mmol/L    Chloride 106 96 - 106 mmol/L    CO2 23 20 - 29 mmol/L    Calcium 9.1 8.6 - 10.2 mg/dL    Protein, total 6.5 6.0 - 8.5 g/dL    Albumin 4.3 3.6 - 4.8 g/dL    GLOBULIN, TOTAL 2.2 1.5 - 4.5 g/dL    A-G Ratio 2.0 1.2 - 2.2    Bilirubin, total 0.5 0.0 - 1.2 mg/dL    Alk. phosphatase 84 39 - 117 IU/L    AST (SGOT) 26 0 - 40 IU/L    ALT (SGPT) 26 0 - 44 IU/L   MAGNESIUM   Result Value Ref Range    Magnesium 2.1 1.6 - 2.3 mg/dL   PHOSPHORUS   Result Value Ref Range    Phosphorus 2.4 (L) 2.5 - 4.5 mg/dL   CK   Result Value Ref Range    Creatine Kinase,Total 63 24 - 204 U/L         Physical Exam  Visit Vitals  /82 (BP 1 Location: Right arm, BP Patient Position: Sitting)   Pulse 69   Temp 97.8 °F (36.6 °C) (Oral)   Resp 18   Ht 5' 10\" (1.778 m)   Wt 216 lb (98 kg)   SpO2 98%   BMI 30.99 kg/m²       Back: symmetric, no curvature. ROM normal. No CVA tenderness. Negative SLR. Some mild discomfort in left calf and less discomfort in left buttock area when he moves his leg from one position to another, mainly with the leg extended and calf flexed. ASSESSMENT and PLAN    ICD-10-CM ICD-9-CM    1. Numbness and tingling of left leg R20.0 782.0     R20.2     2. Vitamin D deficiency E55.9 268.9    3. Seasonal allergic rhinitis due to pollen J30.1 477.0    4. Hyperlipidemia LDL goal <130 E78.5 272.4    5. Obesity (BMI 30.0-34. 9) E66.9 278.00      Diagnoses and all orders for this visit:    1. Numbness and tingling of left leg    2. Vitamin D deficiency    3. Seasonal allergic rhinitis due to pollen    4. Hyperlipidemia LDL goal <130    5. Obesity (BMI 30.0-34. 9)      Follow-up and Dispositions    · Return in about 2 months (around 8/14/2019), or if left buttock and thigh discomfort worsen or fail to improve, for F/U cholesterol, f/u Vitamin D deficiency, F/U of obesity. reviewed medications and side effects in detail  Please call my office if there are any questions- [phone]. Discussed expected course/resolution/complications of diagnosis in detail with patient. Medication risks/benefits/costs/interactions/alternatives discussed with patient. Pt was given an after visit summary which includes diagnoses, current medications & vitals. Pt expressed understanding with the diagnosis and plan. Patient to call if no better in 3 -4 days and prn new problems. Total 25 minutes,60 % counseling re: Reviewed his lumbosacral spine XR that was done recently, which shows a mild amount of arthritis, as well as anterolisthesis of the L4 on L5 of 4 mm. We discussed options, and since medication and PT are not helping I suggested an orthopedic referral, chiropractic manipulation, or even an inversion table. Pt decided that he will try an inversion table, and if not improving will see a chiropractor or orthopedist. I encouraged him to take a copy of LS spine XR to his appointments. Extra Strength Tylenol 500 mg 1-2 every 6 hours as needed for pain. Candance Huger NSAID OTC of choice- aleve 1-2 every 12 hrs or ibuprofen 200 mg 2, 3, or 4 every 6 hrs ( preferably after food to protect the stomach against ulcers ) as needed for pain  Not relieved by Tylenol. Go ahead with back ROM exercises given to hi by PT. Also, discussed symptoms of concern that were noted today in the note above, treatment options( including doing nothing), when to follow up before recommended time frame. Also, answered all questions. This document was written by Judy Ramirez, as dictated by Jonathan Bynum MD.  I have reviewed and agree with the above note and have made corrections where appropriate Mark C. Autry Heimlich M.D.

## 2019-06-14 NOTE — TELEPHONE ENCOUNTER
Patient is calling stating that he called Dr. Lobo Cyr office, who he was referred too. Patient states that they stated they do not work on the back, only the hip. Pt states that he needs to be referred to a specialist that will help with the patient's needs.        Best callback:[phone]      LOV:  Friday, June 14, 2019

## 2019-06-17 NOTE — TELEPHONE ENCOUNTER
Let him know the 2 chiropractors he can try - Dr Charlene Gonsales -[phone] or Benji Britt [phone]. Orthopedist for his back- Dr. Evelyne Mcnally- [phone].

## 2019-06-17 NOTE — TELEPHONE ENCOUNTER
----- Message from Elyse Callahan sent at 6/17/2019  9:43 AM EDT -----  Regarding: Dr. Wade Godinez: [phone]  Patient is requesting to speak with Dr. Jenn Smith in regards to a referral for another Orthopedic provider.

## 2019-06-19 NOTE — ANCILLARY DISCHARGE INSTRUCTIONS
[facility] [address] [place], [address] Phone: [phone]  Fax: [phone] Discharge Summary  2-15 Patient name: Shanel Larry  : 1955  Provider#:[id number] Referral source: Marva Morrissey MD     
Medical/Treatment Diagnosis: Low back pain [M54.5] Prior Hospitalization: see medical history Comorbidities: see evaluation Prior Level of Function:see evaluation Medications: Verified on Patient Summary List 
 
Start of Care: 19      Onset Date:see evaluation Visits from Start of Care: 6     Missed Visits: see CC Reporting Period : 19 to 19 Summary of care:  
Pt has completed 6 skilled PT visits. Pt continuing to have inc'd L LE radicular symptoms despite manual techniques, progression of therapeutic exercises, and compliance with HEP. Recommend hold on PT (pt to continue HEP) until after MD visit to discuss next steps in POC Progress towards goals / Updated goals: 
Short Term Goals: To be accomplished in 2-3 weeks: 
1) Pt will be independent in initial HEP MET 2) Pt will report >/=25% decrease in exacerbation of symptoms not met 3) Pt will report compliance with ice regimen MET 
  
Long Term Goals: To be accomplished in 6-8 weeks: 
1) Pt will report >/=75% relief of L calf symptoms not met 2) Pt will improve L hip abduction strength to >/=4+/5 in order to aid in exercise routine 3) Pt will perform lumbar AROM all directions without inc'd symptoms not met 
  
 
 
RECOMMENDATIONS: 
[x]Discontinue therapy: []Patient has reached or is progressing toward set goals []Patient is non-compliant or has abdicated 
    [x]Due to lack of appreciable progress towards set goals Dariusz Morrison, PT 2019 4:16 PM

## 2019-07-31 ENCOUNTER — OFFICE VISIT (OUTPATIENT)
Dept: FAMILY MEDICINE CLINIC | Age: 64
End: 2019-07-31

## 2019-07-31 ENCOUNTER — HOSPITAL ENCOUNTER (OUTPATIENT)
Dept: LAB | Age: 64
Discharge: HOME | End: 2019-07-31
Payer: MEDICARE

## 2019-07-31 DIAGNOSIS — M25.512 CHRONIC PAIN OF BOTH SHOULDERS: ICD-10-CM

## 2019-07-31 DIAGNOSIS — Z00.00 MEDICARE ANNUAL WELLNESS VISIT, SUBSEQUENT: ICD-10-CM

## 2019-07-31 DIAGNOSIS — R35.1 NOCTURIA: ICD-10-CM

## 2019-07-31 DIAGNOSIS — J30.1 SEASONAL ALLERGIC RHINITIS DUE TO POLLEN: ICD-10-CM

## 2019-07-31 DIAGNOSIS — M25.511 CHRONIC PAIN OF BOTH SHOULDERS: ICD-10-CM

## 2019-07-31 DIAGNOSIS — E66.9 OBESITY (BMI 30.0-34.9): ICD-10-CM

## 2019-07-31 DIAGNOSIS — E78.5 HYPERLIPIDEMIA LDL GOAL <130: Primary | ICD-10-CM

## 2019-07-31 DIAGNOSIS — Z79.01 LONG TERM CURRENT USE OF ANTICOAGULANT: ICD-10-CM

## 2019-07-31 DIAGNOSIS — G89.29 CHRONIC PAIN OF BOTH SHOULDERS: ICD-10-CM

## 2019-07-31 DIAGNOSIS — R97.20 ELEVATED PSA MEASUREMENT: ICD-10-CM

## 2019-07-31 DIAGNOSIS — R73.09 ELEVATED GLUCOSE: ICD-10-CM

## 2019-07-31 DIAGNOSIS — E55.9 VITAMIN D DEFICIENCY: ICD-10-CM

## 2019-07-31 DIAGNOSIS — M50.30 DDD (DEGENERATIVE DISC DISEASE), CERVICAL: ICD-10-CM

## 2019-07-31 DIAGNOSIS — Z01.818 PREOP EXAMINATION: ICD-10-CM

## 2019-07-31 PROCEDURE — 85027 COMPLETE CBC AUTOMATED: CPT

## 2019-07-31 PROCEDURE — 36415 COLL VENOUS BLD VENIPUNCTURE: CPT

## 2019-07-31 PROCEDURE — 85610 PROTHROMBIN TIME: CPT

## 2019-07-31 PROCEDURE — 80053 COMPREHEN METABOLIC PANEL: CPT

## 2019-07-31 PROCEDURE — 84154 ASSAY OF PSA FREE: CPT

## 2019-07-31 PROCEDURE — 83036 HEMOGLOBIN GLYCOSYLATED A1C: CPT

## 2019-07-31 PROCEDURE — 84153 ASSAY OF PSA TOTAL: CPT

## 2019-07-31 PROCEDURE — 81003 URINALYSIS AUTO W/O SCOPE: CPT

## 2019-07-31 PROCEDURE — 80061 LIPID PANEL: CPT

## 2019-07-31 RX ORDER — SIMVASTATIN 40 MG/1
TABLET, FILM COATED ORAL
Qty: 90 TAB | Refills: 1 | Status: SHIPPED | OUTPATIENT
Start: 2019-07-31 | End: 2020-04-17

## 2019-07-31 NOTE — PROGRESS NOTES
HISTORY OF PRESENT ILLNESS  HPI  Chris Colbert is a 70 y.o. Male with a history of cervical DDD and DJD, bilateral shoulder pain, vitamin D deficiency and hyperlipidemia with LDL goal <130, who presents to the office today for a pre-op visit. Pt is scheduled to have a lumbar laminectomy of L3-5 on 8/22 at [facility] with Dr. Donell Li. Pt says the pain is in the sciatic nerve, with most of the pain in his left calf and buttocks with occasional lower back pain. Pt reports having severe degeneration of L4-5, with the pain coming and going. Pt mentions the pain sometimes radiates to his left big toe. Pt denies unusual SOB, chest pain, and any recent ER visits or hospitalizations. Past Medical History:   Diagnosis Date    Allergic rhinitis 2/27/2010    Chronic pain of both shoulders- worse in AM and loosens up with activity. 3/20/2016    DDD,DJD cervicalC5-6,6-7foraminal encroachment on X ray-4/2013 5/17/2013    Obesity (BMI 30.0-34.9) 2/5/2018    Other and unspecified hyperlipidemia 2/27/2010     Past Surgical History:   Procedure Laterality Date    HX CATARACT REMOVAL  87-88    inocente    HX CATARACT REMOVAL      HX TONSIL AND ADENOIDECTOMY  1960    SINUS SURGERY PROC UNLISTED  2001     Current Outpatient Medications on File Prior to Visit   Medication Sig Dispense Refill    naproxen sodium (ALEVE) 220 mg cap Take 220 mg by mouth as needed.  ibuprofen (ADVIL) 200 mg tablet Take 200 mg by mouth every six (6) hours as needed.  Cholecalciferol, Vitamin D3, (VITAMIN D3) 1,000 unit cap Take 2,000 Units by mouth daily (after breakfast).  aspirin 81 mg tablet Take 81 mg by mouth daily. No current facility-administered medications on file prior to visit. Allergies   Allergen Reactions    Pcn [Penicillins] Rash     Family History   Problem Relation Age of Onset    Cancer Mother         ovarian    Cancer Father         prostate?     Elevated Lipids Father     Other Brother factor 5      Social History     Socioeconomic History    Marital status:      Spouse name: Not on file    Number of children: Not on file    Years of education: Not on file    Highest education level: Not on file   Tobacco Use    Smoking status: Never Smoker    Smokeless tobacco: Never Used   Substance and Sexual Activity    Alcohol use: Yes     Comment: 12oz beer once monthly     Drug use: No    Sexual activity: Yes     Partners: Female             Review of Systems   Constitutional: Negative for chills, diaphoresis, fever, malaise/fatigue and weight loss. HENT: Negative for congestion, ear discharge, ear pain, hearing loss, nosebleeds, sore throat and tinnitus. Eyes: Negative for blurred vision, double vision, photophobia, pain, discharge and redness. Respiratory: Negative for cough, hemoptysis, sputum production, shortness of breath, wheezing and stridor. Cardiovascular: Negative for chest pain, palpitations, orthopnea, claudication, leg swelling and PND. Gastrointestinal: Negative for abdominal pain, blood in stool, constipation, diarrhea, heartburn, melena, nausea and vomiting. Genitourinary: Negative for dysuria, flank pain, frequency, hematuria and urgency. Musculoskeletal: Negative for back pain, falls, joint pain, myalgias and neck pain. Skin: Negative for itching and rash. Neurological: Negative for dizziness, tingling, tremors, sensory change, speech change, focal weakness, seizures, loss of consciousness, weakness and headaches. Endo/Heme/Allergies: Negative for environmental allergies and polydipsia. Does not bruise/bleed easily. Psychiatric/Behavioral: Negative for depression, hallucinations, memory loss, substance abuse and suicidal ideas. The patient is not nervous/anxious and does not have insomnia.       Results for orders placed or performed in visit on 19/45/[birth year]   METABOLIC PANEL, COMPREHENSIVE   Result Value Ref Range    Glucose 80 65 - 99 mg/dL    BUN 12 8 - 27 mg/dL    Creatinine 1.12 0.76 - 1.27 mg/dL    GFR est non-AA 69 >59 mL/min/1.73    GFR est AA 79 >59 mL/min/1.73    BUN/Creatinine ratio 11 10 - 24    Sodium 143 134 - 144 mmol/L    Potassium 4.3 3.5 - 5.2 mmol/L    Chloride 104 96 - 106 mmol/L    CO2 23 20 - 29 mmol/L    Calcium 9.2 8.6 - 10.2 mg/dL    Protein, total 6.7 6.0 - 8.5 g/dL    Albumin 4.6 3.6 - 4.8 g/dL    GLOBULIN, TOTAL 2.1 1.5 - 4.5 g/dL    A-G Ratio 2.2 1.2 - 2.2    Bilirubin, total 0.7 0.0 - 1.2 mg/dL    Alk.  phosphatase 76 39 - 117 IU/L    AST (SGOT) 26 0 - 40 IU/L    ALT (SGPT) 22 0 - 44 IU/L   LIPID PANEL   Result Value Ref Range    Cholesterol, total 151 100 - 199 mg/dL    Triglyceride 144 0 - 149 mg/dL    HDL Cholesterol 47 >39 mg/dL    VLDL, calculated 29 5 - 40 mg/dL    LDL, calculated 75 0 - 99 mg/dL   URINALYSIS W/ RFLX MICROSCOPIC   Result Value Ref Range    Specific Gravity 1.029 1.005 - 1.030    pH (UA) 6.0 5.0 - 7.5    Color Yellow Yellow    Appearance Clear Clear    Leukocyte Esterase Negative Negative    Protein Negative Negative/Trace    Glucose Negative Negative    Ketone Negative Negative    Blood Negative Negative    Bilirubin Negative Negative    Urobilinogen 0.2 0.2 - 1.0 mg/dL    Nitrites Negative Negative    Microscopic Examination Comment    CBC W/O DIFF   Result Value Ref Range    WBC 6.6 3.4 - 10.8 x10E3/uL    RBC 5.52 4.14 - 5.80 x10E6/uL    HGB 16.1 13.0 - 17.7 g/dL    HCT 47.5 37.5 - 51.0 %    MCV 86 79 - 97 fL    MCH 29.2 26.6 - 33.0 pg    MCHC 33.9 31.5 - 35.7 g/dL    RDW 13.3 12.3 - 15.4 %    PLATELET 256 640 - 976 x10E3/uL   PROTHROMBIN TIME + INR   Result Value Ref Range    INR 1.1 0.8 - 1.2    Prothrombin time 11.2 9.1 - 12.0 sec   HEMOGLOBIN A1C WITH EAG   Result Value Ref Range    Hemoglobin A1c 5.4 4.8 - 5.6 %    Estimated average glucose 108 mg/dL   PSA W/ REFLX FREE PSA   Result Value Ref Range    Prostate Specific Ag 4.4 (H) 0.0 - 4.0 ng/mL    Reflex Criteria Comment    PSA, FREE   Result Value Ref Range    PSA, Free 0.55 N/A ng/mL    % Free PSA 12.5 %   CVD REPORT   Result Value Ref Range    INTERPRETATION Note          Physical Exam  Visit Vitals  /88 (BP 1 Location: Left arm, BP Patient Position: Sitting)   Pulse 82   Temp 97.8 °F (36.6 °C) (Oral)   Resp 18   Ht 5' 10\" (1.778 m)   Wt 213 lb (96.6 kg)   SpO2 97%   BMI 30.56 kg/m²       No orthostatic changes. General:  Alert, cooperative, no distress, appears stated age. Head:  Normocephalic, without obvious abnormality, atraumatic. Eyes:  Conjunctivae/corneas clear. PERRL, EOMs intact. Fundi benign   Ears:  Normal TMs and external ear canals both ears. Nose: Nares normal. Septum midline. Mucosa normal. No drainage or sinus tenderness. Throat: Lips, mucosa, and tongue normal. Teeth and gums normal.   Neck: Supple, symmetrical, trachea midline, no adenopathy, thyroid: no enlargement/tenderness/nodules, no carotid bruit and no JVD. Back:   Symmetric, no curvature. ROM normal. No CVA tenderness. Lungs:   Clear to auscultation bilaterally. Chest wall:  No tenderness or deformity. Heart:  Regular rate and rhythm, S1, S2 normal, no murmur, click, rub or gallop. Abdomen:   Soft, non-tender. Bowel sounds normal. No masses,  No organomegaly. Genitalia:  deferred   Rectal:  Normal tone, normal prostate, no masses or tenderness  Guaiac negative stool. Extremities: Extremities normal, atraumatic, no cyanosis or edema. Pulses: 2+ and symmetric all extremities. Skin: Skin color, texture, turgor normal. No rashes or lesions   Lymph nodes: Cervical, supraclavicular, and axillary nodes normal.   Neurologic: CNII-XII intact. Normal strength, sensation and reflexes throughout. ASSESSMENT and PLAN    ICD-10-CM ICD-9-CM    1. Hyperlipidemia LDL goal <130 E78.5 272.4 simvastatin (ZOCOR) 40 mg tablet      LIPID PANEL   2.  Preop examination J00.793 U89.46 METABOLIC PANEL, COMPREHENSIVE      LIPID PANEL      URINALYSIS W/ RFLX MICROSCOPIC CBC W/O DIFF      PROTHROMBIN TIME + INR      HEMOGLOBIN A1C WITH EAG      AMB POC EKG ROUTINE W/ 12 LEADS, INTER & REP   3. Nocturia R35.1 788.43 PSA W/ REFLX FREE PSA   4. Medicare annual wellness visit, subsequent M50.00 B12.9 METABOLIC PANEL, COMPREHENSIVE      LIPID PANEL      URINALYSIS W/ RFLX MICROSCOPIC      CBC W/O DIFF      PROTHROMBIN TIME + INR      HEMOGLOBIN A1C WITH EAG   5. Long term current use of anticoagulant  Z79.01 V58.61 PROTHROMBIN TIME + INR   6. Elevated glucose R73.09 790.29 HEMOGLOBIN A1C WITH EAG   7. Vitamin D deficiency E55.9 268.9    8. Obesity (BMI 30.0-34. 9) E66.9 278.00    9. Seasonal allergic rhinitis due to pollen J30.1 477.0    10. Chronic pain of both shoulders- worse in AM and loosens up with activity. M25.511 719.41     G89.29 338.29     M25.512     11. DDD,DJD cervicalC5-6,6-7foraminal encroachment on X ray-4/2013 M50.30 722.4    12. Elevated PSA measurement R97.20 790.93      Diagnoses and all orders for this visit:    1. Hyperlipidemia LDL goal <130  -     simvastatin (ZOCOR) 40 mg tablet; TAKE 1 TABLET BY MOUTH EVERY DAY AT NIGHT  -     LIPID PANEL    2. Preop examination  -     METABOLIC PANEL, COMPREHENSIVE  -     LIPID PANEL  -     URINALYSIS W/ RFLX MICROSCOPIC  -     CBC W/O DIFF  -     PROTHROMBIN TIME + INR  -     HEMOGLOBIN A1C WITH EAG  -     AMB POC EKG ROUTINE W/ 12 LEADS, INTER & REP    3. Nocturia  -     PSA W/ REFLX FREE PSA    4. Medicare annual wellness visit, subsequent  -     METABOLIC PANEL, COMPREHENSIVE  -     LIPID PANEL  -     URINALYSIS W/ RFLX MICROSCOPIC  -     CBC W/O DIFF  -     PROTHROMBIN TIME + INR  -     HEMOGLOBIN A1C WITH EAG    5. Long term current use of anticoagulant   -     PROTHROMBIN TIME + INR    6. Elevated glucose  -     HEMOGLOBIN A1C WITH EAG    7. Vitamin D deficiency    8. Obesity (BMI 30.0-34.9)    9. Seasonal allergic rhinitis due to pollen    10.  Chronic pain of both shoulders- worse in AM and loosens up with activity. 11. DDD,DJD cervicalC5-6,6-7foraminal encroachment on X ray-4/2013    12. Elevated PSA measurement    Other orders  -     PSA, FREE  -     CVD REPORT      Follow-up and Dispositions    · Return in about 6 months (around 1/31/2020), or if symptoms worsen or fail to improve, for F/U cholesterol, f/u Vitamin D deficiency, F/U of obesity. lab results and schedule of future lab studies reviewed with patient  reviewed diet, exercise and weight control  cardiovascular risk and specific lipid/LDL goals reviewed  reviewed medications and side effects in detail  Please call my office if there are any questions- [phone]. I will arrange for follow up after the lab tests done from today return  Recommended a weekly \"heart check. \" I went into detail how to do this. Call for refills on medications as needed. Discussed expected course/resolution/complications of diagnosis in detail with patient. Medication risks/benefits/costs/interactions/alternatives discussed with patient. Pt was given an after visit summary which includes diagnoses, current medications & vitals. Pt expressed understanding with the diagnosis and plan. Total 45 minutes,60 % counseling re: Pt is cleared for surgery on 8/22 with Dr. Charly Cruz. Checked his A1c and PT/INR at the request of Dr. Charly Cruz for his pre-op. Recommended a weekly \"heart check. \" I went into detail how to do this. BMI is significantly elevated- in the obese range. I reviewed diet, exercise and weight control. Discussed weight control in detail, the importance of mainly decreased carbs, and for weight maintenance, exercise; discussed different diets and that it isn't as important to watch the type of foods as it is to decrease calorie intake no matter what type of diet you do, etc.       Reviewed symptoms, or lack thereof, of hypertension and elevated cholesterol.       Regular exercise is very important to your health; it helps mentally, physically, socially; it prevents injuries if done properly. Exercise, even as simple as walking 20-30 minutes daily has major benefits to your health even though your \"numbers\" are the same in the lab. See if you can add this into your daily regimen and after a few months it will become a regular habit-\"just something you do,\" like brushing your teeth. A combination of aerobic exercise and strengthening and stretching is felt to be the best for you, so this should be your ultimate goal.   This can be done in the privacy of your home or in a group setting as at the gym  Some prefer having a , others prefer to do exercise in groups or individually. Do what \"works\" for you. You need to make it simple and \"fun,\" or you most likely you will not continue it. Due to his elevated BP, pt needs to check it at home and inform us of the results. Pt's diastolic BP is 94 but ideally should be below 90. Encouraged him to work on weight loss and implement a low sodium diet, which should hopefully get the diastolic under 90 before his surgery. Also, discussed symptoms of concern that were noted today in the note above, treatment options( including doing nothing), when to follow up before recommended time frame. Also, answered all questions. This document was written by Ebony Goddard, as dictated by Wenceslao Watson MD.  I have reviewed and agree with the above note and have made corrections where appropriate Ancelmo Bajwa M.D.

## 2019-07-31 NOTE — PROGRESS NOTES
Chief Complaint   Patient presents with    Pre-op Exam     back sx      1. Have you been to the ER, urgent care clinic since your last visit? Hospitalized since your last visit? No    2. Have you seen or consulted any other health care providers outside of the [address] [place] [address] since your last visit? Include any pap smears or colon screening.  No      Pt would like his prostate checked as well

## 2019-08-01 LAB
ALBUMIN SERPL-MCNC: 4.6 G/DL (ref 3.6–4.8)
ALBUMIN/GLOB SERPL: 2.2 {RATIO} (ref 1.2–2.2)
ALP SERPL-CCNC: 76 IU/L (ref 39–117)
ALT SERPL-CCNC: 22 IU/L (ref 0–44)
APPEARANCE UR: CLEAR
AST SERPL-CCNC: 26 IU/L (ref 0–40)
BILIRUB SERPL-MCNC: 0.7 MG/DL (ref 0–1.2)
BILIRUB UR QL STRIP: NEGATIVE
BUN SERPL-MCNC: 12 MG/DL (ref 8–27)
BUN/CREAT SERPL: 11 (ref 10–24)
CALCIUM SERPL-MCNC: 9.2 MG/DL (ref 8.6–10.2)
CHLORIDE SERPL-SCNC: 104 MMOL/L (ref 96–106)
CHOLEST SERPL-MCNC: 151 MG/DL (ref 100–199)
CO2 SERPL-SCNC: 23 MMOL/L (ref 20–29)
COLOR UR: YELLOW
CREAT SERPL-MCNC: 1.12 MG/DL (ref 0.76–1.27)
ERYTHROCYTE [DISTWIDTH] IN BLOOD BY AUTOMATED COUNT: 13.3 % (ref 12.3–15.4)
EST. AVERAGE GLUCOSE BLD GHB EST-MCNC: 108 MG/DL
GLOBULIN SER CALC-MCNC: 2.1 G/DL (ref 1.5–4.5)
GLUCOSE SERPL-MCNC: 80 MG/DL (ref 65–99)
GLUCOSE UR QL: NEGATIVE
HBA1C MFR BLD: 5.4 % (ref 4.8–5.6)
HCT VFR BLD AUTO: 47.5 % (ref 37.5–51)
HDLC SERPL-MCNC: 47 MG/DL
HGB BLD-MCNC: 16.1 G/DL (ref 13–17.7)
HGB UR QL STRIP: NEGATIVE
INR PPP: 1.1 (ref 0.8–1.2)
INTERPRETATION, 910389: NORMAL
KETONES UR QL STRIP: NEGATIVE
LDLC SERPL CALC-MCNC: 75 MG/DL (ref 0–99)
LEUKOCYTE ESTERASE UR QL STRIP: NEGATIVE
MCH RBC QN AUTO: 29.2 PG (ref 26.6–33)
MCHC RBC AUTO-ENTMCNC: 33.9 G/DL (ref 31.5–35.7)
MCV RBC AUTO: 86 FL (ref 79–97)
MICRO URNS: NORMAL
NITRITE UR QL STRIP: NEGATIVE
PH UR STRIP: 6 [PH] (ref 5–7.5)
PLATELET # BLD AUTO: 256 X10E3/UL (ref 150–450)
POTASSIUM SERPL-SCNC: 4.3 MMOL/L (ref 3.5–5.2)
PROT SERPL-MCNC: 6.7 G/DL (ref 6–8.5)
PROT UR QL STRIP: NEGATIVE
PROTHROMBIN TIME: 11.2 SEC (ref 9.1–12)
PSA FREE MFR SERPL: 12.5 %
PSA FREE SERPL-MCNC: 0.55 NG/ML
PSA SERPL-MCNC: 4.4 NG/ML (ref 0–4)
RBC # BLD AUTO: 5.52 X10E6/UL (ref 4.14–5.8)
REFLEX CRITERIA: ABNORMAL
SODIUM SERPL-SCNC: 143 MMOL/L (ref 134–144)
SP GR UR: 1.03 (ref 1–1.03)
TRIGL SERPL-MCNC: 144 MG/DL (ref 0–149)
UROBILINOGEN UR STRIP-MCNC: 0.2 MG/DL (ref 0.2–1)
VLDLC SERPL CALC-MCNC: 29 MG/DL (ref 5–40)
WBC # BLD AUTO: 6.6 X10E3/UL (ref 3.4–10.8)

## 2019-08-01 NOTE — PROGRESS NOTES
GREAT NEWS!! All of your recent lab tests are normal or at goal.  No diabetes, normal liver and kidney tests. Great cholesterol numbers. Preop lab tests are acceptable so you are cleared for surgery. Your PSA is slightly elevated which is probably normal for your age- we will discuss that further at your phsical in a few weeks.

## 2019-08-03 VITALS
WEIGHT: 213 LBS | OXYGEN SATURATION: 97 % | DIASTOLIC BLOOD PRESSURE: 88 MMHG | HEART RATE: 82 BPM | RESPIRATION RATE: 18 BRPM | BODY MASS INDEX: 30.49 KG/M2 | HEIGHT: 70 IN | SYSTOLIC BLOOD PRESSURE: 136 MMHG | TEMPERATURE: 97.8 F

## 2019-08-03 PROBLEM — Z79.01 LONG TERM CURRENT USE OF ANTICOAGULANT: Status: ACTIVE | Noted: 2019-08-03

## 2019-08-16 NOTE — PERIOP NOTES
PAT PREOP PHONE INTERVIEW COMPLETED WITH: [facility], PATIENT. PATIENT STATES THAT ALL PREOP LABS/EKG WERE COMPLETED BY PCP: DR Connie Martin AND SENT TO SURGEON: DR BISWAS. PATIENT ADVISED NOT TO EAT ANYTHING PAST MIDNIGHT EVENING PRIOR TO SURGERY,  NOTHING TO EAT MORNING OF SURGERY;  MAY DRINK CLEAR LIQUIDS (12 OZ/4 HOURS) UP UNTIL ONE HOUR PRIOR TO ARRIVAL DOS;     VERBAL INSTRUCTIONS PROVIDED FOR CHG (HIBICLENS) SOAP CLEANSING PREOP PER PROTOCOL. LEAVE ALL VALUABLES AT HOME; DO BRING PICTURE ID, INSURANCE CARD AND ANY COPAY; WEAR COMFORTABLE CLOTHING;  NO PERFUMES, POWDERS, LOTIONS; NO ALCOHOL 24 HOURS BEFORE OR AFTER SURGERY;      WILL NEED TO BE DRIVEN HOME BY FAMILY OR FRIEND;      AVOID TAKING NSAIDS FOR 5 DAYS PREOP; AVOID TAKING ASPIRIN, FISH OIL, VITAMIN E OR GLUCOSAMINE/CHONDROITIN, VITAMINS OR HERBALS FOR 7 DAYS PRIOR TO SURGERY;  MAY TAKE TYLENOL. INSTRUCTED TO REPORT TO [address]

## 2019-08-22 ENCOUNTER — ANESTHESIA (OUTPATIENT)
Dept: SURGERY | Age: 64
End: 2019-08-22
Payer: MEDICARE

## 2019-08-22 ENCOUNTER — ANESTHESIA EVENT (OUTPATIENT)
Dept: SURGERY | Age: 64
End: 2019-08-22
Payer: MEDICARE

## 2019-08-22 ENCOUNTER — HOSPITAL ENCOUNTER (OUTPATIENT)
Age: 64
Setting detail: OBSERVATION
Discharge: HOME | End: 2019-08-23
Attending: ORTHOPAEDIC SURGERY | Admitting: ORTHOPAEDIC SURGERY
Payer: MEDICARE

## 2019-08-22 DIAGNOSIS — M48.061 SPINAL STENOSIS OF LUMBAR REGION, UNSPECIFIED WHETHER NEUROGENIC CLAUDICATION PRESENT: Primary | ICD-10-CM

## 2019-08-22 PROBLEM — M48.00 SPINAL STENOSIS: Status: ACTIVE | Noted: 2019-08-22

## 2019-08-22 LAB
ABO + RH BLD: NORMAL
BLOOD GROUP ANTIBODIES SERPL: NORMAL
SPECIMEN EXP DATE BLD: NORMAL

## 2019-08-22 PROCEDURE — 77030029099 HC BN WAX SSPC -A: Performed by: ORTHOPAEDIC SURGERY

## 2019-08-22 PROCEDURE — 77030031139 HC SUT VCRL2 J&J -A: Performed by: ORTHOPAEDIC SURGERY

## 2019-08-22 PROCEDURE — 77030020782 HC GWN BAIR PAWS FLX 3M -B

## 2019-08-22 PROCEDURE — 77030008771 HC TU NG SALEM SUMP -A: Performed by: NURSE ANESTHETIST, CERTIFIED REGISTERED

## 2019-08-22 PROCEDURE — 77030014647 HC SEAL FBRN TISSL BAXT -D: Performed by: ORTHOPAEDIC SURGERY

## 2019-08-22 PROCEDURE — 74011250637 HC RX REV CODE- 250/637: Performed by: ANESTHESIOLOGY

## 2019-08-22 PROCEDURE — 74011000250 HC RX REV CODE- 250: Performed by: PHYSICIAN ASSISTANT

## 2019-08-22 PROCEDURE — 51798 US URINE CAPACITY MEASURE: CPT

## 2019-08-22 PROCEDURE — 99218 HC RM OBSERVATION: CPT

## 2019-08-22 PROCEDURE — 77030014007 HC SPNG HEMSTAT J&J -B: Performed by: ORTHOPAEDIC SURGERY

## 2019-08-22 PROCEDURE — 74011250636 HC RX REV CODE- 250/636: Performed by: NURSE ANESTHETIST, CERTIFIED REGISTERED

## 2019-08-22 PROCEDURE — 77030011943

## 2019-08-22 PROCEDURE — 74011250637 HC RX REV CODE- 250/637: Performed by: PHYSICIAN ASSISTANT

## 2019-08-22 PROCEDURE — 74011250636 HC RX REV CODE- 250/636

## 2019-08-22 PROCEDURE — 77030012893

## 2019-08-22 PROCEDURE — 86900 BLOOD TYPING SEROLOGIC ABO: CPT

## 2019-08-22 PROCEDURE — 76210000016 HC OR PH I REC 1 TO 1.5 HR: Performed by: ORTHOPAEDIC SURGERY

## 2019-08-22 PROCEDURE — 74011250636 HC RX REV CODE- 250/636: Performed by: PHYSICIAN ASSISTANT

## 2019-08-22 PROCEDURE — 36415 COLL VENOUS BLD VENIPUNCTURE: CPT

## 2019-08-22 PROCEDURE — 77030008684 HC TU ET CUF COVD -B: Performed by: NURSE ANESTHETIST, CERTIFIED REGISTERED

## 2019-08-22 PROCEDURE — 76010000171 HC OR TIME 2 TO 2.5 HR INTENSV-TIER 1: Performed by: ORTHOPAEDIC SURGERY

## 2019-08-22 PROCEDURE — 74011000250 HC RX REV CODE- 250: Performed by: ORTHOPAEDIC SURGERY

## 2019-08-22 PROCEDURE — 74011000250 HC RX REV CODE- 250: Performed by: NURSE ANESTHETIST, CERTIFIED REGISTERED

## 2019-08-22 PROCEDURE — 77030013079 HC BLNKT BAIR HGGR 3M -A: Performed by: NURSE ANESTHETIST, CERTIFIED REGISTERED

## 2019-08-22 PROCEDURE — 76060000035 HC ANESTHESIA 2 TO 2.5 HR: Performed by: ORTHOPAEDIC SURGERY

## 2019-08-22 PROCEDURE — 77030038600 HC TU BPLR IRR DISP STRY -B: Performed by: ORTHOPAEDIC SURGERY

## 2019-08-22 PROCEDURE — 77030026438 HC STYL ET INTUB CARD -A: Performed by: NURSE ANESTHETIST, CERTIFIED REGISTERED

## 2019-08-22 PROCEDURE — 74011000272 HC RX REV CODE- 272: Performed by: ORTHOPAEDIC SURGERY

## 2019-08-22 PROCEDURE — 77030012406 HC DRN WND PENRS BARD -A: Performed by: ORTHOPAEDIC SURGERY

## 2019-08-22 PROCEDURE — 74011250636 HC RX REV CODE- 250/636: Performed by: ANESTHESIOLOGY

## 2019-08-22 PROCEDURE — 77030018836 HC SOL IRR NACL ICUM -A: Performed by: ORTHOPAEDIC SURGERY

## 2019-08-22 PROCEDURE — 77030037400 HC ADH TISS HI VISC EXOFIN CHMP -B: Performed by: ORTHOPAEDIC SURGERY

## 2019-08-22 RX ORDER — SODIUM CHLORIDE 0.9 % (FLUSH) 0.9 %
5-40 SYRINGE (ML) INJECTION AS NEEDED
Status: DISCONTINUED | OUTPATIENT
Start: 2019-08-22 | End: 2019-08-22 | Stop reason: HOSPADM

## 2019-08-22 RX ORDER — FENTANYL CITRATE 50 UG/ML
50 INJECTION, SOLUTION INTRAMUSCULAR; INTRAVENOUS AS NEEDED
Status: DISCONTINUED | OUTPATIENT
Start: 2019-08-22 | End: 2019-08-22 | Stop reason: HOSPADM

## 2019-08-22 RX ORDER — OXYCODONE HYDROCHLORIDE 5 MG/1
10 TABLET ORAL
Status: DISCONTINUED | OUTPATIENT
Start: 2019-08-22 | End: 2019-08-23

## 2019-08-22 RX ORDER — LIDOCAINE HYDROCHLORIDE 10 MG/ML
0.1 INJECTION, SOLUTION EPIDURAL; INFILTRATION; INTRACAUDAL; PERINEURAL AS NEEDED
Status: DISCONTINUED | OUTPATIENT
Start: 2019-08-22 | End: 2019-08-22 | Stop reason: HOSPADM

## 2019-08-22 RX ORDER — SUCCINYLCHOLINE CHLORIDE 20 MG/ML
INJECTION INTRAMUSCULAR; INTRAVENOUS AS NEEDED
Status: DISCONTINUED | OUTPATIENT
Start: 2019-08-22 | End: 2019-08-22 | Stop reason: HOSPADM

## 2019-08-22 RX ORDER — OXYCODONE HYDROCHLORIDE 5 MG/1
5 TABLET ORAL
Status: DISCONTINUED | OUTPATIENT
Start: 2019-08-22 | End: 2019-08-23

## 2019-08-22 RX ORDER — FACIAL-BODY WIPES
10 EACH TOPICAL DAILY PRN
Status: DISCONTINUED | OUTPATIENT
Start: 2019-08-24 | End: 2019-08-23 | Stop reason: HOSPADM

## 2019-08-22 RX ORDER — DEXAMETHASONE SODIUM PHOSPHATE 4 MG/ML
INJECTION, SOLUTION INTRA-ARTICULAR; INTRALESIONAL; INTRAMUSCULAR; INTRAVENOUS; SOFT TISSUE AS NEEDED
Status: DISCONTINUED | OUTPATIENT
Start: 2019-08-22 | End: 2019-08-22 | Stop reason: HOSPADM

## 2019-08-22 RX ORDER — LIDOCAINE HYDROCHLORIDE 20 MG/ML
INJECTION, SOLUTION EPIDURAL; INFILTRATION; INTRACAUDAL; PERINEURAL AS NEEDED
Status: DISCONTINUED | OUTPATIENT
Start: 2019-08-22 | End: 2019-08-22 | Stop reason: HOSPADM

## 2019-08-22 RX ORDER — POLYETHYLENE GLYCOL 3350 17 G/17G
17 POWDER, FOR SOLUTION ORAL DAILY
Status: DISCONTINUED | OUTPATIENT
Start: 2019-08-23 | End: 2019-08-23 | Stop reason: HOSPADM

## 2019-08-22 RX ORDER — HYDROMORPHONE HYDROCHLORIDE 2 MG/ML
INJECTION, SOLUTION INTRAMUSCULAR; INTRAVENOUS; SUBCUTANEOUS AS NEEDED
Status: DISCONTINUED | OUTPATIENT
Start: 2019-08-22 | End: 2019-08-22 | Stop reason: HOSPADM

## 2019-08-22 RX ORDER — ACETAMINOPHEN 325 MG/1
650 TABLET ORAL ONCE
Status: COMPLETED | OUTPATIENT
Start: 2019-08-22 | End: 2019-08-22

## 2019-08-22 RX ORDER — SODIUM CHLORIDE 0.9 % (FLUSH) 0.9 %
5-40 SYRINGE (ML) INJECTION AS NEEDED
Status: DISCONTINUED | OUTPATIENT
Start: 2019-08-22 | End: 2019-08-23 | Stop reason: HOSPADM

## 2019-08-22 RX ORDER — SODIUM CHLORIDE 9 MG/ML
125 INJECTION, SOLUTION INTRAVENOUS CONTINUOUS
Status: DISPENSED | OUTPATIENT
Start: 2019-08-22 | End: 2019-08-23

## 2019-08-22 RX ORDER — SODIUM CHLORIDE, SODIUM LACTATE, POTASSIUM CHLORIDE, CALCIUM CHLORIDE 600; 310; 30; 20 MG/100ML; MG/100ML; MG/100ML; MG/100ML
INJECTION, SOLUTION INTRAVENOUS
Status: DISCONTINUED | OUTPATIENT
Start: 2019-08-22 | End: 2019-08-22 | Stop reason: HOSPADM

## 2019-08-22 RX ORDER — SODIUM CHLORIDE 0.9 % (FLUSH) 0.9 %
5-40 SYRINGE (ML) INJECTION EVERY 8 HOURS
Status: DISCONTINUED | OUTPATIENT
Start: 2019-08-22 | End: 2019-08-23 | Stop reason: HOSPADM

## 2019-08-22 RX ORDER — MIDAZOLAM HYDROCHLORIDE 1 MG/ML
1 INJECTION, SOLUTION INTRAMUSCULAR; INTRAVENOUS AS NEEDED
Status: DISCONTINUED | OUTPATIENT
Start: 2019-08-22 | End: 2019-08-22 | Stop reason: HOSPADM

## 2019-08-22 RX ORDER — DIAZEPAM 5 MG/1
5 TABLET ORAL
Status: DISCONTINUED | OUTPATIENT
Start: 2019-08-22 | End: 2019-08-23 | Stop reason: HOSPADM

## 2019-08-22 RX ORDER — NALOXONE HYDROCHLORIDE 0.4 MG/ML
0.4 INJECTION, SOLUTION INTRAMUSCULAR; INTRAVENOUS; SUBCUTANEOUS AS NEEDED
Status: DISCONTINUED | OUTPATIENT
Start: 2019-08-22 | End: 2019-08-23 | Stop reason: HOSPADM

## 2019-08-22 RX ORDER — ROPIVACAINE HYDROCHLORIDE 5 MG/ML
30 INJECTION, SOLUTION EPIDURAL; INFILTRATION; PERINEURAL ONCE
Status: DISCONTINUED | OUTPATIENT
Start: 2019-08-22 | End: 2019-08-22 | Stop reason: HOSPADM

## 2019-08-22 RX ORDER — ROCURONIUM BROMIDE 10 MG/ML
INJECTION, SOLUTION INTRAVENOUS AS NEEDED
Status: DISCONTINUED | OUTPATIENT
Start: 2019-08-22 | End: 2019-08-22 | Stop reason: HOSPADM

## 2019-08-22 RX ORDER — ONDANSETRON 2 MG/ML
4 INJECTION INTRAMUSCULAR; INTRAVENOUS AS NEEDED
Status: DISCONTINUED | OUTPATIENT
Start: 2019-08-22 | End: 2019-08-22 | Stop reason: HOSPADM

## 2019-08-22 RX ORDER — PROPOFOL 10 MG/ML
INJECTION, EMULSION INTRAVENOUS AS NEEDED
Status: DISCONTINUED | OUTPATIENT
Start: 2019-08-22 | End: 2019-08-22 | Stop reason: HOSPADM

## 2019-08-22 RX ORDER — SODIUM CHLORIDE 0.9 % (FLUSH) 0.9 %
5-40 SYRINGE (ML) INJECTION EVERY 8 HOURS
Status: DISCONTINUED | OUTPATIENT
Start: 2019-08-22 | End: 2019-08-22 | Stop reason: HOSPADM

## 2019-08-22 RX ORDER — SIMVASTATIN 40 MG/1
40 TABLET, FILM COATED ORAL
Status: DISCONTINUED | OUTPATIENT
Start: 2019-08-22 | End: 2019-08-23 | Stop reason: HOSPADM

## 2019-08-22 RX ORDER — SODIUM CHLORIDE, SODIUM LACTATE, POTASSIUM CHLORIDE, CALCIUM CHLORIDE 600; 310; 30; 20 MG/100ML; MG/100ML; MG/100ML; MG/100ML
75 INJECTION, SOLUTION INTRAVENOUS CONTINUOUS
Status: DISCONTINUED | OUTPATIENT
Start: 2019-08-22 | End: 2019-08-22 | Stop reason: HOSPADM

## 2019-08-22 RX ORDER — SODIUM CHLORIDE, SODIUM LACTATE, POTASSIUM CHLORIDE, CALCIUM CHLORIDE 600; 310; 30; 20 MG/100ML; MG/100ML; MG/100ML; MG/100ML
125 INJECTION, SOLUTION INTRAVENOUS CONTINUOUS
Status: DISCONTINUED | OUTPATIENT
Start: 2019-08-22 | End: 2019-08-22 | Stop reason: HOSPADM

## 2019-08-22 RX ORDER — DIPHENHYDRAMINE HYDROCHLORIDE 50 MG/ML
12.5 INJECTION, SOLUTION INTRAMUSCULAR; INTRAVENOUS
Status: ACTIVE | OUTPATIENT
Start: 2019-08-22 | End: 2019-08-23

## 2019-08-22 RX ORDER — MORPHINE SULFATE 10 MG/ML
2 INJECTION, SOLUTION INTRAMUSCULAR; INTRAVENOUS
Status: DISCONTINUED | OUTPATIENT
Start: 2019-08-22 | End: 2019-08-22 | Stop reason: HOSPADM

## 2019-08-22 RX ORDER — HYDROMORPHONE HCL/0.9% NACL/PF 0.5 MG/ML
PLASTIC BAG, INJECTION (ML) INTRAVENOUS CONTINUOUS
Status: DISCONTINUED | OUTPATIENT
Start: 2019-08-22 | End: 2019-08-23

## 2019-08-22 RX ORDER — NEOSTIGMINE METHYLSULFATE 1 MG/ML
INJECTION INTRAVENOUS AS NEEDED
Status: DISCONTINUED | OUTPATIENT
Start: 2019-08-22 | End: 2019-08-22 | Stop reason: HOSPADM

## 2019-08-22 RX ORDER — HYDROMORPHONE HYDROCHLORIDE 1 MG/ML
0.2 INJECTION, SOLUTION INTRAMUSCULAR; INTRAVENOUS; SUBCUTANEOUS
Status: DISCONTINUED | OUTPATIENT
Start: 2019-08-22 | End: 2019-08-22 | Stop reason: HOSPADM

## 2019-08-22 RX ORDER — PHENYLEPHRINE HCL IN 0.9% NACL 0.4MG/10ML
SYRINGE (ML) INTRAVENOUS
Status: DISCONTINUED | OUTPATIENT
Start: 2019-08-22 | End: 2019-08-22 | Stop reason: HOSPADM

## 2019-08-22 RX ORDER — ONDANSETRON 2 MG/ML
INJECTION INTRAMUSCULAR; INTRAVENOUS AS NEEDED
Status: DISCONTINUED | OUTPATIENT
Start: 2019-08-22 | End: 2019-08-22 | Stop reason: HOSPADM

## 2019-08-22 RX ORDER — ONDANSETRON 2 MG/ML
4 INJECTION INTRAMUSCULAR; INTRAVENOUS
Status: DISCONTINUED | OUTPATIENT
Start: 2019-08-22 | End: 2019-08-23 | Stop reason: HOSPADM

## 2019-08-22 RX ORDER — FENTANYL CITRATE 50 UG/ML
INJECTION, SOLUTION INTRAMUSCULAR; INTRAVENOUS AS NEEDED
Status: DISCONTINUED | OUTPATIENT
Start: 2019-08-22 | End: 2019-08-22 | Stop reason: HOSPADM

## 2019-08-22 RX ORDER — MIDAZOLAM HYDROCHLORIDE 1 MG/ML
0.5 INJECTION, SOLUTION INTRAMUSCULAR; INTRAVENOUS
Status: DISCONTINUED | OUTPATIENT
Start: 2019-08-22 | End: 2019-08-22 | Stop reason: HOSPADM

## 2019-08-22 RX ORDER — SODIUM CHLORIDE 9 MG/ML
25 INJECTION, SOLUTION INTRAVENOUS CONTINUOUS
Status: DISCONTINUED | OUTPATIENT
Start: 2019-08-22 | End: 2019-08-22 | Stop reason: HOSPADM

## 2019-08-22 RX ORDER — FENTANYL CITRATE 50 UG/ML
25 INJECTION, SOLUTION INTRAMUSCULAR; INTRAVENOUS
Status: DISCONTINUED | OUTPATIENT
Start: 2019-08-22 | End: 2019-08-22 | Stop reason: HOSPADM

## 2019-08-22 RX ORDER — MIDAZOLAM HYDROCHLORIDE 1 MG/ML
INJECTION, SOLUTION INTRAMUSCULAR; INTRAVENOUS AS NEEDED
Status: DISCONTINUED | OUTPATIENT
Start: 2019-08-22 | End: 2019-08-22 | Stop reason: HOSPADM

## 2019-08-22 RX ORDER — KETAMINE HYDROCHLORIDE 10 MG/ML
INJECTION, SOLUTION INTRAMUSCULAR; INTRAVENOUS AS NEEDED
Status: DISCONTINUED | OUTPATIENT
Start: 2019-08-22 | End: 2019-08-22 | Stop reason: HOSPADM

## 2019-08-22 RX ORDER — DEXMEDETOMIDINE HYDROCHLORIDE 100 UG/ML
INJECTION, SOLUTION INTRAVENOUS AS NEEDED
Status: DISCONTINUED | OUTPATIENT
Start: 2019-08-22 | End: 2019-08-22 | Stop reason: HOSPADM

## 2019-08-22 RX ORDER — ACETAMINOPHEN 325 MG/1
650 TABLET ORAL
Status: DISCONTINUED | OUTPATIENT
Start: 2019-08-22 | End: 2019-08-23

## 2019-08-22 RX ORDER — AMOXICILLIN 250 MG
1 CAPSULE ORAL 2 TIMES DAILY
Status: DISCONTINUED | OUTPATIENT
Start: 2019-08-22 | End: 2019-08-23 | Stop reason: HOSPADM

## 2019-08-22 RX ORDER — CEFAZOLIN SODIUM/WATER 2 G/20 ML
2 SYRINGE (ML) INTRAVENOUS EVERY 8 HOURS
Status: COMPLETED | OUTPATIENT
Start: 2019-08-22 | End: 2019-08-22

## 2019-08-22 RX ORDER — GLYCOPYRROLATE 0.2 MG/ML
INJECTION INTRAMUSCULAR; INTRAVENOUS AS NEEDED
Status: DISCONTINUED | OUTPATIENT
Start: 2019-08-22 | End: 2019-08-22 | Stop reason: HOSPADM

## 2019-08-22 RX ORDER — CEFAZOLIN SODIUM/WATER 2 G/20 ML
2 SYRINGE (ML) INTRAVENOUS ONCE
Status: COMPLETED | OUTPATIENT
Start: 2019-08-22 | End: 2019-08-22

## 2019-08-22 RX ORDER — CYANOCOBALAMIN (VITAMIN B-12) 500 MCG
2000 TABLET ORAL EVERY OTHER DAY
Status: DISCONTINUED | OUTPATIENT
Start: 2019-08-23 | End: 2019-08-23 | Stop reason: HOSPADM

## 2019-08-22 RX ORDER — DIPHENHYDRAMINE HYDROCHLORIDE 50 MG/ML
12.5 INJECTION, SOLUTION INTRAMUSCULAR; INTRAVENOUS AS NEEDED
Status: DISCONTINUED | OUTPATIENT
Start: 2019-08-22 | End: 2019-08-22 | Stop reason: HOSPADM

## 2019-08-22 RX ADMIN — MIDAZOLAM 2 MG: 1 INJECTION INTRAMUSCULAR; INTRAVENOUS at 07:25

## 2019-08-22 RX ADMIN — Medication 10 ML: at 21:04

## 2019-08-22 RX ADMIN — Medication 10 ML: at 23:40

## 2019-08-22 RX ADMIN — DEXMEDETOMIDINE HYDROCHLORIDE 8 MCG: 100 INJECTION, SOLUTION, CONCENTRATE INTRAVENOUS at 07:41

## 2019-08-22 RX ADMIN — GLYCOPYRROLATE 0.6 MG: 0.2 INJECTION, SOLUTION INTRAMUSCULAR; INTRAVENOUS at 09:30

## 2019-08-22 RX ADMIN — PHENYLEPHRINE HYDROCHLORIDE 80 MCG: 10 INJECTION INTRAVENOUS at 09:06

## 2019-08-22 RX ADMIN — FENTANYL CITRATE 50 MCG: 50 INJECTION, SOLUTION INTRAMUSCULAR; INTRAVENOUS at 07:31

## 2019-08-22 RX ADMIN — PROPOFOL 150 MG: 10 INJECTION, EMULSION INTRAVENOUS at 07:32

## 2019-08-22 RX ADMIN — ONDANSETRON HYDROCHLORIDE 4 MG: 2 INJECTION, SOLUTION INTRAMUSCULAR; INTRAVENOUS at 07:37

## 2019-08-22 RX ADMIN — ACETAMINOPHEN 650 MG: 325 TABLET, FILM COATED ORAL at 07:04

## 2019-08-22 RX ADMIN — ROCURONIUM BROMIDE 15 MG: 10 SOLUTION INTRAVENOUS at 07:48

## 2019-08-22 RX ADMIN — ROCURONIUM BROMIDE 5 MG: 10 SOLUTION INTRAVENOUS at 07:31

## 2019-08-22 RX ADMIN — SIMVASTATIN 40 MG: 40 TABLET, FILM COATED ORAL at 21:02

## 2019-08-22 RX ADMIN — SODIUM CHLORIDE, POTASSIUM CHLORIDE, SODIUM LACTATE AND CALCIUM CHLORIDE: 600; 310; 30; 20 INJECTION, SOLUTION INTRAVENOUS at 09:12

## 2019-08-22 RX ADMIN — PHENYLEPHRINE HYDROCHLORIDE 80 MCG: 10 INJECTION INTRAVENOUS at 07:57

## 2019-08-22 RX ADMIN — SODIUM CHLORIDE 125 ML/HR: 900 INJECTION, SOLUTION INTRAVENOUS at 21:32

## 2019-08-22 RX ADMIN — NEOSTIGMINE METHYLSULFATE 3 MG: 1 INJECTION, SOLUTION INTRAMUSCULAR; INTRAVENOUS; SUBCUTANEOUS at 09:30

## 2019-08-22 RX ADMIN — ROCURONIUM BROMIDE 30 MG: 10 SOLUTION INTRAVENOUS at 07:39

## 2019-08-22 RX ADMIN — PROPOFOL 50 MG: 10 INJECTION, EMULSION INTRAVENOUS at 07:33

## 2019-08-22 RX ADMIN — Medication 2 G: at 16:12

## 2019-08-22 RX ADMIN — Medication: at 10:13

## 2019-08-22 RX ADMIN — DEXAMETHASONE SODIUM PHOSPHATE 4 MG: 4 INJECTION, SOLUTION INTRAMUSCULAR; INTRAVENOUS at 07:37

## 2019-08-22 RX ADMIN — Medication 2 G: at 07:55

## 2019-08-22 RX ADMIN — SUCCINYLCHOLINE CHLORIDE 160 MG: 20 INJECTION, SOLUTION INTRAMUSCULAR; INTRAVENOUS at 07:34

## 2019-08-22 RX ADMIN — DEXMEDETOMIDINE HYDROCHLORIDE 8 MCG: 100 INJECTION, SOLUTION, CONCENTRATE INTRAVENOUS at 07:39

## 2019-08-22 RX ADMIN — SENNOSIDES, DOCUSATE SODIUM 1 TABLET: 50; 8.6 TABLET, FILM COATED ORAL at 18:38

## 2019-08-22 RX ADMIN — HYDROMORPHONE HYDROCHLORIDE 0.6 MG: 2 INJECTION, SOLUTION INTRAMUSCULAR; INTRAVENOUS; SUBCUTANEOUS at 09:26

## 2019-08-22 RX ADMIN — SODIUM CHLORIDE 125 ML/HR: 900 INJECTION, SOLUTION INTRAVENOUS at 10:17

## 2019-08-22 RX ADMIN — PROPOFOL 30 MG: 10 INJECTION, EMULSION INTRAVENOUS at 09:33

## 2019-08-22 RX ADMIN — Medication 15 MCG/MIN: at 08:18

## 2019-08-22 RX ADMIN — SODIUM CHLORIDE, POTASSIUM CHLORIDE, SODIUM LACTATE AND CALCIUM CHLORIDE: 600; 310; 30; 20 INJECTION, SOLUTION INTRAVENOUS at 07:25

## 2019-08-22 RX ADMIN — LIDOCAINE HYDROCHLORIDE 100 MG: 20 INJECTION, SOLUTION EPIDURAL; INFILTRATION; INTRACAUDAL; PERINEURAL at 07:31

## 2019-08-22 RX ADMIN — PROPOFOL 50 MG: 10 INJECTION, EMULSION INTRAVENOUS at 08:47

## 2019-08-22 RX ADMIN — PHENYLEPHRINE HYDROCHLORIDE 80 MCG: 10 INJECTION INTRAVENOUS at 08:27

## 2019-08-22 RX ADMIN — SODIUM CHLORIDE, SODIUM LACTATE, POTASSIUM CHLORIDE, AND CALCIUM CHLORIDE 125 ML/HR: 600; 310; 30; 20 INJECTION, SOLUTION INTRAVENOUS at 07:14

## 2019-08-22 RX ADMIN — Medication 2 G: at 23:33

## 2019-08-22 RX ADMIN — DEXMEDETOMIDINE HYDROCHLORIDE 8 MCG: 100 INJECTION, SOLUTION, CONCENTRATE INTRAVENOUS at 09:32

## 2019-08-22 RX ADMIN — DEXMEDETOMIDINE HYDROCHLORIDE 4 MCG: 100 INJECTION, SOLUTION, CONCENTRATE INTRAVENOUS at 07:37

## 2019-08-22 RX ADMIN — KETAMINE HYDROCHLORIDE 20 MG: 10 INJECTION, SOLUTION INTRAMUSCULAR; INTRAVENOUS at 08:05

## 2019-08-22 NOTE — H&P
PRE-OP HISTORY AND PHYSICAL    Subjective:     Patient is a 70 y.o. male presented with a history of back and left leg pain, numbness and tingling. Onset of symptoms was gradual with gradually worsening course since that time. He is being admitted for surgical management of this condition. The indications for the procedure include pain despite conservative management and MRI findings which demonstrate severe stenosis L3-L5. After discussion of risks, benefits and alternatives to surgery the patient has elected to proceed with lumbar laminectomy at this time. Patient Active Problem List    Diagnosis Date Noted    Long term current use of anticoagulant  08/03/2019    Obesity (BMI 30.0-34.9) 02/05/2018    Chronic pain of both shoulders- worse in AM and loosens up with activity. 03/20/2016    Seasonal allergic rhinitis due to pollen 03/20/2016    Hyperlipidemia LDL goal <130 10/06/2015    DDD,DJD cervicalC5-6,6-7foraminal encroachment on X ray-4/2013 05/17/2013    Vitamin D deficiency 07/25/2012     Past Medical History:   Diagnosis Date    Allergic rhinitis 2/27/2010    Cancer ([facility] [address] 2004    SKIN, LIP (MOHS PROCEDURE)    DDD,DJD cervicalC5-6,6-7foraminal encroachment on X ray-4/2013 5/17/2013    Hypertension     BORDERLINE; NO MEDS    Obesity (BMI 30.0-34.9) 2/5/2018    Other and unspecified hyperlipidemia 2/27/2010      Past Surgical History:   Procedure Laterality Date    ENDOSCOPY, COLON, DIAGNOSTIC      X2    HX CATARACT REMOVAL Bilateral 87-88    CONGENITAL CATARACTS; W/ IOL    HX TONSIL AND ADENOIDECTOMY  1960    HX VASECTOMY  1989    SINUS SURGERY [address] UNLISTED  2001      Prior to Admission medications    Medication Sig Start Date End Date Taking? Authorizing Provider   simvastatin (ZOCOR) 40 mg tablet TAKE 1 TABLET BY MOUTH EVERY DAY AT NIGHT 7/31/19  Yes Swetha Haas MD   naproxen sodium (ALEVE) 220 mg cap Take 220 mg by mouth as needed.    Yes Provider, Historical   ibuprofen (ADVIL) 200 mg tablet Take 200 mg by mouth every six (6) hours as needed. Yes Provider, Historical   Cholecalciferol, Vitamin D3, (VITAMIN D3) 1,000 unit cap Take 2,000 Units by mouth every other day. 2/20/13  Yes Shawn Asif MD     Allergies   Allergen Reactions    Pcn [Penicillins] Rash      Social History     Tobacco Use    Smoking status: Never Smoker    Smokeless tobacco: Never Used   Substance Use Topics    Alcohol use: Yes     Comment: 12oz beer once monthly       Family History   Problem Relation Age of Onset    Cancer Mother         ovarian    Cancer Father         prostate?  Elevated Lipids Father     No Known Problems Sister     Other Brother         factor 5       Review of Systems  A comprehensive review of systems was negative except for that written in the HPI. Objective:     No data found. Visit Vitals  Ht 5' 10\" (1.778 m)   Wt 94.3 kg (208 lb)   BMI 29.84 kg/m²     General:  Alert, cooperative, no distress, appears stated age. Head:  Normocephalic, without obvious abnormality, atraumatic. Eyes:  Conjunctivae/corneas clear. PERRL, EOMs intact. Fundi benign   Ears:  Normal TMs and external ear canals both ears. Nose: Nares normal. Septum midline. Mucosa normal. No drainage or sinus tenderness. Throat: Lips, mucosa, and tongue normal. Teeth and gums normal.   Neck: Supple, symmetrical, trachea midline, no adenopathy, thyroid: no enlargement/tenderness/nodules, no carotid bruit and no JVD. Back:   Symmetric, no curvature. ROM normal. No CVA tenderness. Lungs:   Clear to auscultation bilaterally. Chest wall:  No tenderness or deformity. Heart:  Regular rate and rhythm, S1, S2 normal, no murmur, click, rub or gallop. Abdomen:   Soft, non-tender. Bowel sounds normal. No masses,  No organomegaly. Extremities: Extremities normal, atraumatic, no cyanosis or edema. Pulses: 2+ and symmetric all extremities.    Skin: Skin color, texture, turgor normal. No rashes or lesions Lymph nodes: Cervical, supraclavicular, and axillary nodes normal.   Neurologic: CNII-XII intact. Normal strength, sensation and reflexes throughout. Results for Papi Beltran (MRN [id number]) as of 8/22/2019 00:51   Ref. Range 2/11/2019 09:59 5/13/2019 14:03 7/31/2019 12:41   Sodium Latest Ref Range: 134 - 144 mmol/L 142 142 143   Potassium Latest Ref Range: 3.5 - 5.2 mmol/L 4.7 4.3 4.3   Chloride Latest Ref Range: 96 - 106 mmol/L 106 106 104   CO2 Latest Ref Range: 20 - 29 mmol/L 22 23 23   Glucose Latest Ref Range: 65 - 99 mg/dL 83 91 80   BUN Latest Ref Range: 8 - 27 mg/dL 12 13 12   Creatinine Latest Ref Range: 0.76 - 1.27 mg/dL 1.12 1.03 1.12   BUN/Creatinine ratio Latest Ref Range: 10 - 24  11 13 11   Calcium Latest Ref Range: 8.6 - 10.2 mg/dL 9.2 9.1 9.2   Phosphorus Latest Ref Range: 2.5 - 4.5 mg/dL  2.4 (L)    Magnesium Latest Ref Range: 1.6 - 2.3 mg/dL  2.1    GFR est non-AA Latest Ref Range: >59 mL/min/1.73 69 76 69   GFR est AA Latest Ref Range: >59 mL/min/1.73 79 88 79   Bilirubin, total Latest Ref Range: 0.0 - 1.2 mg/dL 0.5 0.5 0.7   Protein, total Latest Ref Range: 6.0 - 8.5 g/dL 6.5 6.5 6.7   Albumin Latest Ref Range: 3.6 - 4.8 g/dL 4.1 4.3 4.6   A-G Ratio Latest Ref Range: 1.2 - 2.2  1.7 2.0 2.2   ALT (SGPT) Latest Ref Range: 0 - 44 IU/L 23 26 22   AST Latest Ref Range: 0 - 40 IU/L 24 26 26   Alk. phosphatase Latest Ref Range: 39 - 117 IU/L 70 84 76   Triglyceride Latest Ref Range: 0 - 149 mg/dL 104  144   Cholesterol, total Latest Ref Range: 100 - 199 mg/dL 149  151   HDL Cholesterol Latest Ref Range: >39 mg/dL 47  47   LDL, calculated Latest Ref Range: 0 - 99 mg/dL 81  75   VLDL, calculated Latest Ref Range: 5 - 40 mg/dL 21  29   Creatine Kinase,Total Latest Ref Range: 24 - 204 U/L  63    Hemoglobin A1c, (calculated) Latest Ref Range: 4.8 - 5.6 %   5.4   Estimated average glucose Latest Units: mg/dL   108     Results for Papi Current (MRN [id number]) as of 8/22/2019 00:51   Ref. Range 8/8/2018 12:39 5/13/2019 14:03 7/31/2019 12:41   WBC Latest Ref Range: 3.4 - 10.8 x10E3/uL 6.3 6.1 6.6   RBC Latest Ref Range: 4.14 - 5.80 x10E6/uL 5.20 5.56 5.52   HGB Latest Ref Range: 13.0 - 17.7 g/dL 15.3 16.3 16.1   HCT Latest Ref Range: 37.5 - 51.0 % 45.2 49.4 47.5   MCV Latest Ref Range: 79 - 97 fL 87 89 86   MCH Latest Ref Range: 26.6 - 33.0 pg 29.4 29.3 29.2   MCHC Latest Ref Range: 31.5 - 35.7 g/dL 33.8 33.0 33.9   RDW Latest Ref Range: 12.3 - 15.4 % 14.5 13.9 13.3   PLATELET Latest Ref Range: 150 - 450 x10E3/uL 237 240 256       Assessment:     Lumbar Spinal Stenosis      Plan:     The various methods of treatment have been discussed with the patient and family. After consideration of risks, benefits and other options for treatment, the patient has consented to surgical interventions. There are no contraindications to the proposed surgery. Cymro Justice

## 2019-08-22 NOTE — ANESTHESIA PREPROCEDURE EVALUATION
Relevant Problems   No relevant active problems       Anesthetic History   No history of anesthetic complications            Review of Systems / Medical History  Patient summary reviewed, nursing notes reviewed and pertinent labs reviewed    Pulmonary  Within defined limits                 Neuro/Psych   Within defined limits           Cardiovascular    Hypertension: well controlled                   GI/Hepatic/Renal  Within defined limits              Endo/Other        Arthritis  Pertinent negatives: No morbid obesity   Other Findings              Physical Exam    Airway  Mallampati: II  TM Distance: > 6 cm  Neck ROM: normal range of motion   Mouth opening: Normal     Cardiovascular  Regular rate and rhythm,  S1 and S2 normal,  no murmur, click, rub, or gallop             Dental  No notable dental hx       Pulmonary  Breath sounds clear to auscultation               Abdominal  GI exam deferred       Other Findings            Anesthetic Plan    ASA: 2  Anesthesia type: general          Induction: Intravenous  Anesthetic plan and risks discussed with: Patient

## 2019-08-22 NOTE — OP NOTES
[address]   OPERATIVE REPORT    Name:  Kitty Thakur  MR#:  [id number]  :  1955  ACCOUNT #:  [de-identified]  DATE OF SERVICE:  2019    PREOPERATIVE DIAGNOSIS:  Lumbar spinal stenosis, L3-S1. POSTOPERATIVE DIAGNOSIS:  Lumbar spinal stenosis, L3-S1. PROCEDURES PERFORMED:  Lumbar laminectomy, L3 to S1, bilateral decompression of the L4, 5 and S1 nerve roots with associated foraminotomy. SURGEON:  Josie Dillon MD    ASSISTANT:  Monica Browning PA-C    ANESTHESIA:  General.    COMPLICATIONS:  None    SPECIMENS REMOVED:  None    IMPLANTS:  None    ESTIMATED BLOOD LOSS:  Documented on Brief Op Note    INDICATIONS:  The patient is a 25-year-old gentleman with symptomatic lumbar radiculopathy to the left side. Pain had been severe for a period of time. It is somewhat reduced to more tolerable level, but continues to be limited functionially in what he can do. Imaging demonstrates that there is no change, particularly L3-4, L4-5 producing degrees of spinal stenosis. Has synovial cyst on the left side at L4-5, but I think it was the symptomatic nerve root. Given the degree and extent of pathology, a lumbar decompression offered. Potential benefits and complications reviewed. PROCEDURE:  The patient was brought to the operating room in the supine position. General anesthetic administered. The patient was then placed in the prone position on the Micah-type frame and the low back region was then prepped and draped in normal fashion. Preoperative antibiotics administered. Thigh-high AMIE hose and sequential pumps applied to the patient's lower extremity. An incision made, extending from the upper border of L3 down to the sacrum. Subcutaneous tissue was then divided in line with the incision down to the level of the fascia. The fascia was incised in the midline and a subperiosteal dissection continued over the spinous process and laminar surfaces.   A complete laminectomy of L4 and 5 completed. The inferior portion of L3 as well resected. A fdfzvom-po-yuilusu decompression ensued which obviously included a partial medial facetectomy at L3-4, L4-5 and to a lesser extent L5-S1. The L4 to S1 nerve root identified and decompressed. Partial foraminotomies over the L4-5 roots. Once completed, a large ball probe passed well into the neural foraminal zone without difficulty. Ball probe was passed easily. Good hemostasis was obtained. Drain was utilized. The fascia closed using #1 Vicryl. Subcutaneous tissues and the skin closed using 2-0 and 3-0 Vicryl. The patient was awoken from the anesthetic, extubated, and taken to recovery in stable condition.         Kathleen Newell MD      [id number]  D:  08/22/2019 12:39  T:  08/22/2019 15:28  JOB #:  [id number]

## 2019-08-22 NOTE — BRIEF OP NOTE
BRIEF OPERATIVE NOTE    Date of Procedure: 8/22/2019   Preoperative Diagnosis: SPINAL STENOSIS L3-5  Postoperative Diagnosis: SPINAL STENOSIS L3-5    Procedure(s):  LUMBAR LAMINECTOMY L3-5  Surgeon(s) and Role:     * Zainab Fuller MD - Primary         Surgical Assistant: Ronda Carrington PA-C  Surgical Staff:  Circ-1: Donavan Flores RN  Circ-Relief: Janet Joaquin RN  Physician Assistant: Gurvinder Del Real PA-C  Scrub Tech-1: Arminda Best  Event Time In Time Out   Incision Start 0805    Incision Close       Anesthesia: General   Estimated Blood Loss: 100 ml  Specimens: * No specimens in log *   Findings: SPINAL STENOSIS G2-4   Complications: None  Implants: * No implants in log *

## 2019-08-22 NOTE — PROGRESS NOTES
TRANSFER - IN REPORT:    Verbal report received from Wanda(name) on Cata Cottrell  being received from Pacu(unit) for routine post - op      Report consisted of patients Situation, Background, Assessment and   Recommendations(SBAR). Information from the following report(s) SBAR was reviewed with the receiving nurse. Opportunity for questions and clarification was provided. Assessment completed upon patients arrival to unit and care assumed.

## 2019-08-22 NOTE — ANESTHESIA POSTPROCEDURE EVALUATION
Post-Anesthesia Evaluation and Assessment    Patient: Jose Antonio Kam MRN: [id number]  SSN: [id number]    YOB: 1955  Age: 70 y.o. Sex: male      I have evaluated the patient and they are stable and ready for discharge from the PACU. Cardiovascular Function/Vital Signs  Visit Vitals  /88   Pulse 68   Temp 36.7 °C (98 °F)   Resp 18   Ht 5' 10\" (1.778 m)   Wt 94.3 kg (208 lb)   SpO2 97%   BMI 29.84 kg/m²       Patient is status post General anesthesia for Procedure(s):  LUMBAR LAMINECTOMY L3-5. Nausea/Vomiting: None    Postoperative hydration reviewed and adequate. Pain:  Pain Scale 1: (patient states pain is at tolerable level) (08/22/19 1045)  Pain Intensity 1: 0 (08/22/19 0950)   Managed    Neurological Status:   Neuro (WDL): Within Defined Limits (08/22/19 1045)  Neuro  Neurologic State: Alert; Appropriate for age (08/22/19 1045)  Orientation Level: Oriented X4 (08/22/19 1045)  Cognition: Follows commands (08/22/19 1045)  Speech: Clear (08/22/19 1045)  LUE Motor Response: Purposeful (08/22/19 1045)  LLE Motor Response: Purposeful (08/22/19 1045)  RUE Motor Response: Purposeful (08/22/19 1045)  RLE Motor Response: Purposeful (08/22/19 1045)   At baseline    Mental Status, Level of Consciousness: Alert and  oriented to person, place, and time    Pulmonary Status:   O2 Device: Nasal cannula (08/22/19 1045)   Adequate oxygenation and airway patent    Complications related to anesthesia: None    Post-anesthesia assessment completed. No concerns    Signed By: Sun Jasmine MD     August 22, 2019              Procedure(s):  LUMBAR LAMINECTOMY L3-5.    general    <BSHSIANPOST>    Vitals Value Taken Time   /88 8/22/2019 10:45 AM   Temp 36.7 °C (98 °F) 8/22/2019 10:45 AM   Pulse 62 8/22/2019 10:53 AM   Resp 11 8/22/2019 10:53 AM   SpO2 97 % 8/22/2019 10:53 AM   Vitals shown include unvalidated device data.

## 2019-08-22 NOTE — PROGRESS NOTES
Primary Nurse Romaine Mayes RN and Amelia Reyes RN performed a dual skin assessment on this patient Impairment noted- see wound doc flow sheet  Jeff score is 23

## 2019-08-22 NOTE — PERIOP NOTES
TRANSFER - OUT REPORT:    Verbal report given to [facility] (name) on Elvi Mckeon  being transferred to  (unit) for routine post - op       Report consisted of patients Situation, Background, Assessment and   Recommendations(SBAR). Time Pre op antibiotic given:0755  Anesthesia Stop time: 1710  Garrett Present on Transfer to floor:no  Order for Garrett on Chart:yes  Discharge Prescriptions with Chart:no    Information from the following report(s) SBAR, OR Summary, Procedure Summary, Intake/Output, MAR and Cardiac Rhythm NSR was reviewed with the receiving nurse. Opportunity for questions and clarification was provided. Is the patient on 02? NO    Is the patient on a monitor? NO    Is the nurse transporting with the patient? NO    Surgical Waiting Area notified of patient's transfer from PACU?  YES      The following personal items collected during your admission accompanied patient upon transfer:   Dental Appliance: Dental Appliances: None  Vision: Visual Aid: Other (comment)(with wife)  Hearing Aid:    Jewelry:    Clothing: Clothing: (clothes bag to [employer])  Other Valuables:    Valuables sent to safe:

## 2019-08-23 VITALS
SYSTOLIC BLOOD PRESSURE: 132 MMHG | RESPIRATION RATE: 16 BRPM | WEIGHT: 208 LBS | TEMPERATURE: 99.2 F | HEART RATE: 80 BPM | OXYGEN SATURATION: 96 % | BODY MASS INDEX: 29.78 KG/M2 | DIASTOLIC BLOOD PRESSURE: 84 MMHG | HEIGHT: 70 IN

## 2019-08-23 LAB
ANION GAP SERPL CALC-SCNC: 7 MMOL/L (ref 5–15)
BUN SERPL-MCNC: 13 MG/DL (ref 6–20)
BUN/CREAT SERPL: 11 (ref 12–20)
CALCIUM SERPL-MCNC: 8.2 MG/DL (ref 8.5–10.1)
CHLORIDE SERPL-SCNC: 108 MMOL/L (ref 97–108)
CO2 SERPL-SCNC: 25 MMOL/L (ref 21–32)
CREAT SERPL-MCNC: 1.2 MG/DL (ref 0.7–1.3)
GLUCOSE SERPL-MCNC: 118 MG/DL (ref 65–100)
HGB BLD-MCNC: 13.5 G/DL (ref 12.1–17)
POTASSIUM SERPL-SCNC: 4 MMOL/L (ref 3.5–5.1)
SODIUM SERPL-SCNC: 140 MMOL/L (ref 136–145)

## 2019-08-23 PROCEDURE — 97161 PT EVAL LOW COMPLEX 20 MIN: CPT

## 2019-08-23 PROCEDURE — 77010033678 HC OXYGEN DAILY

## 2019-08-23 PROCEDURE — 51701 INSERT BLADDER CATHETER: CPT

## 2019-08-23 PROCEDURE — 74011250637 HC RX REV CODE- 250/637: Performed by: PHYSICIAN ASSISTANT

## 2019-08-23 PROCEDURE — 97165 OT EVAL LOW COMPLEX 30 MIN: CPT

## 2019-08-23 PROCEDURE — 97535 SELF CARE MNGMENT TRAINING: CPT

## 2019-08-23 PROCEDURE — 36415 COLL VENOUS BLD VENIPUNCTURE: CPT

## 2019-08-23 PROCEDURE — 74011250636 HC RX REV CODE- 250/636: Performed by: PHYSICIAN ASSISTANT

## 2019-08-23 PROCEDURE — 80048 BASIC METABOLIC PNL TOTAL CA: CPT

## 2019-08-23 PROCEDURE — 97530 THERAPEUTIC ACTIVITIES: CPT

## 2019-08-23 PROCEDURE — 85018 HEMOGLOBIN: CPT

## 2019-08-23 PROCEDURE — 99218 HC RM OBSERVATION: CPT

## 2019-08-23 PROCEDURE — 74011250637 HC RX REV CODE- 250/637: Performed by: NURSE PRACTITIONER

## 2019-08-23 PROCEDURE — 51798 US URINE CAPACITY MEASURE: CPT

## 2019-08-23 PROCEDURE — 97116 GAIT TRAINING THERAPY: CPT

## 2019-08-23 RX ORDER — TAMSULOSIN HYDROCHLORIDE 0.4 MG/1
0.4 CAPSULE ORAL DAILY
Qty: 30 CAP | Refills: 0 | Status: SHIPPED | OUTPATIENT
Start: 2019-08-23 | End: 2019-09-18 | Stop reason: SDUPTHER

## 2019-08-23 RX ORDER — HYDROCODONE BITARTRATE AND ACETAMINOPHEN 5; 325 MG/1; MG/1
1 TABLET ORAL
Status: DISCONTINUED | OUTPATIENT
Start: 2019-08-23 | End: 2019-08-23 | Stop reason: HOSPADM

## 2019-08-23 RX ORDER — POLYETHYLENE GLYCOL 3350 17 G/17G
17 POWDER, FOR SOLUTION ORAL
Qty: 14 PACKET | Refills: 0 | Status: SHIPPED | OUTPATIENT
Start: 2019-08-23 | End: 2020-09-21 | Stop reason: ALTCHOICE

## 2019-08-23 RX ORDER — TRAMADOL HYDROCHLORIDE 50 MG/1
50 TABLET ORAL
Qty: 20 TAB | Refills: 0 | Status: SHIPPED | OUTPATIENT
Start: 2019-08-23 | End: 2019-08-28

## 2019-08-23 RX ORDER — TAMSULOSIN HYDROCHLORIDE 0.4 MG/1
0.4 CAPSULE ORAL DAILY
Status: DISCONTINUED | OUTPATIENT
Start: 2019-08-23 | End: 2019-08-23 | Stop reason: HOSPADM

## 2019-08-23 RX ORDER — AMOXICILLIN 250 MG
1 CAPSULE ORAL 2 TIMES DAILY
Qty: 60 TAB | Refills: 0 | Status: SHIPPED | OUTPATIENT
Start: 2019-08-23 | End: 2020-09-21 | Stop reason: ALTCHOICE

## 2019-08-23 RX ORDER — OXYCODONE HYDROCHLORIDE 5 MG/1
5 TABLET ORAL
Qty: 60 TAB | Refills: 0 | Status: SHIPPED | OUTPATIENT
Start: 2019-08-23 | End: 2019-08-23

## 2019-08-23 RX ORDER — TRAMADOL HYDROCHLORIDE 50 MG/1
50 TABLET ORAL
Status: DISCONTINUED | OUTPATIENT
Start: 2019-08-23 | End: 2019-08-23 | Stop reason: HOSPADM

## 2019-08-23 RX ADMIN — TAMSULOSIN HYDROCHLORIDE 0.4 MG: 0.4 CAPSULE ORAL at 09:06

## 2019-08-23 RX ADMIN — POLYETHYLENE GLYCOL 3350 17 G: 17 POWDER, FOR SOLUTION ORAL at 09:06

## 2019-08-23 RX ADMIN — Medication 10 ML: at 06:29

## 2019-08-23 RX ADMIN — SENNOSIDES, DOCUSATE SODIUM 1 TABLET: 50; 8.6 TABLET, FILM COATED ORAL at 09:06

## 2019-08-23 RX ADMIN — Medication 10 ML: at 06:28

## 2019-08-23 RX ADMIN — SODIUM CHLORIDE 125 ML/HR: 900 INJECTION, SOLUTION INTRAVENOUS at 06:27

## 2019-08-23 RX ADMIN — OXYCODONE HYDROCHLORIDE 5 MG: 5 TABLET ORAL at 11:20

## 2019-08-23 RX ADMIN — TRAMADOL HYDROCHLORIDE 50 MG: 50 TABLET, FILM COATED ORAL at 13:28

## 2019-08-23 NOTE — DISCHARGE INSTRUCTIONS
After Hospital Care Plan:  Discharge Instructions Lumbar Fusion Surgery   Dr. Geena Adames  Patient Name:  Rita Beard    Date of procedure:  8/22/2019  Date of discharge: 8/23/2019    Procedure:  Procedure(s):  LUMBAR LAMINECTOMY L3-5    PCP: Shawna Hudson MD    Follow up appointments  -Follow up with Dr. Geena Adames in 2 weeks. Call Dr. Devra Kanner at [phone], Ext [phone], once you get home from the hospital to make an appointment for your 2 week postoperative visit. Home Health Agency: ____________________   phone: _______________________  The agency will contact you to arrange dates/times for visits. Please call them if you do not hear from them within 24 hours after you are discharged  Physical therapy 3 times a week for 3 weeks  Nursing-initial assessment and as needed    When to call your Orthopaedic Surgeon:  -Signs of infection-if your incision is red; continues to have drainage; drainage has a foul odor or if you have a persistent fever over 101 degrees for 24 hours  -Nausea or vomiting, severe headache  -Loss of bowel or bladder function, inability to urinate  -Changes in sensation in your arms or legs (numbness, tingling, loss of color)  -Increased weakness-greater than before your surgery  -Severe pain or pain not relieved by medications  -Signs of a blood clot in your leg-calf pain, tenderness, redness, swelling of lower leg    When to call your Primary Care Physician:  -Concerns about medical conditions such as diabetes, high blood pressure, asthma, congestive heart failure  -Call if blood sugars are elevated, persistent headache or dizziness, coughing or congestion, constipation or diarrhea, burning with urination, abnormal heart rate    When to call 911 and go to the nearest emergency room:  -Acute onset of chest pain, shortness of breath, difficulty breathing    Activity  -You are going home a well person, be as active as possible. Your only exercise should be walking. Start with short frequent walks and increase your walking distance each day.  -Limit the amount of time you sit to 20-30 minute intervals. Sitting for prolonged periods of time will be uncomfortable for you following surgery.  -Do NOT lift anything over 5 pounds  -Do NOT do any straining, twisting or bending  -When you are in bed, you may lay on your back or on either side. Do NOT lie on your stomach    Brace  -If you have a back brace, you should wear your brace at all times when you are out of bed. Do not wear the brace while in bed or showering.  -Remember to always wear a cotton t-shirt underneath your brace.  -Do not bend or twist when your brace is off. Diet  -Resume usual diet; drink plenty of fluids; eat foods high in fiber.  -It is important to have regular bowel movements. Pain medications may cause constipation. You may want to take a stool softener (such as Senokot-S or Colace) to prevent constipation.   -If constipation occurs, take a laxative (such as Dulcolax tablets, Milk of Magnesia, or a suppository). Laxatives should only be used if the above preventable measures have failed and you still have not had a bowel movement after three days. Driving  -You may not drive or return to work until instructed by your physician. However, you may ride in the car for short periods of time. Incision Care  -You may take brief showers but do not run the water run directly onto the wound. After showering or bathing, remove the wet dressing and place a new dressinl.  -Do not rub or apply any lotions or ointments to your incision site.   -Do not soak or scrub your wound  -A new dressing has been applied to your incision prior to discharge. Showering  -You may shower in approximately 4 days after your surgery.    -Leave the dressing on during your shower. Do NOT allow the water to run directly onto your dressing.    Once you get out of the shower, place a new dressing.  -Reminder- your brace can be removed while showering. Remember to not bend or twist while your brace is off.    -Do not take a tub bath. Preventing blood clots  -You have been given T.E.D. stockings to wear. Continue to wear these for 7 days after your discharge. Put them on in the morning and take them off at night.    -They are used to increase your circulation and prevent blood clots from forming in your legs  -T. E.D. stockings can be machine washed, temperature not to exceed 160° F (71°C) and machine dried for 15 to 20 minutes, temperature not to exceed 250° F (121°C). Pain management  -Take pain medication as prescribed; decrease the amount you use as your pain lessens. -DO not wait until you are in extreme pain to take your medication.  -Avoid alcoholic beverages while taking pain medication    Pain Medication Safety  DO:  -Read the Medication Guide   -Take your medicine exactly as prescribed   -Store your medicine away from children and in a safe place   -Flush unused medicine down the toilet   -Call your healthcare provider for medical advice about side effects. You may report side effects to FDA at 2-739-FDA-7796.   -Please be aware that many medications contain Tylenol. We do not want you to over medicate so please read the information below as a guide. Do not take more than 4 Grams of Tylenol in a 24 hour period.   (There are 1000 milligrams in one Gram)                                                                                                                                                                                                                                                                                                                                                                  Percocet contains 325 mg of Tylenol per tablet (do not take more than 12 tablets in 24 hours)  Lortab contains 500 mg of Tylenol per tablet (do not take more than 8 tablets in 24 hours)  Norco contains 325 mg of Tylenol per tablet (do not take more than 12 tablets in 24 hours). DO NOT:  -Do not give your medicine to others   -Do not take medicine unless it was prescribed for you   -Do not stop taking your medicine without talking to your healthcare provider   -Do not break, chew, crush, dissolve, or inject your medicine. If you cannot swallow your medicine whole, talk to your healthcare provider.  -Do not drink alcohol while taking this medicine  -Do not take anti-inflammatory medications or aspirin unless instructed by your     physician.

## 2019-08-23 NOTE — PROGRESS NOTES
Patient had \"full body chills, nasal congestion, and flushing 20 minutes after 1st Oxycodone 5mg given. Oxycodone listed as Allergen now. Carl Silvestre notified. Prescriptions changed to Tramadol and [place] for patient to try.

## 2019-08-23 NOTE — PROGRESS NOTES
PHYSICAL THERAPY EVALUATION/DISCHARGE  Patient: Rossi Alcala (43 y.o. male)  Date: 8/23/2019  Primary Diagnosis: Spinal stenosis [M48.00]  Procedure(s) (LRB):  LUMBAR LAMINECTOMY L3-5 (N/A) 1 Day Post-Op   Precautions: No bending, no lifting greater than 5 lbs, no twisting, log-roll technique, repositioning every 20-30 min except when sleeping, brace when OOB           ASSESSMENT  Based on the objective data described below, the patient presents with decreased mobility minimally and mild pain levels. Pt demos increased anxiety regarding movement, lengthy discussion with patient on the benefits of moving within suggested parameters, positioning, precautions. Pt wife present and appears to understand precautions/manage well. Pt tolerates gait well with no assistance/SBA for 300ft with near normal gait pattern, able to ascend/descend 12 steps with x1 handrail reciprocal stepping, and demos initial difficulty with log rolling however does not appear to need further assistance. Pt is cleared to [place] home without PT services and transition to outpatient. Functional Outcome Measure: The patient scored 100 on the barthel outcome measure which is indicative of baseline function. Other factors to consider for discharge:      Further skilled acute physical therapy is not indicated at this time. PLAN :  Recommendation for discharge: (in order for the patient to meet his/her long term goals)  No skilled physical therapy/ follow up rehabilitation needs identified at this time. This discharge recommendation:  Has been made in collaboration with the attending provider and/or case management    Equipment recommendations for successful discharge: none       SUBJECTIVE:   Patient stated Ana Maria Mcclendon can I go walking for a few miles? Rosie Aldana extensive education regarding pacing, expectations, when to perform tasks/awaiting MD clearance     OBJECTIVE DATA SUMMARY:   HISTORY:    Past Medical History:   Diagnosis Date    Allergic rhinitis 2/27/2010    Cancer ([facility] [address] 2004    SKIN, LIP (MOHS PROCEDURE)    DDD,DJD cervicalC5-6,6-7foraminal encroachment on X ray-4/2013 5/17/2013    Hypertension     BORDERLINE; NO MEDS    Obesity (BMI 30.0-34.9) 2/5/2018    Other and unspecified hyperlipidemia 2/27/2010     Past Surgical History:   Procedure Laterality Date    ENDOSCOPY, COLON, DIAGNOSTIC      X2    HX CATARACT REMOVAL Bilateral 87-88    CONGENITAL CATARACTS; W/ IOL    HX TONSIL AND ADENOIDECTOMY  1960    HX VASECTOMY  1989    SINUS SURGERY [address] UNLISTED  2001       Prior level of function: ind   Personal factors and/or comorbidities impacting plan of care:     Home Situation  Home Environment: Private residence  # Steps to Enter: 6  One/Two Story Residence: Two story  # of Interior Steps: 15  Interior Rails: Right  Lift Chair Available: No  Living Alone: No  Support Systems: Child(dexter), Spouse/Significant Other/Partner, Family member(s)  Patient Expects to be Discharged to[de-identified] Private residence  Current DME Used/Available at Home: Blood pressure cuff    EXAMINATION/PRESENTATION/DECISION MAKING:   Critical Behavior:  Neurologic State: Alert, Appropriate for age  Orientation Level: Appropriate for age, Oriented X4  Cognition: Appropriate decision making, Appropriate for age attention/concentration, Appropriate safety awareness, Follows commands     Hearing:   Auditory  Auditory Impairment: None  Skin:  intact  Edema: none noted  Range Of Motion:  AROM: Generally decreased, functional           PROM: Generally decreased, functional           Strength:    Strength: Generally decreased, functional                    Tone & Sensation:   Tone: Normal              Sensation: Intact               Coordination:  Coordination: Generally decreased, functional  Vision:      Functional Mobility:  Bed Mobility:  Rolling: Minimum assistance  Supine to Sit: Minimum assistance(for instruction)     Scooting: Modified independent  Transfers:  Sit to Stand: Supervision  Stand to Sit: Supervision        Bed to Chair: Supervision              Balance:   Sitting: Intact  Standing: Intact  Ambulation/Gait Training:  Distance (ft): 300 Feet (ft)  Assistive Device: (none)  Ambulation - Level of Assistance: Supervision;Modified independent     Gait Description (WDL): Exceptions to WDL  Gait Abnormalities: Other        Base of Support: Widened     Speed/Sue: Slow;Shuffled  Step Length: Right shortened;Left shortened                     Stairs:  Number of Stairs Trained: 12  Stairs - Level of Assistance: Supervision   Rail Use: Left     Functional Measure:  Barthel Index:    Bathin  Bladder: 10  Bowels: 10  Groomin  Dressing: 10  Feeding: 10  Mobility: 15  Stairs: 10  Toilet Use: 10  Transfer (Bed to Chair and Back): 15  Total: 100/100       The Barthel ADL Index: Guidelines  1. The index should be used as a record of what a patient does, not as a record of what a patient could do. 2. The main aim is to establish degree of independence from any help, physical or verbal, however minor and for whatever reason. 3. The need for supervision renders the patient not independent. 4. A patient's performance should be established using the best available evidence. Asking the patient, friends/relatives and nurses are the usual sources, but direct observation and common sense are also important. However direct testing is not needed. 5. Usually the patient's performance over the preceding 24-48 hours is important, but occasionally longer periods will be relevant. 6. Middle categories imply that the patient supplies over 50 per cent of the effort. 7. Use of aids to be independent is allowed. Raina Anderson., Barthel, D.W. (0283). Functional evaluation: the Barthel Index. [facility] (14)2. [facility] ALMA Stubbs, Lulu Fitch., Rachele Palacios., Andre, [address] ().  Measuring the change indisability after inpatient rehabilitation; comparison of the responsiveness of the Barthel Index and Functional Nelson Measure. Journal of Neurology, Neurosurgery, and Psychiatry, 66(4), 656-430. CHAU Graves, JAY De Leon, & John Diop M.A. (2004.) Assessment of post-stroke quality of life in cost-effectiveness studies: The usefulness of the Barthel Index and the EuroQoL-5D. Quality of Life Research, 15, 168-56            Physical Therapy Evaluation Charge Determination   History Examination Presentation Decision-Making   MEDIUM  Complexity : 1-2 comorbidities / personal factors will impact the outcome/ POC  MEDIUM Complexity : 3 Standardized tests and measures addressing body structure, function, activity limitation and / or participation in recreation  LOW Complexity : Stable, uncomplicated  Other outcome measures barthel  LOW       Based on the above components, the patient evaluation is determined to be of the following complexity level: LOW     Pain Ratin/10post tx    Activity Tolerance: WNL  Please refer to the flowsheet for vital signs taken during this treatment. After treatment patient left in no apparent distress:   Sitting in chair    COMMUNICATION/EDUCATION:   The patients plan of care was discussed with: Registered Nurse and . Fall prevention education was provided and the patient/caregiver indicated understanding. and Patient/family have participated as able in goal setting and plan of care.     Thank you for this referral.  Sheryl Kimble, PT   Time Calculation: 57 mins

## 2019-08-23 NOTE — PROGRESS NOTES
OCCUPATIONAL THERAPY EVALUATION/DISCHARGE  Patient: Aliyah Mcclure (1 y.o. male)  Date: 8/23/2019  Primary Diagnosis: Spinal stenosis [M48.00]  Procedure(s) (LRB):  LUMBAR LAMINECTOMY L3-5 (N/A) 1 Day Post-Op   Precautions:  Back (stand erect, log roll, sitting 30 minutes or less)    ASSESSMENT  Based on the objective data described below, the patient presents with intact balance, moderate pain, good ADL performance, and good mobility POD 1 s/p L3-L5 laminectomy. Patient is received in bed agreeable to participate with wife present for duration of session. Participates in AE training and is receptive to all education. Patient lives at home with his wife who will be able to assist as needed. Patient is cleared from OT for discharge. Current Level of Function (ADLs/self-care): patient requires SBA to supervision for standing ADLs at sink; SBA to min A for lower body dressing with AE, independent upper body ADLs, and SBA for toileting. SBA for OOB mobility without AD. Functional Outcome Measure: The patient scored 95/100 on the Barthel outcome measure which is indicative of 5% impairment in ADLs and mobility. Other factors to consider for discharge: independent at baseline; wife able to assist as needed     PLAN :  Recommendation for discharge: (in order for the patient to meet his/her long term goals)  No skilled occupational therapy/ follow up rehabilitation needs identified at this time. This discharge recommendation:  A follow-up discussion with the attending provider and/or case management is planned    Equipment recommendations for successful discharge: patient owns DME required for discharge; Discussed AE and where to purchase. SUBJECTIVE:   Patient stated I'm her HMM - high . My wife will be able to help when I need it.  RN cleared patient for therapy. Patient pleasant and agreeable to participate.  Wife present for duration of session and for all education provided. OBJECTIVE DATA SUMMARY:   HISTORY:   Past Medical History:   Diagnosis Date    Allergic rhinitis 2/27/2010    Cancer ([facility] [address] 2004    SKIN, LIP (MOHS PROCEDURE)    DDD,DJD cervicalC5-6,6-7foraminal encroachment on X ray-4/2013 5/17/2013    Hypertension     BORDERLINE; NO MEDS    Obesity (BMI 30.0-34.9) 2/5/2018    Other and unspecified hyperlipidemia 2/27/2010     Past Surgical History:   Procedure Laterality Date    ENDOSCOPY, COLON, DIAGNOSTIC      X2    HX CATARACT REMOVAL Bilateral 87-88    CONGENITAL CATARACTS; W/ IOL    HX TONSIL AND ADENOIDECTOMY  1960    HX VASECTOMY  1989    SINUS SURGERY [address] UNLISTED  2001       Prior Level of Function/Environment/Context: Patient lives in [facility] with his wife who is able to assist as needed. Patient drives at baseline and reports independence with ADLs and mobility. Expanded or extensive additional review of patient history:     Home Situation  Home Environment: Private residence  # Steps to Enter: 6  One/Two Story Residence: Two story  # of Interior Steps: 15  Interior Rails: Right  Lift Chair Available: No  Living Alone: No(lives with wife)  Support Systems: Spouse/Significant Other/Partner, Child(dexter), Family member(s)  Patient Expects to be Discharged to[de-identified] Private residence  Current DME Used/Available at Home: Blood pressure cuff, Shower chair  Tub or Shower Type: Shower    EXAMINATION OF PERFORMANCE DEFICITS:  Cognitive/Behavioral Status:  Neurologic State: Appropriate for age; Alert  Orientation Level: Oriented X4  Cognition: Appropriate decision making; Appropriate for age attention/concentration; Appropriate safety awareness; Follows commands  Perception: Appears intact  Perseveration: No perseveration noted  Safety/Judgement: Awareness of environment; Fall prevention;Home safety; Insight into deficits;Good awareness of safety precautions    Skin: observed skin intact. Dressing intact on back.     Edema: no abnormal swelling noted.    Hearing: Auditory  Auditory Impairment: None    Vision/Perceptual:    Diplopia: No      Range of Motion:  AROM: Within functional limits In bilateral UEs    Strength:  Strength: Generally decreased, functional In bilateral UEs    Coordination:  Coordination: Within functional limits In bilateral UEs  Fine Motor Skills-Upper: Left Intact; Right Intact    Gross Motor Skills-Upper: Left Intact; Right Intact    Tone & Sensation:  Tone: Normal  Sensation: Intact    Balance:  Sitting: Intact  Standing: Intact    Functional Mobility and Transfers for ADLs:  Bed Mobility:  Rolling: Minimum assistance  Supine to Sit: Minimum assistance(cues for log roll technique)  Sit to Supine: (Pt seated up in chair with RN and wife )  Scooting: Modified independent    Transfers:  Sit to Stand: Supervision  Stand to Sit: Supervision  Bed to Chair: Supervision  Bathroom Mobility: Supervision/set up  Toilet Transfer : Supervision(infer based on observed mobility)    ADL Assessment:  Feeding: Independent    Oral Facial Hygiene/Grooming: Supervision;Setup(in standing)    Bathing: Moderate assistance(due to back precautions; unable to tailor sit)    Upper Body Dressing: Independent    Lower Body Dressing: Moderate assistance(due to back precautions; unable to tailor sit)    Toileting: Stand by assistance    ADL Intervention and task modifications:  Grooming  Grooming Assistance: (standing at sink)  Washing Hands: Supervision  Brushing Teeth: Compensatory technique training(verbal instruction on techniiques to prevent bending/twisting)    [address]: (seated)  Pullover Shirt: Independent    Lower Body Dressing Assistance  Underpants: Stand-by assistance; Compensatory technique training(with reacher)  Pants With Elastic Waist: Stand-by assistance; Compensatory technique training(with reacher)  Socks: Supervision; Compensatory technique training(reacher to doff; sock aid to don)  Shoes with Elastic Laces: Minimum assistance; Compensatory technique training(with long handled shoe horn; cues for technique)  Leg Crossed Method Used: No  Position Performed: Seated in chair  Cues: Don;Verbal cues provided;Physical assistance  Adaptive Equipment Used: Long handled shoe horn;Reacher;Sock aid    Cognitive Retraining  Safety/Judgement: Awareness of environment; Fall prevention;Home safety; Insight into deficits;Good awareness of safety precautions    Patient instructed and demonstrated 3/3 spine precautions with verbal cues. Patient instructed and indicated understanding the benefits of maintaining activity tolerance, functional mobility, and independence with self care tasks during acute stay  to ensure safe return home and to baseline. Encouraged patient to increase frequency and duration OOB, not sitting longer than 30 mins without marching/walking with staff, be out of bed for all meals, perform daily ADLs (as approved by RN/MD regarding bathing etc), and performing functional mobility to/from bathroom. Patient instruction and indicated understanding on body mechanics, ergonomics and gravitational force on the spine during different body positions to plan activities in prep for return home to complete basic ADLs, instrumental ADLs and back to work safely. Bathing: Patient instructed and indicated understanding when bathing to not submerge wound in water, stand to shower or sponge bathe, cover wound with plastic and tape to ensure no water reaches bandage/wound without cues. Instructed patient to perform shower transfer (when ready) dry for safety prior to attempting wet for safety and fall prevention. Instructed patient to have supervision from family member/significant other when performing wet shower transfer for safety. Dressing lower body: Patient instructed on the benefits to remain seated to don all clothing to increase independence with precautions and pain management.  Patient unable to perform tailor sitting, therefore participated in AE training for lower body dressing. Demonstrated use of reacher, sock aid, and long handled shoe horn with overall min A. Instructed on where to purchase items with wife and patient indicating understanding of same. Toileting: Patient instructed on the benefits of using flushable wet wipes and toilet tongs if decreased reach or pain for monty care. Also, the benefits of a reacher to aid in clothing management. Home safety: Patient instructed and indicated understanding on home modifications and safety [raise height of ADL objects (i.e. clothing, sink items, fridge items, items to mouth when grooming), appropriate height of chair surfaces, recliner safety, change of floor surfaces, clear pathways] to increase independence and fall prevention. Standing: Patient instructed and indicated understanding to walk up to sink/counter top/surfaces, step into walker, square off while using objects, slide objects along surfaces, to increase adherence to back precautions and fall prevention. Patient instructed to increase amount of time standing in order to increase independence and tolerance with ADLs. During prolonged standing, can open cabinet door or place foot on stool to decrease spinal pressure/increase pain. Functional Measure:  Barthel Index:    Bathin  Bladder: 10  Bowels: 10  Groomin  Dressing: 10  Feeding: 10  Mobility: 15  Stairs: 10  Toilet Use: 10  Transfer (Bed to Chair and Back): 15  Total: 95/100        Percentage of impairment   0%   1-19%   20-39%   40-59%   60-79%   80-99%   100%   Barthel Score 0-100 100 99-80 79-60 59-40 20-39 1-19   0     The Barthel ADL Index: Guidelines  1. The index should be used as a record of what a patient does, not as a record of what a patient could do. 2. The main aim is to establish degree of independence from any help, physical or verbal, however minor and for whatever reason.   3. The need for supervision renders the patient not independent. 4. A patient's performance should be established using the best available evidence. Asking the patient, friends/relatives and nurses are the usual sources, but direct observation and common sense are also important. However direct testing is not needed. 5. Usually the patient's performance over the preceding 24-48 hours is important, but occasionally longer periods will be relevant. 6. Middle categories imply that the patient supplies over 50 per cent of the effort. 7. Use of aids to be independent is allowed. Kumar Kaye., Barthel, ELAINEW. (3735). Functional evaluation: the Barthel Index. [address] [place] [employer] (14)2. Kiko Bhagat justino JASON StubbsF, Yony Boo., Judy Lion., Andre, [address] (1999). Measuring the change indisability after inpatient rehabilitation; comparison of the responsiveness of the Barthel Index and Functional [place] Measure. Journal of Neurology, Neurosurgery, and Psychiatry, 66(4), 266-138. Elissa Sauceda, N.J.A, JAY De Leon, & Lobo Alexandre M.A. (2004.) Assessment of post-stroke quality of life in cost-effectiveness studies: The usefulness of the Barthel Index and the EuroQoL-5D. Quality of Life Research, 15, 559-57     Occupational Therapy Evaluation Charge Determination   History Examination Decision-Making   LOW Complexity : Brief history review  LOW Complexity : 1-3 performance deficits relating to physical, cognitive , or psychosocial skils that result in activity limitations and / or participation restrictions  LOW Complexity : No comorbidities that affect functional and no verbal or physical assistance needed to complete eval tasks       Based on the above components, the patient evaluation is determined to be of the following complexity level: LOW   Pain Rating:  Patient reporting 2-3/10 pain this session. RN present to manage medications as appropriate.     Activity Tolerance:   Good, tolerates ADLs without rest breaks, SpO2 stable on RA and denied feeling lightheaded or dizzy during session  Please refer to the flowsheet for vital signs taken during this treatment. After treatment patient left in no apparent distress:    Sitting in chair, Call bell within reach, Caregiver / family present and RN present    COMMUNICATION/EDUCATION:   The patients plan of care was discussed with: Physical Therapist and Registered Nurse.     Thank you for this referral.  Layvonne Kehr, OT  Time Calculation: 46 mins

## 2019-08-23 NOTE — PROGRESS NOTES
Care Management Interventions  PCP Verified by CM: Yes  Palliative Care Criteria Met (RRAT>21 & CHF Dx)?: No  Mode of Transport at Discharge: Other (see comment)  Transition of Care Consult (CM Consult): Discharge Planning  MyChart Signup: Yes  Discharge Durable Medical Equipment: No  Health Maintenance Reviewed: Yes  Physical Therapy Consult: Yes  Occupational Therapy Consult: Yes  Speech Therapy Consult: No  Current Support Network: Lives with Spouse, Own Home  Confirm Follow Up Transport: Family  Plan discussed with Pt/Family/Caregiver: Yes  [place] Resource Information Provided?: No  Discharge Location  Discharge Placement: Home with family assistance    Reason for Admission:   Lumbar Laminectomy L3-5                   RRAT Score:          10           Plan for utilizing home health:      Not appropriate at this time. Current Advanced Directive/Advance Care Plan:  Not on file. Transition of Care Plan:                      Reviewed chart for transitions of care, and discussed in rounds. CM met with patient at bedside to explain role and offer support. His wife is present at bedside and will transport him home at discharge. Patient does not need any home health needs at this time, his wife will be at home for assistance. He is independent with ADLs and IADLs prior to admission. CM available if any needs arise. Observation notice provided in writing to patient and/or caregiver as well as verbal explanation of the policy. Patients who are in outpatient status also receive the Observation notice.     Armando Arriaga [facility]

## 2019-08-23 NOTE — PROGRESS NOTES
Ortho Spine Daily Progress Note    Date of Surgery:  2019      Patient: Singh Pérez   YOB: 1955  Age: 70 y.o. SUBJECTIVE:   1 Day Post-Op following LUMBAR LAMINECTOMY L3-5    The patient states moderate back pain this morning. The patient states that their pre-operative lower extremity symptoms appear to be significantly  Improved Was able to ambulate around the floor last evening. The patient rates their current level of pain as 7- 8/10. The patient's post operative pain is adequately controlled. Positive urinary retention and straight cath last evening. The patient denies CP, SOB, N/V, dizziness, abdominal pain, HA. OBJECTIVE:     Vital Signs:    Temp (24hrs), Av °F (36.7 °C), Min:96.5 °F (35.8 °C), Max:98.8 °F (37.1 °C)      Patient Vitals for the past 24 hrs:   BP Temp Pulse Resp SpO2   19 0406 137/76 98 °F (36.7 °C) 70 16 97 %   19 0134 158/81 98.1 °F (36.7 °C) 70 16 99 %   19 0011 163/90 98.3 °F (36.8 °C) 69 16 99 %   19 1952 138/79 98.8 °F (37.1 °C) 76 16 96 %   19 1659 145/79 98.4 °F (36.9 °C) 73 16 98 %   19 1600 158/86 98.4 °F (36.9 °C) 71 16 98 %   19 1514 151/85 98 °F (36.7 °C) 71 16 97 %   19 1417 154/88 97.5 °F (36.4 °C) 73 16 98 %   19 1340   71 16 97 %   19 1339   76 19 95 %   19 1300 137/84  80 20 97 %   19 1200 129/77  60 17 95 %   19 1045 155/88 98 °F (36.7 °C) 68 18 97 %   19 1030 151/86  67 13 98 %   19 1015 150/88  66 15 97 %   19 1000 101/64  63 18 97 %   19 0955 124/77  67 18 91 %   19 0950 108/45 96.5 °F (35.8 °C) 69 19 92 %           Physical Exam:  General: A&Ox3, NAD. Respiratory: Respirations are unlabored.   Abdomen: S/NT  Surgical site: C,D and I.  Musculoskeletal: Calves are S/NT, Narendra dorsi/plantar flexion/EHL/quads/hip flexion intact, Narendra DP 2+  Neurological:  Narendra LE's NVI    Laboratory Values:             Recent Labs 08/23/19  0147   HGB 13.5   CREA 1.20   *     Lab Results   Component Value Date/Time    Sodium 140 08/23/2019 01:47 AM    Potassium 4.0 08/23/2019 01:47 AM    Chloride 108 08/23/2019 01:47 AM    CO2 25 08/23/2019 01:47 AM    Glucose 118 (H) 08/23/2019 01:47 AM    BUN 13 08/23/2019 01:47 AM    Creatinine 1.20 08/23/2019 01:47 AM    Calcium 8.2 (L) 08/23/2019 01:47 AM       Hemovac Output:   150 ml in the last 12 hours. PLAN:     S/P LUMBAR LAMINECTOMY L3-5 Mobilize with PT/nursing until discharged. Hemodynamics Stable. Wound D/C drain and change dressing prior to discharge. Post Operative Pain Pain Control: D/C PCA, PO pain meds. DVT Prophylaxis Continue with SCD'S, Encourage Ankle Pump Exercises, Mobilize. Discharge Disposition Discharge plan: Will focus on pain control and mobilizing with PT/nursing. Plan on D/C home today if continues to do well. Follow up in 2 weeks for post op care. Have patient contact the office to schedule follow up appointment.              Signed By: Flora Hardy PA-C  August 23, 2019 8:12 AM

## 2019-08-23 NOTE — PROGRESS NOTES
PT eval performed, patient cleared for home DC by PT. Pt ambulates 300ft with no assistance and up and down 12 steps SBA. No further skilled need for services.      Dwigth Wright, DPT, PT

## 2019-08-23 NOTE — PROGRESS NOTES
Occupational Therapy:   08/23/19    Orders received and appreciated, chart reviewed. Patient seen and evaluated by Occupational Therapy. Recommend discharge home with wife assist. Patient is cleared from OT for discharge. Full note to follow.     Thank you,  HÉCTOR Gleason/L

## 2019-08-23 NOTE — PROGRESS NOTES
Problem: Falls - Risk of  Goal: *Absence of Falls  Description  Document Mónica Garibay Fall Risk and appropriate interventions in the flowsheet.   Outcome: Progressing Towards Goal  Note:   Fall Risk Interventions:  Mobility Interventions: Communicate number of staff needed for ambulation/transfer, Patient to call before getting OOB    Mentation Interventions: Adequate sleep, hydration, pain control, Door open when patient unattended    Medication Interventions: Assess postural VS orthostatic hypotension    Elimination Interventions: Call light in reach, Patient to call for help with toileting needs    History of Falls Interventions: Consult care management for discharge planning

## 2019-08-23 NOTE — PROGRESS NOTES
On 08/22/2019 at 23:48 RN used the bladder scanner and it showed 497 ml retention. Patient has been urinating frequently and in small quantities. Some of the voided amounts were counted together, however, patient only voided 100-200 ml at a time. At 23:55 patient voided 150 ml. On 08/23/2019 at 00:01 RN inserted a straight catheter and obtained 400 ml more of clear yellow urine.

## 2019-08-26 ENCOUNTER — PATIENT OUTREACH (OUTPATIENT)
Dept: FAMILY MEDICINE CLINIC | Age: 64
End: 2019-08-26

## 2019-08-26 NOTE — PROGRESS NOTES
Hospital Discharge Follow-Up      Date/Time:  2019 9:25 AM    Patient was admitted to [facility] on  and discharged on  for lumbar laminectomy. The physician discharge summary was not available at the time of outreach. Patient was contacted within 1 business days of discharge. Top Challenges reviewed with the provider   [address] [place] [address] - lumbar laminectomy L3-S1  Did have urinary retention -straight cath. Put on new rx-Flomax. Patient reports it is really helping. No ACP on file-says he has Living Will-advised to bring to next ov so copy can be made for chart. Method of communication with provider :chart routing,face to face    Inpatient RRAT score: 8  Was this a readmission? no  Patient stated reason for the readmission: na    Nurse Navigator (NN) contacted the patient by telephone to perform post hospital discharge assessment. Verified name and  with patient as identifiers. Provided introduction to self, and explanation of the Nurse Navigator role. Patient states he is doing fairly well. Rates current pain at about 1.5 at this time. Took most recent dose of Tramadol at 4:40am this morning. Is using ESTylenol in between doses for pain as needed. Trying to walk frequently. Wife reports incision looks good-no sign of infection. Changing dressing qd as advised. Is concerned that he hasn't had a BM since Thursday am, day of surgery. Taking the Miralax and stool softener each day. Suggested he check with 's nurse-says he has already called and LM for her to call him back. Advised to increase water intake as well. Reviewed discharge instructions and red flags with patient who verbalized understanding. Patient given an opportunity to ask questions and does not have any further questions or concerns at this time. The patient agrees to contact the PCP office for questions related to their healthcare. NN provided contact information for future reference.     Disease Specific:   N/A    Summary of patient's top problems:  1. Lumbar spinal stenosis-Had lumbar laminectomy 8/22 on L3-S1 per . No complications during surgery. Discharged to home next day. 2. No ACP on file- patient says he has a Living Will. Advised to bring to next office visit with pcp so we can make a copy for his chart. Home Health orders at discharge: [address]: na  Date of initial visit: na    Durable Medical Equipment ordered/company: none  Durable Medical Equipment received: na    Barriers to care? None at this time    Advance Care Planning:   Does patient have an Advance Directive:  not on file -says he has a Living Will-advised to bring in to next office visit so we can make a copy for his chart. Medication(s):   New Medications at Discharge: Tramadol 50mg q 6 hours prn pain, Flomax 0.4mg one qd, Pericolace bid for constipation and Miralax one packet qd. Changed Medications at Discharge: none  Discontinued Medications at Discharge: none    Medication reconciliation was performed with patient, who verbalizes understanding of administration of home medications. There were no barriers to obtaining medications identified at this time. Referral to Pharm D needed: no     Current Outpatient Medications   Medication Sig    polyethylene glycol (MIRALAX) 17 gram packet Take 1 Packet by mouth daily as needed for Other (constipation). Indications: constipation    senna-docusate (PERICOLACE) 8.6-50 mg per tablet Take 1 Tab by mouth two (2) times a day. Indications: constipation    tamsulosin (FLOMAX) 0.4 mg capsule Take 1 Cap by mouth daily. Indications: enlarged prostate with urination problem    traMADol (ULTRAM) 50 mg tablet Take 1 Tab by mouth every six (6) hours as needed for Pain for up to 5 days. Max Daily Amount: 200 mg.     simvastatin (ZOCOR) 40 mg tablet TAKE 1 TABLET BY MOUTH EVERY DAY AT NIGHT    Cholecalciferol, Vitamin D3, (VITAMIN D3) 1,000 unit cap Take 2,000 Units by mouth every other day. No current facility-administered medications for this visit. There are no discontinued medications. [facility] follow up appointment(s):   Future Appointments   Date Time Provider Melodie Dean   8/4/2020  9:00 AM Juanito Milan MD [facility] SCHED      Non-[facility] follow up appointment(s): [place] Davis, 9/6 9am.  [facility]:  information provided as a resource       Goals      education on post lumbar surgery      8/26/19 [facility]  8/22-8/23 lumbar laminectomy  · F/u with  in 2 weeks-appt sched for 9/6 9am  · Notify  if signs of infection at incision site,nausea or vomiting,inability to urinate, numbness or tingling in arms/legs, increased weakness,severe pain not controlled by meds or signs of blood clot in leg-calf pain,tenderness,redness or swelling of lower leg. · Call 911- if acute onset of chest pain,sob or difficulty breathing  · Walk frequently-start with short walks and gradually build up. · Avoid sitting for longer that 30 minutes  · No lifting > 5 lb, no [id number]-[id number] bending. · Eat foods high in fiber to avoid constipation. Take stool softener and Miralax qd. · If no BM in 3 days, take MOM or Dulolax tab or suppository. · No driving until ok per . · Brief showers-do not run water directly on incision-put new dry dressing on after shower and qd. · Wear AMIE stockings for 7 days to prevent blood clots. · Take pain med as prescribed, decrease the amount as pain lessens. · Can call NN if any questions/concerns. mbt       Prevent complications post hospitalization. 8/26/19 [facility] 8/22-8/23 s/p lumbar laminectomy  · Reviewed discharge instructions with patient and his wife. · Reviewed medications with both of them-teaching done on new meds.   · Reviewed red flags: sob,chest pain,fever, signs of infection at incision site, nausea,vomiting, increased weakness, pain not relieved by pain medication, signs of blood clot in leg-calf pain,tenderness, redness or swelling of lower leg. · Given info on [facility] as resource. · Scheduled to see Nichelle Barragan on 9/6 at [address] [facility] visit with  at this time. · Given NN direct contact info if any questions/concerns. · NN to check back in 5-7 days for update. mbt

## 2019-09-18 NOTE — TELEPHONE ENCOUNTER
PCP: Emory Levi MD    Last appt: 7/31/2019  Future Appointments   Date Time Provider Melodie Dean   8/4/2020  9:00 AM [facility]       Requested Prescriptions     Pending Prescriptions Disp Refills    tamsulosin (FLOMAX) 0.4 mg capsule 30 Cap 0     Sig: Take 1 Cap by mouth daily.  Indications: enlarged prostate with urination problem

## 2019-09-18 NOTE — TELEPHONE ENCOUNTER
Pharmacy is requesting medication refill/ NEW RX    Requested Prescriptions     Pending Prescriptions Disp Refills    tamsulosin (FLOMAX) 0.4 mg capsule 30 Cap 0     Sig: Take 1 Cap by mouth daily.  Indications: enlarged prostate with urination problem     Pharmacy on file verified  [phone])

## 2019-09-20 ENCOUNTER — PATIENT OUTREACH (OUTPATIENT)
Dept: FAMILY MEDICINE CLINIC | Age: 64
End: 2019-09-20

## 2019-09-20 RX ORDER — TAMSULOSIN HYDROCHLORIDE 0.4 MG/1
0.4 CAPSULE ORAL DAILY
Qty: 30 CAP | Refills: 11 | Status: SHIPPED | OUTPATIENT
Start: 2019-09-20 | End: 2020-09-28 | Stop reason: SDUPTHER

## 2019-09-20 RX ORDER — TAMSULOSIN HYDROCHLORIDE 0.4 MG/1
0.4 CAPSULE ORAL DAILY
Qty: 30 CAP | Refills: 0 | Status: CANCELLED | OUTPATIENT
Start: 2019-09-20

## 2019-09-20 NOTE — PROGRESS NOTES
Called and spoke to patient re [facility] 8/22-8/23. Doing well , pain miniimal. No longer taking the Tramadol or Tylenol. Walking about 3 1/2 miles per day. Has continued the Flomax that was ordered in the hospital, really helps with the frequency and flow. Said his pharmacy had requested it from  for last 3 days but no response. Patient doesn't really want to come in for office visit. Said he had discussed this issue with  on multiple occasions but just never started a rx. Advised I will check with . Message sent to - rx refilled.

## 2019-09-20 NOTE — Clinical Note
Patient requesting rx for Flomax-started on it in hospital after his surgery. Almost out. Pharmacy has sent requests for last 3 days. Hoping not to have to come in for office visit-said he has discussed this issue with  in past.

## 2020-04-17 DIAGNOSIS — E78.5 HYPERLIPIDEMIA LDL GOAL <130: ICD-10-CM

## 2020-04-17 RX ORDER — SIMVASTATIN 40 MG/1
TABLET, FILM COATED ORAL
Qty: 90 TAB | Refills: 1 | Status: SHIPPED | OUTPATIENT
Start: 2020-04-17 | End: 2020-10-28

## 2020-08-04 ENCOUNTER — OFFICE VISIT (OUTPATIENT)
Dept: FAMILY MEDICINE CLINIC | Age: 65
End: 2020-08-04
Payer: MEDICARE

## 2020-08-04 ENCOUNTER — HOSPITAL ENCOUNTER (OUTPATIENT)
Dept: LAB | Age: 65
Discharge: HOME | End: 2020-08-04
Payer: MEDICARE

## 2020-08-04 VITALS
WEIGHT: 211 LBS | BODY MASS INDEX: 30.21 KG/M2 | SYSTOLIC BLOOD PRESSURE: 140 MMHG | TEMPERATURE: 98.7 F | DIASTOLIC BLOOD PRESSURE: 78 MMHG | HEART RATE: 72 BPM | OXYGEN SATURATION: 99 % | RESPIRATION RATE: 16 BRPM | HEIGHT: 70 IN

## 2020-08-04 DIAGNOSIS — E66.9 OBESITY (BMI 30.0-34.9): ICD-10-CM

## 2020-08-04 DIAGNOSIS — M50.30 DDD (DEGENERATIVE DISC DISEASE), CERVICAL: ICD-10-CM

## 2020-08-04 DIAGNOSIS — M25.512 CHRONIC PAIN OF BOTH SHOULDERS: Chronic | ICD-10-CM

## 2020-08-04 DIAGNOSIS — E78.5 HYPERLIPIDEMIA LDL GOAL <130: Primary | ICD-10-CM

## 2020-08-04 DIAGNOSIS — D64.9 ANEMIA, UNSPECIFIED TYPE: ICD-10-CM

## 2020-08-04 DIAGNOSIS — Z00.00 MEDICARE ANNUAL WELLNESS VISIT, SUBSEQUENT: ICD-10-CM

## 2020-08-04 DIAGNOSIS — J30.1 SEASONAL ALLERGIC RHINITIS DUE TO POLLEN: ICD-10-CM

## 2020-08-04 DIAGNOSIS — R35.1 NOCTURIA: ICD-10-CM

## 2020-08-04 DIAGNOSIS — E55.9 VITAMIN D DEFICIENCY: ICD-10-CM

## 2020-08-04 DIAGNOSIS — Z00.00 PHYSICAL EXAM: ICD-10-CM

## 2020-08-04 DIAGNOSIS — D64.89 ANEMIA DUE TO OTHER CAUSE, NOT CLASSIFIED: ICD-10-CM

## 2020-08-04 DIAGNOSIS — M48.061 SPINAL STENOSIS OF LUMBAR REGION, UNSPECIFIED WHETHER NEUROGENIC CLAUDICATION PRESENT: ICD-10-CM

## 2020-08-04 DIAGNOSIS — Z23 ENCOUNTER FOR IMMUNIZATION: ICD-10-CM

## 2020-08-04 DIAGNOSIS — G89.29 CHRONIC PAIN OF BOTH SHOULDERS: Chronic | ICD-10-CM

## 2020-08-04 DIAGNOSIS — M25.511 CHRONIC PAIN OF BOTH SHOULDERS: Chronic | ICD-10-CM

## 2020-08-04 PROCEDURE — 84153 ASSAY OF PSA TOTAL: CPT

## 2020-08-04 PROCEDURE — G8536 NO DOC ELDER MAL SCRN: HCPCS | Performed by: FAMILY MEDICINE

## 2020-08-04 PROCEDURE — G8427 DOCREV CUR MEDS BY ELIG CLIN: HCPCS | Performed by: FAMILY MEDICINE

## 2020-08-04 PROCEDURE — 99214 OFFICE O/P EST MOD 30 MIN: CPT | Performed by: FAMILY MEDICINE

## 2020-08-04 PROCEDURE — 85027 COMPLETE CBC AUTOMATED: CPT

## 2020-08-04 PROCEDURE — 90471 IMMUNIZATION ADMIN: CPT

## 2020-08-04 PROCEDURE — 36415 COLL VENOUS BLD VENIPUNCTURE: CPT

## 2020-08-04 PROCEDURE — 1101F PT FALLS ASSESS-DOCD LE1/YR: CPT | Performed by: FAMILY MEDICINE

## 2020-08-04 PROCEDURE — 81001 URINALYSIS AUTO W/SCOPE: CPT

## 2020-08-04 PROCEDURE — 80053 COMPREHEN METABOLIC PANEL: CPT

## 2020-08-04 PROCEDURE — 3017F COLORECTAL CA SCREEN DOC REV: CPT | Performed by: FAMILY MEDICINE

## 2020-08-04 PROCEDURE — G0439 PPPS, SUBSEQ VISIT: HCPCS | Performed by: FAMILY MEDICINE

## 2020-08-04 PROCEDURE — G8510 SCR DEP NEG, NO PLAN REQD: HCPCS | Performed by: FAMILY MEDICINE

## 2020-08-04 PROCEDURE — G8419 CALC BMI OUT NRM PARAM NOF/U: HCPCS | Performed by: FAMILY MEDICINE

## 2020-08-04 PROCEDURE — 80061 LIPID PANEL: CPT

## 2020-08-04 NOTE — PROGRESS NOTES
Chief Complaint   Patient presents with    Complete Physical   1. Have you been to the ER, urgent care clinic since your last visit? Hospitalized since your last visit? No    2. Have you seen or consulted any other health care providers outside of the [address] [place] [address] since your last visit? Include any pap smears or colon screening.  No

## 2020-08-04 NOTE — PROGRESS NOTES
HISTORY OF PRESENT ILLNESS  HPI  Samantha Mai is a 75 y.o. Male with a history of cervical DDD and DJD, bilateral shoulder pain, vitamin D deficiency and hyperlipidemia with LDL goal <130, who presents to the office today for an annual wellness visit. Pt is fasting. Pt reports he had a Sx at [facility] on 08/22/2019 for his spinal stenosis. He was discharge on 08/23/2019. He says he has been able to return to his regular activity. Pt denies unusual SOB, chest pain, and any recent other ER visits or hospitalizations since 08/23/2019. Past Medical History:   Diagnosis Date    Allergic rhinitis 2/27/2010    Cancer ([facility] [address] 2004    SKIN, LIP (MOHS PROCEDURE)    DDD,DJD cervicalC5-6,6-7foraminal encroachment on X ray-4/2013 5/17/2013    Hypertension     BORDERLINE; NO MEDS    Obesity (BMI 30.0-34.9) 2/5/2018    Other and unspecified hyperlipidemia 2/27/2010     Past Surgical History:   Procedure Laterality Date    ENDOSCOPY, COLON, DIAGNOSTIC      X2    HX CATARACT REMOVAL Bilateral 87-88    CONGENITAL CATARACTS; W/ IOL    HX ORTHOPAEDIC  08/2019    l-s spine     HX TONSIL AND ADENOIDECTOMY  1960    HX VASECTOMY  1989    SINUS SURGERY PROC UNLISTED  2001     Current Outpatient Medications on File Prior to Visit   Medication Sig Dispense Refill    simvastatin (ZOCOR) 40 mg tablet TAKE 1 TABLET BY MOUTH EVERY DAY EVERY NIGHT 90 Tab 1    tamsulosin (FLOMAX) 0.4 mg capsule Take 1 Cap by mouth daily. Indications: enlarged prostate with urination problem 30 Cap 11    Cholecalciferol, Vitamin D3, (VITAMIN D3) 1,000 unit cap Take 2,000 Units by mouth every other day.  polyethylene glycol (MIRALAX) 17 gram packet Take 1 Packet by mouth daily as needed for Other (constipation). Indications: constipation 14 Packet 0    senna-docusate (PERICOLACE) 8.6-50 mg per tablet Take 1 Tab by mouth two (2) times a day.  Indications: constipation 60 Tab 0     No current facility-administered medications on file prior to visit. Allergies   Allergen Reactions    Oxycodone Rash     Patient had full body chills 20 minutes after taking 5 mg. Redness, stuffy nose, and felt warm.  Pcn [Penicillins] Rash     Family History   Problem Relation Age of Onset    Cancer Mother         ovarian    Cancer Father         prostate?  Elevated Lipids Father     No Known Problems Sister     Other Brother         factor 5      Social History     Socioeconomic History    Marital status:      Spouse name: Not on file    Number of children: Not on file    Years of education: Not on file    Highest education level: Not on file   Tobacco Use    Smoking status: Never Smoker    Smokeless tobacco: Never Used   Substance and Sexual Activity    Alcohol use: Yes     Comment: 12oz beer once monthly     Drug use: No    Sexual activity: Yes     Partners: Female             Review of Systems   Constitutional: Negative for chills, diaphoresis, fever, malaise/fatigue and weight loss. Eyes: Negative for blurred vision, double vision, pain and redness. Respiratory: Negative for cough, shortness of breath and wheezing. Cardiovascular: Negative for chest pain, palpitations, orthopnea, claudication, leg swelling and PND. Skin: Negative for itching and rash. Neurological: Negative for dizziness, tingling, tremors, sensory change, speech change, focal weakness, seizures, loss of consciousness, weakness and headaches.      Results for orders placed or performed during the hospital encounter of 37/03/08   METABOLIC PANEL, BASIC   Result Value Ref Range    Sodium 140 136 - 145 mmol/L    Potassium 4.0 3.5 - 5.1 mmol/L    Chloride 108 97 - 108 mmol/L    CO2 25 21 - 32 mmol/L    Anion gap 7 5 - 15 mmol/L    Glucose 118 (H) 65 - 100 mg/dL    BUN 13 6 - 20 MG/DL    Creatinine 1.20 0.70 - 1.30 MG/DL    BUN/Creatinine ratio 11 (L) 12 - 20      GFR est AA >60 >60 ml/min/1.73m2    GFR est non-AA >60 >60 ml/min/1.73m2    Calcium 8.2 (L) 8.5 - 10.1 MG/DL   HEMOGLOBIN   Result Value Ref Range    HGB 13.5 12.1 - 17.0 g/dL   TYPE & SCREEN   Result Value Ref Range    Crossmatch Expiration 08/25/2019     ABO/Rh(D) O POSITIVE     Antibody screen NEG              Physical Exam  Visit Vitals  /78   Pulse 72   Temp 98.7 °F (37.1 °C)   Resp 16   Ht 5' 10\" (1.778 m)   Wt 211 lb (95.7 kg)   SpO2 99%   BMI 30.28 kg/m²         General:  Alert, cooperative, no distress, appears stated age. Head:  Normocephalic, without obvious abnormality, atraumatic. Eyes:  Conjunctivae/corneas clear. PERRL, EOMs intact. Fundi benign   Ears:  Normal TMs and external ear canals both ears. Nose: Nares normal. Septum midline. Mucosa normal. No drainage or sinus tenderness. Throat: Lips, mucosa, and tongue normal. Teeth and gums normal.   Neck: Supple, symmetrical, trachea midline, no adenopathy, thyroid: no enlargement/tenderness/nodules, no carotid bruit and no JVD. Back:   Symmetric, no curvature. ROM normal. No CVA tenderness. Lungs:   Clear to auscultation bilaterally. Chest wall:  No tenderness or deformity. Heart:  Regular rate and rhythm, S1, S2 normal, no murmur, click, rub or gallop. Abdomen:   Soft, non-tender. Bowel sounds normal. No masses,  No organomegaly. Genitalia:  Normal male without lesion, discharge or tenderness. Rectal:  Normal tone, normal prostate, no masses or tenderness  Guaiac negative stool. Is traced to 1+ enlarged symmetrically   Extremities: Extremities normal, atraumatic, no cyanosis or edema. Pulses: 2+ and symmetric all extremities. Skin: Skin color, texture, turgor normal. No rashes or lesions   Lymph nodes: Cervical, supraclavicular, and axillary nodes normal.   Neurologic: CNII-XII intact. Normal strength, sensation and reflexes throughout. ASSESSMENT and PLAN    ICD-10-CM ICD-9-CM    1.  Hyperlipidemia LDL goal <130  E78.5 272.4 LIPID PANEL      METABOLIC PANEL, COMPREHENSIVE   2. Physical exam  Z00.00 V70.9 CBC W/O DIFF      URINALYSIS W/ RFLX MICROSCOPIC   3. Nocturia  R35.1 788.43 PSA W/ REFLX FREE PSA   4. Anemia, unspecified type  D64.9 285.9 CBC W/O DIFF     Diagnoses and all orders for this visit:    1. Hyperlipidemia LDL goal <130  -     LIPID PANEL  -     METABOLIC PANEL, COMPREHENSIVE    2. Physical exam  -     CBC W/O DIFF  -     URINALYSIS W/ RFLX MICROSCOPIC    3. Nocturia  -     PSA W/ REFLX FREE PSA    4. Anemia, unspecified type  -     CBC W/O DIFF          lab results and schedule of future lab studies reviewed with patient  reviewed diet, exercise and weight control  cardiovascular risk and specific lipid/LDL goals reviewed  reviewed medications and side effects in detail  Please call my office if there are any questions- [phone]. I will arrange for follow up after the lab tests done from today return  Recommended a weekly \"heart check. \" I went into detail how to do this. Call for refills on medications as needed. Discussed expected course/resolution/complications of diagnosis in detail with patient. Medication risks/benefits/costs/interactions/alternatives discussed with patient. Pt was given an after visit summary which includes diagnoses, current medications & vitals. Pt expressed understanding with the diagnosis and plan        BMI is significantly elevated- in the overweight range. I reviewed diet, exercise and weight control. Discussed weight control in detail, the importance of mainly decreased carbs, and for weight maintenance, exercise; discussed different diets and that it isn't as important to watch the type of foods as it is to decrease calorie intake no matter what type of diet you do, etc.     Total 25 minutes,60 % counseling re: Regular exercise is very important to your health; it helps mentally, physically, socially; it prevents injuries if done properly.   Exercise, even as simple as walking 20-30 minutes daily has major benefits to your health even though your \"numbers\" are the same in the lab. See if you can add this into your daily regimen and after a few months it will become a regular habit-\"just something you do,\" like brushing your teeth. A combination of aerobic exercise and strengthening and stretching is felt to be the best for you, so this should be your ultimate goal.   This can be done in the privacy of your home or in a group setting as at the gym  Some prefer having a , others prefer to do exercise in groups or individually. Do what \"works\" for you. You need to make it simple and \"fun,\" or you most likely will not continue it. Recommended a weekly \"heart check. \" I went into detail how to do this. Also, discussed symptoms of concern that were noted today in the note above, treatment options( including doing nothing), when to follow up before recommended time frame. Also, answered all questions. We gave him a Pneumovax 23 today. I encouraged him to continue to work on his weight loss. I reminded pt to do a weekly heart check.       This document was written by Rashi Alves, as dictated by Freda Higginbotham MD.

## 2020-08-05 PROBLEM — R35.1 NOCTURIA: Status: ACTIVE | Noted: 2020-08-05

## 2020-08-05 LAB
ALBUMIN SERPL-MCNC: 4.5 G/DL (ref 3.8–4.8)
ALBUMIN/GLOB SERPL: 2.3 {RATIO} (ref 1.2–2.2)
ALP SERPL-CCNC: 90 IU/L (ref 39–117)
ALT SERPL-CCNC: 21 IU/L (ref 0–44)
APPEARANCE UR: CLEAR
AST SERPL-CCNC: 21 IU/L (ref 0–40)
BILIRUB SERPL-MCNC: 0.5 MG/DL (ref 0–1.2)
BILIRUB UR QL STRIP: NEGATIVE
BUN SERPL-MCNC: 14 MG/DL (ref 8–27)
BUN/CREAT SERPL: 13 (ref 10–24)
CALCIUM SERPL-MCNC: 9.5 MG/DL (ref 8.6–10.2)
CHLORIDE SERPL-SCNC: 104 MMOL/L (ref 96–106)
CHOLEST SERPL-MCNC: 157 MG/DL (ref 100–199)
CO2 SERPL-SCNC: 23 MMOL/L (ref 20–29)
COLOR UR: YELLOW
CREAT SERPL-MCNC: 1.06 MG/DL (ref 0.76–1.27)
ERYTHROCYTE [DISTWIDTH] IN BLOOD BY AUTOMATED COUNT: 13 % (ref 11.6–15.4)
GLOBULIN SER CALC-MCNC: 2 G/DL (ref 1.5–4.5)
GLUCOSE SERPL-MCNC: 89 MG/DL (ref 65–99)
GLUCOSE UR QL: NEGATIVE
HCT VFR BLD AUTO: 49.1 % (ref 37.5–51)
HDLC SERPL-MCNC: 59 MG/DL
HGB BLD-MCNC: 16.2 G/DL (ref 13–17.7)
HGB UR QL STRIP: NEGATIVE
INTERPRETATION, 910389: NORMAL
KETONES UR QL STRIP: NEGATIVE
LDLC SERPL CALC-MCNC: 75 MG/DL (ref 0–99)
LEUKOCYTE ESTERASE UR QL STRIP: NEGATIVE
MCH RBC QN AUTO: 28.9 PG (ref 26.6–33)
MCHC RBC AUTO-ENTMCNC: 33 G/DL (ref 31.5–35.7)
MCV RBC AUTO: 88 FL (ref 79–97)
MICRO URNS: NORMAL
NITRITE UR QL STRIP: NEGATIVE
PH UR STRIP: 5.5 [PH] (ref 5–7.5)
PLATELET # BLD AUTO: 240 X10E3/UL (ref 150–450)
POTASSIUM SERPL-SCNC: 4.6 MMOL/L (ref 3.5–5.2)
PROT SERPL-MCNC: 6.5 G/DL (ref 6–8.5)
PROT UR QL STRIP: NEGATIVE
PSA SERPL-MCNC: 3 NG/ML (ref 0–4)
RBC # BLD AUTO: 5.61 X10E6/UL (ref 4.14–5.8)
REFLEX CRITERIA: NORMAL
SODIUM SERPL-SCNC: 139 MMOL/L (ref 134–144)
SP GR UR: 1.02 (ref 1–1.03)
TRIGL SERPL-MCNC: 114 MG/DL (ref 0–149)
UROBILINOGEN UR STRIP-MCNC: 0.2 MG/DL (ref 0.2–1)
VLDLC SERPL CALC-MCNC: 23 MG/DL (ref 5–40)
WBC # BLD AUTO: 5.3 X10E3/UL (ref 3.4–10.8)

## 2020-08-05 NOTE — PATIENT INSTRUCTIONS
Medicare Wellness Visit, Male The best way to live healthy is to have a lifestyle where you eat a well-balanced diet, exercise regularly, limit alcohol use, and quit all forms of tobacco/nicotine, if applicable. Regular preventive services are another way to keep healthy. Preventive services (vaccines, screening tests, monitoring & exams) can help personalize your care plan, which helps you manage your own care. Screening tests can find health problems at the earliest stages, when they are easiest to treat. [address] follows the current, evidence-based guidelines published by the [facility] ([facility]) when recommending preventive services for our patients. Because we follow these guidelines, sometimes recommendations change over time as research supports it. (For example, a prostate screening blood test is no longer routinely recommended for men with no symptoms.) Of course, you and your doctor may decide to screen more often for some diseases, based on your risk and co-morbidities (chronic disease you are already diagnosed with). Preventive services for you include: - Medicare offers their members a free annual wellness visit, which is time for you and your primary care provider to discuss and plan for your preventive service needs. Take advantage of this benefit every year! 
-All adults over age 72 should receive the recommended pneumonia vaccines. Current USPSTF guidelines recommend a series of two vaccines for the best pneumonia protection.  
-All adults should have a flu vaccine yearly and an ECG.  All adults age 48 and older should receive a shingles vaccine once in their lifetime.   
-All adults age 38-68 who are overweight should have a diabetes screening test once every three years.  
-Other screening tests & preventive services for persons with diabetes include: an eye exam to screen for diabetic retinopathy, a kidney function test, a foot exam, and stricter control over your cholesterol.  
-Cardiovascular screening for adults with routine risk involves an electrocardiogram (ECG) at intervals determined by the provider.  
-Colorectal cancer screening should be done for adults age 54-65 with no increased risk factors for colorectal cancer. There are a number of acceptable methods of screening for this type of cancer. Each test has its own benefits and drawbacks. Discuss with your provider what is most appropriate for you during your annual wellness visit. The different tests include: colonoscopy (considered the best screening method), a fecal occult blood test, a fecal DNA test, and sigmoidoscopy. 
-All adults born between [facility] should be screened once for Hepatitis C. 
-An Abdominal Aortic Aneurysm (AAA) Screening is recommended for men age 73-68 who has ever smoked in their lifetime. Here is a list of your current Health Maintenance items (your personalized list of preventive services) with a due date: 
Health Maintenance Due Topic Date Due  Shingles Vaccine (1 of 2) 11/09/2003  Glaucoma Screening   11/09/2018 [address] [place] [address] Annual Well Visit  02/11/2019  Flu Vaccine  08/01/2020  Cholesterol Test   07/31/2020 Medicare Wellness Visit, Male The best way to live healthy is to have a lifestyle where you eat a well-balanced diet, exercise regularly, limit alcohol use, and quit all forms of tobacco/nicotine, if applicable. Regular preventive services are another way to keep healthy. Preventive services (vaccines, screening tests, monitoring & exams) can help personalize your care plan, which helps you manage your own care. Screening tests can find health problems at the earliest stages, when they are easiest to treat.   
[facility] follows the current, evidence-based guidelines published by the [employer] (USPSTF) when recommending preventive services for our patients. Because we follow these guidelines, sometimes recommendations change over time as research supports it. (For example, a prostate screening blood test is no longer routinely recommended for men with no symptoms). Of course, you and your doctor may decide to screen more often for some diseases, based on your risk and co-morbidities (chronic disease you are already diagnosed with). Preventive services for you include: - Medicare offers their members a free annual wellness visit, which is time for you and your primary care provider to discuss and plan for your preventive service needs. Take advantage of this benefit every year! 
-All adults over age 72 should receive the recommended pneumonia vaccines. Current USPSTF guidelines recommend a series of two vaccines for the best pneumonia protection.  
-All adults should have a flu vaccine yearly and tetanus vaccine every 10 years. 
-All adults age 48 and older should receive the shingles vaccines (series of two vaccines). -All adults age 38-68 who are overweight should have a diabetes screening test once every three years.  
-Other screening tests & preventive services for persons with diabetes include: an eye exam to screen for diabetic retinopathy, a kidney function test, a foot exam, and stricter control over your cholesterol.  
-Cardiovascular screening for adults with routine risk involves an electrocardiogram (ECG) at intervals determined by the provider.  
-Colorectal cancer screening should be done for adults age 54-65 with no increased risk factors for colorectal cancer. There are a number of acceptable methods of screening for this type of cancer. Each test has its own benefits and drawbacks. Discuss with your provider what is most appropriate for you during your annual wellness visit.  The different tests include: colonoscopy (considered the best screening method), a fecal occult blood test, a fecal DNA test, and sigmoidoscopy. 
-All adults born between [facility] should be screened once for Hepatitis C. 
-An Abdominal Aortic Aneurysm (AAA) Screening is recommended for men age 73-68 who has ever smoked in their lifetime. Here is a list of your current Health Maintenance items (your personalized list of preventive services) with a due date: 
Health Maintenance Due Topic Date Due  Shingles Vaccine (1 of 2) 11/09/2003  Glaucoma Screening   11/09/2018 [facility] Last Annual Well Visit  02/11/2019  Flu Vaccine  08/01/2020  Cholesterol Test   07/31/2020

## 2020-08-05 NOTE — PROGRESS NOTES
This is the Subsequent Medicare Annual Wellness Exam, performed 12 months or more after the Initial AWV or the last Subsequent AWV    I have reviewed the patient's medical history in detail and updated the computerized patient record. History     Patient Active Problem List   Diagnosis Code    Vitamin D deficiency E55.9    DDD,DJD cervicalC5-6,6-7foraminal encroachment on X ray-4/2013 M50.30    Hyperlipidemia LDL goal <130 E78.5    Chronic pain of both shoulders- worse in AM and loosens up with activity. M25.511, G89.29, M25.512    Seasonal allergic rhinitis due to pollen J30.1    Obesity (BMI 30.0-34. 9) E66.9    Spinal stenosis M48.00    Nocturia R35.1     Past Medical History:   Diagnosis Date    Allergic rhinitis 2/27/2010    Cancer ([facility] [address] 2004    SKIN, LIP (MOHS PROCEDURE)    DDD,DJD cervicalC5-6,6-7foraminal encroachment on X ray-4/2013 5/17/2013    Hypertension     BORDERLINE; NO MEDS    Obesity (BMI 30.0-34.9) 2/5/2018    Other and unspecified hyperlipidemia 2/27/2010      Past Surgical History:   Procedure Laterality Date    ENDOSCOPY, COLON, DIAGNOSTIC      X2    HX CATARACT REMOVAL Bilateral 87-88    CONGENITAL CATARACTS; W/ IOL    HX ORTHOPAEDIC  08/2019    l-s spine     HX TONSIL AND ADENOIDECTOMY  1960    HX VASECTOMY  1989    SINUS SURGERY PROC UNLISTED  2001     Current Outpatient Medications   Medication Sig Dispense Refill    simvastatin (ZOCOR) 40 mg tablet TAKE 1 TABLET BY MOUTH EVERY DAY EVERY NIGHT 90 Tab 1    tamsulosin (FLOMAX) 0.4 mg capsule Take 1 Cap by mouth daily. Indications: enlarged prostate with urination problem 30 Cap 11    Cholecalciferol, Vitamin D3, (VITAMIN D3) 1,000 unit cap Take 2,000 Units by mouth every other day.  polyethylene glycol (MIRALAX) 17 gram packet Take 1 Packet by mouth daily as needed for Other (constipation).  Indications: constipation 14 Packet 0    senna-docusate (PERICOLACE) 8.6-50 mg per tablet Take 1 Tab by mouth two (2) times a day. Indications: constipation 60 Tab 0     Allergies   Allergen Reactions    Oxycodone Rash     Patient had full body chills 20 minutes after taking 5 mg. Redness, stuffy nose, and felt warm.  Pcn [Penicillins] Rash       Family History   Problem Relation Age of Onset    Cancer Mother         ovarian    Cancer Father         prostate?  Elevated Lipids Father     No Known Problems Sister     Other Brother         factor 5      Social History     Tobacco Use    Smoking status: Never Smoker    Smokeless tobacco: Never Used   Substance Use Topics    Alcohol use: Yes     Comment: 12oz beer once monthly        Depression Risk Factor Screening:     3 most recent PHQ Screens 8/4/2020   Little interest or pleasure in doing things Not at all   Feeling down, depressed, irritable, or hopeless Not at all   Total Score PHQ 2 0       Alcohol Risk Factor Screening (MALE > 65): Do you average more 1 drink per night or more than 7 drinks a week: No    In the past three months have you have had more than 4 drinks containing alcohol on one occasion: No      Functional Ability and Level of Safety:   Hearing: Hearing is good. Activities of Daily Living: The home contains: no safety equipment. Patient does total self care     Ambulation: with no difficulty     Fall Risk:  Fall Risk Assessment, last 12 mths 8/4/2020   Able to walk? Yes   Fall in past 12 months?  No     Abuse Screen:  Patient is not abused       Cognitive Screening   Has your family/caregiver stated any concerns about your memory: no     Cognitive Screening: Normal - Mini Cog Test    Patient Care Team   Patient Care Team:  Sierra Romero MD as PCP - General  Sierra Romero MD [facility]  Elio Pichardo MD as Physician (Orthopedic Surgery)  Artur Mancia RN as Ambulatory Care Manager    Assessment/Plan   Education and counseling provided:  Are appropriate based on today's review and evaluation    Diagnoses and all orders for this visit:    1. Hyperlipidemia LDL goal <130  -     LIPID PANEL  -     METABOLIC PANEL, COMPREHENSIVE    2. Physical exam  -     CBC W/O DIFF  -     URINALYSIS W/ RFLX MICROSCOPIC    3. Nocturia  -     PSA W/ REFLX FREE PSA    4. Anemia, unspecified type  -     CBC W/O DIFF    5. Encounter for immunization  -     PNEUMOCOCCAL POLYSACCHARIDE VACCINE, 23-VALENT, ADULT OR IMMUNOSUPPRESSED PT DOSE,    6. Chronic pain of both shoulders- worse in AM and loosens up with activity. 7. DDD,DJD cervicalC5-6,6-7foraminal encroachment on X ray-4/2013    8. Obesity (BMI 30.0-34.9)    9. Seasonal allergic rhinitis due to pollen    10. Spinal stenosis of lumbar region, unspecified whether neurogenic claudication present    11. Vitamin D deficiency    12. Medicare annual wellness visit, subsequent    13.  Anemia due to other cause, not classified  Comments:  post op from back Sx 8 /2019        Health Maintenance Due   Topic Date Due    Shingrix Vaccine Age 50> (1 of 2) 11/09/2003    GLAUCOMA SCREENING Q2Y  11/09/2018    Medicare Yearly Exam  02/11/2019    Influenza Age 9 to Adult  08/01/2020    Lipid Screen  07/31/2020

## 2020-09-13 ENCOUNTER — HOSPITAL ENCOUNTER (EMERGENCY)
Age: 65
Discharge: HOME | End: 2020-09-13
Attending: EMERGENCY MEDICINE
Payer: MEDICARE

## 2020-09-13 ENCOUNTER — APPOINTMENT (OUTPATIENT)
Dept: CT IMAGING | Age: 65
End: 2020-09-13
Attending: EMERGENCY MEDICINE
Payer: MEDICARE

## 2020-09-13 VITALS
HEIGHT: 70 IN | BODY MASS INDEX: 30.06 KG/M2 | HEART RATE: 58 BPM | WEIGHT: 210 LBS | DIASTOLIC BLOOD PRESSURE: 88 MMHG | OXYGEN SATURATION: 100 % | SYSTOLIC BLOOD PRESSURE: 167 MMHG | RESPIRATION RATE: 13 BRPM

## 2020-09-13 DIAGNOSIS — R42 DIZZINESS: Primary | ICD-10-CM

## 2020-09-13 LAB
ALBUMIN SERPL-MCNC: 3.7 G/DL (ref 3.5–5)
ALBUMIN/GLOB SERPL: 1.2 {RATIO} (ref 1.1–2.2)
ALP SERPL-CCNC: 87 U/L (ref 45–117)
ALT SERPL-CCNC: 28 U/L (ref 12–78)
ANION GAP SERPL CALC-SCNC: 2 MMOL/L (ref 5–15)
AST SERPL-CCNC: 17 U/L (ref 15–37)
ATRIAL RATE: 64 BPM
BASOPHILS # BLD: 0.1 K/UL (ref 0–0.1)
BASOPHILS NFR BLD: 1 % (ref 0–1)
BILIRUB SERPL-MCNC: 0.3 MG/DL (ref 0.2–1)
BUN SERPL-MCNC: 13 MG/DL (ref 6–20)
BUN/CREAT SERPL: 11 (ref 12–20)
CALCIUM SERPL-MCNC: 9.1 MG/DL (ref 8.5–10.1)
CALCULATED P AXIS, ECG09: 50 DEGREES
CALCULATED R AXIS, ECG10: 1 DEGREES
CALCULATED T AXIS, ECG11: 29 DEGREES
CHLORIDE SERPL-SCNC: 111 MMOL/L (ref 97–108)
CO2 SERPL-SCNC: 26 MMOL/L (ref 21–32)
COMMENT, HOLDF: NORMAL
CREAT SERPL-MCNC: 1.17 MG/DL (ref 0.7–1.3)
DIAGNOSIS, 93000: NORMAL
DIFFERENTIAL METHOD BLD: ABNORMAL
EOSINOPHIL # BLD: 0.1 K/UL (ref 0–0.4)
EOSINOPHIL NFR BLD: 1 % (ref 0–7)
ERYTHROCYTE [DISTWIDTH] IN BLOOD BY AUTOMATED COUNT: 12.9 % (ref 11.5–14.5)
GLOBULIN SER CALC-MCNC: 3.2 G/DL (ref 2–4)
GLUCOSE SERPL-MCNC: 108 MG/DL (ref 65–100)
HCT VFR BLD AUTO: 48.1 % (ref 36.6–50.3)
HGB BLD-MCNC: 16.1 G/DL (ref 12.1–17)
IMM GRANULOCYTES # BLD AUTO: 0.1 K/UL (ref 0–0.04)
IMM GRANULOCYTES NFR BLD AUTO: 1 % (ref 0–0.5)
LYMPHOCYTES # BLD: 0.8 K/UL (ref 0.8–3.5)
LYMPHOCYTES NFR BLD: 13 % (ref 12–49)
MCH RBC QN AUTO: 29.4 PG (ref 26–34)
MCHC RBC AUTO-ENTMCNC: 33.5 G/DL (ref 30–36.5)
MCV RBC AUTO: 87.9 FL (ref 80–99)
MONOCYTES # BLD: 0.3 K/UL (ref 0–1)
MONOCYTES NFR BLD: 5 % (ref 5–13)
NEUTS SEG # BLD: 4.6 K/UL (ref 1.8–8)
NEUTS SEG NFR BLD: 79 % (ref 32–75)
NRBC # BLD: 0 K/UL (ref 0–0.01)
NRBC BLD-RTO: 0 PER 100 WBC
P-R INTERVAL, ECG05: 192 MS
PLATELET # BLD AUTO: 225 K/UL (ref 150–400)
PMV BLD AUTO: 9.6 FL (ref 8.9–12.9)
POTASSIUM SERPL-SCNC: 4.8 MMOL/L (ref 3.5–5.1)
PROT SERPL-MCNC: 6.9 G/DL (ref 6.4–8.2)
Q-T INTERVAL, ECG07: 420 MS
QRS DURATION, ECG06: 98 MS
QTC CALCULATION (BEZET), ECG08: 433 MS
RBC # BLD AUTO: 5.47 M/UL (ref 4.1–5.7)
RBC MORPH BLD: ABNORMAL
SAMPLES BEING HELD,HOLD: NORMAL
SODIUM SERPL-SCNC: 139 MMOL/L (ref 136–145)
VENTRICULAR RATE, ECG03: 64 BPM
WBC # BLD AUTO: 6 K/UL (ref 4.1–11.1)

## 2020-09-13 PROCEDURE — 74011250636 HC RX REV CODE- 250/636: Performed by: EMERGENCY MEDICINE

## 2020-09-13 PROCEDURE — 99285 EMERGENCY DEPT VISIT HI MDM: CPT

## 2020-09-13 PROCEDURE — 80053 COMPREHEN METABOLIC PANEL: CPT

## 2020-09-13 PROCEDURE — 36415 COLL VENOUS BLD VENIPUNCTURE: CPT

## 2020-09-13 PROCEDURE — 85025 COMPLETE CBC W/AUTO DIFF WBC: CPT

## 2020-09-13 PROCEDURE — 70450 CT HEAD/BRAIN W/O DYE: CPT

## 2020-09-13 PROCEDURE — 93005 ELECTROCARDIOGRAM TRACING: CPT

## 2020-09-13 RX ORDER — MECLIZINE HYDROCHLORIDE 25 MG/1
25 TABLET ORAL
Qty: 30 TAB | Refills: 0 | Status: SHIPPED | OUTPATIENT
Start: 2020-09-13 | End: 2020-09-23

## 2020-09-13 RX ORDER — MECLIZINE HCL 12.5 MG 12.5 MG/1
25 TABLET ORAL
Status: COMPLETED | OUTPATIENT
Start: 2020-09-13 | End: 2020-09-13

## 2020-09-13 RX ADMIN — MECLIZINE 25 MG: 12.5 TABLET ORAL at 11:42

## 2020-09-13 NOTE — ED TRIAGE NOTES
Pt states that yesterday he had slight dizziness and today it was worse which is worse with movement. Pt states that he has a history of wax build up.

## 2020-09-13 NOTE — DISCHARGE INSTRUCTIONS

## 2020-09-13 NOTE — ED PROVIDER NOTES
HPI the patient complains of dizziness/vertigo since yesterday. It has worsened this morning and he especially notices it when he moves his head. He also complains of left ear fullness and thinks there is a cerumen impaction in his left ear. This morning he also has had nausea but no vomiting. He is used eardrops to try and remove this unsuccessfully. He denies headache, recent illness, fever, syncope, focal weakness or slurred speech. Past Medical History:   Diagnosis Date    Allergic rhinitis 2/27/2010    Cancer ([facility] [address] 2004    SKIN, LIP (MOHS PROCEDURE)    DDD,DJD cervicalC5-6,6-7foraminal encroachment on X ray-4/2013 5/17/2013    Hypertension     BORDERLINE; NO MEDS    Obesity (BMI 30.0-34.9) 2/5/2018    Other and unspecified hyperlipidemia 2/27/2010       Past Surgical History:   Procedure Laterality Date    ENDOSCOPY, COLON, DIAGNOSTIC      X2    HX CATARACT REMOVAL Bilateral 87-88    CONGENITAL CATARACTS; W/ IOL    HX ORTHOPAEDIC  08/2019    l-s spine     HX TONSIL AND ADENOIDECTOMY  1960    HX VASECTOMY  1989    SINUS SURGERY [address] UNLISTED  2001         Family History:   Problem Relation Age of Onset    Cancer Mother         ovarian    Cancer Father         prostate?     Elevated Lipids Father     No Known Problems Sister     Other Brother         factor 5        Social History     Socioeconomic History    Marital status:      Spouse name: Not on file    Number of children: Not on file    Years of education: Not on file    Highest education level: Not on file   Occupational History    Not on file   Social Needs    Financial resource strain: Not on file    Food insecurity     Worry: Not on file     Inability: Not on file   [employer] needs     Medical: Not on file     Non-medical: Not on file   Tobacco Use    Smoking status: Never Smoker    Smokeless tobacco: Never Used   Substance and Sexual Activity    Alcohol use: Yes     Comment: 12oz beer once monthly     Drug use: No    Sexual activity: Yes     Partners: Female   Lifestyle    Physical activity     Days per week: Not on file     Minutes per session: Not on file    Stress: Not on file   Relationships    Social connections     Talks on phone: Not on file     Gets together: Not on file     Attends Sikhism service: Not on file     Active member of club or organization: Not on file     Attends meetings of clubs or organizations: Not on file     Relationship status: Not on file    Intimate partner violence     Fear of current or ex partner: Not on file     Emotionally abused: Not on file     Physically abused: Not on file     Forced sexual activity: Not on file   Other Topics Concern    Not on file   Social History Narrative    Not on file         ALLERGIES: Oxycodone and Pcn [penicillins]    Review of Systems   Constitutional: Negative for fever. HENT: Positive for ear pain and tinnitus. Negative for voice change. Eyes: Negative for visual disturbance. Respiratory: Negative for cough and shortness of breath. Cardiovascular: Negative for chest pain. Gastrointestinal: Negative for abdominal pain, nausea and vomiting. Genitourinary: Negative for flank pain. Musculoskeletal: Negative for back pain. Skin: Negative for rash. Neurological: Positive for dizziness. Negative for headaches. Psychiatric/Behavioral: Negative for confusion. Vitals:    09/13/20 0931   BP: (!) 181/83   Pulse: (!) 58   Resp: 11   SpO2: 99%   Weight: 95.3 kg (210 lb)   Height: 5' 10\" (1.778 m)            Physical Exam  Constitutional:       General: He is not in acute distress. Appearance: He is well-developed. HENT:      Head: Normocephalic. Ears:      Comments: The left TM is impacted with cerumen  Eyes:      Extraocular Movements: Extraocular movements intact. Pupils: Pupils are equal, round, and reactive to light. Comments: No nystagmus   Neck:      Musculoskeletal: Normal range of motion. Cardiovascular:      Rate and Rhythm: Normal rate. Heart sounds: No murmur. Pulmonary:      Effort: Pulmonary effort is normal.      Breath sounds: Normal breath sounds. Abdominal:      Palpations: Abdomen is soft. Tenderness: There is no abdominal tenderness. Musculoskeletal: Normal range of motion. Skin:     General: Skin is warm and dry. Capillary Refill: Capillary refill takes less than 2 seconds. Neurological:      General: No focal deficit present. Mental Status: He is alert and oriented to person, place, and time. Cranial Nerves: No cranial nerve deficit. Psychiatric:         Behavior: Behavior normal.          MDM       Procedures ED EKG interpretation:  Rhythm: NSR, rate 64. No acute ST changes. This EKG was interpreted by Miguel Ángel Schmidt MD,ED Provider.

## 2020-09-13 NOTE — ED NOTES
Pt's ears irrigated with wax removal noted in both ears. Pt states that he continues to feel dizzy. Dr Adrien Horton made aware.

## 2020-09-15 ENCOUNTER — OFFICE VISIT (OUTPATIENT)
Dept: FAMILY MEDICINE CLINIC | Age: 65
End: 2020-09-15
Payer: MEDICARE

## 2020-09-15 VITALS
SYSTOLIC BLOOD PRESSURE: 160 MMHG | OXYGEN SATURATION: 99 % | RESPIRATION RATE: 16 BRPM | TEMPERATURE: 98.5 F | BODY MASS INDEX: 30.06 KG/M2 | HEART RATE: 77 BPM | DIASTOLIC BLOOD PRESSURE: 82 MMHG | WEIGHT: 210 LBS | HEIGHT: 70 IN

## 2020-09-15 DIAGNOSIS — M25.511 CHRONIC PAIN OF BOTH SHOULDERS: Chronic | ICD-10-CM

## 2020-09-15 DIAGNOSIS — E55.9 VITAMIN D DEFICIENCY: ICD-10-CM

## 2020-09-15 DIAGNOSIS — G89.29 CHRONIC PAIN OF BOTH SHOULDERS: Chronic | ICD-10-CM

## 2020-09-15 DIAGNOSIS — J30.1 SEASONAL ALLERGIC RHINITIS DUE TO POLLEN: ICD-10-CM

## 2020-09-15 DIAGNOSIS — M48.061 SPINAL STENOSIS OF LUMBAR REGION, UNSPECIFIED WHETHER NEUROGENIC CLAUDICATION PRESENT: ICD-10-CM

## 2020-09-15 DIAGNOSIS — E66.9 OBESITY (BMI 30.0-34.9): ICD-10-CM

## 2020-09-15 DIAGNOSIS — M50.30 DDD (DEGENERATIVE DISC DISEASE), CERVICAL: ICD-10-CM

## 2020-09-15 DIAGNOSIS — H81.13 BENIGN PAROXYSMAL POSITIONAL VERTIGO DUE TO BILATERAL VESTIBULAR DISORDER: Primary | ICD-10-CM

## 2020-09-15 DIAGNOSIS — E78.5 HYPERLIPIDEMIA LDL GOAL <130: ICD-10-CM

## 2020-09-15 DIAGNOSIS — M25.512 CHRONIC PAIN OF BOTH SHOULDERS: Chronic | ICD-10-CM

## 2020-09-15 PROCEDURE — 99214 OFFICE O/P EST MOD 30 MIN: CPT | Performed by: FAMILY MEDICINE

## 2020-09-15 PROCEDURE — G8536 NO DOC ELDER MAL SCRN: HCPCS | Performed by: FAMILY MEDICINE

## 2020-09-15 PROCEDURE — G8427 DOCREV CUR MEDS BY ELIG CLIN: HCPCS | Performed by: FAMILY MEDICINE

## 2020-09-15 PROCEDURE — G8510 SCR DEP NEG, NO PLAN REQD: HCPCS | Performed by: FAMILY MEDICINE

## 2020-09-15 PROCEDURE — 1101F PT FALLS ASSESS-DOCD LE1/YR: CPT | Performed by: FAMILY MEDICINE

## 2020-09-15 PROCEDURE — G0463 HOSPITAL OUTPT CLINIC VISIT: HCPCS | Performed by: FAMILY MEDICINE

## 2020-09-15 PROCEDURE — G0444 DEPRESSION SCREEN ANNUAL: HCPCS | Performed by: FAMILY MEDICINE

## 2020-09-15 PROCEDURE — G8419 CALC BMI OUT NRM PARAM NOF/U: HCPCS | Performed by: FAMILY MEDICINE

## 2020-09-15 PROCEDURE — 3017F COLORECTAL CA SCREEN DOC REV: CPT | Performed by: FAMILY MEDICINE

## 2020-09-15 NOTE — PROGRESS NOTES
HISTORY OF PRESENT ILLNESS  HPI  Tarun Miller is a 75 y.o. Male with a history of cervical DDD and DJD, bilateral shoulder pain, vitamin D deficiency and hyperlipidemia with LDL goal <130, who presents to the office today with wife for a f/u for his dizziness. Pt reports that he went to [facility] ER on 09/13/2020 for dizziness. Pt recalls he woke up dizzy that day. He notes that as he got up his dizziness worsened. pt endorses some associative nausea and says that laying down made no difference. He believes he had some wax build up in his ear as he has a hx of that. Pt states he had a BP of 160/110 that day while it usually runs around 138/80. Pt confirms that he still has some dizziness, but does not have any nausea. Pt denies unusual SOB, chest pain, and any other ER visits or hospitalizations since 09/13/2020. Past Medical History:   Diagnosis Date    Allergic rhinitis 2/27/2010    Cancer ([address] 2004    SKIN, LIP (MOHS PROCEDURE)    DDD,DJD cervicalC5-6,6-7foraminal encroachment on X ray-4/2013 5/17/2013    Hypertension     BORDERLINE; NO MEDS    Obesity (BMI 30.0-34.9) 2/5/2018    Other and unspecified hyperlipidemia 2/27/2010     Past Surgical History:   Procedure Laterality Date    ENDOSCOPY, COLON, DIAGNOSTIC      X2    HX CATARACT REMOVAL Bilateral 87-88    CONGENITAL CATARACTS; W/ IOL    HX ORTHOPAEDIC  08/2019    l-s spine     HX TONSIL AND ADENOIDECTOMY  1960    HX VASECTOMY  1989    SINUS SURGERY PROC UNLISTED  2001     Current Outpatient Medications on File Prior to Visit   Medication Sig Dispense Refill    meclizine (ANTIVERT) 25 mg tablet Take 1 Tab by mouth three (3) times daily as needed for Dizziness for up to 10 days. 30 Tab 0    simvastatin (ZOCOR) 40 mg tablet TAKE 1 TABLET BY MOUTH EVERY DAY EVERY NIGHT 90 Tab 1    tamsulosin (FLOMAX) 0.4 mg capsule Take 1 Cap by mouth daily.  Indications: enlarged prostate with urination problem 30 Cap 11    Cholecalciferol, Vitamin D3, (VITAMIN D3) 1,000 unit cap Take 2,000 Units by mouth every other day.  [DISCONTINUED] polyethylene glycol (MIRALAX) 17 gram packet Take 1 Packet by mouth daily as needed for Other (constipation). Indications: constipation 14 Packet 0    [DISCONTINUED] senna-docusate (PERICOLACE) 8.6-50 mg per tablet Take 1 Tab by mouth two (2) times a day. Indications: constipation 60 Tab 0     No current facility-administered medications on file prior to visit. Allergies   Allergen Reactions    Oxycodone Rash     Patient had full body chills 20 minutes after taking 5 mg. Redness, stuffy nose, and felt warm.  Pcn [Penicillins] Rash     Family History   Problem Relation Age of Onset    Cancer Mother         ovarian    Cancer Father         prostate?  Elevated Lipids Father     No Known Problems Sister     Other Brother         factor 5      Social History     Socioeconomic History    Marital status:      Spouse name: Not on file    Number of children: Not on file    Years of education: Not on file    Highest education level: Not on file   Tobacco Use    Smoking status: Never Smoker    Smokeless tobacco: Never Used   Substance and Sexual Activity    Alcohol use: Yes     Comment: 12oz beer once monthly     Drug use: No    Sexual activity: Yes     Partners: Female             Review of Systems   Constitutional: Negative for chills, diaphoresis, fever, malaise/fatigue and weight loss. Eyes: Negative for blurred vision, double vision, pain and redness. Respiratory: Negative for cough, shortness of breath and wheezing. Cardiovascular: Negative for chest pain, palpitations, orthopnea, claudication, leg swelling and PND. Skin: Negative for itching and rash. Neurological: Negative for dizziness, tingling, tremors, sensory change, speech change, focal weakness, seizures, loss of consciousness, weakness and headaches.      Results for orders placed or performed during the hospital encounter of 09/13/20   SAMPLES BEING HELD   Result Value Ref Range    SAMPLES BEING HELD 1BLU,1RED     COMMENT        Add-on orders for these samples will be processed based on acceptable specimen integrity and analyte stability, which may vary by analyte. CBC WITH AUTOMATED DIFF   Result Value Ref Range    WBC 6.0 4.1 - 11.1 K/uL    RBC 5.47 4.10 - 5.70 M/uL    HGB 16.1 12.1 - 17.0 g/dL    HCT 48.1 36.6 - 50.3 %    MCV 87.9 80.0 - 99.0 FL    MCH 29.4 26.0 - 34.0 PG    MCHC 33.5 30.0 - 36.5 g/dL    RDW 12.9 11.5 - 14.5 %    PLATELET 778 169 - 792 K/uL    MPV 9.6 8.9 - 12.9 FL    NRBC 0.0 0  WBC    ABSOLUTE NRBC 0.00 0.00 - 0.01 K/uL    NEUTROPHILS 79 (H) 32 - 75 %    LYMPHOCYTES 13 12 - 49 %    MONOCYTES 5 5 - 13 %    EOSINOPHILS 1 0 - 7 %    BASOPHILS 1 0 - 1 %    IMMATURE GRANULOCYTES 1 (H) 0.0 - 0.5 %    ABS. NEUTROPHILS 4.6 1.8 - 8.0 K/UL    ABS. LYMPHOCYTES 0.8 0.8 - 3.5 K/UL    ABS. MONOCYTES 0.3 0.0 - 1.0 K/UL    ABS. EOSINOPHILS 0.1 0.0 - 0.4 K/UL    ABS. BASOPHILS 0.1 0.0 - 0.1 K/UL    ABS. IMM. GRANS. 0.1 (H) 0.00 - 0.04 K/UL    DF SMEAR SCANNED      RBC COMMENTS NORMOCYTIC, NORMOCHROMIC     METABOLIC PANEL, COMPREHENSIVE   Result Value Ref Range    Sodium 139 136 - 145 mmol/L    Potassium 4.8 3.5 - 5.1 mmol/L    Chloride 111 (H) 97 - 108 mmol/L    CO2 26 21 - 32 mmol/L    Anion gap 2 (L) 5 - 15 mmol/L    Glucose 108 (H) 65 - 100 mg/dL    BUN 13 6 - 20 MG/DL    Creatinine 1.17 0.70 - 1.30 MG/DL    BUN/Creatinine ratio 11 (L) 12 - 20      GFR est AA >60 >60 ml/min/1.73m2    GFR est non-AA >60 >60 ml/min/1.73m2    Calcium 9.1 8.5 - 10.1 MG/DL    Bilirubin, total 0.3 0.2 - 1.0 MG/DL    ALT (SGPT) 28 12 - 78 U/L    AST (SGOT) 17 15 - 37 U/L    Alk.  phosphatase 87 45 - 117 U/L    Protein, total 6.9 6.4 - 8.2 g/dL    Albumin 3.7 3.5 - 5.0 g/dL    Globulin 3.2 2.0 - 4.0 g/dL    A-G Ratio 1.2 1.1 - 2.2     EKG, 12 LEAD, INITIAL   Result Value Ref Range    Ventricular Rate 64 BPM    Atrial Rate 64 BPM P-R Interval 192 ms    QRS Duration 98 ms    Q-T Interval 420 ms    QTC Calculation (Bezet) 433 ms    Calculated P Axis 50 degrees    Calculated R Axis 1 degrees    Calculated T Axis 29 degrees    Diagnosis       Normal sinus rhythm Poor R-wave Progression (consider lead placement or loss   of anterior forces)  When compared with ECG of 21-AUG-2001 15:47,  Questionable change in QRS axis  Confirmed by Edmar Feliz M.D., Elayne Doyle ([id number]) on 9/13/2020 1:35:40 PM               Physical Exam  Visit Vitals  BP (!) 160/82   Pulse 77   Temp 98.5 °F (36.9 °C)   Resp 16   Ht 5' 10\" (1.778 m)   Wt 210 lb (95.3 kg)   SpO2 99%   BMI 30.13 kg/m²       Exam is deferred        ASSESSMENT and PLAN    ICD-10-CM ICD-9-CM    1. Benign paroxysmal positional vertigo due to bilateral vestibular disorder  H81.13 386.11    2. Vitamin D deficiency  E55.9 268.9    3. Spinal stenosis of lumbar region, unspecified whether neurogenic claudication present  M48.061 724.02    4. Seasonal allergic rhinitis due to pollen  J30.1 477.0    5. Obesity (BMI 30.0-34. 9)  E66.9 278.00    6. Hyperlipidemia LDL goal <130  E78.5 272.4    7. DDD,DJD cervicalC5-6,6-7foraminal encroachment on X ray-4/2013  M50.30 722.4    8. Chronic pain of both shoulders- worse in AM and loosens up with activity. M25.511 719.41     G89.29 338.29     M25.512       Diagnoses and all orders for this visit:    1. Benign paroxysmal positional vertigo due to bilateral vestibular disorder    2. Vitamin D deficiency    3. Spinal stenosis of lumbar region, unspecified whether neurogenic claudication present    4. Seasonal allergic rhinitis due to pollen    5. Obesity (BMI 30.0-34.9)    6. Hyperlipidemia LDL goal <130    7. DDD,DJD cervicalC5-6,6-7foraminal encroachment on X ray-4/2013    8. Chronic pain of both shoulders- worse in AM and loosens up with activity.       Follow-up and Dispositions    · Return if symptoms worsen or fail to improve, for F/U cholesterol, f/u Vitamin D deficiency, F/U BPH, F/U of obesity. reviewed medications and side effects in detail  Please call my office if there are any questions- [phone]. Discussed expected course/resolution/complications of diagnosis in detail with patient. Medication risks/benefits/costs/interactions/alternatives discussed with patient. Pt was given an after visit summary which includes diagnoses, current medications & vitals. Pt expressed understanding with the diagnosis and plan. Patient to call if no better in 3 -4 days and prn new problems. BMI is significantly elevated- in the obese range. I reviewed diet, exercise and weight control. Discussed weight control in detail, the importance of mainly decreased carbs, and for weight maintenance, exercise; discussed different diets and that it isn't as important to watch the type of foods as it is to decrease calorie intake no matter what type of diet you do, etc.       Total 25 minutes,60 % counseling re:  Regular exercise is very important to your health; it helps mentally, physically, socially; it prevents injuries if done properly. Exercise, even as simple as walking 20-30 minutes daily has major benefits to your health even though your \"numbers\" are the same in the lab. See if you can add this into your daily regimen and after a few months it will become a regular habit-\"just something you do,\" like brushing your teeth. A combination of aerobic exercise and strengthening and stretching is felt to be the best for you, so this should be your ultimate goal.   This can be done in the privacy of your home or in a group setting as at the gym  Some prefer having a , others prefer to do exercise in groups or individually. Do what \"works\" for you. You need to make it simple and \"fun,\" or you most likely will not continue it. Recommended a weekly \"heart check. \" I went into detail how to do this.  Also, discussed symptoms of concern that were noted today in the note above, treatment options( including doing nothing), when to follow up before recommended time frame. Also, answered all questions. I informed pt that because of his CT scan being negative that the serious causes of vertigo have been rule out, so he needs to protect himself from falling and resultant injuries from the fall. We talked about how to do that. I recommended he try Epley maneuvers. If  they do not help, he should see specialist Dr. Tomasa Cortes. In the meantime, he can continue with low-sodium diet and prn meclizine that he has obtained OTC. This document was written by Ariela Martinez, as dictated by Lara Parra MD.  I have reviewed and agree with the above note and have made corrections where appropriate Ancelmo Castro M.D.

## 2020-09-15 NOTE — PROGRESS NOTES
Chief Complaint   Patient presents with    Dizziness     was seen in Er sunday. 1. Have you been to the ER, urgent care clinic since your last visit? Hospitalized since your last visit? Yes Reason for visit: ER dizziness    2. Have you seen or consulted any other health care providers outside of the [address] [place] [address] since your last visit? Include any pap smears or colon screening.  No

## 2020-09-16 ENCOUNTER — TELEPHONE (OUTPATIENT)
Dept: FAMILY MEDICINE CLINIC | Age: 65
End: 2020-09-16

## 2020-09-16 NOTE — TELEPHONE ENCOUNTER
----- Message from Tanja Lebron sent at 9/16/2020  1:17 PM EDT -----  Regarding: Dr. Juan Manuel Schwartz telephone  Caller's first and last name: Pt  Reason for call: Pt has a ENT appt on 9/18/20. Pt would like a hard copy of the head scan that was taken on 9/13/20 at [facility]. Pt is willing to pick it up, or any other method that is the fastest in getting the imaging to his ENT doctor, in time for his appt. Callback required yes/no and why: yes  Best contact number(s): [phone]  Details to clarify the request: Pt wants to speak with Dr. Marylu Cowden, regarding this matter. Pt would also like to know if it would be faster to pick it up at [facility] or the office, as he needs it for his ENT appt on 9/16/20.

## 2020-09-21 PROBLEM — H81.13 BENIGN PAROXYSMAL POSITIONAL VERTIGO DUE TO BILATERAL VESTIBULAR DISORDER: Status: ACTIVE | Noted: 2020-09-21

## 2020-09-28 RX ORDER — TAMSULOSIN HYDROCHLORIDE 0.4 MG/1
0.4 CAPSULE ORAL DAILY
Qty: 30 CAP | Refills: 11 | Status: SHIPPED | OUTPATIENT
Start: 2020-09-28 | End: 2021-10-06

## 2020-09-28 NOTE — TELEPHONE ENCOUNTER
Last Visit: 9/15/20  MD Yusef Miller  Next Appointment: 2/10/21  MD Yusef Miller  Previous Refill Encounter(s): 9/20/19  30 + 11    Requested Prescriptions     Pending Prescriptions Disp Refills    tamsulosin (FLOMAX) 0.4 mg capsule 30 Cap 11     Sig: Take 1 Cap by mouth daily.  Indications: enlarged prostate with urination problem

## 2020-10-26 ENCOUNTER — OFFICE VISIT (OUTPATIENT)
Dept: FAMILY MEDICINE CLINIC | Age: 65
End: 2020-10-26
Payer: MEDICARE

## 2020-10-26 ENCOUNTER — TELEPHONE (OUTPATIENT)
Dept: FAMILY MEDICINE CLINIC | Age: 65
End: 2020-10-26

## 2020-10-26 VITALS
HEIGHT: 70 IN | RESPIRATION RATE: 18 BRPM | OXYGEN SATURATION: 98 % | BODY MASS INDEX: 31.18 KG/M2 | WEIGHT: 217.8 LBS | HEART RATE: 65 BPM | SYSTOLIC BLOOD PRESSURE: 146 MMHG | TEMPERATURE: 97.5 F | DIASTOLIC BLOOD PRESSURE: 94 MMHG

## 2020-10-26 DIAGNOSIS — E78.5 HYPERLIPIDEMIA LDL GOAL <130: ICD-10-CM

## 2020-10-26 DIAGNOSIS — E66.9 OBESITY (BMI 30.0-34.9): ICD-10-CM

## 2020-10-26 DIAGNOSIS — I10 BENIGN ESSENTIAL HYPERTENSION: Primary | ICD-10-CM

## 2020-10-26 PROCEDURE — 99214 OFFICE O/P EST MOD 30 MIN: CPT | Performed by: FAMILY MEDICINE

## 2020-10-26 PROCEDURE — G8427 DOCREV CUR MEDS BY ELIG CLIN: HCPCS | Performed by: FAMILY MEDICINE

## 2020-10-26 PROCEDURE — G8432 DEP SCR NOT DOC, RNG: HCPCS | Performed by: FAMILY MEDICINE

## 2020-10-26 PROCEDURE — G8417 CALC BMI ABV UP PARAM F/U: HCPCS | Performed by: FAMILY MEDICINE

## 2020-10-26 PROCEDURE — 1101F PT FALLS ASSESS-DOCD LE1/YR: CPT | Performed by: FAMILY MEDICINE

## 2020-10-26 PROCEDURE — 3017F COLORECTAL CA SCREEN DOC REV: CPT | Performed by: FAMILY MEDICINE

## 2020-10-26 PROCEDURE — G0463 HOSPITAL OUTPT CLINIC VISIT: HCPCS | Performed by: FAMILY MEDICINE

## 2020-10-26 PROCEDURE — G8536 NO DOC ELDER MAL SCRN: HCPCS | Performed by: FAMILY MEDICINE

## 2020-10-26 RX ORDER — HYDROCHLOROTHIAZIDE 25 MG/1
25 TABLET ORAL DAILY
Qty: 30 TAB | Refills: 0 | Status: SHIPPED | OUTPATIENT
Start: 2020-10-26 | End: 2020-11-20 | Stop reason: SDUPTHER

## 2020-10-26 NOTE — TELEPHONE ENCOUNTER
Pt called c/o elevated bp and dizziness Friday during pt 197/85, 160/90, 204/104 last night  After walking up and down the steps (14 steps) 197/85, Exercise causes his bp to joe rocket.     Pt would like to be seen, but says he only wats to see Dr. Jana Fernandez        BIANKA#[phone]

## 2020-10-26 NOTE — TELEPHONE ENCOUNTER
Spoke with patient, patient gave update of symptoms he has been experiencing. Patient was at PT with ENT 10/23/2020 and experienced dizziness and elevated blood pressure. 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 While completing tasks give by therapist ( throwing beach ball ) :  Blood pressure 204/104, rested 160/90; Standing ( moving head side to side ) 181/117. Informed to do walk in neighborhood and then take blood pressure; 10//24/2020: 154/85, 10/25/2020 :195/85 after doing steps , some light headedness and SOB . Today while on phone blood pressure 199/99. Patient denies any chest pain, nausea, sweating , arm pain.   Clayton Izaguirre LPN

## 2020-10-26 NOTE — PROGRESS NOTES
Chief Complaint   Patient presents with    Elevated Blood Pressure          1. Have you been to the ER, urgent care clinic since your last visit? Hospitalized since your last visit? No    2. Have you seen or consulted any other health care providers outside of the [address] [place] [address] since your last visit? Include any pap smears or colon screening.   No

## 2020-10-26 NOTE — PROGRESS NOTES
Chief Complaint   Patient presents with    Elevated Blood Pressure       HISTORY OF PRESENT ILLNESS   HPI  Usual patient of Dr. Lieutenant Guzmán presents at the request of his PT for recent BP elevations. He saw ENT Dr. Edinson Downs 9/18 and diagnosed w/ vestibular dysfunction. Dr. Edinson Downs referred him to the [address] [place] [address] for PT which he has been doing weekly since 9/21. At his most recent session there 10/23 his BP was 204/104 5 minutes after doing therapy. After resting his BP's came down to 160/90 and 156/90. Patient and his wife have been monitoring his BP's daily several times a day since then using their home machine. They brought in a log which was filed to be scanned into EMR. Some of those readings have been as follows:  10/23:  132/91  130/75  163/93  143/82  154/88  146/84    10/24:  150/81  134/82    10/25:  128/78  121/82  197/85 after activity  132/82 10 minutes after resting    10/26:  191/99 after activity  177/91 2 minutes after resting  172/94 4 minutes after resting    He admits he follows a high sodium diet. PT told him to follow low sodium diet to see if it would help his vertigo. Patient tried it for a week, didn't make a difference so he is back to adding salt. Wife states pt's eating habits are terrible. Patient agrees jokingly. Eats lots of carbs, starches, butter, adds salt. Used to play tennis ~ 2-3 x a week but none since 9/12 when he started w/ the vertigo issues. Wt is up 7 lbs the past few months. Caffeine consumption is occasional.  Takes Advil or Aleve on occasion after playing tennis. Non smoker. Alcohol in moderation. REVIEW OF SYMPTOMS   Review of Systems   Constitutional: Negative. Eyes: Negative. Respiratory: Negative. Cardiovascular: Negative. Gastrointestinal: Negative. Neurological: Negative for speech change, focal weakness, weakness and headaches. Endo/Heme/Allergies: Negative.             PROBLEM LIST/MEDICAL HISTORY     Problem List  Date Reviewed: 10/26/2020          Codes Class Noted    Benign paroxysmal positional vertigo due to bilateral vestibular disorder ICD-10-CM: H81.13  ICD-9-CM: 386.11  9/21/2020        Nocturia ICD-10-CM: R35.1  ICD-9-CM: 788.43  8/5/2020        Spinal stenosis ICD-10-CM: M48.00  ICD-9-CM: 724.00  8/22/2019        Obesity (BMI 30.0-34.9) ICD-10-CM: E66.9  ICD-9-CM: 278.00  2/5/2018        Chronic pain of both shoulders- worse in AM and loosens up with activity. 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 Indications: enlarged prostate with urination problem    simvastatin (ZOCOR) 40 mg tablet TAKE 1 TABLET BY MOUTH EVERY DAY EVERY NIGHT    Cholecalciferol, Vitamin D3, (VITAMIN D3) 1,000 unit cap Take 2,000 Units by mouth every other day. No current facility-administered medications for this visit. ALLERGIES     Allergies   Allergen Reactions    Oxycodone Rash     Patient had full body chills 20 minutes after taking 5 mg. Redness, stuffy nose, and felt warm.     Pcn [Penicillins] Rash          SOCIAL HISTORY     Social History Socioeconomic History    Marital status:      Spouse name: Not on file    Number of children: Not on file    Years of education: Not on file    Highest education level: Not on file   Tobacco Use    Smoking status: Never Smoker    Smokeless tobacco: Never Used   Substance and Sexual Activity    Alcohol use: Yes     Comment: 12oz beer once monthly     Drug use: No    Sexual activity: Yes     Partners: Female   Other Topics Concern    Caffeine Concern No     Comment: no coffee, occ decaff tea, occ soda    Special Diet No    Exercise No     Comment: not much lately, usually plays tennis 2-3 x a week        IMMUNIZATIONS  Immunization History   Administered Date(s) Administered    Influenza High Dose Vaccine PF 11/14/2018, 10/06/2020    Influenza Vaccine 11/11/2018    Pneumococcal Polysaccharide (PPSV-23) 08/04/2020    TDAP Vaccine 04/27/2010    Tdap 07/28/2015, 02/03/2018    dT Vaccine 01/27/1999         FAMILY HISTORY     Family History   Problem Relation Age of Onset    Cancer Mother         ovarian    Cancer Father         prostate?  Elevated Lipids Father     Heart Surgery Father         CABG at 58, lived to 80    No Known Problems Sister     Other Brother         factor 5          VITALS     Visit Vitals  BP (!) 132/90 (BP 1 Location: Left arm, BP Patient Position: Sitting); repeated end of exam 146/94   Pulse 65   Temp 97.5 °F (36.4 °C) (Temporal)   Resp 18   Ht 5' 10\" (1.778 m)   Wt 217 lb 12.8 oz (98.8 kg)   SpO2 98%   BMI 31.25 kg/m²          PHYSICAL EXAMINATION   Physical Exam  Vitals signs reviewed. Constitutional:       General: He is not in acute distress. Appearance: He is not ill-appearing. Cardiovascular:      Rate and Rhythm: Normal rate and regular rhythm. Heart sounds: Normal heart sounds. No murmur. No gallop. Pulmonary:      Effort: Pulmonary effort is normal. No respiratory distress. Breath sounds: Normal breath sounds.    Neurological: General: No focal deficit present. Mental Status: He is oriented to person, place, and time. Psychiatric:      Comments: Happy, cheerful, upbeat, laughing, joking             DIAGNOSTIC DATA         LABORATORY DATA     Results for orders placed or performed during the hospital encounter of 09/13/20   SAMPLES BEING HELD   Result Value Ref Range    SAMPLES BEING HELD 1BLU,1RED     COMMENT        Add-on orders for these samples will be processed based on acceptable specimen integrity and analyte stability, which may vary by analyte. CBC WITH AUTOMATED DIFF   Result Value Ref Range    WBC 6.0 4.1 - 11.1 K/uL    RBC 5.47 4.10 - 5.70 M/uL    HGB 16.1 12.1 - 17.0 g/dL    HCT 48.1 36.6 - 50.3 %    MCV 87.9 80.0 - 99.0 FL    MCH 29.4 26.0 - 34.0 PG    MCHC 33.5 30.0 - 36.5 g/dL    RDW 12.9 11.5 - 14.5 %    PLATELET 271 343 - 548 K/uL    MPV 9.6 8.9 - 12.9 FL    NRBC 0.0 0  WBC    ABSOLUTE NRBC 0.00 0.00 - 0.01 K/uL    NEUTROPHILS 79 (H) 32 - 75 %    LYMPHOCYTES 13 12 - 49 %    MONOCYTES 5 5 - 13 %    EOSINOPHILS 1 0 - 7 %    BASOPHILS 1 0 - 1 %    IMMATURE GRANULOCYTES 1 (H) 0.0 - 0.5 %    ABS. NEUTROPHILS 4.6 1.8 - 8.0 K/UL    ABS. LYMPHOCYTES 0.8 0.8 - 3.5 K/UL    ABS. MONOCYTES 0.3 0.0 - 1.0 K/UL    ABS. EOSINOPHILS 0.1 0.0 - 0.4 K/UL    ABS. BASOPHILS 0.1 0.0 - 0.1 K/UL    ABS. IMM.  GRANS. 0.1 (H) 0.00 - 0.04 K/UL    DF SMEAR SCANNED      RBC COMMENTS NORMOCYTIC, NORMOCHROMIC     METABOLIC PANEL, COMPREHENSIVE   Result Value Ref Range    Sodium 139 136 - 145 mmol/L    Potassium 4.8 3.5 - 5.1 mmol/L    Chloride 111 (H) 97 - 108 mmol/L    CO2 26 21 - 32 mmol/L    Anion gap 2 (L) 5 - 15 mmol/L    Glucose 108 (H) 65 - 100 mg/dL    BUN 13 6 - 20 MG/DL    Creatinine 1.17 0.70 - 1.30 MG/DL    BUN/Creatinine ratio 11 (L) 12 - 20      GFR est AA >60 >60 ml/min/1.73m2    GFR est non-AA >60 >60 ml/min/1.73m2    Calcium 9.1 8.5 - 10.1 MG/DL    Bilirubin, total 0.3 0.2 - 1.0 MG/DL    ALT (SGPT) 28 12 - 78 U/L    AST (SGOT) 17 15 - 37 U/L    Alk. phosphatase 87 45 - 117 U/L    Protein, total 6.9 6.4 - 8.2 g/dL    Albumin 3.7 3.5 - 5.0 g/dL    Globulin 3.2 2.0 - 4.0 g/dL    A-G Ratio 1.2 1.1 - 2.2     EKG, 12 LEAD, INITIAL   Result Value Ref Range    Ventricular Rate 64 BPM    Atrial Rate 64 BPM    P-R Interval 192 ms    QRS Duration 98 ms    Q-T Interval 420 ms    QTC Calculation (Bezet) 433 ms    Calculated P Axis 50 degrees    Calculated R Axis 1 degrees    Calculated T Axis 29 degrees    Diagnosis       Normal sinus rhythm Poor R-wave Progression (consider lead placement or loss   of anterior forces)  When compared with ECG of 21-AUG-2001 15:47,  Questionable change in QRS axis  Confirmed by Charity Kilgore M.D., Brenda Collier ([id number]) on 9/13/2020 1:35:40 PM         Lab Results   Component Value Date/Time    Cholesterol, total 157 08/04/2020 10:54 AM    HDL Cholesterol 59 08/04/2020 10:54 AM    LDL, calculated 75 08/04/2020 10:54 AM    Triglyceride 114 08/04/2020 10:54 AM            ASSESSMENT & PLAN       ICD-10-CM ICD-9-CM    1. Benign essential hypertension, not controlled  I10 401.1 hydroCHLOROthiazide (HYDRODIURIL) 25 mg tablet   2. Obesity (BMI 30.0-34. 9)  E66.9 278.00    3. Hyperlipidemia LDL goal <130, met w/ statin therapy E78.5 272.4      Start HCTZ 25 mg once daily in the AM w/ K+ rich food/beverage  Reviewed medication, effects, and possible side effects in detail  Cardiovascular risk and specific BP/lipid/LDL goals reviewed  Reviewed diet, nutrition, exercise, weight management, BMI/goals. Discussed DASH diet and K+ rich diet. Handouts given. Continue interim home BP monitoring and bring log in for follow up w/ PCP in 2 weeks  Call back sooner in the interim if needed.

## 2020-10-26 NOTE — PATIENT INSTRUCTIONS
High Blood Pressure: Care Instructions Overview It's normal for blood pressure to go up and down throughout the day. But if it stays up, you have high blood pressure. Another name for high blood pressure is hypertension. Despite what a lot of people think, high blood pressure usually doesn't cause headaches or make you feel dizzy or lightheaded. It usually has no symptoms. But it does increase your risk of stroke, heart attack, and other problems. You and your doctor will talk about your risks of these problems based on your blood pressure. Your doctor will give you a goal for your blood pressure. Your goal will be based on your health and your age. Lifestyle changes, such as eating healthy and being active, are always important to help lower blood pressure. You might also take medicine to reach your blood pressure goal. 
Follow-up care is a key part of your treatment and safety. Be sure to make and go to all appointments, and call your doctor if you are having problems. It's also a good idea to know your test results and keep a list of the medicines you take. How can you care for yourself at home? Medical treatment · If you stop taking your medicine, your blood pressure will go back up. You may take one or more types of medicine to lower your blood pressure. Be safe with medicines. Take your medicine exactly as prescribed. Call your doctor if you think you are having a problem with your medicine. · Talk to your doctor before you start taking aspirin every day. Aspirin can help certain people lower their risk of a heart attack or stroke. But taking aspirin isn't right for everyone, because it can cause serious bleeding. · See your doctor regularly. You may need to see the doctor more often at first or until your blood pressure comes down. · If you are taking blood pressure medicine, talk to your doctor before you take decongestants or anti-inflammatory medicine, such as ibuprofen. Some of these medicines can raise blood pressure. · Learn how to check your blood pressure at home. Lifestyle changes · Stay at a healthy weight. This is especially important if you put on weight around the waist. Losing even 10 pounds can help you lower your blood pressure. · If your doctor recommends it, get more exercise. Walking is a good choice. Bit by bit, increase the amount you walk every day. Try for at least 30 minutes on most days of the week. You also may want to swim, bike, or do other activities. · Avoid or limit alcohol. Talk to your doctor about whether you can drink any alcohol. · Try to limit how much sodium you eat to less than 2,300 milligrams (mg) a day. Your doctor may ask you to try to eat less than 1,500 mg a day. · Eat plenty of fruits (such as bananas and oranges), vegetables, legumes, whole grains, and low-fat dairy products. · Lower the amount of saturated fat in your diet. Saturated fat is found in animal products such as milk, cheese, and meat. Limiting these foods may help you lose weight and also lower your risk for heart disease. · Do not smoke. Smoking increases your risk for heart attack and stroke. If you need help quitting, talk to your doctor about stop-smoking programs and medicines. These can increase your chances of quitting for good. When should you call for help? Call  911 anytime you think you may need emergency care. This may mean having symptoms that suggest that your blood pressure is causing a serious heart or blood vessel problem. Your blood pressure may be over 180/120. For example, call 911 if: 
  · You have symptoms of a heart attack. These may include: 
? Chest pain or pressure, or a strange feeling in the chest. 
? Sweating. ? Shortness of breath. ? Nausea or vomiting. ? Pain, pressure, or a strange feeling in the back, neck, jaw, or upper belly or in one or both shoulders or arms. ? Lightheadedness or sudden weakness. ? A fast or irregular heartbeat.  
  · You have symptoms of a stroke. These may include: 
? Sudden numbness, tingling, weakness, or loss of movement in your face, arm, or leg, especially on only one side of your body. ? Sudden vision changes. ? Sudden trouble speaking. ? Sudden confusion or trouble understanding simple statements. ? Sudden problems with walking or balance. ? A sudden, severe headache that is different from past headaches.  
  · You have severe back or belly pain. Do not wait until your blood pressure comes down on its own. Get help right away. Call your doctor now or seek immediate care if: 
  · Your blood pressure is much higher than normal (such as 180/120 or higher), but you don't have symptoms.  
  · You think high blood pressure is causing symptoms, such as: 
? Severe headache. 
? Blurry vision. Watch closely for changes in your health, and be sure to contact your doctor if: 
  · Your blood pressure measures higher than your doctor recommends at least 2 times. That means the top number is higher or the bottom number is higher, or both.  
  · You think you may be having side effects from your blood pressure medicine. Where can you learn more? Go to http://www.gray.com/ Enter B134 in the search box to learn more about \"High Blood Pressure: Care Instructions. \" Current as of: December 16, 2019               Content Version: 12.6 © 2299-8051 [employer]. Care instructions adapted under license by [employer] (which disclaims liability or warranty for this information). If you have questions about a medical condition or this instruction, always ask your healthcare professional. [place] [address] any warranty or liability for your use of this information. DASH Diet: Care Instructions Your Care Instructions The DASH diet is an eating plan that can help lower your blood pressure. DASH stands for Dietary Approaches to Stop Hypertension. Hypertension is high blood pressure. The DASH diet focuses on eating foods that are high in calcium, potassium, and magnesium. These nutrients can lower blood pressure. The foods that are highest in these nutrients are fruits, vegetables, low-fat dairy products, nuts, seeds, and legumes. But taking calcium, potassium, and magnesium supplements instead of eating foods that are high in those nutrients does not have the same effect. The DASH diet also includes whole grains, fish, and poultry. The DASH diet is one of several lifestyle changes your doctor may recommend to lower your high blood pressure. Your doctor may also want you to decrease the amount of sodium in your diet. Lowering sodium while following the DASH diet can lower blood pressure even further than just the DASH diet alone. Follow-up care is a key part of your treatment and safety. Be sure to make and go to all appointments, and call your doctor if you are having problems. It's also a good idea to know your test results and keep a list of the medicines you take. How can you care for yourself at home? Following the DASH diet · Eat 4 to 5 servings of fruit each day. A serving is 1 medium-sized piece of fruit, ½ cup chopped or canned fruit, 1/4 cup dried fruit, or 4 ounces (½ cup) of fruit juice. Choose fruit more often than fruit juice. · Eat 4 to 5 servings of vegetables each day. A serving is 1 cup of lettuce or raw leafy vegetables, ½ cup of chopped or cooked vegetables, or 4 ounces (½ cup) of vegetable juice. Choose vegetables more often than vegetable juice. · Get 2 to 3 servings of low-fat and fat-free dairy each day. A serving is 8 ounces of milk, 1 cup of yogurt, or 1 ½ ounces of cheese. · Eat 6 to 8 servings of grains each day.  A serving is 1 slice of bread, 1 ounce of dry cereal, or ½ cup of cooked rice, pasta, or cooked cereal. Try to choose whole-grain products as much as possible. · Limit lean meat, poultry, and fish to 2 servings each day. A serving is 3 ounces, about the size of a deck of cards. · Eat 4 to 5 servings of nuts, seeds, and legumes (cooked dried beans, lentils, and split peas) each week. A serving is 1/3 cup of nuts, 2 tablespoons of seeds, or ½ cup of cooked beans or peas. · Limit fats and oils to 2 to 3 servings each day. A serving is 1 teaspoon of vegetable oil or 2 tablespoons of salad dressing. · Limit sweets and added sugars to 5 servings or less a week. A serving is 1 tablespoon jelly or jam, ½ cup sorbet, or 1 cup of lemonade. · Eat less than 2,300 milligrams (mg) of sodium a day. If you limit your sodium to 1,500 mg a day, you can lower your blood pressure even more. Tips for success · Start small. Do not try to make dramatic changes to your diet all at once. You might feel that you are missing out on your favorite foods and then be more likely to not follow the plan. Make small changes, and stick with them. Once those changes become habit, add a few more changes. · Try some of the following: ? Make it a goal to eat a fruit or vegetable at every meal and at snacks. This will make it easy to get the recommended amount of fruits and vegetables each day. ? Try yogurt topped with fruit and nuts for a snack or healthy dessert. ? Add lettuce, tomato, cucumber, and onion to sandwiches. ? Combine a ready-made pizza crust with low-fat mozzarella cheese and lots of vegetable toppings. Try using tomatoes, squash, spinach, broccoli, carrots, cauliflower, and onions. ? Have a variety of cut-up vegetables with a low-fat dip as an appetizer instead of chips and dip. ? Sprinkle sunflower seeds or chopped almonds over salads. Or try adding chopped walnuts or almonds to cooked vegetables. ? Try some vegetarian meals using beans and peas.  Add garbanzo or kidney beans to salads. Make burritos and tacos with mashed gomez beans or black beans. Where can you learn more? Go to http://www.gray.com/ Enter T773 in the search box to learn more about \"DASH Diet: Care Instructions. \" Current as of: December 16, 2019               Content Version: 12.6 © 4028-7303 [employer]. Care instructions adapted under license by [employer] (which disclaims liability or warranty for this information). If you have questions about a medical condition or this instruction, always ask your healthcare professional. [address] any warranty or liability for your use of this information. Potassium-Rich Diet: Care Instructions Your Care Instructions Potassium is a mineral. It helps keep the right mix of fluids in your body. It also helps your nerves and muscles work as they should. You'll find it in milk and meats. It's also in all fresh foods, including fruits and vegetables. Most adults need about 5 grams of potassium a day. The foods you eat should supply all that you need. Some health conditions can cause a loss of potassium. For example, kidney problems and stomach problems with vomiting and diarrhea can cause you to lose this mineral. Some medicines, such as water pills (diuretics), can cause low potassium. If you can't get enough potassium from what you eat, your doctor may advise you to take supplements. Follow-up care is a key part of your treatment and safety. Be sure to make and go to all appointments, and call your doctor if you are having problems. It's also a good idea to know your test results and keep a list of the medicines you take. How can you care for yourself at home? · Plan your diet around foods that are rich in potassium. Fresh, unprocessed whole foods have the most. These foods include: ? Milk and other dairy products. ? Vegetables, especially broccoli, cooked dry beans, tomatoes, potatoes, artichokes, winter squash, and spinach. ? Fruits, especially citrus fruits, bananas, and apricots. Dried apricots contain more potassium than fresh apricots. ? Meat, poultry, and fish. · Ask your doctor about using a salt substitute or \"light\" salt. These often contain potassium. Where can you learn more? Go to http://www.gray.com/ Enter H315 in the search box to learn more about \"Potassium-Rich Diet: Care Instructions. \" Current as of: August 22, 2019               Content Version: 12.6 © 3918-4054 [employer]. Care instructions adapted under license by [employer] (which disclaims liability or warranty for this information). If you have questions about a medical condition or this instruction, always ask your healthcare professional. [address] any warranty or liability for your use of this information.

## 2020-10-28 DIAGNOSIS — E78.5 HYPERLIPIDEMIA LDL GOAL <130: ICD-10-CM

## 2020-10-28 RX ORDER — SIMVASTATIN 40 MG/1
TABLET, FILM COATED ORAL
Qty: 90 TAB | Refills: 1 | Status: SHIPPED | OUTPATIENT
Start: 2020-10-28 | End: 2021-05-05 | Stop reason: SDUPTHER

## 2020-11-09 ENCOUNTER — OFFICE VISIT (OUTPATIENT)
Dept: FAMILY MEDICINE CLINIC | Age: 65
End: 2020-11-09
Payer: MEDICARE

## 2020-11-09 VITALS
HEIGHT: 70 IN | SYSTOLIC BLOOD PRESSURE: 122 MMHG | TEMPERATURE: 98.2 F | HEART RATE: 82 BPM | DIASTOLIC BLOOD PRESSURE: 84 MMHG | OXYGEN SATURATION: 98 % | RESPIRATION RATE: 18 BRPM | BODY MASS INDEX: 30.46 KG/M2 | WEIGHT: 212.8 LBS

## 2020-11-09 DIAGNOSIS — R73.09 ELEVATED GLUCOSE: ICD-10-CM

## 2020-11-09 DIAGNOSIS — M25.512 CHRONIC PAIN OF BOTH SHOULDERS: ICD-10-CM

## 2020-11-09 DIAGNOSIS — M48.061 SPINAL STENOSIS OF LUMBAR REGION, UNSPECIFIED WHETHER NEUROGENIC CLAUDICATION PRESENT: ICD-10-CM

## 2020-11-09 DIAGNOSIS — H81.13 BENIGN PAROXYSMAL POSITIONAL VERTIGO DUE TO BILATERAL VESTIBULAR DISORDER: ICD-10-CM

## 2020-11-09 DIAGNOSIS — M25.511 CHRONIC PAIN OF BOTH SHOULDERS: ICD-10-CM

## 2020-11-09 DIAGNOSIS — M50.30 DDD (DEGENERATIVE DISC DISEASE), CERVICAL: ICD-10-CM

## 2020-11-09 DIAGNOSIS — E78.5 HYPERLIPIDEMIA LDL GOAL <130: ICD-10-CM

## 2020-11-09 DIAGNOSIS — Z00.00 MEDICARE ANNUAL WELLNESS VISIT, SUBSEQUENT: ICD-10-CM

## 2020-11-09 DIAGNOSIS — G89.29 CHRONIC PAIN OF BOTH SHOULDERS: ICD-10-CM

## 2020-11-09 DIAGNOSIS — E66.9 OBESITY (BMI 30.0-34.9): ICD-10-CM

## 2020-11-09 DIAGNOSIS — E55.9 VITAMIN D DEFICIENCY: ICD-10-CM

## 2020-11-09 DIAGNOSIS — R97.20 ELEVATED PSA MEASUREMENT: ICD-10-CM

## 2020-11-09 DIAGNOSIS — J30.1 SEASONAL ALLERGIC RHINITIS DUE TO POLLEN: ICD-10-CM

## 2020-11-09 DIAGNOSIS — I10 BENIGN ESSENTIAL HYPERTENSION: Primary | ICD-10-CM

## 2020-11-09 PROCEDURE — G8427 DOCREV CUR MEDS BY ELIG CLIN: HCPCS | Performed by: FAMILY MEDICINE

## 2020-11-09 PROCEDURE — 3017F COLORECTAL CA SCREEN DOC REV: CPT | Performed by: FAMILY MEDICINE

## 2020-11-09 PROCEDURE — G8432 DEP SCR NOT DOC, RNG: HCPCS | Performed by: FAMILY MEDICINE

## 2020-11-09 PROCEDURE — 1101F PT FALLS ASSESS-DOCD LE1/YR: CPT | Performed by: FAMILY MEDICINE

## 2020-11-09 PROCEDURE — G8417 CALC BMI ABV UP PARAM F/U: HCPCS | Performed by: FAMILY MEDICINE

## 2020-11-09 PROCEDURE — G8536 NO DOC ELDER MAL SCRN: HCPCS | Performed by: FAMILY MEDICINE

## 2020-11-09 PROCEDURE — G0463 HOSPITAL OUTPT CLINIC VISIT: HCPCS | Performed by: FAMILY MEDICINE

## 2020-11-09 PROCEDURE — 99214 OFFICE O/P EST MOD 30 MIN: CPT | Performed by: FAMILY MEDICINE

## 2020-11-09 NOTE — PROGRESS NOTES
Chief Complaint   Patient presents with    Hypertension     2 week follow up      1. Have you been to the ER, urgent care clinic since your last visit? Hospitalized since your last visit? No    2. Have you seen or consulted any other health care providers outside of the [address] [place] [address] since your last visit? Include any pap smears or colon screening.  No

## 2020-11-09 NOTE — PROGRESS NOTES
HISTORY OF PRESENT ILLNESS  HPI  Destin Miller is a 65 y.o. Male with a history of cervical DDD and DJD, bilateral shoulder pain, vitamin D deficiency and hyperlipidemia with LDL goal <130, who presents to the office today with wife for a f/u for his HTN and dizziness. Pt checks BP outside of the office with morning BP higher than BP in the evening. Pt BP readings averaged 138/94 before treatment while after treatment his readings average 128/84 with a few readings systolic over 962. He states he f/u his dizziness with Dr. Lance Hopkins (ENT) in 10/23/202 who found that he has a \"a little tick\" on the right side of his ear. He describes his dizziness to not feel like himself and is off balance. Pt adds that he is seeing PT for balance issue  And it seems to be helping some, but problem has not resolved    Pt denies unusual SOB, chest pain, and any recent ER visits or hospitalizations. Past Medical History:   Diagnosis Date    Allergic rhinitis 2/27/2010    Cancer ([facility] [address] 2004    SKIN, LIP (MOHS PROCEDURE)    DDD,DJD cervicalC5-6,6-7foraminal encroachment on X ray-4/2013 5/17/2013    Hypertension     BORDERLINE; NO MEDS    Obesity (BMI 30.0-34.9) 2/5/2018    Other and unspecified hyperlipidemia 2/27/2010     Past Surgical History:   Procedure Laterality Date    ENDOSCOPY, COLON, DIAGNOSTIC      X2    HX CATARACT REMOVAL Bilateral 87-88    CONGENITAL CATARACTS; W/ IOL    HX ORTHOPAEDIC  08/2019    l-s spine     HX TONSIL AND ADENOIDECTOMY  1960    HX VASECTOMY  1989    SINUS SURGERY PROC UNLISTED  2001     Current Outpatient Medications on File Prior to Visit   Medication Sig Dispense Refill    simvastatin (ZOCOR) 40 mg tablet TAKE 1 TABLET BY MOUTH EVERY DAY EVERY NIGHT 90 Tab 1    hydroCHLOROthiazide (HYDRODIURIL) 25 mg tablet Take 1 Tab by mouth daily. Take in the morning with potassium rich food or beverage 30 Tab 0    tamsulosin (FLOMAX) 0.4 mg capsule Take 1 Cap by mouth daily. Indications: enlarged prostate with urination problem 30 Cap 11    Cholecalciferol, Vitamin D3, (VITAMIN D3) 1,000 unit cap Take 2,000 Units by mouth every other day. No current facility-administered medications on file prior to visit. Allergies   Allergen Reactions    Oxycodone Rash     Patient had full body chills 20 minutes after taking 5 mg. Redness, stuffy nose, and felt warm.  Pcn [Penicillins] Rash     Family History   Problem Relation Age of Onset    Cancer Mother         ovarian    Cancer Father         prostate?  Elevated Lipids Father     Heart Surgery Father         CABG at 58, lived to 80    No Known Problems Sister     Other Brother         factor 5      Social History     Socioeconomic History    Marital status:      Spouse name: Not on file    Number of children: Not on file    Years of education: Not on file    Highest education level: Not on file   Tobacco Use    Smoking status: Never Smoker    Smokeless tobacco: Never Used   Substance and Sexual Activity    Alcohol use: Yes     Comment: 12oz beer once monthly     Drug use: No    Sexual activity: Yes     Partners: Female   Other Topics Concern    Caffeine Concern No     Comment: no coffee, occ decaff tea, occ soda    Special Diet No    Exercise No     Comment: not much lately, usually plays tennis 2-3 x a week             Review of Systems   Constitutional: Negative for chills, diaphoresis, fever, malaise/fatigue and weight loss. Eyes: Negative for blurred vision, double vision, pain and redness. Respiratory: Negative for cough, shortness of breath and wheezing. Cardiovascular: Negative for chest pain, palpitations, orthopnea, claudication, leg swelling and PND. Skin: Negative for itching and rash. Neurological: Negative for dizziness, tingling, tremors, sensory change, speech change, focal weakness, seizures, loss of consciousness, weakness and headaches.      Results for orders placed or performed during the hospital encounter of 09/13/20   SAMPLES BEING HELD   Result Value Ref Range    SAMPLES BEING HELD 1BLU,1RED     COMMENT        Add-on orders for these samples will be processed based on acceptable specimen integrity and analyte stability, which may vary by analyte. CBC WITH AUTOMATED DIFF   Result Value Ref Range    WBC 6.0 4.1 - 11.1 K/uL    RBC 5.47 4.10 - 5.70 M/uL    HGB 16.1 12.1 - 17.0 g/dL    HCT 48.1 36.6 - 50.3 %    MCV 87.9 80.0 - 99.0 FL    MCH 29.4 26.0 - 34.0 PG    MCHC 33.5 30.0 - 36.5 g/dL    RDW 12.9 11.5 - 14.5 %    PLATELET 183 937 - 739 K/uL    MPV 9.6 8.9 - 12.9 FL    NRBC 0.0 0  WBC    ABSOLUTE NRBC 0.00 0.00 - 0.01 K/uL    NEUTROPHILS 79 (H) 32 - 75 %    LYMPHOCYTES 13 12 - 49 %    MONOCYTES 5 5 - 13 %    EOSINOPHILS 1 0 - 7 %    BASOPHILS 1 0 - 1 %    IMMATURE GRANULOCYTES 1 (H) 0.0 - 0.5 %    ABS. NEUTROPHILS 4.6 1.8 - 8.0 K/UL    ABS. LYMPHOCYTES 0.8 0.8 - 3.5 K/UL    ABS. MONOCYTES 0.3 0.0 - 1.0 K/UL    ABS. EOSINOPHILS 0.1 0.0 - 0.4 K/UL    ABS. BASOPHILS 0.1 0.0 - 0.1 K/UL    ABS. IMM. GRANS. 0.1 (H) 0.00 - 0.04 K/UL    DF SMEAR SCANNED      RBC COMMENTS NORMOCYTIC, NORMOCHROMIC     METABOLIC PANEL, COMPREHENSIVE   Result Value Ref Range    Sodium 139 136 - 145 mmol/L    Potassium 4.8 3.5 - 5.1 mmol/L    Chloride 111 (H) 97 - 108 mmol/L    CO2 26 21 - 32 mmol/L    Anion gap 2 (L) 5 - 15 mmol/L    Glucose 108 (H) 65 - 100 mg/dL    BUN 13 6 - 20 MG/DL    Creatinine 1.17 0.70 - 1.30 MG/DL    BUN/Creatinine ratio 11 (L) 12 - 20      GFR est AA >60 >60 ml/min/1.73m2    GFR est non-AA >60 >60 ml/min/1.73m2    Calcium 9.1 8.5 - 10.1 MG/DL    Bilirubin, total 0.3 0.2 - 1.0 MG/DL    ALT (SGPT) 28 12 - 78 U/L    AST (SGOT) 17 15 - 37 U/L    Alk.  phosphatase 87 45 - 117 U/L    Protein, total 6.9 6.4 - 8.2 g/dL    Albumin 3.7 3.5 - 5.0 g/dL    Globulin 3.2 2.0 - 4.0 g/dL    A-G Ratio 1.2 1.1 - 2.2     EKG, 12 LEAD, INITIAL   Result Value Ref Range    Ventricular Rate 64 BPM    Atrial Rate 64 BPM    P-R Interval 192 ms    QRS Duration 98 ms    Q-T Interval 420 ms    QTC Calculation (Bezet) 433 ms    Calculated P Axis 50 degrees    Calculated R Axis 1 degrees    Calculated T Axis 29 degrees    Diagnosis       Normal sinus rhythm Poor R-wave Progression (consider lead placement or loss   of anterior forces)  When compared with ECG of 21-AUG-2001 15:47,  Questionable change in QRS axis  Confirmed by Karolina Onofre M.D., Lebron Moss ([id number]) on 9/13/2020 1:35:40 PM             Physical Exam  Visit Vitals  /84 (BP 1 Location: Left arm, BP Patient Position: Sitting)   Pulse 82   Temp 98.2 °F (36.8 °C) (Temporal)   Resp 18   Ht 5' 10\" (1.778 m)   Wt 212 lb 12.8 oz (96.5 kg)   SpO2 98%   BMI 30.53 kg/m²       No orthostatic changes    Exam is deferred          ASSESSMENT and PLAN    ICD-10-CM ICD-9-CM    1. Benign essential hypertension  I10 401.1    2. Hyperlipidemia LDL goal <130  E78.5 272.4    3. DDD,DJD cervicalC5-6,6-7foraminal encroachment on X ray-4/2013  M50.30 722.4    4. Chronic pain of both shoulders- worse in AM and loosens up with activity. M25.511 719.41     G89.29 338.29     M25.512     5. Vitamin D deficiency  E55.9 268.9    6. Spinal stenosis of lumbar region, unspecified whether neurogenic claudication present  M48.061 724.02    7. Seasonal allergic rhinitis due to pollen  J30.1 477.0    8. Benign paroxysmal positional vertigo due to bilateral vestibular disorder  H81.13 386.11    9. Obesity (BMI 30.0-34. 9)  E66.9 278.00    10. Medicare annual wellness visit, subsequent  Z00.00 V70.0    11. Elevated glucose  R73.09 790.29    12. Elevated PSA measurement  R97.20 790.93      Diagnoses and all orders for this visit:    1. Benign essential hypertension    2. Hyperlipidemia LDL goal <130    3. DDD,DJD cervicalC5-6,6-7foraminal encroachment on X ray-4/2013    4. Chronic pain of both shoulders- worse in AM and loosens up with activity. 5. Vitamin D deficiency    6.  Spinal stenosis of lumbar region, unspecified whether neurogenic claudication present    7. Seasonal allergic rhinitis due to pollen    8. Benign paroxysmal positional vertigo due to bilateral vestibular disorder    9. Obesity (BMI 30.0-34.9)    10. Medicare annual wellness visit, subsequent    11. Elevated glucose    12. Elevated PSA measurement      Follow-up and Dispositions    · Return in about 3 months (around 2/9/2021) for F/U HTN and CHOL, f/u Vitamin D deficiency, F/U weight concerns. reviewed medications and side effects in detail  Please call my office if there are any questions- [phone]. Discussed expected course/resolution/complications of diagnosis in detail with patient. Medication risks/benefits/costs/interactions/alternatives discussed with patient. Pt was given an after visit summary which includes diagnoses, current medications & vitals. Pt expressed understanding with the diagnosis and plan. Patient to call if no better in 3 -4 days and prn new problems. BMI is significantly elevated- in the obese range. I reviewed diet, exercise and weight control. Discussed weight control in detail, the importance of mainly decreased carbs, and for weight maintenance, exercise; discussed different diets and that it isn't as important to watch the type of foods as it is to decrease calorie intake no matter what type of diet you do, etc.       Total 45 minutes,60 % counseling re: Recommended a weekly \"heart check. \" I went into detail how to do this. Regular exercise is very important to your health; it helps mentally, physically, socially; it prevents injuries if done properly. Exercise, even as simple as walking 20-30 minutes daily has major benefits to your health even though your \"numbers\" are the same in the lab. See if you can add this into your daily regimen and after a few months it will become a regular habit-\"just something you do,\" like brushing your teeth.      A combination of aerobic exercise and strengthening and stretching is felt to be the best for you, so this should be your ultimate goal.   This can be done in the privacy of your home or in a group setting as at the gym  Some prefer having a , others prefer to do exercise in groups or individually. Do what \"works\" for you. You need to make it simple and \"fun,\" or you most likely will not continue it. Reviewed symptoms, or lack thereof, of hypertension and elevated cholesterol. Review of  the proper technique of checking the blood pressure- check it on an average day only, not on a stressful day, sitting, no exercise for at least 1 hour and not experiencing any new pain( chronic pain is OK). Patient encouraged to check BP sitting and standing at least once a month and to report these readings to me if > 140/ 90 on average , or if the standing BP is >  15 points lower than the sitting. Always check it twice and if there is > 5 points decrease from the previous reading( top reading or systolic) keep checking it until it does not drop 5 points. Write only this final reading down, not the preceding readings. If out of these readings there is only 1 out of 4 or less > 317, or > 90 diastolic then your blood pressure is OK; it needs further treatment if it is above this. Also, don't forget,  as noted above, to check your blood pressure standing once a month; this is to detect a drop in your BP that might lead to fainting and serious injury; you check it standing with your arm hanging straight down and relaxed. Check it twice waiting 1 minute between the two readings. If, with either one of these 2 readings there is a > 15 point drop of the systolic compared to your sitting pressure( done before the standing BP), then let me know. Following these guidelines, continue to check your BP and write down only the ones described above and it will help me to effectively treat your blood pressure.  Also, discussed symptoms of concern that were noted today in the note above, treatment options( including doing nothing), when to follow up before recommended time frame. Also, answered all questions. We talked in detail about his BP and the correct way to check it, both sitting and standing. We talked about what to do if it gets too low. He will come in 2-3 weeks for lab work. He should continue f/u with Dr. Tamika Prescott for his balance problems. This document was written by Demetrius Frankel, as dictated by Corky Ramesh MD.  I have reviewed and agree with the above note and have made corrections where appropriate Ancelmo Campbell M.D.

## 2020-11-16 PROBLEM — I10 BENIGN ESSENTIAL HYPERTENSION: Status: ACTIVE | Noted: 2020-11-16

## 2020-11-16 PROBLEM — M48.061 SPINAL STENOSIS OF LUMBAR REGION: Status: ACTIVE | Noted: 2019-08-22

## 2020-11-20 DIAGNOSIS — I10 BENIGN ESSENTIAL HYPERTENSION: ICD-10-CM

## 2020-11-20 RX ORDER — HYDROCHLOROTHIAZIDE 25 MG/1
25 TABLET ORAL DAILY
Qty: 90 TAB | Refills: 1 | Status: SHIPPED | OUTPATIENT
Start: 2020-11-20 | End: 2021-05-13 | Stop reason: SDUPTHER

## 2020-11-20 NOTE — TELEPHONE ENCOUNTER
Last visit 11/09/2020 MD Zachary Zambrano   Next appointment 02/10/2021 MD Zachary Zambrano   Previous refill encounter(s)   10/26/2020 HCTZ #30       Requested Prescriptions     Pending Prescriptions Disp Refills    hydroCHLOROthiazide (HYDRODIURIL) 25 mg tablet 90 Tab 1     Sig: Take 1 Tab by mouth daily.  Take in the morning with potassium rich food or beverage

## 2020-11-20 NOTE — TELEPHONE ENCOUNTER
Patient requesting refill on    . Requested Prescriptions     Pending Prescriptions Disp Refills    hydroCHLOROthiazide (HYDRODIURIL) 25 mg tablet 30 Tab 0     Sig: Take 1 Tab by mouth daily. Take in the morning with potassium rich food or beverage         Patient had appt. On 11/09/2020 with Dr. Neeta Ceron stated he suppose to have labs 2 weeks after his visit. No labs oredered requesting orders and patient has appointment on 12/01/2020 .     Best call back #[phone]

## 2020-12-01 ENCOUNTER — TELEPHONE (OUTPATIENT)
Dept: FAMILY MEDICINE CLINIC | Age: 65
End: 2020-12-01

## 2020-12-01 ENCOUNTER — APPOINTMENT (OUTPATIENT)
Dept: FAMILY MEDICINE CLINIC | Age: 65
End: 2020-12-01

## 2020-12-01 DIAGNOSIS — E78.5 HYPERLIPIDEMIA LDL GOAL <130: Primary | ICD-10-CM

## 2020-12-01 LAB
ALBUMIN SERPL-MCNC: 3.7 G/DL (ref 3.5–5)
ALBUMIN/GLOB SERPL: 1.2 {RATIO} (ref 1.1–2.2)
ALP SERPL-CCNC: 100 U/L (ref 45–117)
ALT SERPL-CCNC: 21 U/L (ref 12–78)
ANION GAP SERPL CALC-SCNC: 7 MMOL/L (ref 5–15)
AST SERPL-CCNC: 23 U/L (ref 15–37)
BILIRUB SERPL-MCNC: 0.4 MG/DL (ref 0.2–1)
BUN SERPL-MCNC: 16 MG/DL (ref 6–20)
BUN/CREAT SERPL: 23 (ref 12–20)
CALCIUM SERPL-MCNC: 9.6 MG/DL (ref 8.5–10.1)
CHLORIDE SERPL-SCNC: 109 MMOL/L (ref 97–108)
CHOLEST SERPL-MCNC: 207 MG/DL
CO2 SERPL-SCNC: 27 MMOL/L (ref 21–32)
COMMENT, HOLDF: NORMAL
CREAT SERPL-MCNC: 0.69 MG/DL (ref 0.7–1.3)
GLOBULIN SER CALC-MCNC: 3.2 G/DL (ref 2–4)
GLUCOSE SERPL-MCNC: 87 MG/DL (ref 65–100)
HDLC SERPL-MCNC: 46 MG/DL
HDLC SERPL: 4.5 {RATIO} (ref 0–5)
LDLC SERPL CALC-MCNC: 134.4 MG/DL (ref 0–100)
LIPID PROFILE,FLP: ABNORMAL
POTASSIUM SERPL-SCNC: 4 MMOL/L (ref 3.5–5.1)
PROT SERPL-MCNC: 6.9 G/DL (ref 6.4–8.2)
SAMPLES BEING HELD,HOLD: NORMAL
SODIUM SERPL-SCNC: 143 MMOL/L (ref 136–145)
TRIGL SERPL-MCNC: 133 MG/DL (ref ?–150)
VLDLC SERPL CALC-MCNC: 26.6 MG/DL

## 2021-02-10 ENCOUNTER — OFFICE VISIT (OUTPATIENT)
Dept: FAMILY MEDICINE CLINIC | Age: 66
End: 2021-02-10
Payer: MEDICARE

## 2021-02-10 VITALS
BODY MASS INDEX: 30.64 KG/M2 | HEIGHT: 70 IN | RESPIRATION RATE: 16 BRPM | TEMPERATURE: 98.1 F | HEART RATE: 76 BPM | OXYGEN SATURATION: 97 % | DIASTOLIC BLOOD PRESSURE: 80 MMHG | WEIGHT: 214 LBS | SYSTOLIC BLOOD PRESSURE: 138 MMHG

## 2021-02-10 DIAGNOSIS — E78.5 HYPERLIPIDEMIA LDL GOAL <130: ICD-10-CM

## 2021-02-10 DIAGNOSIS — G89.29 CHRONIC PAIN OF BOTH SHOULDERS: ICD-10-CM

## 2021-02-10 DIAGNOSIS — M25.511 CHRONIC PAIN OF BOTH SHOULDERS: ICD-10-CM

## 2021-02-10 DIAGNOSIS — R35.1 NOCTURIA MORE THAN TWICE PER NIGHT: ICD-10-CM

## 2021-02-10 DIAGNOSIS — M50.30 DDD (DEGENERATIVE DISC DISEASE), CERVICAL: ICD-10-CM

## 2021-02-10 DIAGNOSIS — H81.20 VESTIBULAR NEURITIS, UNSPECIFIED LATERALITY: ICD-10-CM

## 2021-02-10 DIAGNOSIS — H81.13 BENIGN PAROXYSMAL POSITIONAL VERTIGO DUE TO BILATERAL VESTIBULAR DISORDER: ICD-10-CM

## 2021-02-10 DIAGNOSIS — M25.512 CHRONIC PAIN OF BOTH SHOULDERS: ICD-10-CM

## 2021-02-10 DIAGNOSIS — E66.9 OBESITY (BMI 30.0-34.9): ICD-10-CM

## 2021-02-10 DIAGNOSIS — M48.061 SPINAL STENOSIS OF LUMBAR REGION, UNSPECIFIED WHETHER NEUROGENIC CLAUDICATION PRESENT: ICD-10-CM

## 2021-02-10 DIAGNOSIS — I10 BENIGN ESSENTIAL HYPERTENSION: ICD-10-CM

## 2021-02-10 DIAGNOSIS — J30.1 SEASONAL ALLERGIC RHINITIS DUE TO POLLEN: ICD-10-CM

## 2021-02-10 DIAGNOSIS — E55.9 VITAMIN D DEFICIENCY: Primary | ICD-10-CM

## 2021-02-10 PROCEDURE — G8536 NO DOC ELDER MAL SCRN: HCPCS | Performed by: FAMILY MEDICINE

## 2021-02-10 PROCEDURE — G8510 SCR DEP NEG, NO PLAN REQD: HCPCS | Performed by: FAMILY MEDICINE

## 2021-02-10 PROCEDURE — G0463 HOSPITAL OUTPT CLINIC VISIT: HCPCS | Performed by: FAMILY MEDICINE

## 2021-02-10 PROCEDURE — 1101F PT FALLS ASSESS-DOCD LE1/YR: CPT | Performed by: FAMILY MEDICINE

## 2021-02-10 PROCEDURE — G8754 DIAS BP LESS 90: HCPCS | Performed by: FAMILY MEDICINE

## 2021-02-10 PROCEDURE — G8417 CALC BMI ABV UP PARAM F/U: HCPCS | Performed by: FAMILY MEDICINE

## 2021-02-10 PROCEDURE — G8427 DOCREV CUR MEDS BY ELIG CLIN: HCPCS | Performed by: FAMILY MEDICINE

## 2021-02-10 PROCEDURE — 99214 OFFICE O/P EST MOD 30 MIN: CPT | Performed by: FAMILY MEDICINE

## 2021-02-10 PROCEDURE — 3017F COLORECTAL CA SCREEN DOC REV: CPT | Performed by: FAMILY MEDICINE

## 2021-02-10 PROCEDURE — G8752 SYS BP LESS 140: HCPCS | Performed by: FAMILY MEDICINE

## 2021-02-10 NOTE — PROGRESS NOTES
Chief Complaint   Patient presents with    Hypertension    Cholesterol Problem   1. Have you been to the ER, urgent care clinic since your last visit? Hospitalized since your last visit? No    2. Have you seen or consulted any other health care providers outside of the [address] [place] [address] since your last visit? Include any pap smears or colon screening.  Yes ENT and PT for balance Dr Deon Prado neuro Dr Darby Menon

## 2021-02-10 NOTE — PROGRESS NOTES
HISTORY OF PRESENT ILLNESS  MADELYN Stevens is a 65 y.o. Male with a history of cervical DDD and DJD, essential hypertension, bilateral shoulder pain, vitamin D deficiency and hyperlipidemia with LDL goal <130, who presents to the office today for f/u of these health problems. Pt saw Dr. Pkao Jamison (ENT) for his dizziness. He states that his dizziness is slowly resolving. He adds that during his visit with Dr. Pako Jamison, his BP was high. Pt notes that he was seen by Dr. Chari Cabrera ([facility]), who prescribed him HCTZ due to this high reading. Pt says that he is tolerating the HCTZ. He remarks that he continues to see Dr. Jareth Mora (ENT) for his dizziness. He affirms that Dr. Jareth Mora diagnosed pt with vestibular neuritis. He recalls that when he went on a trip and did some activity without having any of his dizzy symptoms. Pt denotes that he has not checked his BP recently. Pt reports that he has done regular walking up and down his stairs without any complications. He inquires about diuretics as they relate to losing weight. Pt denies unusual SOB, chest pain, and any recent ER visits or hospitalizations.        Past Medical History:   Diagnosis Date    Allergic rhinitis 2/27/2010    Cancer ([facility] [address] 2004    SKIN, LIP (MOHS PROCEDURE)    DDD,DJD cervicalC5-6,6-7foraminal encroachment on X ray-4/2013 5/17/2013    Hypertension     BORDERLINE; NO MEDS    Obesity (BMI 30.0-34.9) 2/5/2018    Other and unspecified hyperlipidemia 2/27/2010     Past Surgical History:   Procedure Laterality Date    ENDOSCOPY, COLON, DIAGNOSTIC      X2    HX CATARACT REMOVAL Bilateral 87-88    CONGENITAL CATARACTS; W/ IOL    HX ORTHOPAEDIC  08/2019    l-s spine     HX TONSIL AND ADENOIDECTOMY  1960    HX VASECTOMY  1989    NV SINUS SURGERY PROC UNLISTED  2001     Current Outpatient Medications on File Prior to Visit   Medication Sig Dispense Refill    hydroCHLOROthiazide (HYDRODIURIL) 25 mg tablet Take 1 Tab by mouth daily. Take in the morning with potassium rich food or beverage 90 Tab 1    simvastatin (ZOCOR) 40 mg tablet TAKE 1 TABLET BY MOUTH EVERY DAY EVERY NIGHT 90 Tab 1    tamsulosin (FLOMAX) 0.4 mg capsule Take 1 Cap by mouth daily. Indications: enlarged prostate with urination problem 30 Cap 11    Cholecalciferol, Vitamin D3, (VITAMIN D3) 1,000 unit cap Take 2,000 Units by mouth every other day. No current facility-administered medications on file prior to visit. Allergies   Allergen Reactions    Oxycodone Rash     Patient had full body chills 20 minutes after taking 5 mg. Redness, stuffy nose, and felt warm.  Pcn [Penicillins] Rash     Family History   Problem Relation Age of Onset    Cancer Mother         ovarian    Cancer Father         prostate?  Elevated Lipids Father     Heart Surgery Father         CABG at 58, lived to 80    No Known Problems Sister     Other Brother         factor 5      Social History     Socioeconomic History    Marital status:      Spouse name: Not on file    Number of children: Not on file    Years of education: Not on file    Highest education level: Not on file   Tobacco Use    Smoking status: Never Smoker    Smokeless tobacco: Never Used   Substance and Sexual Activity    Alcohol use: Yes     Comment: 12oz beer once monthly     Drug use: No    Sexual activity: Yes     Partners: Female   Other Topics Concern    Caffeine Concern No     Comment: no coffee, occ decaff tea, occ soda    Special Diet No    Exercise No     Comment: not much lately, usually plays tennis 2-3 x a week             Review of Systems   Constitutional: Negative for chills, diaphoresis, fever, malaise/fatigue and weight loss. Eyes: Negative for blurred vision, double vision, pain and redness. Respiratory: Negative for cough, shortness of breath and wheezing. Cardiovascular: Negative for chest pain, palpitations, orthopnea, claudication, leg swelling and PND.    Skin: Negative for itching and rash. Neurological: Negative for dizziness, tingling, tremors, sensory change, speech change, focal weakness, seizures, loss of consciousness, weakness and headaches. Results for orders placed or performed in visit on 84/97/98   METABOLIC PANEL, COMPREHENSIVE   Result Value Ref Range    Sodium 143 136 - 145 mmol/L    Potassium 4.0 3.5 - 5.1 mmol/L    Chloride 109 (H) 97 - 108 mmol/L    CO2 27 21 - 32 mmol/L    Anion gap 7 5 - 15 mmol/L    Glucose 87 65 - 100 mg/dL    BUN 16 6 - 20 MG/DL    Creatinine 0.69 (L) 0.70 - 1.30 MG/DL    BUN/Creatinine ratio 23 (H) 12 - 20      GFR est AA >60 >60 ml/min/1.73m2    GFR est non-AA >60 >60 ml/min/1.73m2    Calcium 9.6 8.5 - 10.1 MG/DL    Bilirubin, total 0.4 0.2 - 1.0 MG/DL    ALT (SGPT) 21 12 - 78 U/L    AST (SGOT) 23 15 - 37 U/L    Alk. phosphatase 100 45 - 117 U/L    Protein, total 6.9 6.4 - 8.2 g/dL    Albumin 3.7 3.5 - 5.0 g/dL    Globulin 3.2 2.0 - 4.0 g/dL    A-G Ratio 1.2 1.1 - 2.2     LIPID PANEL   Result Value Ref Range    LIPID PROFILE          Cholesterol, total 207 (H) <200 MG/DL    Triglyceride 133 <150 MG/DL    HDL Cholesterol 46 MG/DL    LDL, calculated 134.4 (H) 0 - 100 MG/DL    VLDL, calculated 26.6 MG/DL    CHOL/HDL Ratio 4.5 0.0 - 5.0     SAMPLES BEING HELD   Result Value Ref Range    SAMPLES BEING HELD 1SST     COMMENT        Add-on orders for these samples will be processed based on acceptable specimen integrity and analyte stability, which may vary by analyte. Physical Exam  Visit Vitals  /80   Pulse 76   Temp 98.1 °F (36.7 °C)   Resp 16   Ht 5' 10\" (1.778 m)   Wt 214 lb (97.1 kg)   SpO2 97%   BMI 30.71 kg/m²         Head: Normocephalic, without obvious abnormality, atraumatic  Eyes: conjunctivae/corneas clear. PERRL, EOM's intact.    Neck: supple, symmetrical, trachea midline, no adenopathy, thyroid: not enlarged, symmetric, no tenderness/mass/nodules, no carotid bruit and no JVD  Lungs: clear to auscultation bilaterally  Heart: regular rate and rhythm, S1, S2 normal, no murmur, click, rub or gallop  Extremities: extremities normal, atraumatic, no cyanosis or edema  Pulses: 2+ and symmetric  Lymph nodes: Cervical, supraclavicular, and axillary nodes normal.  Neurologic: Grossly normal          ASSESSMENT and PLAN    ICD-10-CM ICD-9-CM    1. Vitamin D deficiency  E55.9 268.9 VITAMIN D, 25 HYDROXY   2. Vestibular neuritis, unspecified laterality  H81.20 386.12     9/2020- Dr. Sasha Garay   3. Benign essential hypertension  I10 401.1    4. Hyperlipidemia LDL goal <130  E78.5 272.4    5. Chronic pain of both shoulders- worse in AM and loosens up with activity. M25.511 719.41     G89.29 338.29     M25.512     6. DDD,DJD cervicalC5-6,6-7foraminal encroachment on X ray-4/2013  M50.30 722.4    7. Spinal stenosis of lumbar region, unspecified whether neurogenic claudication present  M48.061 724.02    8. Seasonal allergic rhinitis due to pollen  J30.1 477.0    9. Benign paroxysmal positional vertigo due to bilateral vestibular disorder  H81.13 386.11    10. Obesity (BMI 30.0-34. 9)  E66.9 278.00    11. Nocturia more than twice per night  R35.1 788.43      Diagnoses and all orders for this visit:    1. Vitamin D deficiency  -     VITAMIN D, 25 HYDROXY; Future    2. Vestibular neuritis, unspecified laterality  Comments:  9/2020- Dr. Sasha Garay    3. Benign essential hypertension    4. Hyperlipidemia LDL goal <130    5. Chronic pain of both shoulders- worse in AM and loosens up with activity. 6. DDD,DJD cervicalC5-6,6-7foraminal encroachment on X ray-4/2013    7. Spinal stenosis of lumbar region, unspecified whether neurogenic claudication present    8. Seasonal allergic rhinitis due to pollen    9. Benign paroxysmal positional vertigo due to bilateral vestibular disorder    10. Obesity (BMI 30.0-34.9)    11.  Nocturia more than twice per night      Follow-up and Dispositions    · Return in about 6 months (around 8/10/2021) for F/U HTN and CHOL, F/U of obesity, balance concerns. lab results and schedule of future lab studies reviewed with patient  reviewed diet, exercise and weight control  cardiovascular risk and specific lipid/LDL goals reviewed  reviewed medications and side effects in detail  Please call my office if there are any questions- [phone]. I will arrange for follow up after the lab tests done from today return  Recommended a weekly \"heart check. \" I went into detail how to do this. Call for refills on medications as needed. Discussed expected course/resolution/complications of diagnosis in detail with patient. Medication risks/benefits/costs/interactions/alternatives discussed with patient. Pt was given an after visit summary which includes diagnoses, current medications & vitals. Pt expressed understanding with the diagnosis and plan      I encouraged him to work on his weight. We talked about the importance of reducing calories and that rarely do quick fix diets work  BMI is significantly elevated- in the obese range. I reviewed diet, exercise and weight control. Discussed weight control in detail, the importance of mainly decreased carbs, and for weight maintenance, exercise; discussed different diets and that it isn't as important to watch the type of foods as it is to decrease calorie intake no matter what type of diet you do, etc.       Total 30 minutes  re: Review of  the proper technique of checking the blood pressure- check it on an average day only, not on a stressful day, sitting, no exercise for at least 1 hour and not experiencing any new pain( chronic pain is OK). Patient encouraged to check BP sitting and standing at least once a month and to report these readings to me if > 140/ 90 on average , or if the standing BP is >  15 points lower than the sitting.      Always check it twice and if there is > 5 points decrease from the previous reading( top reading or systolic) keep checking it until it does not drop 5 points. Write only this final reading down, not the preceding readings. If out of these readings there is only 1 out of 4 or less > 971, or > 90 diastolic then your blood pressure is OK; it needs further treatment if it is above this. Also, don't forget,  as noted above, to check your blood pressure standing once a month; this is to detect a drop in your BP that might lead to fainting and serious injury; you check it standing with your arm hanging straight down and relaxed. Check it twice waiting 1 minute between the two readings. If, with either one of these 2 readings there is a > 15 point drop of the systolic compared to your sitting pressure( done before the standing BP), then let me know. Following these guidelines, continue to check your BP and write down only the ones described above and it will help me to effectively treat your blood pressure. Reviewed symptoms, or lack thereof, of hypertension and elevated cholesterol. Regular exercise is very important to your health; it helps mentally, physically, socially; it prevents injuries if done properly. Exercise, even as simple as walking 20-30 minutes daily has major benefits to your health even though your \"numbers\" are the same in the lab. See if you can add this into your daily regimen and after a few months it will become a regular habit-\"just something you do,\" like brushing your teeth. A combination of aerobic exercise and strengthening and stretching is felt to be the best for you, so this should be your ultimate goal.   This can be done in the privacy of your home or in a group setting as at the gym  Some prefer having a , others prefer to do exercise in groups or individually. Do what \"works\" for you. You need to make it simple and \"fun,\" or you most likely will not continue it. Recommended a weekly \"heart check. \" I went into detail how to do this.  Also, discussed symptoms of concern that were noted today in the note above, treatment options( including doing nothing), when to follow up before recommended time frame. Also, answered all questions. His inner ear problems seem to be slowly resolving. For some reason, his LDL, when it was checked in December, was 134 and prior to that it has been in the 75 range. He states that he is not missing doses, so some of this could be dietary or lab error. We will check that in 6 months along with his vitamin D level, which has not been checked in a couple of years. Chart says that he is on 2000 units but that is unclear. I encouraged him to check in and get on that dose regularly. Long discussion re: Covid 19 infection, prevention, treatment limitations, importance of masks and distancing, and the upcoming vaccines. At this point there are no plans to give it in the office- pull up the [address] on your computer and go to Covid 19 and update everyday. They are directng where it is to be given. Presently it is being given at all of the hospitals in non patient areas- classrooms, etc. It is also going to be given at the [facility] and is presently beng given at the [facility]. When it is time for you to get your vaccine( based mainly on age and possibly on health risk), they will have it all over the news as well as their website as where to go. We will probably send out an email when it becomes time for us to start giving it- I don't see that happening until into April or later. I would get whatever vaccine is available; the Demarco Peter and Suze Hammed are very similar in efficacy and side effects. This document was written by Luna Leblanc, as dictated by Suleiman Foster MD.  I have reviewed and agree with the above note and have made corrections where appropriate Ancelmo Barrientos M.D.

## 2021-04-28 ENCOUNTER — HOSPITAL ENCOUNTER (OUTPATIENT)
Dept: PHYSICAL THERAPY | Age: 66
Discharge: HOME | End: 2021-04-28
Payer: MEDICARE

## 2021-04-28 PROCEDURE — 97110 THERAPEUTIC EXERCISES: CPT | Performed by: PHYSICAL THERAPIST

## 2021-04-28 PROCEDURE — 97140 MANUAL THERAPY 1/> REGIONS: CPT | Performed by: PHYSICAL THERAPIST

## 2021-04-28 PROCEDURE — 97161 PT EVAL LOW COMPLEX 20 MIN: CPT | Performed by: PHYSICAL THERAPIST

## 2021-04-28 NOTE — PROGRESS NOTES
Physical Therapy at [facility],   a part of [facility] [place]  [address]  [place], [address]  Phone: [phone]  Fax: [phone]    Plan of Care/Statement of Necessity for Physical Therapy Services  2-15    Patient name: Alvarez Boyd  : 1955  Provider#: [id number]  Referral source: Radha Juarez MD      Medical/Treatment Diagnosis: Cervicalgia [M54.2]     Prior Hospitalization: see medical history     Comorbidities: see evaluation  Prior Level of Function: see evaluation  Medications: Verified on Patient Summary List  Start of Care: 19      Onset Date: chronic   The Plan of Care and following information is based on the information from the initial evaluation. Assessment/ key information: Pt is a 77year old male presenting with cervical pain, stiffness.  He will benefit from PT to address problem list below    Evaluation Complexity History MEDIUM  Complexity : 1-2 comorbidities / personal factors will impact the outcome/ POC ; Examination LOW Complexity : 1-2 Standardized tests and measures addressing body structure, function, activity limitation and / or participation in recreation  ;Presentation LOW Complexity : Stable, uncomplicated  ;Clinical Decision Making MEDIUM Complexity : FOTO score of 26-74  Overall Complexity Rating: LOW     Problem List: pain affecting function, decrease ROM, decrease ADL/ functional abilitiies, decrease activity tolerance and decrease flexibility/ joint mobility   Treatment Plan may include any combination of the following: Therapeutic exercise, Therapeutic activities, Neuromuscular re-education, Physical agent/modality, Gait/balance training, Manual therapy, Patient education, Self Care training and Functional mobility training  Patient / Family readiness to learn indicated by: asking questions, trying to perform skills and interest  Persons(s) to be included in education: patient (P)  Barriers to Learning/Limitations: None  Patient Goal (s): see evaluation  Patient Self Reported Health Status: good  Rehabilitation Potential: good    Short Term Goals: To be accomplished in 3-4 weeks:  1) Pt will be independent in initial HEP  2) Pt will report >/=25% improvement in symptoms   3) Pt will report compliance with ice/heat regimen in order to manage symptoms    Long Term Goals: To be accomplished in 10-12 weeks:  1) Pt will report being able to play tennis without cervical symptoms  2) Pt will report >/=75% improvement in symptoms  3) Pt will report return to gym routine with knowledge of appropriate modifications without cervical symptoms    Frequency / Duration: Patient to be seen 1-2 times per week for 10-12 weeks. Patient/ Caregiver education and instruction: self care, activity modification and exercises    [x]  Plan of care has been reviewed with PTA    Certification Period: 4/28/21- 7/25/21  Rand Norwood, PT 4/28/2021    ________________________________________________________________________    I certify that the above Therapy Services are being furnished while the patient is under my care. I agree with the treatment plan and certify that this therapy is necessary.     Physician's Signature:____________________  Date:____________Time: _________         Juan Levi MD

## 2021-04-28 NOTE — PROGRESS NOTES
[facility]  [address] [facility], [address]  Phone: [phone]  Fax: [phone]    PT INITIAL EVALUATION NOTE - [facility] 2-15    Patient Name: Lola Begun  Date:2021  : 1955  [x]  Patient  Verified  Payor: Tresa Garcia / Plan: [place] PART [place] & B / Product Type: Medicare /    In time: 218 A  Out time: 7540 A  Total Treatment Time (min): 55  Total Timed Codes (min): 25  1:1 Treatment Time ( [address] only): 55   Visit #: 1     Treatment Area: Cervicalgia [M54.2]    Subjective: Any medication changes, allergies to medications, adverse drug reactions, diagnosis change, or new procedure performed?: [] No    [x] Yes (see summary    Date of onset/injury:   Pt presents with cervical \"stiffness\". In 2020 he woke up with dizziness, was nauseous that lasted several days. Went to [facility] ER-- \"vestibular neuritis. Inflammation of my balance nerve\". He reports it was supposed to be a 3-6 month recovery period, he has been seeing a vestibular PT at [address] [place] [address]-- he has been getting better. However, he feels that his neck is very \"stiff\" and sometimes painful. He reports that his neck has been \"stiff\" for several years. Denies UE radicular symptoms    Pain:   1/10 max 1/10 min 1/10 now       Aggravated by: Inc'd activity, movement  Eased by: rest    Location and description of symptoms:  cervical    Diagnostic Tests: n/a  Social/Recreation/Work: Semi-retired; works in sales. He enjoys playing tennis which is just started back to playing since Sept.   Prior level of function: chronic neck \"stiffness\"; worsening since onset of vestibular problems in Sept  Patient goal(s): \"loosen muscles in my neck\"  PMH: Significant for lumbar laminectomy 2019; high BP   Headaches: Do you have headaches?  [x] Yes   [] No    Objective:      Observation/posture: fair sitting, standing posture-- protracted shoulders bilat, forward head     Active Movements:   AROM Degrees Comments:pain, area   Forward flexion 40 inc'd dizzy symptoms   Extension 35 inc'd dizzy symptoms   Rotation right 45 No change   Rotation left 45 No change   SB right approx 15 Dizzy, stiff   SB left approx 15  Dizzy, stiff     UE AROM: bilat shoulder flex, abd WNL    Strength (Upper Extremities):    R (0-5) L (0-5) N/T   Shoulder Elevation (C2-4) 5 5 []   Shoulder Abduction (C5) 5 5 []   Elbow Flexion (C6)  5 5 []   Elbow Extension (C7) 5 5 []   Wrist Flexion (C7)   [x]   Wrist Extension (C6)   [x]   Thumb Extension (C8)   [x]   Finger Abduction/Inter.  (T1)   [x]     Palpation:  TTP bilat UT, LS, suboccipitals    Special Tests:  Cervical:        Vertebral Artery:   [] R    [] L    [] +    [x] -       Spurling's:   [] R    [] L    [] +    [x] -       Distraction:   [] R    [] L    [] +    [x] -       Compression:  [] R    [] L    [] +    [x] -    Outcome Measure: Patient presents with a FOTO Score of  NT.      15 min Therapeutic Exercise:  [x] See flow sheet : instructed in HEP   Rationale: increase ROM and increase strength to improve the patients ability to perform daily chores, activities with dec'd symptoms    10 min Manual Therapy:  STM bilat UT, LS  Manual cervical distraction   Rationale: decrease trigger points, improve tissue extensibilty to improve the patients ability to play tennis, perform daily chores with dec'd symptoms          With   [x] TE   [] TA   [] neuro   [] other: Patient Education: [x] Review HEP    [] Progressed/Changed HEP based on:   [x] positioning   [] body mechanics   [] transfers   [x] heat/ice application    [x] other: camille/phys; PT POC; importance of performing HEP in order to maximize benefit of PT; use of ice for 10-15 min      Pain Level (0-10 scale) post treatment: not reported    Assessment:  [x] See POC  [] Other:    Plan:   [x] See Aaron Celaya PT DPT     4/28/2021  9:21 AM

## 2021-05-05 ENCOUNTER — HOSPITAL ENCOUNTER (OUTPATIENT)
Dept: PHYSICAL THERAPY | Age: 66
Discharge: HOME | End: 2021-05-05
Payer: MEDICARE

## 2021-05-05 DIAGNOSIS — E78.5 HYPERLIPIDEMIA LDL GOAL <130: ICD-10-CM

## 2021-05-05 PROCEDURE — 97110 THERAPEUTIC EXERCISES: CPT | Performed by: PHYSICAL THERAPIST

## 2021-05-05 PROCEDURE — 97140 MANUAL THERAPY 1/> REGIONS: CPT | Performed by: PHYSICAL THERAPIST

## 2021-05-05 RX ORDER — SIMVASTATIN 40 MG/1
TABLET, FILM COATED ORAL
Qty: 90 TAB | Refills: 1 | Status: SHIPPED | OUTPATIENT
Start: 2021-05-05 | End: 2021-10-29

## 2021-05-05 NOTE — PROGRESS NOTES
PT DAILY TREATMENT NOTE - [facility] 2-15    Patient Name: Yue Nuñez  Date:2021  : 1955  [x]  Patient  Verified  Payor: [place] MED[place] / Plan: [place] MEDICARE PART A & B / Product Type: Medicare /    In time: 4:25 P  Out time: 5:15 P  Total Treatment Time (min): 50  Total Timed Codes (min): 50  1:1 Treatment Time ( only): 39   Visit #:  2    Treatment Area: Cervicalgia [M54.2]    SUBJECTIVE  Pain Level (0-10 scale): 0  Any medication changes, allergies to medications, adverse drug reactions, diagnosis change, or new procedure performed?: [x] No    [] Yes (see summary sheet for update)  Subjective functional status/changes:   [] No changes reported  Pt reports that he has been compliant with HEP; his tennis matches got rained out earlier this week.    He still reports a \"numbness\" in the back of his head-- states this has been constant for several years-- has been to the doctor for it years ago but no one figured out what it was coming from    OBJECTIVE    Modality rationale: decrease pain and increase tissue extensibility to improve the patients ability to perform daily chores , activities   Min Type Additional Details       [] Estim: []Att   []Unatt    []TENS instruct                  []IFC  []Premod   []NMES                     []Other:  []w/US   []w/ice   []w/heat  Position:  Location:       []  Traction: [] Cervical       []Lumbar                       [] Prone          []Supine                       []Intermittent   []Continuous Lbs:  [] before manual  [] after manual  []w/heat    []  Ice     []  Heat  []  Ice massage Position:  Location:      []  Vasopneumatic Device Pressure:       [] lo [] med [] hi   Temperature:      [x] Skin assessment post-treatment:  [x]intact []redness- no adverse reaction    []redness  adverse reaction:     35 min Therapeutic Exercise:  [x] See flow sheet : progressed per flow sheet   Rationale: increase ROM and increase strength to improve the patients ability to perform daily activities, play tennis with dec'd symptoms    15 min Manual Therapy:  STM/TPR bilat UT, LS  Suboccipital release x3  Man UT stretch bilat  Man cervical distraction   Rationale: decrease pain, increase ROM, increase tissue extensibility and decrease trigger points to improve the patients ability to play tennis, perform daily chores with dec'd symptoms            With   [] TE   [] TA   [] Neuro   [] SC   [] other: Patient Education: [x] Review HEP    [] Progressed/Changed HEP based on:   [] positioning   [] body mechanics   [] transfers   [] heat/ice application    [] other:      Other Objective/Functional Measures: n/a     Pain Level (0-10 scale) post treatment: 0    ASSESSMENT/Changes in Function:   Pt blu addition of new exercises well today; plan to update HEP handout next visit  Patient will continue to benefit from skilled PT services to modify and progress therapeutic interventions, address functional mobility deficits, address ROM deficits, address strength deficits, analyze and address soft tissue restrictions, analyze and cue movement patterns and analyze and modify body mechanics/ergonomics to attain remaining goals.      [x]  See Plan of Care  []  See progress note/recertification  []  See Discharge Summary         Progress towards goals / Updated goals:  NT    PLAN  []  Upgrade activities as tolerated     [x]  Continue plan of care  []  Update interventions per flow sheet       []  Discharge due to:_  []  Other:_      Jorge Cooley, PT 5/5/2021

## 2021-05-05 NOTE — TELEPHONE ENCOUNTER
MD Darell Park,    Patient call requesting refill of simvastatin.   teodoro Shelton    Last Visit: 2/10/21 MD Darell Park, lipid lab done 12/2020  Next Appointment: Not scheduled  Previous Refill Encounter(s): 10/28/20 90 + 1    Requested Prescriptions     Pending Prescriptions Disp Refills    simvastatin (ZOCOR) 40 mg tablet 90 Tab 1     Sig: TAKE 1 TABLET BY MOUTH EVERY DAY EVERY NIGHT

## 2021-05-11 ENCOUNTER — HOSPITAL ENCOUNTER (OUTPATIENT)
Dept: PHYSICAL THERAPY | Age: 66
Discharge: HOME | End: 2021-05-11
Payer: MEDICARE

## 2021-05-11 PROCEDURE — 97140 MANUAL THERAPY 1/> REGIONS: CPT | Performed by: PHYSICAL THERAPIST

## 2021-05-11 PROCEDURE — 97110 THERAPEUTIC EXERCISES: CPT | Performed by: PHYSICAL THERAPIST

## 2021-05-11 NOTE — PROGRESS NOTES
PT DAILY TREATMENT NOTE - [facility] 2-15    Patient Name: Racquel Bynum  Date:2021  : 1955  [x]  Patient  Verified  Payor: Min Howard / Plan: [place] MEDICARE PART A & B / Product Type: Medicare /    In time: 8:30 A  Out time: 9:35 A  Total Treatment Time (min): 65  Total Timed Codes (min): 55  1:1 Treatment Time ( [address] only): 39   Visit #:  3    Treatment Area: Cervicalgia [M54.2]    SUBJECTIVE  Pain Level (0-10 scale): 0  Any medication changes, allergies to medications, adverse drug reactions, diagnosis change, or new procedure performed?: [x] No    [] Yes (see summary sheet for update)  Subjective functional status/changes:   [] No changes reported  Pt reports he only had 5# weights at home which he used for sidelying ER \"I think it was too much\".  He played tennis over the weekend    OBJECTIVE    Modality rationale: decrease pain and increase tissue extensibility to improve the patients ability to perform daily chores , activities   Min Type Additional Details       [] Estim: []Att   []Unatt    []TENS instruct                  []IFC  []Premod   []NMES                     []Other:  []w/US   []w/ice   []w/heat  Position:  Location:       []  Traction: [] Cervical       []Lumbar                       [] Prone          []Supine                       []Intermittent   []Continuous Lbs:  [] before manual  [] after manual  []w/heat    []  Ice     []  Heat  []  Ice massage Position:  Location:      []  Vasopneumatic Device Pressure:       [] lo [] med [] hi   Temperature:      [x] Skin assessment post-treatment:  [x]intact []redness- no adverse reaction    []redness  adverse reaction:     40 min Therapeutic Exercise:  [x] See flow sheet : progressed per flow sheet   Rationale: increase ROM and increase strength to improve the patients ability to perform daily activities, play tennis with dec'd symptoms    15 min Manual Therapy:  STM/TPR bilat UT, LS  Suboccipital release x3  Man UT stretch bilat  Man cervical distraction   Rationale: decrease pain, increase ROM, increase tissue extensibility and decrease trigger points to improve the patients ability to play tennis, perform daily chores with dec'd symptoms            With   [] TE   [] TA   [] Neuro   [] SC   [] other: Patient Education: [x] Review HEP    [] Progressed/Changed HEP based on:   [] positioning   [] body mechanics   [] transfers   [] heat/ice application    [] other:      Other Objective/Functional Measures: n/a     Pain Level (0-10 scale) post treatment: 0    ASSESSMENT/Changes in Function:   Continuing to progress well. Advised to use water bottle for weight during sidelying ER; avoid using 5# weights. Patient will continue to benefit from skilled PT services to modify and progress therapeutic interventions, address functional mobility deficits, address ROM deficits, address strength deficits, analyze and address soft tissue restrictions, analyze and cue movement patterns and analyze and modify body mechanics/ergonomics to attain remaining goals.      [x]  See Plan of Care  []  See progress note/recertification  []  See Discharge Summary         Progress towards goals / Updated goals:  NT    PLAN  []  Upgrade activities as tolerated     [x]  Continue plan of care  []  Update interventions per flow sheet       []  Discharge due to:_  []  Other:_      Render Sportsman, PT 5/11/2021

## 2021-05-13 DIAGNOSIS — I10 BENIGN ESSENTIAL HYPERTENSION: ICD-10-CM

## 2021-05-13 NOTE — TELEPHONE ENCOUNTER
MD Mariel Diaz,    Patient call requesting refill of microzide 25mg. Thanks, Yuri Jacobs    Last Visit: 2/10/21 MD Mariel Diaz  Next Appointment: 8/16/21 MD Mariel Diaz  Previous Refill Encounter(s): 11/20/20 90 + 1    Requested Prescriptions     Pending Prescriptions Disp Refills    hydroCHLOROthiazide (HYDRODIURIL) 25 mg tablet 90 Tab 1     Sig: Take 1 Tab by mouth daily.  Take in the morning with potassium rich food or beverage

## 2021-05-14 ENCOUNTER — HOSPITAL ENCOUNTER (OUTPATIENT)
Dept: PHYSICAL THERAPY | Age: 66
Discharge: HOME | End: 2021-05-14
Payer: MEDICARE

## 2021-05-14 PROCEDURE — 97140 MANUAL THERAPY 1/> REGIONS: CPT | Performed by: PHYSICAL THERAPY ASSISTANT

## 2021-05-14 PROCEDURE — 97110 THERAPEUTIC EXERCISES: CPT | Performed by: PHYSICAL THERAPY ASSISTANT

## 2021-05-14 RX ORDER — HYDROCHLOROTHIAZIDE 25 MG/1
25 TABLET ORAL DAILY
Qty: 90 TAB | Refills: 1 | Status: SHIPPED | OUTPATIENT
Start: 2021-05-14 | End: 2021-11-16

## 2021-05-14 NOTE — PROGRESS NOTES
PT DAILY TREATMENT NOTE - [facility] 2-15    Patient Name: Diane Ashley  Date:2021  : 1955  [x]  Patient  Verified  Payor: Xiomy Moctezuma / Plan: [place] MEDICARE PART A & B / Product Type: Medicare /    In time: 8:25 A  Out time: 9:35 A  Total Treatment Time (min): 70  Total Timed Codes (min): 60  1:1 Treatment Time ( only): 39   Visit #:  4    Treatment Area: Cervicalgia [M54.2]    SUBJECTIVE  Pain Level (0-10 scale): 0  Any medication changes, allergies to medications, adverse drug reactions, diagnosis change, or new procedure performed?: [x] No    [] Yes (see summary sheet for update)  Subjective functional status/changes:   [] No changes reported  Pt reports he played tennis yesterday, 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      OBJECTIVE    Modality rationale: decrease pain and increase tissue extensibility to improve the patients ability to perform daily chores , activities   Min Type Additional Details       [] Estim: []Att   []Unatt    []TENS instruct                  []IFC  []Premod   []NMES                     []Other:  []w/US   []w/ice   []w/heat  Position:  Location:       []  Traction: [] Cervical       []Lumbar                       [] Prone          []Supine                       []Intermittent   []Continuous Lbs:  [] before manual  [] after manual  []w/heat   10 [x]  Ice     []  Heat  []  Ice massage Position: supine with LE's elevated  Location: cervical spine      []  Vasopneumatic Device Pressure:       [] lo [] med [] hi   Temperature:      [x] Skin assessment post-treatment:  [x]intact []redness- no adverse reaction    []redness  adverse reaction:     40 min Therapeutic Exercise:  [x] See flow sheet : progressed per flow sheet   Rationale: increase ROM and increase strength to improve the patients ability to perform daily activities, play tennis with dec'd symptoms    15 min Manual Therapy:  STM/TPR bilat UT, LS  Suboccipital release x3  Man UT stretch bilat  Man cervical distraction   Rationale: decrease pain, increase ROM, increase tissue extensibility and decrease trigger points to improve the patients ability to play tennis, perform daily chores with dec'd symptoms            With   [] TE   [] TA   [] Neuro   [] SC   [] other: Patient Education: [x] Review HEP    [] Progressed/Changed HEP based on:   [] positioning   [] body mechanics   [] transfers   [] heat/ice application    [] other:      Other Objective/Functional Measures: n/a     Pain Level (0-10 scale) post treatment: 0    ASSESSMENT/Changes in Function:   Minor cues for correct scapular positioning during s/l ER. Tolerated session well today. Patient will continue to benefit from skilled PT services to modify and progress therapeutic interventions, address functional mobility deficits, address ROM deficits, address strength deficits, analyze and address soft tissue restrictions, analyze and cue movement patterns and analyze and modify body mechanics/ergonomics to attain remaining goals.      [x]  See Plan of Care  []  See progress note/recertification  []  See Discharge Summary         Progress towards goals / Updated goals:  NT    PLAN  []  Upgrade activities as tolerated     [x]  Continue plan of care  []  Update interventions per flow sheet       []  Discharge due to:_  []  Other:_      Alisha Baez, PTA 5/14/2021

## 2021-05-18 ENCOUNTER — HOSPITAL ENCOUNTER (OUTPATIENT)
Dept: PHYSICAL THERAPY | Age: 66
Discharge: HOME | End: 2021-05-18
Payer: MEDICARE

## 2021-05-18 PROCEDURE — 97110 THERAPEUTIC EXERCISES: CPT | Performed by: PHYSICAL THERAPIST

## 2021-05-18 PROCEDURE — 97140 MANUAL THERAPY 1/> REGIONS: CPT | Performed by: PHYSICAL THERAPIST

## 2021-05-18 NOTE — PROGRESS NOTES
PT DAILY TREATMENT NOTE - [facility] 2-15    Patient Name: Micky Nieto  Date:2021  : 1955  [x]  Patient  Verified  Payor: Ousmane Muse / Plan: [place] MEDICARE PART A & B / Product Type: Medicare /    In time: 8:30 A  Out time: 9:45 A  Total Treatment Time (min): 60  Total Timed Codes (min): 50  1:1 Treatment Time ( only): 40   Visit #:  5    Treatment Area: Cervicalgia [M54.2]    SUBJECTIVE  Pain Level (0-10 scale): 0  Any medication changes, allergies to medications, adverse drug reactions, diagnosis change, or new procedure performed?: [x] No    [] Yes (see summary sheet for update)  Subjective functional status/changes:   [] No changes reported  Pt states he had appt with vestibular PT yesterday-- he has gotten better but she gave him more stuff to work on; told him that he needs to work on loosening his suboccipital muscles. 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  \"my neck has been stiff my whole life\"    OBJECTIVE    Modality rationale: decrease pain and increase tissue extensibility to improve the patients ability to perform daily chores , activities   Min Type Additional Details       [] Estim: []Att   []Unatt    []TENS instruct                  []IFC  []Premod   []NMES                     []Other:  []w/US   []w/ice   []w/heat  Position:  Location:       []  Traction: [] Cervical       []Lumbar                       [] Prone          []Supine                       []Intermittent   []Continuous Lbs:  [] before manual  [] after manual  []w/heat   10 [x]  Ice     []  Heat  []  Ice massage Position: supine with LE's elevated  Location: cervical spine      []  Vasopneumatic Device Pressure:       [] lo [] med [] hi   Temperature:      [x] Skin assessment post-treatment:  [x]intact []redness- no adverse reaction    []redness  adverse reaction:     40 min Therapeutic Exercise:  [x] See flow sheet : progressed per flow sheet   Rationale: increase ROM and increase strength to improve the patients ability to perform daily activities, play tennis with dec'd symptoms    10 min Manual Therapy:  STM/TPR bilat UT, LS  Suboccipital release x3  Man UT stretch bilat  Man cervical distraction   Rationale: decrease pain, increase ROM, increase tissue extensibility and decrease trigger points to improve the patients ability to play tennis, perform daily chores with dec'd symptoms            With   [] TE   [] TA   [] Neuro   [] SC   [] other: Patient Education: [x] Review HEP    [] Progressed/Changed HEP based on:   [] positioning   [] body mechanics   [] transfers   [] heat/ice application    [] other:      Other Objective/Functional Measures: n/a     Pain Level (0-10 scale) post treatment: 0    ASSESSMENT/Changes in Function:   Lorenzo progression of strengthening exercises well today  Patient will continue to benefit from skilled PT services to modify and progress therapeutic interventions, address functional mobility deficits, address ROM deficits, address strength deficits, analyze and address soft tissue restrictions, analyze and cue movement patterns and analyze and modify body mechanics/ergonomics to attain remaining goals.      [x]  See Plan of Care  []  See progress note/recertification  []  See Discharge Summary         Progress towards goals / Updated goals:  NT    PLAN  []  Upgrade activities as tolerated     [x]  Continue plan of care  []  Update interventions per flow sheet       []  Discharge due to:_  []  Other:_      Erik Patel, PT 5/18/2021

## 2021-05-20 ENCOUNTER — APPOINTMENT (OUTPATIENT)
Dept: PHYSICAL THERAPY | Age: 66
End: 2021-05-20
Payer: MEDICARE

## 2021-05-21 ENCOUNTER — HOSPITAL ENCOUNTER (OUTPATIENT)
Dept: PHYSICAL THERAPY | Age: 66
Discharge: HOME | End: 2021-05-21
Payer: MEDICARE

## 2021-05-21 PROCEDURE — 97110 THERAPEUTIC EXERCISES: CPT | Performed by: PHYSICAL THERAPIST

## 2021-05-21 PROCEDURE — 97140 MANUAL THERAPY 1/> REGIONS: CPT | Performed by: PHYSICAL THERAPIST

## 2021-05-21 NOTE — PROGRESS NOTES
PT DAILY TREATMENT NOTE - [facility] 2-15    Patient Name: Micky Nieto  Date:2021  : 1955  [x]  Patient  Verified  Payor: Ousmane Muse / Plan: [place] MEDICARE PART A & B / Product Type: Medicare /    In time: 8:15 A  Out time: 9:10 A  Total Treatment Time (min): 55  Total Timed Codes (min): 55  1:1 Treatment Time ( only): 54   Visit #:  6    Treatment Area: Cervicalgia [M54.2]    SUBJECTIVE  Pain Level (0-10 scale): 0  Any medication changes, allergies to medications, adverse drug reactions, diagnosis change, or new procedure performed?: [x] No    [] Yes (see summary sheet for update)  Subjective functional status/changes:   [] No changes reported  Pt reports that he is still having dizziness, 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  Feels that the muscles around his should blades feel like they are getting stronger    OBJECTIVE    Modality rationale: decrease pain and increase tissue extensibility to improve the patients ability to perform daily chores , activities   Min Type Additional Details       [] Estim: []Att   []Unatt    []TENS instruct                  []IFC  []Premod   []NMES                     []Other:  []w/US   []w/ice   []w/heat  Position:  Location:       []  Traction: [] Cervical       []Lumbar                       [] Prone          []Supine                       []Intermittent   []Continuous Lbs:  [] before manual  [] after manual  []w/heat   n/a [x]  Ice     []  Heat  []  Ice massage Position: supine with LE's elevated  Location: cervical spine      []  Vasopneumatic Device Pressure:       [] lo [] med [] hi   Temperature:      [x] Skin assessment post-treatment:  [x]intact []redness- no adverse reaction    []redness  adverse reaction:     40 min Therapeutic Exercise:  [x] See flow sheet : progressed per flow sheet   Rationale: increase ROM and increase strength to improve the patients ability to perform daily activities, play tennis with dec'd symptoms    15 min Manual Therapy:  STM/TPR bilat UT, LS  Suboccipital release x3  Man UT stretch bilat  Man cervical distraction  Prone grade III thoracic PA mobs   Rationale: decrease pain, increase ROM, increase tissue extensibility and decrease trigger points to improve the patients ability to play tennis, perform daily chores with dec'd symptoms            With   [] TE   [] TA   [] Neuro   [] SC   [] other: Patient Education: [x] Review HEP    [] Progressed/Changed HEP based on:   [] positioning   [] body mechanics   [] transfers   [] heat/ice application    [] other:      Other Objective/Functional Measures: n/a     Pain Level (0-10 scale) post treatment: 0    ASSESSMENT/Changes in Function:   Continuing to progress well  Patient will continue to benefit from skilled PT services to modify and progress therapeutic interventions, address functional mobility deficits, address ROM deficits, address strength deficits, analyze and address soft tissue restrictions, analyze and cue movement patterns and analyze and modify body mechanics/ergonomics to attain remaining goals.      [x]  See Plan of Care  []  See progress note/recertification  []  See Discharge Summary         Progress towards goals / Updated goals:  NT    PLAN  []  Upgrade activities as tolerated     [x]  Continue plan of care  []  Update interventions per flow sheet       []  Discharge due to:_  []  Other:_      Kanchan Stein, PT 5/21/2021

## 2021-05-25 ENCOUNTER — HOSPITAL ENCOUNTER (OUTPATIENT)
Dept: PHYSICAL THERAPY | Age: 66
Discharge: HOME | End: 2021-05-25
Payer: MEDICARE

## 2021-05-25 PROCEDURE — 97140 MANUAL THERAPY 1/> REGIONS: CPT | Performed by: PHYSICAL THERAPIST

## 2021-05-25 PROCEDURE — 97110 THERAPEUTIC EXERCISES: CPT | Performed by: PHYSICAL THERAPIST

## 2021-05-25 NOTE — PROGRESS NOTES
PT DAILY TREATMENT NOTE - [facility] 2-15    Patient Name: Jaylen Kramer  Date:2021  : 1955  [x]  Patient  Verified  Payor: [place] MEDICARE / Plan: [place] MEDICARE PART A & B / Product Type: Medicare /    In time: 8:30 A  Out time: 9:35 A  Total Treatment Time (min): 60  Total Timed Codes (min): 60  1:1 Treatment Time ( only): 40   Visit #:  7    Treatment Area: Cervicalgia [M54.2]    SUBJECTIVE  Pain Level (0-10 scale): 0  Any medication changes, allergies to medications, adverse drug reactions, diagnosis change, or new procedure performed?: [x] No    [] Yes (see summary sheet for update)  Subjective functional status/changes:   [] No changes reported  Pt reports that his balance is feeling off. He goes back to vestibular PT in 1-2 months.  He feels like his neck stiffness has improved    OBJECTIVE    Modality rationale: decrease pain and increase tissue extensibility to improve the patients ability to perform daily chores , activities   Min Type Additional Details       [] Estim: []Att   []Unatt    []TENS instruct                  []IFC  []Premod   []NMES                     []Other:  []w/US   []w/ice   []w/heat  Position:  Location:       []  Traction: [] Cervical       []Lumbar                       [] Prone          []Supine                       []Intermittent   []Continuous Lbs:  [] before manual  [] after manual  []w/heat   n/a [x]  Ice     []  Heat  []  Ice massage Position: supine with LE's elevated  Location: cervical spine      []  Vasopneumatic Device Pressure:       [] lo [] med [] hi   Temperature:      [x] Skin assessment post-treatment:  [x]intact []redness- no adverse reaction    []redness  adverse reaction:     45 min Therapeutic Exercise:  [x] See flow sheet : progressed per flow sheet   Rationale: increase ROM and increase strength to improve the patients ability to perform daily activities, play tennis with dec'd symptoms    15 min Manual Therapy:  STM/TPR bilat UT, LS  Suboccipital release x3  Man UT stretch bilat  Man cervical distraction  Prone grade III thoracic PA mobs   Rationale: decrease pain, increase ROM, increase tissue extensibility and decrease trigger points to improve the patients ability to play tennis, perform daily chores with dec'd symptoms            With   [] TE   [] TA   [] Neuro   [] SC   [] other: Patient Education: [x] Review HEP    [] Progressed/Changed HEP based on:   [] positioning   [] body mechanics   [] transfers   [] heat/ice application    [] other:      Other Objective/Functional Measures: n/a     Pain Level (0-10 scale) post treatment: 0    ASSESSMENT/Changes in Function:   Educated pt on use of theracane for self TPR/STM over bilat UT/LS. Patient will continue to benefit from skilled PT services to modify and progress therapeutic interventions, address functional mobility deficits, address ROM deficits, address strength deficits, analyze and address soft tissue restrictions, analyze and cue movement patterns and analyze and modify body mechanics/ergonomics to attain remaining goals. [x]  See Plan of Care  []  See progress note/recertification  []  See Discharge Summary         Progress towards goals / Updated goals:  NT    PLAN  []  Upgrade activities as tolerated     [x]  Continue plan of care  []  Update interventions per flow sheet       []  Discharge due to:_  [x]  Other:_  Reassessment next visit.     John Sands, PT 5/25/2021

## 2021-05-28 ENCOUNTER — HOSPITAL ENCOUNTER (OUTPATIENT)
Dept: PHYSICAL THERAPY | Age: 66
Discharge: HOME | End: 2021-05-28
Payer: MEDICARE

## 2021-05-28 PROCEDURE — 97110 THERAPEUTIC EXERCISES: CPT | Performed by: PHYSICAL THERAPIST

## 2021-05-28 PROCEDURE — 97140 MANUAL THERAPY 1/> REGIONS: CPT | Performed by: PHYSICAL THERAPIST

## 2021-05-28 NOTE — PROGRESS NOTES
PT DAILY TREATMENT NOTE - [facility] 2-15    Patient Name: Leroy Lilly  Date:2021  : 1955  [x]  Patient  Verified  Payor: Michael Perdomo / Plan: [place] / Product Type: Medicare /    In time: 8:15 A  Out time: 36 A  Total Treatment Time (min): 70  Total Timed Codes (min): 70  1:1 Treatment Time ( only): 45   Visit #:  8    Treatment Area: Cervicalgia [M54.2]    SUBJECTIVE  Pain Level (0-10 scale): 0  Any medication changes, allergies to medications, adverse drug reactions, diagnosis change, or new procedure performed?: [x] No    [] Yes (see summary sheet for update)  Subjective functional status/changes:   [] No changes reported  Pt states that he is still have \"balance\" problems when looking up or down which effects him when playing tennis.  He does not have any neck pain and feels that his stiffness has improved    OBJECTIVE    Modality rationale: decrease pain and increase tissue extensibility to improve the patients ability to perform daily chores , activities   Min Type Additional Details       [] Estim: []Att   []Unatt    []TENS instruct                  []IFC  []Premod   []NMES                     []Other:  []w/US   []w/ice   []w/heat  Position:  Location:       []  Traction: [] Cervical       []Lumbar                       [] Prone          []Supine                       []Intermittent   []Continuous Lbs:  [] before manual  [] after manual  []w/heat   n/a [x]  Ice     []  Heat  []  Ice massage Position: supine with LE's elevated  Location: cervical spine      []  Vasopneumatic Device Pressure:       [] lo [] med [] hi   Temperature:      [x] Skin assessment post-treatment:  [x]intact []redness- no adverse reaction    []redness  adverse reaction:     55 min Therapeutic Exercise:  [x] See flow sheet : progressed per flow sheet   Rationale: increase ROM and increase strength to improve the patients ability to perform daily activities, play tennis with dec'd symptoms    15 min Manual Therapy:  STM/TPR bilat UT, LS  Suboccipital release x3  Man UT stretch bilat  Man cervical distraction  Prone grade III thoracic PA mobs   Rationale: decrease pain, increase ROM, increase tissue extensibility and decrease trigger points to improve the patients ability to play tennis, perform daily chores with dec'd symptoms            With   [] TE   [] TA   [] Neuro   [] SC   [] other: Patient Education: [x] Review HEP    [] Progressed/Changed HEP based on:   [] positioning   [] body mechanics   [] transfers   [] heat/ice application    [] other:      Other Objective/Functional Measures: Active Movements:   AROM Degrees Comments:pain, area   Forward flexion 58 inc'd dizzy symptoms   Extension 58 inc'd dizzy symptoms   Rotation right 90 No change   Rotation left 85 No change   SB right 35 No change   SB left 30  No change      UE AROM: bilat shoulder flex, abd WNL     Strength (Upper Extremities):     R (0-5) L (0-5) N/T   Shoulder Elevation (C2-4) 5 5 []?    Shoulder Abduction (C5) 5 5 []?    Elbow Flexion (C6)  5 5 []?    Elbow Extension (C7) 5 5 []?    Wrist Flexion (C7)     [x]?    Wrist Extension (C6)     [x]?     Thumb Extension (C8)     [x]?     Finger Abduction/Inter. (T1)     [x]?        Palpation:  TTP bilat UT, LS, suboccipitals       Pain Level (0-10 scale) post treatment: 0    ASSESSMENT/Changes in Function:   Pt has completed 9 skilled PT visits. He demonstrates improvement in cervical ROM since beginning PT; however he continues to demonstrate dizziness, balance issues mary kay while playing tennis when looking up/down. Encouraged pt to f/u with vestibular PT and /or MD regarding continued vestibular symptoms.  Plan for pt to trial with HEP independently for 2 weeks   Patient will continue to benefit from skilled PT services to modify and progress therapeutic interventions, address functional mobility deficits, address ROM deficits, address strength deficits, analyze and address soft tissue restrictions, analyze and cue movement patterns and analyze and modify body mechanics/ergonomics to attain remaining goals. [x]  See Plan of Care  []  See progress note/recertification  []  See Discharge Summary         Progress towards goals / Updated goals:  Short Term Goals: To be accomplished in 3-4 weeks:  1) Pt will be independent in initial HEP MET  2) Pt will report >/=25% improvement in symptoms  not met   3) Pt will report compliance with ice/heat regimen in order to manage symptoms MET     Long Term Goals:  To be accomplished in 10-12 weeks:  1) Pt will report being able to play tennis without cervical symptoms not met  2) Pt will report >/=75% improvement in symptoms not met  3) Pt will report return to gym routine with knowledge of appropriate modifications without cervical symptoms not met    PLAN  []  Upgrade activities as tolerated     [x]  Continue plan of care  []  Update interventions per flow sheet       []  Discharge due to:_  [x]  Other:_ Pt to trial independently with HEP, f/u in 2 weeks    Sakshi Bruno, PT 5/28/2021

## 2021-06-11 ENCOUNTER — HOSPITAL ENCOUNTER (OUTPATIENT)
Dept: PHYSICAL THERAPY | Age: 66
Discharge: HOME | End: 2021-06-11
Payer: MEDICARE

## 2021-06-11 PROCEDURE — 97110 THERAPEUTIC EXERCISES: CPT | Performed by: PHYSICAL THERAPIST

## 2021-06-11 PROCEDURE — 97140 MANUAL THERAPY 1/> REGIONS: CPT | Performed by: PHYSICAL THERAPIST

## 2021-06-11 NOTE — PROGRESS NOTES
PT DAILY TREATMENT/Discharge NOTE - [facility] 2-15    Patient Name: Charlene Mathis  Date:2021  : 1955  [x]  Patient  Verified  Payor: Freida Mathews / Plan: [place] MED[place] PART A & B / Product Type: Medicare /    In time: 8:15 A  Out time: 9:25 A  Total Treatment Time (min): 70  Total Timed Codes (min): 70  1:1 Treatment Time ([facility] only): 39   Visit #:  9    Treatment Area: Cervicalgia [M54.2]    SUBJECTIVE  Pain Level (0-10 scale): 0  Any medication changes, allergies to medications, adverse drug reactions, diagnosis change, or new procedure performed?: [x] No    [] Yes (see summary sheet for update)  Subjective functional status/changes:   [] No changes reported  \"not much different\" He reports that he still has symptoms when he looks up for overheads, serves, or when bouncing the tennis ball. He feels unsteady on his feet. He states that Dr. Aurelia Wakefield told him that \"where you are a year out is where you are going to be\". States he hasn't done much of the PT exercises or vestibular exercises.  He has appt with Dr. Aurelia Wakefield on     OBJECTIVE    Modality rationale: decrease pain and increase tissue extensibility to improve the patients ability to perform daily chores , activities   Min Type Additional Details       [] Estim: []Att   []Unatt    []TENS instruct                  []IFC  []Premod   []NMES                     []Other:  []w/US   []w/ice   []w/heat  Position:  Location:       []  Traction: [] Cervical       []Lumbar                       [] Prone          []Supine                       []Intermittent   []Continuous Lbs:  [] before manual  [] after manual  []w/heat   10 [x]  Ice     []  Heat  []  Ice massage Position: supine with LE's elevated  Location: cervical spine      []  Vasopneumatic Device Pressure:       [] lo [] med [] hi   Temperature:      [x] Skin assessment post-treatment:  [x]intact []redness- no adverse reaction    []redness  adverse reaction:     45 min Therapeutic Exercise:  [x] See flow sheet : progressed per flow sheet   Rationale: increase ROM and increase strength to improve the patients ability to perform daily activities, play tennis with dec'd symptoms    15 min Manual Therapy:  STM/TPR bilat UT, LS  Suboccipital release x3  Man UT stretch bilat  Man cervical distraction  Prone grade III thoracic PA mobs   Rationale: decrease pain, increase ROM, increase tissue extensibility and decrease trigger points to improve the patients ability to play tennis, perform daily chores with dec'd symptoms            With   [] TE   [] TA   [] Neuro   [] SC   [] other: Patient Education: [x] Review HEP    [] Progressed/Changed HEP based on:   [] positioning   [] body mechanics   [] transfers   [] heat/ice application    [] other:      Other Objective/Functional Measures: see measurements taken at last visit. Pain Level (0-10 scale) post treatment: 0    ASSESSMENT/Changes in Function:   Pt demonstrates improvement in cervical ROM compared to beginning of PT. He continues to demonstrate dizzy/lightheadedness mary kay when looking up/down while playing tennis. Poor to moderate compliance with vestibular PT and PT exercises over the last 2 weeks. Discussed trial with massage therapy and to continue independently with HEP. Pt to be discharged at this time. [x]  See Discharge Summary         Progress towards goals / Updated goals:  Short Term Goals: To be accomplished in 3-4 weeks:  1) Pt will be independent in initial HEP MET  2) Pt will report >/=25% improvement in symptoms  not met   3) Pt will report compliance with ice/heat regimen in order to manage symptoms MET     Long Term Goals:  To be accomplished in 10-12 weeks:  1) Pt will report being able to play tennis without cervical symptoms not met  2) Pt will report >/=75% improvement in symptoms not met  3) Pt will report return to gym routine with knowledge of appropriate modifications without cervical symptoms not met    PLAN  [x]  Discharge due to: return to MD; continue with HEP; info given for massage therapy    John Hull, PT 6/11/2021

## 2021-06-11 NOTE — ANCILLARY DISCHARGE INSTRUCTIONS
[facility] [address] [facility], [address] [place] [address] Phone: [phone]  Fax: [phone] Discharge Summary  2-15 Patient name: Michael Russell  : 1955  Provider#:[id number] Referral source: Kiah Leyva MD     
Medical/Treatment Diagnosis: Cervicalgia [M54.2] Prior Hospitalization: see medical history Comorbidities: see evaluation Prior Level of Function:see evaluation Medications: Verified on Patient Summary List 
 
Start of Care: 21      Onset Date:see evaluation Visits from Start of Care: 10     Missed Visits: 0 Reporting Period : 21 to 21 Pt demonstrates improvement in cervical ROM compared to beginning of PT. He continues to demonstrate dizzy/lightheadedness mary kay when looking up/down while playing tennis. Poor to moderate compliance with vestibular PT and PT exercises over the last 2 weeks. Discussed trial with massage therapy and to continue independently with HEP. Pt to be discharged at this time. Progress towards goals / Updated goals: 
Short Term Goals: To be accomplished in 3-4 weeks: 
1) Pt will be independent in initial HEP MET 2) Pt will report >/=25% improvement in symptoms  not met 3) Pt will report compliance with ice/heat regimen in order to manage symptoms MET 
  
Long Term Goals: To be accomplished in 10-12 weeks: 
1) Pt will report being able to play tennis without cervical symptoms not met 2) Pt will report >/=75% improvement in symptoms not met 3) Pt will report return to gym routine with knowledge of appropriate modifications without cervical symptoms not met 
  
PLAN [x]? Discharge due to: return to MD; continue with HEP; info given for massage therapy Magdalene Magallon, PT 2021 11:02 AM

## 2021-08-16 ENCOUNTER — OFFICE VISIT (OUTPATIENT)
Dept: FAMILY MEDICINE CLINIC | Age: 66
End: 2021-08-16
Payer: MEDICARE

## 2021-08-16 VITALS
HEART RATE: 78 BPM | DIASTOLIC BLOOD PRESSURE: 72 MMHG | SYSTOLIC BLOOD PRESSURE: 138 MMHG | BODY MASS INDEX: 29.63 KG/M2 | RESPIRATION RATE: 16 BRPM | OXYGEN SATURATION: 99 % | HEIGHT: 70 IN | TEMPERATURE: 98.6 F | WEIGHT: 207 LBS

## 2021-08-16 DIAGNOSIS — M25.512 CHRONIC PAIN OF BOTH SHOULDERS: ICD-10-CM

## 2021-08-16 DIAGNOSIS — M50.30 DDD (DEGENERATIVE DISC DISEASE), CERVICAL: ICD-10-CM

## 2021-08-16 DIAGNOSIS — J30.1 SEASONAL ALLERGIC RHINITIS DUE TO POLLEN: ICD-10-CM

## 2021-08-16 DIAGNOSIS — H81.13 BENIGN PAROXYSMAL POSITIONAL VERTIGO DUE TO BILATERAL VESTIBULAR DISORDER: ICD-10-CM

## 2021-08-16 DIAGNOSIS — M25.511 CHRONIC PAIN OF BOTH SHOULDERS: ICD-10-CM

## 2021-08-16 DIAGNOSIS — H81.20 VESTIBULAR NEURITIS, UNSPECIFIED LATERALITY: ICD-10-CM

## 2021-08-16 DIAGNOSIS — Z00.00 MEDICARE ANNUAL WELLNESS VISIT, SUBSEQUENT: ICD-10-CM

## 2021-08-16 DIAGNOSIS — Z12.5 PROSTATE CANCER SCREENING: ICD-10-CM

## 2021-08-16 DIAGNOSIS — R35.1 NOCTURIA MORE THAN TWICE PER NIGHT: ICD-10-CM

## 2021-08-16 DIAGNOSIS — I10 BENIGN ESSENTIAL HYPERTENSION: Primary | ICD-10-CM

## 2021-08-16 DIAGNOSIS — M48.061 SPINAL STENOSIS OF LUMBAR REGION, UNSPECIFIED WHETHER NEUROGENIC CLAUDICATION PRESENT: ICD-10-CM

## 2021-08-16 DIAGNOSIS — Z71.89 ACP (ADVANCE CARE PLANNING): ICD-10-CM

## 2021-08-16 DIAGNOSIS — E78.5 HYPERLIPIDEMIA LDL GOAL <130: ICD-10-CM

## 2021-08-16 DIAGNOSIS — E55.9 VITAMIN D DEFICIENCY: ICD-10-CM

## 2021-08-16 DIAGNOSIS — G89.29 CHRONIC PAIN OF BOTH SHOULDERS: ICD-10-CM

## 2021-08-16 LAB
ALBUMIN SERPL-MCNC: 4 G/DL (ref 3.5–5)
ALBUMIN/GLOB SERPL: 1.6 {RATIO} (ref 1.1–2.2)
ALP SERPL-CCNC: 74 U/L (ref 45–117)
ALT SERPL-CCNC: 28 U/L (ref 12–78)
ANION GAP SERPL CALC-SCNC: 6 MMOL/L (ref 5–15)
AST SERPL-CCNC: 25 U/L (ref 15–37)
BILIRUB SERPL-MCNC: 0.6 MG/DL (ref 0.2–1)
BUN SERPL-MCNC: 13 MG/DL (ref 6–20)
BUN/CREAT SERPL: 13 (ref 12–20)
CALCIUM SERPL-MCNC: 9 MG/DL (ref 8.5–10.1)
CHLORIDE SERPL-SCNC: 104 MMOL/L (ref 97–108)
CHOLEST SERPL-MCNC: 140 MG/DL
CO2 SERPL-SCNC: 29 MMOL/L (ref 21–32)
CREAT SERPL-MCNC: 1.03 MG/DL (ref 0.7–1.3)
GLOBULIN SER CALC-MCNC: 2.5 G/DL (ref 2–4)
GLUCOSE SERPL-MCNC: 91 MG/DL (ref 65–100)
HDLC SERPL-MCNC: 59 MG/DL
HDLC SERPL: 2.4 {RATIO} (ref 0–5)
LDLC SERPL CALC-MCNC: 61.6 MG/DL (ref 0–100)
POTASSIUM SERPL-SCNC: 3.5 MMOL/L (ref 3.5–5.1)
PROT SERPL-MCNC: 6.5 G/DL (ref 6.4–8.2)
PSA SERPL-MCNC: 3.5 NG/ML (ref 0.01–4)
SODIUM SERPL-SCNC: 139 MMOL/L (ref 136–145)
TRIGL SERPL-MCNC: 97 MG/DL (ref ?–150)
VLDLC SERPL CALC-MCNC: 19.4 MG/DL

## 2021-08-16 PROCEDURE — 3017F COLORECTAL CA SCREEN DOC REV: CPT | Performed by: FAMILY MEDICINE

## 2021-08-16 PROCEDURE — G0439 PPPS, SUBSEQ VISIT: HCPCS | Performed by: FAMILY MEDICINE

## 2021-08-16 PROCEDURE — G8536 NO DOC ELDER MAL SCRN: HCPCS | Performed by: FAMILY MEDICINE

## 2021-08-16 PROCEDURE — G8510 SCR DEP NEG, NO PLAN REQD: HCPCS | Performed by: FAMILY MEDICINE

## 2021-08-16 PROCEDURE — G8754 DIAS BP LESS 90: HCPCS | Performed by: FAMILY MEDICINE

## 2021-08-16 PROCEDURE — G8427 DOCREV CUR MEDS BY ELIG CLIN: HCPCS | Performed by: FAMILY MEDICINE

## 2021-08-16 PROCEDURE — G8752 SYS BP LESS 140: HCPCS | Performed by: FAMILY MEDICINE

## 2021-08-16 PROCEDURE — 99215 OFFICE O/P EST HI 40 MIN: CPT | Performed by: FAMILY MEDICINE

## 2021-08-16 PROCEDURE — 1101F PT FALLS ASSESS-DOCD LE1/YR: CPT | Performed by: FAMILY MEDICINE

## 2021-08-16 PROCEDURE — G0463 HOSPITAL OUTPT CLINIC VISIT: HCPCS | Performed by: FAMILY MEDICINE

## 2021-08-16 PROCEDURE — G8417 CALC BMI ABV UP PARAM F/U: HCPCS | Performed by: FAMILY MEDICINE

## 2021-08-16 NOTE — PROGRESS NOTES
Chief Complaint   Patient presents with    Cholesterol Problem    Hypertension   1. Have you been to the ER, urgent care clinic since your last visit? Hospitalized since your last visit? No    2. Have you seen or consulted any other health care providers outside of the [address] [place] [address] since your last visit? Include any pap smears or colon screening.  Yes Reason for visit: Dr Adams Gallagher

## 2021-08-16 NOTE — PROGRESS NOTES
HISTORY OF PRESENT ILLNESS  HPI  Shanna Miller is a 68 V. o. male with a history of cervical DDD and DJD, essential hypertension, bilateral shoulder pain, benign paroxysmal positional vertigo due to bilateral vestibular disorder, spinal stenosis of lumbar region, vitamin D deficiency and hyperlipidemia with LDL goal <130, who presents to the office today for f/u of these health problems. Since September 2020, pt has been following up with specialists for his vestibular neuritis that affects his balance. He is doing physical therapy for this and his restricted ROM of his neck Is affecting his abilty to do the Physical Therapy  Pt does not feel the ROM of his neck has improved much with physical therapy as he has trouble looking up when serving in tennis and bending his neck down when he picks up things. Pt reports his dentist noticed some white spots on the right side of his tongue in July. He states this area is not sore. He says his dentist plans to do biopsy of the area. Pt denies unusual SOB, chest pain, and any recent ER visits or hospitalizations. Past Medical History:   Diagnosis Date    Allergic rhinitis 2/27/2010    Cancer ([address] 2004    SKIN, LIP (MOHS PROCEDURE)    DDD,DJD cervicalC5-6,6-7foraminal encroachment on X ray-4/2013 5/17/2013    Hypertension     BORDERLINE; NO MEDS    Obesity (BMI 30.0-34.9) 2/5/2018    Other and unspecified hyperlipidemia 2/27/2010     Past Surgical History:   Procedure Laterality Date    ENDOSCOPY, COLON, DIAGNOSTIC      X2    HX CATARACT REMOVAL Bilateral 87-88    CONGENITAL CATARACTS; W/ IOL    HX ORTHOPAEDIC  08/2019    l-s spine     HX TONSIL AND ADENOIDECTOMY  1960    HX VASECTOMY  1989    [place] SINUS SURGERY PROC UNLISTED  2001     Current Outpatient Medications on File Prior to Visit   Medication Sig Dispense Refill    hydroCHLOROthiazide (HYDRODIURIL) 25 mg tablet Take 1 Tab by mouth daily.  Take in the morning with potassium rich food or beverage 90 Tab 1    simvastatin (ZOCOR) 40 mg tablet TAKE 1 TABLET BY MOUTH EVERY DAY EVERY NIGHT 90 Tab 1    tamsulosin (FLOMAX) 0.4 mg capsule Take 1 Cap by mouth daily. Indications: enlarged prostate with urination problem 30 Cap 11    Cholecalciferol, Vitamin D3, (VITAMIN D3) 1,000 unit cap Take 2,000 Units by mouth every other day. No current facility-administered medications on file prior to visit. Allergies   Allergen Reactions    Oxycodone Rash     Patient had full body chills 20 minutes after taking 5 mg. Redness, stuffy nose, and felt warm.  Pcn [Penicillins] Rash     Family History   Problem Relation Age of Onset    Cancer Mother         ovarian    Cancer Father         prostate?  Elevated Lipids Father     Heart Surgery Father         CABG at 58, lived to 80    No Known Problems Sister     Other Brother         factor 5      Social History     Socioeconomic History    Marital status:      Spouse name: Not on file    Number of children: Not on file    Years of education: Not on file    Highest education level: Not on file   Tobacco Use    Smoking status: Never Smoker    Smokeless tobacco: Never Used   Vaping Use    Vaping Use: Never used   Substance and Sexual Activity    Alcohol use: Yes     Comment: 12oz beer once monthly     Drug use: No    Sexual activity: Yes     Partners: Female   Other Topics Concern    Caffeine Concern No     Comment: no coffee, occ decaff tea, occ soda    Special Diet No    Exercise No     Comment: not much lately, usually plays tennis 2-3 x a week     Social Determinants of Health     Financial Resource Strain:     Difficulty of Paying Living Expenses:    Food Insecurity:     Worried About Running Out of Food in the Last Year:     Ran Out of Food in the Last Year:    Transportation Needs:     Lack of Transportation (Medical):      Lack of Transportation (Non-Medical):    Physical Activity:     Days of Exercise per Week:     Minutes of Exercise per Session:    Stress:     Feeling of Stress :    Social Connections:     Frequency of Communication with Friends and Family:     Frequency of Social Gatherings with Friends and Family:     Attends Ca[employer] Services:     Active Member of Clubs or Organizations:     Attends Club or Organization Meetings:     Marital Status:                Review of Systems   Constitutional: Negative for chills, diaphoresis, fever, malaise/fatigue and weight loss. Eyes: Negative for blurred vision, double vision, pain and redness. Respiratory: Negative for cough, shortness of breath and wheezing. Cardiovascular: Negative for chest pain, palpitations, orthopnea, claudication, leg swelling and PND. Skin: Negative for itching and rash. Neurological: Negative for dizziness, tingling, tremors, sensory change, speech change, focal weakness, seizures, loss of consciousness, weakness and headaches. Results for orders placed or performed in visit on 08/16/21   PSA SCREENING (SCREENING)   Result Value Ref Range    Prostate Specific Ag 3.5 0.01 - 4.0 ng/mL   METABOLIC PANEL, COMPREHENSIVE   Result Value Ref Range    Sodium 139 136 - 145 mmol/L    Potassium 3.5 3.5 - 5.1 mmol/L    Chloride 104 97 - 108 mmol/L    CO2 29 21 - 32 mmol/L    Anion gap 6 5 - 15 mmol/L    Glucose 91 65 - 100 mg/dL    BUN 13 6 - 20 MG/DL    Creatinine 1.03 0.70 - 1.30 MG/DL    BUN/Creatinine ratio 13 12 - 20      GFR est AA >60 >60 ml/min/1.73m2    GFR est non-AA >60 >60 ml/min/1.73m2    Calcium 9.0 8.5 - 10.1 MG/DL    Bilirubin, total 0.6 0.2 - 1.0 MG/DL    ALT (SGPT) 28 12 - 78 U/L    AST (SGOT) 25 15 - 37 U/L    Alk.  phosphatase 74 45 - 117 U/L    Protein, total 6.5 6.4 - 8.2 g/dL    Albumin 4.0 3.5 - 5.0 g/dL    Globulin 2.5 2.0 - 4.0 g/dL    A-G Ratio 1.6 1.1 - 2.2     LIPID PANEL   Result Value Ref Range    Cholesterol, total 140 <200 MG/DL    Triglyceride 97 <150 MG/DL    HDL Cholesterol 59 MG/DL    LDL, calculated 61.6 0 - 100 MG/DL    VLDL, calculated 19.4 MG/DL    CHOL/HDL Ratio 2.4 0.0 - 5.0               Physical Exam  Visit Vitals  /72 (BP 1 Location: Left arm, BP Patient Position: Sitting, BP Cuff Size: Large adult)   Pulse 78   Temp 98.6 °F (37 °C)   Resp 16   Ht 5' 10\" (1.778 m)   Wt 207 lb (93.9 kg)   SpO2 99%   BMI 29.70 kg/m²         Head: Normocephalic, without obvious abnormality, atraumatic  Mouth: he does have some white discoloration of the lateral and undersurface of the tongue. Eyes: conjunctivae/corneas clear. PERRL, EOM's intact. Neck: supple, symmetrical, trachea midline, no adenopathy, thyroid: not enlarged, symmetric, no tenderness/mass/nodules, no carotid bruit and no JVD  Lungs: clear to auscultation bilaterally  Heart: regular rate and rhythm, S1, S2 normal, no murmur, click, rub or gallop  Extremities: extremities normal, atraumatic, no cyanosis or edema  Rectal: Normal tone, normal prostate, no masses or tenderness  Pulses: 2+ and symmetric  Lymph nodes: Cervical, supraclavicular, and axillary nodes normal.  Neurologic: Grossly normal        ASSESSMENT and PLAN    ICD-10-CM ICD-9-CM    1. Benign essential hypertension  I10 401.1    2. Hyperlipidemia LDL goal <130  E78.5 272.4 LIPID PANEL      METABOLIC PANEL, COMPREHENSIVE      METABOLIC PANEL, COMPREHENSIVE      LIPID PANEL   3. Prostate cancer screening  Z12.5 V76.44 PSA SCREENING (SCREENING)      PSA SCREENING (SCREENING)   4. Spinal stenosis of lumbar region, unspecified whether neurogenic claudication present  M48.061 724.02    5. Vestibular neuritis, unspecified laterality  H81.20 386.12    6. Vitamin D deficiency  E55.9 268.9    7. Chronic pain of both shoulders- worse in AM and loosens up with activity. M25.511 719.41     G89.29 338.29     M25.512     8. Seasonal allergic rhinitis due to pollen  J30.1 477.0    9. Benign paroxysmal positional vertigo due to bilateral vestibular disorder  H81.13 386.11    10.  Nocturia more than twice per night  R35.1 788.43    11. Medicare annual wellness visit, subsequent  Z00.00 V70.0    12. DDD,DJD cervicalC5-6,6-7foraminal encroachment on X ray-4/2013  M50.30 722.4    13. ACP (advance care planning)  Z71.89 V65.49      Diagnoses and all orders for this visit:    1. Benign essential hypertension    2. Hyperlipidemia LDL goal <130  -     LIPID PANEL; Future  -     METABOLIC PANEL, COMPREHENSIVE; Future    3. Prostate cancer screening  -     PSA SCREENING (SCREENING); Future    4. Spinal stenosis of lumbar region, unspecified whether neurogenic claudication present    5. Vestibular neuritis, unspecified laterality    6. Vitamin D deficiency    7. Chronic pain of both shoulders- worse in AM and loosens up with activity. 8. Seasonal allergic rhinitis due to pollen    9. Benign paroxysmal positional vertigo due to bilateral vestibular disorder    10. Nocturia more than twice per night    11. Medicare annual wellness visit, subsequent    12. DDD,DJD cervicalC5-6,6-7foraminal encroachment on X ray-4/2013    13. ACP (advance care planning)      Follow-up and Dispositions    · Return for F/U HTN and CHOL, F/U weight concerns, fatigue in the heat during tennis/ exertion, no other times. .         lab results and schedule of future lab studies reviewed with patient  reviewed diet, exercise and weight control  cardiovascular risk and specific lipid/LDL goals reviewed  reviewed medications and side effects in detail  Please call my office if there are any questions- [phone]. I will arrange for follow up after the lab tests done from today return  Recommended a weekly \"heart check. \" I went into detail how to do this. Call for refills on medications as needed. Discussed expected course/resolution/complications of diagnosis in detail with patient. Medication risks/benefits/costs/interactions/alternatives discussed with patient. Pt was given an after visit summary which includes diagnoses, current medications & vitals. Pt expressed understanding with the diagnosis and plan      BMI is significantly elevated- in the obese range. I reviewed diet, exercise and weight control. Discussed weight control in detail, the importance of mainly decreased carbs, and for weight maintenance, exercise; discussed different diets and that it isn't as important to watch the type of foods as it is to decrease calorie intake no matter what type of diet you do, etc.       Total 30 minutes  re: Recommended a weekly \"heart check. \" I went into detail how to do this. Regular exercise is very important to your health; it helps mentally, physically, socially; it prevents injuries if done properly. Exercise, even as simple as walking 20-30 minutes daily has major benefits to your health even though your \"numbers\" are the same in the lab. See if you can add this into your daily regimen and after a few months it will become a regular habit-\"just something you do,\" like brushing your teeth. A combination of aerobic exercise and strengthening and stretching is felt to be the best for you, so this should be your ultimate goal.   This can be done in the privacy of your home or in a group setting as at the gym  Some prefer having a , others prefer to do exercise in groups or individually. Do what \"works\" for you. You need to make it simple and \"fun,\" or you most likely will not continue it. Reviewed symptoms, or lack thereof, of hypertension and elevated cholesterol. Review of  the proper technique of checking the blood pressure- check it on an average day only, not on a stressful day, sitting, no exercise for at least 1 hour and not experiencing any new pain( chronic pain is OK). Patient encouraged to check BP sitting and standing at least once a month and to report these readings to me if > 140/ 90 on average , or if the standing BP is >  15 points lower than the sitting.      Always check it twice and if there is > 5 points decrease from the previous reading( top reading or systolic) keep checking it until it does not drop 5 points. Write only this final reading down, not the preceding readings. If out of these readings there is only 1 out of 4 or less > 769, or > 90 diastolic then your blood pressure is OK; it needs further treatment if it is above this. Also, don't forget,  as noted above, to check your blood pressure standing once a month; this is to detect a drop in your BP that might lead to fainting and serious injury; you check it standing with your arm hanging straight down and relaxed. Check it twice waiting 1 minute between the two readings. If, with either one of these 2 readings there is a > 15 point drop of the systolic compared to your sitting pressure( done before the standing BP), then let me know. Following these guidelines, continue to check your BP and write down only the ones described above and it will help me to effectively treat your blood pressure. Also, discussed symptoms of concern that were noted today in the note above, treatment options( including doing nothing), when to follow up before recommended time frame. Also, answered all questions. I agree with need to biopsy his tongue. He should continue with follow up with Dr. Mariaelena Gomes for his vertigo and balance problem. He mentioned that he gets tired when he is playing tennis and this appears to be due to deconditioning;discussed the process of getting back in shape and of avoiding activity that might be more dangerous. and I encouraged him to get in better aerobic shape, especially during the hot, humid summer days. I encouraged pt to get a copy of his advanced directives to us. This document was written by Brandon Edward, as dictated by Bharathi Baker MD.  I have reviewed and agree with the above note and have made corrections where appropriate Ancelmo Livingston M.D.   This is the Subsequent Medicare Annual Wellness Exam, performed 12 months or more after the Initial AWV or the last Subsequent AWV    I have reviewed the patient's medical history in detail and updated the computerized patient record. Assessment/Plan   Education and counseling provided:  Are appropriate based on today's review and evaluation    1. Benign essential hypertension  2. Hyperlipidemia LDL goal <130  -     LIPID PANEL; Future  -     METABOLIC PANEL, COMPREHENSIVE; Future  3. Prostate cancer screening  -     PSA SCREENING (SCREENING); Future  4. Spinal stenosis of lumbar region, unspecified whether neurogenic claudication present  5. Vestibular neuritis, unspecified laterality  6. Vitamin D deficiency  7. Chronic pain of both shoulders- worse in AM and loosens up with activity. 8. Seasonal allergic rhinitis due to pollen  9. Benign paroxysmal positional vertigo due to bilateral vestibular disorder  10. Nocturia more than twice per night  11. Medicare annual wellness visit, subsequent  12. DDD,DJD cervicalC5-6,6-7foraminal encroachment on X ray-4/2013  13. ACP (advance care planning)       Depression Risk Factor Screening     3 most recent PHQ Screens 8/16/2021   Little interest or pleasure in doing things Not at all   Feeling down, depressed, irritable, or hopeless Not at all   Total Score PHQ 2 0       Alcohol Risk Screen    Do you average more than 1 drink per night or more than 7 drinks a week: No    In the past three months have you have had more than 4 drinks containing alcohol on one occasion: No        Functional Ability and Level of Safety    Hearing: Hearing is good. Activities of Daily Living: The home contains: no safety equipment. Patient does total self care      Ambulation: with no difficulty     Fall Risk:  Fall Risk Assessment, last 12 mths 8/16/2021   Able to walk? Yes   Fall in past 12 months? 0   Do you feel unsteady?  0   Are you worried about falling 0      Abuse Screen:  Patient is not abused       Cognitive Screening    Has your family/caregiver stated any concerns about your memory: no     Cognitive Screening: Normal - Mini Cog Test  I reviewed advanced directive information and written information given to patient; patient to make follow up appointment with a NN for any questions,to review choices made for health care, agent, and anatomical gifts that are on the advanced directive at home. Health Maintenance Due     Health Maintenance Due   Topic Date Due    COVID-19 Vaccine (1) Never done    Shingrix Vaccine Age 49> (1 of 2) Never done    Medicare Yearly Exam  08/05/2021       Patient Care Team   Patient Care Team:  Barb Stack MD as PCP - General  Barb Stack MD as PCP - [address] [place] [address] [facility]  Austin Barnes MD as Physician (Orthopedic Surgery)    History     Patient Active Problem List   Diagnosis Code    Vitamin D deficiency E55.9    DDD,DJD cervicalC5-6,6-7foraminal encroachment on X ray-4/2013 M50.30    Hyperlipidemia LDL goal <130 E78.5    Chronic pain of both shoulders- worse in AM and loosens up with activity. M25.511, G89.29, M25.512    Seasonal allergic rhinitis due to pollen J30.1    Obesity (BMI 30.0-34. 9) E66.9    Spinal stenosis of lumbar region M48.061    Nocturia R35.1    Benign paroxysmal positional vertigo due to bilateral vestibular disorder H81.13    Benign essential hypertension I10     Past Medical History:   Diagnosis Date    Allergic rhinitis 2/27/2010    Cancer ([facility] [address] 2004    SKIN, LIP (MOHS PROCEDURE)    DDD,DJD cervicalC5-6,6-7foraminal encroachment on X ray-4/2013 5/17/2013    Hypertension     BORDERLINE; NO MEDS    Obesity (BMI 30.0-34.9) 2/5/2018    Other and unspecified hyperlipidemia 2/27/2010      Past Surgical History:   Procedure Laterality Date    ENDOSCOPY, COLON, DIAGNOSTIC      X2    HX CATARACT REMOVAL Bilateral 87-88    CONGENITAL CATARACTS; W/ IOL    HX ORTHOPAEDIC  08/2019    l-s spine     HX TONSIL AND ADENOIDECTOMY  1960    HX VASECTOMY  1989    ID SINUS SURGERY PROC UNLISTED 2001     Current Outpatient Medications   Medication Sig Dispense Refill    hydroCHLOROthiazide (HYDRODIURIL) 25 mg tablet Take 1 Tab by mouth daily. Take in the morning with potassium rich food or beverage 90 Tab 1    simvastatin (ZOCOR) 40 mg tablet TAKE 1 TABLET BY MOUTH EVERY DAY EVERY NIGHT 90 Tab 1    tamsulosin (FLOMAX) 0.4 mg capsule Take 1 Cap by mouth daily. Indications: enlarged prostate with urination problem 30 Cap 11    Cholecalciferol, Vitamin D3, (VITAMIN D3) 1,000 unit cap Take 2,000 Units by mouth every other day. Allergies   Allergen Reactions    Oxycodone Rash     Patient had full body chills 20 minutes after taking 5 mg. Redness, stuffy nose, and felt warm.  Pcn [Penicillins] Rash       Family History   Problem Relation Age of Onset    Cancer Mother         ovarian    Cancer Father         prostate?     Elevated Lipids Father     Heart Surgery Father         CABG at 58, lived to 80    No Known Problems Sister     Other Brother         factor 5      Social History     Tobacco Use    Smoking status: Never Smoker    Smokeless tobacco: Never Used   Substance Use Topics    Alcohol use: Yes     Comment: 12oz beer once monthly          Flori Davies MD

## 2021-08-17 NOTE — PATIENT INSTRUCTIONS
Medicare Wellness Visit, Male    The best way to live healthy is to have a lifestyle where you eat a well-balanced diet, exercise regularly, limit alcohol use, and quit all forms of tobacco/nicotine, if applicable. Regular preventive services are another way to keep healthy. Preventive services (vaccines, screening tests, monitoring & exams) can help personalize your care plan, which helps you manage your own care. Screening tests can find health problems at the earliest stages, when they are easiest to treat. [facility] follows the current, evidence-based guidelines published by the [facility] ([facility]) when recommending preventive services for our patients. Because we follow these guidelines, sometimes recommendations change over time as research supports it. (For example, a prostate screening blood test is no longer routinely recommended for men with no symptoms). Of course, you and your doctor may decide to screen more often for some diseases, based on your risk and co-morbidities (chronic disease you are already diagnosed with). Preventive services for you include:  - Medicare offers their members a free annual wellness visit, which is time for you and your primary care provider to discuss and plan for your preventive service needs. Take advantage of this benefit every year!  -All adults over age 72 should receive the recommended pneumonia vaccines. Current USPSTF guidelines recommend a series of two vaccines for the best pneumonia protection.   -All adults should have a flu vaccine yearly and tetanus vaccine every 10 years.  -All adults age 48 and older should receive the shingles vaccines (series of two vaccines).        -All adults age 38-68 who are overweight should have a diabetes screening test once every three years.   -Other screening tests & preventive services for persons with diabetes include: an eye exam to screen for diabetic retinopathy, a kidney function test, a foot exam, and stricter control over your cholesterol.   -Cardiovascular screening for adults with routine risk involves an electrocardiogram (ECG) at intervals determined by the provider.   -Colorectal cancer screening should be done for adults age 54-65 with no increased risk factors for colorectal cancer. There are a number of acceptable methods of screening for this type of cancer. Each test has its own benefits and drawbacks. Discuss with your provider what is most appropriate for you during your annual wellness visit. The different tests include: colonoscopy (considered the best screening method), a fecal occult blood test, a fecal DNA test, and sigmoidoscopy.  -All adults born between [facility] should be screened once for Hepatitis C.  -An Abdominal Aortic Aneurysm (AAA) Screening is recommended for men age 73-68 who has ever smoked in their lifetime.      Here is a list of your current Health Maintenance items (your personalized list of preventive services) with a due date:  Health Maintenance Due   Topic Date Due    COVID-19 Vaccine (1) Never done    Shingles Vaccine (1 of 2) Never done

## 2021-09-16 ENCOUNTER — OFFICE VISIT (OUTPATIENT)
Dept: FAMILY MEDICINE CLINIC | Age: 66
End: 2021-09-16
Payer: MEDICARE

## 2021-09-16 VITALS
DIASTOLIC BLOOD PRESSURE: 81 MMHG | SYSTOLIC BLOOD PRESSURE: 134 MMHG | HEIGHT: 70 IN | WEIGHT: 206 LBS | OXYGEN SATURATION: 98 % | RESPIRATION RATE: 16 BRPM | HEART RATE: 79 BPM | BODY MASS INDEX: 29.49 KG/M2 | TEMPERATURE: 97.7 F

## 2021-09-16 DIAGNOSIS — S46.911A STRAIN OF RIGHT SHOULDER, INITIAL ENCOUNTER: Primary | ICD-10-CM

## 2021-09-16 PROCEDURE — G8754 DIAS BP LESS 90: HCPCS | Performed by: NURSE PRACTITIONER

## 2021-09-16 PROCEDURE — G8427 DOCREV CUR MEDS BY ELIG CLIN: HCPCS | Performed by: NURSE PRACTITIONER

## 2021-09-16 PROCEDURE — 1101F PT FALLS ASSESS-DOCD LE1/YR: CPT | Performed by: NURSE PRACTITIONER

## 2021-09-16 PROCEDURE — 3017F COLORECTAL CA SCREEN DOC REV: CPT | Performed by: NURSE PRACTITIONER

## 2021-09-16 PROCEDURE — G8536 NO DOC ELDER MAL SCRN: HCPCS | Performed by: NURSE PRACTITIONER

## 2021-09-16 PROCEDURE — G0463 HOSPITAL OUTPT CLINIC VISIT: HCPCS | Performed by: NURSE PRACTITIONER

## 2021-09-16 PROCEDURE — 99213 OFFICE O/P EST LOW 20 MIN: CPT | Performed by: NURSE PRACTITIONER

## 2021-09-16 PROCEDURE — G8417 CALC BMI ABV UP PARAM F/U: HCPCS | Performed by: NURSE PRACTITIONER

## 2021-09-16 PROCEDURE — G8432 DEP SCR NOT DOC, RNG: HCPCS | Performed by: NURSE PRACTITIONER

## 2021-09-16 PROCEDURE — G8752 SYS BP LESS 140: HCPCS | Performed by: NURSE PRACTITIONER

## 2021-09-16 RX ORDER — CLONAZEPAM 0.5 MG/1
TABLET ORAL
COMMUNITY
Start: 2021-08-23 | End: 2022-03-05 | Stop reason: ALTCHOICE

## 2021-09-16 NOTE — PROGRESS NOTES
HISTORY OF PRESENT ILLNESS  Yung Coates is a 77 y.o. male. HPI  C/o injury to right shoulder. Reports he was lifting weights overhead 6 days ago when he felt a pull in right shoulder. Has been using ice and taking aleve with some sx improvement. Aggravated by certain movements of right arm. No weakness. No loss of ROM. Patient Active Problem List   Diagnosis Code    Vitamin D deficiency E55.9    DDD,DJD cervicalC5-6,6-7foraminal encroachment on X ray-4/2013 M50.30    Hyperlipidemia LDL goal <130 E78.5    Chronic pain of both shoulders- worse in AM and loosens up with activity. M25.511, G89.29, M25.512    Seasonal allergic rhinitis due to pollen J30.1    Obesity (BMI 30.0-34. 9) E66.9    Spinal stenosis of lumbar region M48.061    Nocturia R35.1    Benign paroxysmal positional vertigo due to bilateral vestibular disorder H81.13    Benign essential hypertension I10     Current Outpatient Medications   Medication Sig    clonazePAM (KlonoPIN) 0.5 mg tablet TAKE 1/2 TABLET(S) ORAL EVERY EVENING    hydroCHLOROthiazide (HYDRODIURIL) 25 mg tablet Take 1 Tab by mouth daily. Take in the morning with potassium rich food or beverage    simvastatin (ZOCOR) 40 mg tablet TAKE 1 TABLET BY MOUTH EVERY DAY EVERY NIGHT    tamsulosin (FLOMAX) 0.4 mg capsule Take 1 Cap by mouth daily. Indications: enlarged prostate with urination problem    Cholecalciferol, Vitamin D3, (VITAMIN D3) 1,000 unit cap Take 2,000 Units by mouth every other day. No current facility-administered medications for this visit. Social History     Tobacco Use    Smoking status: Never Smoker    Smokeless tobacco: Never Used   Vaping Use    Vaping Use: Never used   Substance Use Topics    Alcohol use: Yes     Comment: 12oz beer once monthly     Drug use: No     Blood pressure 134/81, pulse 79, temperature 97.7 °F (36.5 °C), resp. rate 16, height 5' 10\" (1.778 m), weight 206 lb (93.4 kg), SpO2 98 %.       Review of Systems Musculoskeletal: Positive for joint pain. Neurological: Negative for tingling, sensory change and weakness. All other systems reviewed and are negative. Physical Exam  Constitutional:       General: He is not in acute distress. Musculoskeletal:      Right shoulder: Tenderness (anterolateral) present. No swelling, bony tenderness or crepitus. Normal range of motion. Normal strength. Neurological:      Mental Status: He is alert. ASSESSMENT and PLAN  Diagnoses and all orders for this visit:    1. Strain of right shoulder, initial encounter    Continue ice, NSAIDs. Trial topical biofreeze as directed. Gentle stretches and ROM as demonstrated. Follow up prn if sx worsen or FTI. We discussed the expected course, resolution and complications of the diagnosis in detail. Medication risks, benefits, costs, interactions and side effects were discussed. The patient was advised to contact the office for worsening of condition or FTI as anticipated. The patient expressed understanding of the diagnosis and plan.

## 2021-09-16 NOTE — PROGRESS NOTES
Chief Complaint   Patient presents with    Shoulder Pain        1. \"Have you been to the ER, urgent care clinic since your last visit? Hospitalized since your last visit? \" No    2. \"Have you seen or consulted any other health care providers outside of the [address] [place] [address] since your last visit? \" No     3. For patients aged 39-70: Has the patient had a colonoscopy? Yes, HM satisfied with blue hyperlink     If the patient is female:    4. For patients aged 41-77: Has the patient had a mammogram within the past 2 years? No    5. For patients aged 21-30: Has the patient had a pap smear?  No    3 most recent PHQ Screens 9/16/2021   Little interest or pleasure in doing things Not at all   Feeling down, depressed, irritable, or hopeless Not at all   Total Score PHQ 2 0

## 2021-10-06 RX ORDER — TAMSULOSIN HYDROCHLORIDE 0.4 MG/1
0.4 CAPSULE ORAL DAILY
Qty: 90 CAPSULE | Refills: 3 | Status: SHIPPED | OUTPATIENT
Start: 2021-10-06 | End: 2022-10-19

## 2021-10-18 ENCOUNTER — VIRTUAL VISIT (OUTPATIENT)
Dept: FAMILY MEDICINE CLINIC | Age: 66
End: 2021-10-18
Payer: MEDICARE

## 2021-10-18 DIAGNOSIS — M25.511 ACUTE PAIN OF RIGHT SHOULDER: Primary | ICD-10-CM

## 2021-10-18 PROCEDURE — G8427 DOCREV CUR MEDS BY ELIG CLIN: HCPCS | Performed by: NURSE PRACTITIONER

## 2021-10-18 PROCEDURE — 3017F COLORECTAL CA SCREEN DOC REV: CPT | Performed by: NURSE PRACTITIONER

## 2021-10-18 PROCEDURE — G8432 DEP SCR NOT DOC, RNG: HCPCS | Performed by: NURSE PRACTITIONER

## 2021-10-18 PROCEDURE — G8756 NO BP MEASURE DOC: HCPCS | Performed by: NURSE PRACTITIONER

## 2021-10-18 PROCEDURE — 99213 OFFICE O/P EST LOW 20 MIN: CPT | Performed by: NURSE PRACTITIONER

## 2021-10-18 PROCEDURE — 1101F PT FALLS ASSESS-DOCD LE1/YR: CPT | Performed by: NURSE PRACTITIONER

## 2021-10-18 NOTE — PROGRESS NOTES
Alexa Garcia is a 77 y.o. male who was seen by synchronous (real-time) audio-video technology on 10/18/2021 for Shoulder Pain        Assessment & Plan:   Diagnoses and all orders for this visit:    1. Acute pain of right shoulder  -     REFERRAL TO PHYSICAL THERAPY    Contact info given to patient to schedule appointment. Follow up prn. 712  Subjective: Follow up right shoulder pain. Seen here 1 month ago with sx after lifting weights. Has tried topical biofreeze, stretches with some symptom improvement. Reports still has discomfort with certain movements. No weakness or sensory changes. Taking 2 aleve daily with temporary sx relief. Played golf today, noticed discomfort with forceful swing. Prior to Admission medications    Medication Sig Start Date End Date Taking? Authorizing Provider   tamsulosin (FLOMAX) 0.4 mg capsule TAKE 1 CAP BY MOUTH DAILY. INDICATIONS: ENLARGED PROSTATE WITH URINATION PROBLEM 10/6/21  Yes Ricci Jolly MD   clonazePAM (KlonoPIN) 0.5 mg tablet TAKE 1/2 TABLET(S) ORAL EVERY EVENING 8/23/21  Yes Provider, Historical   hydroCHLOROthiazide (HYDRODIURIL) 25 mg tablet Take 1 Tab by mouth daily. Take in the morning with potassium rich food or beverage 5/14/21  Yes Ricci Jolly MD   simvastatin (ZOCOR) 40 mg tablet TAKE 1 TABLET BY MOUTH EVERY DAY EVERY NIGHT 5/5/21  Yes Ricci Jolly MD   Cholecalciferol, Vitamin D3, (VITAMIN D3) 1,000 unit cap Take 2,000 Units by mouth every other day. 2/20/13  Yes Ricci Jolly MD     Patient Active Problem List   Diagnosis Code    Vitamin D deficiency E55.9    DDD,DJD cervicalC5-6,6-7foraminal encroachment on X ray-4/2013 M50.30    Hyperlipidemia LDL goal <130 E78.5    Chronic pain of both shoulders- worse in AM and loosens up with activity. M25.511, G89.29, M25.512    Seasonal allergic rhinitis due to pollen J30.1    Obesity (BMI 30.0-34. 9) E66.9    Spinal stenosis of lumbar region M48.061    Nocturia R35.1    Benign paroxysmal positional vertigo due to bilateral vestibular disorder H81.13    Benign essential hypertension I10       Review of Systems   Musculoskeletal: Positive for joint pain (right shoulder). Negative for neck pain. Neurological: Negative for sensory change and focal weakness. Objective:   No flowsheet data found. General: alert, cooperative, no distress   Mental  status: normal mood, behavior, speech, dress, motor activity, and thought processes, able to follow commands   HENT: NCAT   Neck: no visualized mass   Resp: no respiratory distress   Neuro: no gross deficits   Skin: no discoloration or lesions of concern on visible areas   Psychiatric: normal affect, consistent with stated mood, no evidence of hallucinations     Additional exam findings:  Full ROM noted to right arm/shoulder. We discussed the expected course, resolution and complications of the diagnosis(es) in detail. Medication risks, benefits, costs, interactions, and alternatives were discussed as indicated. I advised him to contact the office if his condition worsens, changes or fails to improve as anticipated. He expressed understanding with the diagnosis(es) and plan. Margaritagene Paula, was evaluated through a synchronous (real-time) audio-video encounter. The patient (or guardian if applicable) is aware that this is a billable service. Verbal consent to proceed has been obtained within the past 12 months. The visit was conducted pursuant to the emergency declaration under the [address] [place] [address] authority and the [employer] and [employer] General Act. Patient identification was verified, and a caregiver was present when appropriate. The patient was located in a state where the provider was credentialed to provide care.     Julian Cordoba NP

## 2021-10-25 ENCOUNTER — HOSPITAL ENCOUNTER (OUTPATIENT)
Dept: PHYSICAL THERAPY | Age: 66
Discharge: HOME | End: 2021-10-25
Payer: MEDICARE

## 2021-10-25 DIAGNOSIS — M25.511 ACUTE PAIN OF RIGHT SHOULDER: ICD-10-CM

## 2021-10-25 PROCEDURE — 97110 THERAPEUTIC EXERCISES: CPT | Performed by: PHYSICAL THERAPIST

## 2021-10-25 PROCEDURE — 97161 PT EVAL LOW COMPLEX 20 MIN: CPT | Performed by: PHYSICAL THERAPIST

## 2021-10-25 NOTE — PROGRESS NOTES
[facility]  [address], [place], [address]  Phone: [phone]  Fax: [phone]    PT INITIAL EVALUATION NOTE - [facility] 2-15    Patient Name: Ez Kaur  Date:10/25/2021  : 1955  [x]  Patient  Verified  Payor: Rufina Somers / Plan: [place] & [place] / Product Type: Medicare /    In time:145 P  Out time: 230 P  Total Treatment Time (min): 45  Total Timed Codes (min): 15  1:1 Treatment Time (MC only): 39   Visit #: 1     Treatment Area: Acute pain of right shoulder [M25.511]    SUBJECTIVE    Any medication changes, allergies to medications, adverse drug reactions, diagnosis change, or new procedure performed?: [] No    [x] Yes (see summary sheet for update)    Current symptoms/chief complaint and date of onset/injury:   Pt presents with R shoulder pain. Was doing [employer], felt a \"tweak\" in R shoulder approx 7-8 weeks ago. Reports a \"tightness\" in R forearm and on top of hand; pain along outside of R upper arm/shoulder. Symptoms worse at night, \"I usually feel okay during the day\". He has tried ice, biofreeze. He is L hand dominant. Has not played much tennis recently; has picked up golf more often-- usually no pain when swings golf club however once in a while a sharp pain. Pt is dizzy regularly, feels off balance. See past PT notes. 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 Takes 1/2 valium daily for \"balance\"    Aggravated by:  Quick turns; sleeping at night (with R arm overhead)  Eased by: Aleve    Pain Level (0-10 scale):  8/10 max  2/10 min      Location of symptoms: R shoulder   PMH: Significant for lumbar laminectomy ; high BP; hx of vestibular neuritis  Social/Recreation/Work: Semi-retired; works in sales. He enjoys playing tennis, golf.  3yo granddaughter  Prior level of function: no shoulder pain prior to 7-8 weeks ago  Patient goal(s): \"get better or figure out if more than a pull-- maybe slight tear\"     Objective: Observation/posture: Fair sitting/standing posture-- ant tilt scap bilat, forward head    ROM:                                               AROM                                                                PROM   Right Left  Right Left   Flexion WNL, p! At end range WNL Flexion WNL, p! At end range WNL   ABD WNL, p! At end range WNL ABD WNL, p! At end range WNL   ER @ 90 Degrees -- -- ER @ 90 Degrees WNL, p! At end range WNL   IR @ 90 Degrees -- -- IR @ 90 Degrees WNL WNL   Functional ER NT NT      Functional IR approx T11 approx T11                Accessory Motions:  R post, inf GH jt mobs WNL    Strength:                                                                            R (1-5) L (1-5)   Shoulder Flexion 5 5   Shoulder Scaption 5 5   Shoulder Abduction 4 (p!) 4   Shoulder ER 4 4   Shoulder IR 4 4   Elbow Flexion 4 4   Elbow Extension 4 4     Palpation:  Mod TTP R biceps tendon, R infraspinatus muscle belly, R common wrist extensors    Special Tests:  Painful arc  [] Pos   [x] Neg   Neer's Test  [x] Pos   [] Neg   Hawkin's Test  [x] Pos   [] Neg   Empty Can  [] Pos   [x] Neg  Full Can   [] Pos   [x] Neg   Drop Arm Test [] Pos   [x] Neg  External Rotation Lag Sign [] Pos   [x] Neg     Outcome Measure: Patient presents with an initial FOTO score of NT.      Today's Treatment[de-identified]    15 min Therapeutic Exercise:  [x] See flow sheet : instructed in HEP   Rationale: increase ROM and increase strength to improve the patients ability to perform daily chores, swing golf club with dec'd symptoms            With   [x] TE   [] TA   [] neuro   [] other: Patient Education: [x] Review HEP    [] Progressed/Changed HEP based on:   [x] positioning   [] body mechanics   [] transfers   [x] heat/ice application    [x] other: camille/phys; PT POC; importance of performing HEP in order to maximize benefit of PT; reviewed sleeping positions and use of pillow to support R UE; use of ice for 10-15 min as needed to reduce inflammation and manage pain levels     Pain Level (0-10 scale) post treatment: not reported    Assessment:   [x] See POC    Plan:    [x] See Aaron Celaya PT, DPT   10/25/2021 1:41 PM

## 2021-10-25 NOTE — PROGRESS NOTES
Physical Therapy at [facility],   a part of [address]  [address] [facility], [address]  Phone: [phone]  Fax: [phone]    Plan of Care/Statement of Necessity for Physical Therapy Services  2-15    Patient name: Rina Travis  : 1955  Provider#: [id number]  Referral source: Leif Umaña NP      Medical/Treatment Diagnosis: Acute pain of right shoulder [M25.511]     Prior Hospitalization: see medical history     Comorbidities: see evaluation  Prior Level of Function: see evaluation  Medications: Verified on Patient Summary List  Start of Care: 10/25/21      Onset Date: 7-8 weeks ago   The Plan of Care and following information is based on the information from the initial evaluation. Assessment/ key information: Pt is a 77year old male presenting with R shoulder pain, tenderness, dec'd strength and ROM. He will benefit from PT to address problem list below.     Evaluation Complexity History MEDIUM  Complexity : 1-2 comorbidities / personal factors will impact the outcome/ POC ; Examination LOW Complexity : 1-2 Standardized tests and measures addressing body structure, function, activity limitation and / or participation in recreation  ;Presentation LOW Complexity : Stable, uncomplicated  ;Clinical Decision Making MEDIUM Complexity : FOTO score of 26-74  Overall Complexity Rating: LOW     Problem List: pain affecting function, decrease ROM, decrease strength, decrease ADL/ functional abilitiies, decrease activity tolerance and decrease flexibility/ joint mobility   Treatment Plan may include any combination of the following: Therapeutic exercise, Therapeutic activities, Neuromuscular re-education, Physical agent/modality, Manual therapy, Patient education, Self Care training and Functional mobility training  Patient / Family readiness to learn indicated by: asking questions, trying to perform skills and interest  Persons(s) to be included in education: patient (P)  Barriers to Learning/Limitations: None  Patient Goal (s): see evaluation  Patient Self Reported Health Status: fair  Rehabilitation Potential: good    Short Term Goals: To be accomplished in 3-4 weeks:  1) Pt will be independent in initial HEP  2) Pt will report >/=25% improvement in symptoms    Long Term Goals: To be accomplished in 10-12 weeks:  1) Pt will report being able to play tennis without R shoulder symptoms  2) Pt will report being able to play golf without R shoulder symptoms  3) Pt will demonstrate >/=4+/5 R shoulder IR/ER strength in order to assist in ADL's  4) Pt will demonstrate >/=4+/5 R shoulder abduction strength in order to assist in lifting objects  5) Pt will be independent in final HEP    Frequency / Duration: Patient to be seen 1-2 times per week for 10-12 weeks. Patient/ Caregiver education and instruction: self care, activity modification and exercises    [x]  Plan of care has been reviewed with PTA    Certification Period: 10/25/21- 1/20/22  Octavio Reece, PT 10/25/2021    ________________________________________________________________________    I certify that the above Therapy Services are being furnished while the patient is under my care. I agree with the treatment plan and certify that this therapy is necessary.     Physician's Signature:____________________  Date:____________Time: _________         Subhash Barrientos NP

## 2021-10-29 DIAGNOSIS — E78.5 HYPERLIPIDEMIA LDL GOAL <130: ICD-10-CM

## 2021-10-29 RX ORDER — SIMVASTATIN 40 MG/1
TABLET, FILM COATED ORAL
Qty: 90 TABLET | Refills: 1 | Status: SHIPPED | OUTPATIENT
Start: 2021-10-29 | End: 2022-04-28 | Stop reason: SDUPTHER

## 2021-11-02 ENCOUNTER — HOSPITAL ENCOUNTER (OUTPATIENT)
Dept: PHYSICAL THERAPY | Age: 66
Discharge: HOME | End: 2021-11-02
Payer: MEDICARE

## 2021-11-02 PROCEDURE — 97110 THERAPEUTIC EXERCISES: CPT | Performed by: PHYSICAL THERAPIST

## 2021-11-02 PROCEDURE — 97140 MANUAL THERAPY 1/> REGIONS: CPT | Performed by: PHYSICAL THERAPIST

## 2021-11-02 NOTE — PROGRESS NOTES
PT DAILY TREATMENT NOTE - [facility] 2-15    Patient Name: Althea Loss  Date:2021  : 1955  [x]  Patient  Verified  Payor: [place] MEDICARE / Plan: [place] MEDICARE PART A & B / Product Type: Medicare /    In time: 1030 A  Out time: 11:25 A  Total Treatment Time (min): 55  Total Timed Codes (min): 45  1:1 Treatment Time (MC only): 40   Visit #:  2    Treatment Area: Pain in right shoulder [M25.511]    SUBJECTIVE  Pain Level (0-10 scale): 0   Any medication changes, allergies to medications, adverse drug reactions, diagnosis change, or new procedure performed?: [x] No    [] Yes (see summary sheet for update)  Subjective functional status/changes:   [] No changes reported  Pt reports only pain is at night; no shoulder pain right now. States that when he does \"quick movements\" he will have shoulder pain.  Most painful is lifting arm out to side (abduction)    OBJECTIVE    Modality rationale: decrease inflammation and decrease pain to improve the patients ability to lift overhead, sleep with dec'd symptoms   Min Type Additional Details       [] Estim: []Att   []Unatt    []TENS instruct                  []IFC  []Premod   []NMES                     []Other:  []w/US   []w/ice   []w/heat  Position:  Location:       []  Traction: [] Cervical       []Lumbar                       [] Prone          []Supine                       []Intermittent   []Continuous Lbs:  [] before manual  [] after manual  []w/heat    []  Ultrasound: []Continuous   [] Pulsed                       at: []1MHz   []3MHz Location:  W/cm2:   10 [x]  Ice     []  Heat  []  Ice massage Position: seated  Location: R shoulder      []  Vasopneumatic Device Pressure:       [] lo [] med [] hi   Temperature:      [x] Skin assessment post-treatment:  [x]intact []redness- no adverse reaction    []redness  adverse reaction:     35 min Therapeutic Exercise:  [x] See flow sheet : progressed per flow sheet   Rationale: increase ROM and increase strength to improve the patients ability to reach overhead, perform ADL's with dec'd symptoms    10 min Manual Therapy: TPR R infraspinatus muscle belly    Rationale: decrease pain, increase ROM, increase tissue extensibility and decrease trigger points to improve the patients ability to perform activities, lift objects, sleep with dec'd symptoms          With   [] TE   [] TA   [] Neuro   [] SC   [] other: Patient Education: [x] Review HEP    [] Progressed/Changed HEP based on:   [] positioning   [] body mechanics   [] transfers   [] heat/ice application    [] other:      Other Objective/Functional Measures: n/a     Pain Level (0-10 scale) post treatment: 0    ASSESSMENT/Changes in Function:   Updated HEP handout. Cues for proper scapular positioning during tband IR/ER. Lorenzo session well. Patient will continue to benefit from skilled PT services to modify and progress therapeutic interventions, address functional mobility deficits, address ROM deficits, address strength deficits, analyze and address soft tissue restrictions, analyze and cue movement patterns, analyze and modify body mechanics/ergonomics and assess and modify postural abnormalities to attain remaining goals.      [x]  See Plan of Care  []  See progress note/recertification  []  See Discharge Summary         Progress towards goals / Updated goals:  NT    PLAN  []  Upgrade activities as tolerated     [x]  Continue plan of care  []  Update interventions per flow sheet       []  Discharge due to:_  []  Other:_      Sanjuanita Quiroz, PT 11/2/2021

## 2021-11-05 ENCOUNTER — HOSPITAL ENCOUNTER (OUTPATIENT)
Dept: PHYSICAL THERAPY | Age: 66
Discharge: HOME | End: 2021-11-05
Payer: MEDICARE

## 2021-11-05 PROCEDURE — 97140 MANUAL THERAPY 1/> REGIONS: CPT | Performed by: PHYSICAL THERAPY ASSISTANT

## 2021-11-05 PROCEDURE — 97110 THERAPEUTIC EXERCISES: CPT | Performed by: PHYSICAL THERAPY ASSISTANT

## 2021-11-05 NOTE — PROGRESS NOTES
PT DAILY TREATMENT NOTE - [facility] 2-15    Patient Name: Laura Juarez  Date:2021  : 1955  [x]  Patient  Verified  Payor: [place] MEDICARE / Plan: [place] MEDICARE PART A & B / Product Type: Medicare /    In time: 071 A  Out time: 9:25 A  Total Treatment Time (min): 55  Total Timed Codes (min): 45  1:1 Treatment Time ( only): 40   Visit #:  3    Treatment Area: Pain in right shoulder [M25.511]    SUBJECTIVE  Pain Level (0-10 scale): 1-2  Any medication changes, allergies to medications, adverse drug reactions, diagnosis change, or new procedure performed?: [x] No    [] Yes (see summary sheet for update)  Subjective functional status/changes:   [] No changes reported  Pt reports he has a few questions about his HEP and wants to know if he is performing this correctly. States his shoulder is doing better overall.     OBJECTIVE    Modality rationale: decrease inflammation and decrease pain to improve the patients ability to lift overhead, sleep with dec'd symptoms   Min Type Additional Details       [] Estim: []Att   []Unatt    []TENS instruct                  []IFC  []Premod   []NMES                     []Other:  []w/US   []w/ice   []w/heat  Position:  Location:       []  Traction: [] Cervical       []Lumbar                       [] Prone          []Supine                       []Intermittent   []Continuous Lbs:  [] before manual  [] after manual  []w/heat    []  Ultrasound: []Continuous   [] Pulsed                       at: []1MHz   []3MHz Location:  W/cm2:   10 [x]  Ice     []  Heat  []  Ice massage Position: seated  Location: R shoulder      []  Vasopneumatic Device Pressure:       [] lo [] med [] hi   Temperature:      [x] Skin assessment post-treatment:  [x]intact []redness- no adverse reaction    []redness  adverse reaction:     35 min Therapeutic Exercise:  [x] See flow sheet : progressed per flow sheet   Rationale: increase ROM and increase strength to improve the patients ability to reach overhead, perform ADL's with dec'd symptoms    10 min Manual Therapy: TPR R infraspinatus muscle belly    Rationale: decrease pain, increase ROM, increase tissue extensibility and decrease trigger points to improve the patients ability to perform activities, lift objects, sleep with dec'd symptoms          With   [] TE   [] TA   [] Neuro   [] SC   [] other: Patient Education: [x] Review HEP    [] Progressed/Changed HEP based on:   [] positioning   [] body mechanics   [] transfers   [] heat/ice application    [] other:      Other Objective/Functional Measures: n/a     Pain Level (0-10 scale) post treatment: 0    ASSESSMENT/Changes in Function:   Reviewed HEP with Tband IR/ER and gave green band for HEP. Continues to require cues for scapular positioning during exercises. Patient will continue to benefit from skilled PT services to modify and progress therapeutic interventions, address functional mobility deficits, address ROM deficits, address strength deficits, analyze and address soft tissue restrictions, analyze and cue movement patterns, analyze and modify body mechanics/ergonomics and assess and modify postural abnormalities to attain remaining goals.      [x]  See Plan of Care  []  See progress note/recertification  []  See Discharge Summary         Progress towards goals / Updated goals:  NT    PLAN  []  Upgrade activities as tolerated     [x]  Continue plan of care  []  Update interventions per flow sheet       []  Discharge due to:_  []  Other:_      Stephania Thompson, KHUSHI 11/5/2021

## 2021-11-08 ENCOUNTER — HOSPITAL ENCOUNTER (OUTPATIENT)
Dept: PHYSICAL THERAPY | Age: 66
Discharge: HOME | End: 2021-11-08
Payer: MEDICARE

## 2021-11-08 PROCEDURE — 97140 MANUAL THERAPY 1/> REGIONS: CPT | Performed by: PHYSICAL THERAPIST

## 2021-11-08 PROCEDURE — 97110 THERAPEUTIC EXERCISES: CPT | Performed by: PHYSICAL THERAPIST

## 2021-11-11 ENCOUNTER — HOSPITAL ENCOUNTER (OUTPATIENT)
Dept: PHYSICAL THERAPY | Age: 66
Discharge: HOME | End: 2021-11-11
Payer: MEDICARE

## 2021-11-11 PROCEDURE — 97110 THERAPEUTIC EXERCISES: CPT | Performed by: PHYSICAL THERAPIST

## 2021-11-11 PROCEDURE — 97140 MANUAL THERAPY 1/> REGIONS: CPT | Performed by: PHYSICAL THERAPIST

## 2021-11-11 NOTE — PROGRESS NOTES
PT DAILY TREATMENT NOTE - [facility] 2-15    Patient Name: Nesha iJn  Date:2021  : 1955  [x]  Patient  Verified  Payor: Chris Mays / Plan: [place] MED[place] & [place] / Product Type: Medicare /    In time: 1230 P  Out time: 1:30 P  Total Treatment Time (min): 60  Total Timed Codes (min): 50  1:1 Treatment Time ([facility] only): 45   Visit #:  5    Treatment Area: Pain in right shoulder [M25.511]    SUBJECTIVE  Pain Level (0-10 scale): 0  Any medication changes, allergies to medications, adverse drug reactions, diagnosis change, or new procedure performed?: [x] No    [] Yes (see summary sheet for update)  Subjective functional status/changes:   [] No changes reported  \"I think its getting better\" He does not have as much pain/does not feel it as much throughout the day.  He is leaving early next week to go to [facility] for a golf trip with friends    OBJECTIVE    Modality rationale: decrease inflammation and decrease pain to improve the patients ability to lift overhead, sleep with dec'd symptoms   Min Type Additional Details       [] Estim: []Att   []Unatt    []TENS instruct                  []IFC  []Premod   []NMES                     []Other:  []w/US   []w/ice   []w/heat  Position:  Location:       []  Traction: [] Cervical       []Lumbar                       [] Prone          []Supine                       []Intermittent   []Continuous Lbs:  [] before manual  [] after manual  []w/heat    []  Ultrasound: []Continuous   [] Pulsed                       at: []1MHz   []3MHz Location:  W/cm2:   10 [x]  Ice     []  Heat  []  Ice massage Position: seated  Location: R shoulder      []  Vasopneumatic Device Pressure:       [] lo [] med [] hi   Temperature:      [x] Skin assessment post-treatment:  [x]intact []redness- no adverse reaction    []redness  adverse reaction:     40 min Therapeutic Exercise:  [x] See flow sheet : progressed per flow sheet   Rationale: increase ROM and increase strength to improve the patients ability to reach overhead, perform ADL's with dec'd symptoms    10 min Manual Therapy: PROM R shoulder IR, ER, flexion to tolerance  Rhythmic stabilization at 90 deg    Rationale: decrease pain, increase ROM, increase tissue extensibility and decrease trigger points to improve the patients ability to perform activities, lift objects, sleep with dec'd symptoms          With   [] TE   [] TA   [] Neuro   [] SC   [] other: Patient Education: [x] Review HEP    [] Progressed/Changed HEP based on:   [] positioning   [] body mechanics   [] transfers   [] heat/ice application    [] other:      Other Objective/Functional Measures:   P! End range PROM R shoulder flex     Pain Level (0-10 scale) post treatment: 0    ASSESSMENT/Changes in Function:   Overall progressing well with PT. Continues to require cueing for proper form on exercises. TTP dec'd compared to initial visit. Patient will continue to benefit from skilled PT services to modify and progress therapeutic interventions, address functional mobility deficits, address ROM deficits, address strength deficits, analyze and address soft tissue restrictions, analyze and cue movement patterns, analyze and modify body mechanics/ergonomics and assess and modify postural abnormalities to attain remaining goals.      [x]  See Plan of Care  []  See progress note/recertification  []  See Discharge Summary         Progress towards goals / Updated goals:  NT    PLAN  []  Upgrade activities as tolerated     [x]  Continue plan of care  []  Update interventions per flow sheet       []  Discharge due to:_  []  Other:_      Radha Moreno, PT 11/11/2021

## 2021-11-16 DIAGNOSIS — I10 BENIGN ESSENTIAL HYPERTENSION: ICD-10-CM

## 2021-11-16 RX ORDER — HYDROCHLOROTHIAZIDE 25 MG/1
25 TABLET ORAL DAILY
Qty: 90 TABLET | Refills: 1 | Status: SHIPPED | OUTPATIENT
Start: 2021-11-16 | End: 2022-04-28 | Stop reason: SDUPTHER

## 2021-11-19 ENCOUNTER — HOSPITAL ENCOUNTER (OUTPATIENT)
Dept: PHYSICAL THERAPY | Age: 66
Discharge: HOME | End: 2021-11-19
Payer: MEDICARE

## 2021-11-19 PROCEDURE — 97140 MANUAL THERAPY 1/> REGIONS: CPT | Performed by: PHYSICAL THERAPIST

## 2021-11-19 PROCEDURE — 97110 THERAPEUTIC EXERCISES: CPT | Performed by: PHYSICAL THERAPIST

## 2021-11-19 NOTE — PROGRESS NOTES
PT DAILY TREATMENT NOTE - [facility] 2-15    Patient Name: Ivonne Pike  Date:2021  : 1955  [x]  Patient  Verified  Payor: Gayle Hazard / Plan: [place] MEDICARE PART A & B / Product Type: Medicare /    In time: 8:10 A  Out time: 9:10 A  Total Treatment Time (min): 60  Total Timed Codes (min):  50  1:1 Treatment Time ( [address] only): 40   Visit #:  6    Treatment Area: Pain in right shoulder [M25.511]    SUBJECTIVE  Pain Level (0-10 scale): 0  Any medication changes, allergies to medications, adverse drug reactions, diagnosis change, or new procedure performed?: [x] No    [] Yes (see summary sheet for update)  Subjective functional status/changes:   [] No changes reported  Pt reports approx 60% improvement in symptoms. He feels like his balance issues are more of a problem than his shoulder right now (he does have an appointment with the vestibular PT next week). He states that he was able to play golf for several consecutive days while on vacation last week with only 1 incident of a twinge in his shoulder. He states that most of his symptoms are at night while he is trying to relax or with quick, sudden movements.       OBJECTIVE    Modality rationale: decrease inflammation and decrease pain to improve the patients ability to lift overhead, sleep with dec'd symptoms   Min Type Additional Details       [] Estim: []Att   []Unatt    []TENS instruct                  []IFC  []Premod   []NMES                     []Other:  []w/US   []w/ice   []w/heat  Position:  Location:       []  Traction: [] Cervical       []Lumbar                       [] Prone          []Supine                       []Intermittent   []Continuous Lbs:  [] before manual  [] after manual  []w/heat    []  Ultrasound: []Continuous   [] Pulsed                       at: []1MHz   []3MHz Location:  W/cm2:   10 [x]  Ice     []  Heat  []  Ice massage Position: seated  Location: R shoulder      []  Vasopneumatic Device Pressure:       [] lo [] med [] hi Temperature:      [x] Skin assessment post-treatment:  [x]intact []redness- no adverse reaction    []redness  adverse reaction:     40 min Therapeutic Exercise:  [x] See flow sheet : progressed per flow sheet   Rationale: increase ROM and increase strength to improve the patients ability to reach overhead, perform ADL's with dec'd symptoms    10 min Manual Therapy: PROM R shoulder IR, ER, flexion to tolerance  Rhythmic stabilization at 90 deg-- held today   Rationale: decrease pain, increase ROM, increase tissue extensibility and decrease trigger points to improve the patients ability to perform activities, lift objects, sleep with dec'd symptoms          With   [] TE   [] TA   [] Neuro   [] SC   [] other: Patient Education: [x] Review HEP    [] Progressed/Changed HEP based on:   [] positioning   [] body mechanics   [] transfers   [] heat/ice application    [] other:      Other Objective/Functional Measures:   n/a    Pain Level (0-10 scale) post treatment: 0    ASSESSMENT/Changes in Function:   Progressing well with strengthening program; pain levels improving, ability to perform daily/functional activities with less pain improving. Patient will continue to benefit from skilled PT services to modify and progress therapeutic interventions, address functional mobility deficits, address ROM deficits, address strength deficits, analyze and address soft tissue restrictions, analyze and cue movement patterns, analyze and modify body mechanics/ergonomics and assess and modify postural abnormalities to attain remaining goals.      [x]  See Plan of Care  []  See progress note/recertification  []  See Discharge Summary         Progress towards goals / Updated goals:  NT    PLAN  []  Upgrade activities as tolerated     [x]  Continue plan of care  []  Update interventions per flow sheet       []  Discharge due to:_  []  Other:_      Danika Mckee, PT 11/19/2021

## 2021-11-23 ENCOUNTER — HOSPITAL ENCOUNTER (OUTPATIENT)
Dept: PHYSICAL THERAPY | Age: 66
Discharge: HOME | End: 2021-11-23
Payer: MEDICARE

## 2021-11-23 PROCEDURE — 97140 MANUAL THERAPY 1/> REGIONS: CPT | Performed by: PHYSICAL THERAPIST

## 2021-11-23 PROCEDURE — 97110 THERAPEUTIC EXERCISES: CPT | Performed by: PHYSICAL THERAPIST

## 2021-11-23 NOTE — PROGRESS NOTES
PT DAILY TREATMENT NOTE - [facility] 2-15    Patient Name: Efren Ortiz  Date:2021  : 1955  [x]  Patient  Verified  Payor: [place] MEDICARE / Plan: [place] MEDICARE PART A & B / Product Type: Medicare /    In time: 1:15 P    Out time: 2:15 A  Total Treatment Time (min): 60  Total Timed Codes (min):  50  1:1 Treatment Time ( only): 45  Visit #:  7    Treatment Area: Pain in right shoulder [M25.511]    SUBJECTIVE  Pain Level (0-10 scale): 0  Any medication changes, allergies to medications, adverse drug reactions, diagnosis change, or new procedure performed?: [x] No    [] Yes (see summary sheet for update)  Subjective functional status/changes:   [] No changes reported  Pt states that his shoulder feels good during the day; still has pain at night occasionally. He woke up last night with shoulder pain, but did not need to take any OTC pain meds.     OBJECTIVE    Modality rationale: decrease inflammation and decrease pain to improve the patients ability to lift overhead, sleep with dec'd symptoms   Min Type Additional Details       [] Estim: []Att   []Unatt    []TENS instruct                  []IFC  []Premod   []NMES                     []Other:  []w/US   []w/ice   []w/heat  Position:  Location:       []  Traction: [] Cervical       []Lumbar                       [] Prone          []Supine                       []Intermittent   []Continuous Lbs:  [] before manual  [] after manual  []w/heat    []  Ultrasound: []Continuous   [] Pulsed                       at: []1MHz   []3MHz Location:  W/cm2:   10 [x]  Ice     []  Heat  []  Ice massage Position: seated  Location: R shoulder      []  Vasopneumatic Device Pressure:       [] lo [] med [] hi   Temperature:      [x] Skin assessment post-treatment:  [x]intact []redness- no adverse reaction    []redness  adverse reaction:     40 min Therapeutic Exercise:  [x] See flow sheet : progressed per flow sheet   Rationale: increase ROM and increase strength to improve the patients ability to reach overhead, perform ADL's with dec'd symptoms    10 min Manual Therapy: PROM R shoulder IR, ER, flexion to tolerance  Rhythmic stabilization at 90 deg   Rationale: decrease pain, increase ROM, increase tissue extensibility and decrease trigger points to improve the patients ability to perform activities, lift objects, sleep with dec'd symptoms          With   [] TE   [] TA   [] Neuro   [] SC   [] other: Patient Education: [x] Review HEP    [] Progressed/Changed HEP based on:   [] positioning   [] body mechanics   [] transfers   [] heat/ice application    [] other:      Other Objective/Functional Measures:   n/a    Pain Level (0-10 scale) post treatment: 0    ASSESSMENT/Changes in Function:   Progressing well with strengthening program. Good ROM, min pain at PROM end range flex  Patient will continue to benefit from skilled PT services to modify and progress therapeutic interventions, address functional mobility deficits, address ROM deficits, address strength deficits, analyze and address soft tissue restrictions, analyze and cue movement patterns, analyze and modify body mechanics/ergonomics and assess and modify postural abnormalities to attain remaining goals.      [x]  See Plan of Care  []  See progress note/recertification  []  See Discharge Summary         Progress towards goals / Updated goals:  NT    PLAN  []  Upgrade activities as tolerated     [x]  Continue plan of care  []  Update interventions per flow sheet       []  Discharge due to:_  []  Other:_      Maegan yWnne, PT 11/23/2021

## 2021-12-02 ENCOUNTER — HOSPITAL ENCOUNTER (OUTPATIENT)
Dept: PHYSICAL THERAPY | Age: 66
Discharge: HOME | End: 2021-12-02
Payer: MEDICARE

## 2021-12-02 PROCEDURE — 97140 MANUAL THERAPY 1/> REGIONS: CPT | Performed by: PHYSICAL THERAPIST

## 2021-12-02 PROCEDURE — 97110 THERAPEUTIC EXERCISES: CPT | Performed by: PHYSICAL THERAPIST

## 2021-12-02 NOTE — PROGRESS NOTES
PT DAILY TREATMENT NOTE - [facility] 2-15    Patient Name: Teo Medrano  Date:2021  : 1955  [x]  Patient  Verified  Payor: [place] MEDICARE / Plan: [place] MEDICARE PART A & B / Product Type: Medicare /    In time: 1:15 P    Out time: 2:10 P  Total Treatment Time (min): 55  Total Timed Codes (min):  45  1:1 Treatment Time ( only): 40  Visit #:  8    Treatment Area: Pain in right shoulder [M25.511]    SUBJECTIVE  Pain Level (0-10 scale): 0  Any medication changes, allergies to medications, adverse drug reactions, diagnosis change, or new procedure performed?: [x] No    [] Yes (see summary sheet for update)  Subjective functional status/changes:   [] No changes reported  Pt reports that he does not get much shoulder pain during the day except with quick, sudden movements. He woke up in the middle of the night with some shoulder pain last night. After his tongue surgery earlier this week he did not do any PT exercises and felt like his shoulder was better/not hurting at night. \"maybe if I do nothing? \"    OBJECTIVE    Modality rationale: decrease inflammation and decrease pain to improve the patients ability to lift overhead, sleep with dec'd symptoms   Min Type Additional Details       [] Estim: []Att   []Unatt    []TENS instruct                  []IFC  []Premod   []NMES                     []Other:  []w/US   []w/ice   []w/heat  Position:  Location:       []  Traction: [] Cervical       []Lumbar                       [] Prone          []Supine                       []Intermittent   []Continuous Lbs:  [] before manual  [] after manual  []w/heat    []  Ultrasound: []Continuous   [] Pulsed                       at: []1MHz   []3MHz Location:  W/cm2:   10 [x]  Ice     []  Heat  []  Ice massage Position: seated  Location: R shoulder      []  Vasopneumatic Device Pressure:       [] lo [] med [] hi   Temperature:      [x] Skin assessment post-treatment:  [x]intact []redness- no adverse reaction    []redness  adverse reaction:     35 min Therapeutic Exercise:  [x] See flow sheet : progressed per flow sheet   Rationale: increase ROM and increase strength to improve the patients ability to reach overhead, perform ADL's with dec'd symptoms    10 min Manual Therapy: PROM R shoulder IR, ER, flexion to tolerance  Rhythmic stabilization at 90 deg   Rationale: decrease pain, increase ROM, increase tissue extensibility and decrease trigger points to improve the patients ability to perform activities, lift objects, sleep with dec'd symptoms          With   [] TE   [] TA   [] Neuro   [] SC   [] other: Patient Education: [x] Review HEP    [] Progressed/Changed HEP based on:   [] positioning   [] body mechanics   [] transfers   [] heat/ice application    [] other:      Other Objective/Functional Measures:   n/a    Pain Level (0-10 scale) post treatment: 0    ASSESSMENT/Changes in Function:   Continuing to progress well. Intermittent symptoms, difficulty obtaining consistent subjective information. Patient will continue to benefit from skilled PT services to modify and progress therapeutic interventions, address functional mobility deficits, address ROM deficits, address strength deficits, analyze and address soft tissue restrictions, analyze and cue movement patterns, analyze and modify body mechanics/ergonomics and assess and modify postural abnormalities to attain remaining goals.      [x]  See Plan of Care  []  See progress note/recertification  []  See Discharge Summary         Progress towards goals / Updated goals:  NT    PLAN  []  Upgrade activities as tolerated     [x]  Continue plan of care  []  Update interventions per flow sheet       []  Discharge due to:_  []  Other:_      Danika Mckee, PT 12/2/2021

## 2021-12-06 ENCOUNTER — HOSPITAL ENCOUNTER (OUTPATIENT)
Dept: PHYSICAL THERAPY | Age: 66
Discharge: HOME | End: 2021-12-06
Payer: MEDICARE

## 2021-12-06 PROCEDURE — 97140 MANUAL THERAPY 1/> REGIONS: CPT | Performed by: PHYSICAL THERAPIST

## 2021-12-06 PROCEDURE — 97110 THERAPEUTIC EXERCISES: CPT | Performed by: PHYSICAL THERAPIST

## 2021-12-06 NOTE — PROGRESS NOTES
PT DAILY TREATMENT NOTE - [facility] 2-15    Patient Name: Jozef Mann  Date:2021  : 1955  [x]  Patient  Verified  Payor: [place] MEDICARE / Plan: [place] MEDICARE PART A & B / Product Type: Medicare /    In time: 4:00 P   Out time: 5:00 P  Total Treatment Time (min): 60  Total Timed Codes (min):  50  1:1 Treatment Time ( only): 30  Visit #:  9    Treatment Area: Pain in right shoulder [M25.511]    SUBJECTIVE  Pain Level (0-10 scale): 0  Any medication changes, allergies to medications, adverse drug reactions, diagnosis change, or new procedure performed?: [x] No    [] Yes (see summary sheet for update)  Subjective functional status/changes:   [] No changes reported  Pt reports that his wife saw him a couple times sleeping with his arm above his head; he has noticed it a couple times as well. He was able to do some mulching this past weekend without any shoulder pain.  \"its good\"    OBJECTIVE    Modality rationale: decrease inflammation and decrease pain to improve the patients ability to lift overhead, sleep with dec'd symptoms   Min Type Additional Details       [] Estim: []Att   []Unatt    []TENS instruct                  []IFC  []Premod   []NMES                     []Other:  []w/US   []w/ice   []w/heat  Position:  Location:       []  Traction: [] Cervical       []Lumbar                       [] Prone          []Supine                       []Intermittent   []Continuous Lbs:  [] before manual  [] after manual  []w/heat    []  Ultrasound: []Continuous   [] Pulsed                       at: []1MHz   []3MHz Location:  W/cm2:   10 [x]  Ice     []  Heat  []  Ice massage Position: seated  Location: R shoulder      []  Vasopneumatic Device Pressure:       [] lo [] med [] hi   Temperature:      [x] Skin assessment post-treatment:  [x]intact []redness- no adverse reaction    []redness  adverse reaction:     40 min Therapeutic Exercise:  [x] See flow sheet : progressed per flow sheet   Rationale: increase ROM and increase strength to improve the patients ability to reach overhead, perform ADL's with dec'd symptoms    10 min Manual Therapy: PROM R shoulder IR, ER, flexion to tolerance  Rhythmic stabilization at 90 deg   Rationale: decrease pain, increase ROM, increase tissue extensibility and decrease trigger points to improve the patients ability to perform activities, lift objects, sleep with dec'd symptoms          With   [] TE   [] TA   [] Neuro   [] SC   [] other: Patient Education: [x] Review HEP    [] Progressed/Changed HEP based on:   [] positioning   [] body mechanics   [] transfers   [] heat/ice application    [] other:      Other Objective/Functional Measures:   n/a    Pain Level (0-10 scale) post treatment: 0    ASSESSMENT/Changes in Function:   Symptoms at night likely due to sleeping position. Progressing well with PT  Patient will continue to benefit from skilled PT services to modify and progress therapeutic interventions, address functional mobility deficits, address ROM deficits, address strength deficits, analyze and address soft tissue restrictions, analyze and cue movement patterns, analyze and modify body mechanics/ergonomics and assess and modify postural abnormalities to attain remaining goals.      [x]  See Plan of Care  []  See progress note/recertification  []  See Discharge Summary         Progress towards goals / Updated goals:  NT    PLAN  []  Upgrade activities as tolerated     [x]  Continue plan of care  []  Update interventions per flow sheet       []  Discharge due to:_  []  Other:_      Radha Moreno PT 12/6/2021

## 2021-12-08 ENCOUNTER — HOSPITAL ENCOUNTER (OUTPATIENT)
Dept: PHYSICAL THERAPY | Age: 66
Discharge: HOME | End: 2021-12-08
Payer: MEDICARE

## 2021-12-08 PROCEDURE — 97110 THERAPEUTIC EXERCISES: CPT | Performed by: PHYSICAL THERAPIST

## 2021-12-08 PROCEDURE — 97140 MANUAL THERAPY 1/> REGIONS: CPT | Performed by: PHYSICAL THERAPIST

## 2021-12-08 NOTE — PROGRESS NOTES
PT DAILY TREATMENT/Discharge NOTE - [facility] 2-15    Patient Name: Ashok Ratliff  Date:2021  : 1955  [x]  Patient  Verified  Payor: VA MEDICARE / Plan: [place] MEDICARE PART A & B / Product Type: Medicare /    In time: 490 A   Out time: 5008 A  Total Treatment Time (min): 60  Total Timed Codes (min):  50  1:1 Treatment Time ( only): 30  Visit #:  10    Treatment Area: Pain in right shoulder [M25.511]    SUBJECTIVE  Pain Level (0-10 scale): 0  Any medication changes, allergies to medications, adverse drug reactions, diagnosis change, or new procedure performed?: [x] No    [] Yes (see summary sheet for update)  Subjective functional status/changes:   [] No changes reported  Pt reports 80% improvement in symptoms. His wife woke him up 2x last night bc he was sleeping with his R arm up over his head. He still has occasional R shoulder pain with sharp/sudden movements and he sometimes feels the \"tightness\" through his R forearm, mary kay in the AM. He is able to do most activities throughout the day without noticing his shoulder, including mulching.  His main concern at this time is his continued dizziness/vestibular problem    OBJECTIVE    Modality rationale: decrease inflammation and decrease pain to improve the patients ability to lift overhead, sleep with dec'd symptoms   Min Type Additional Details       [] Estim: []Att   []Unatt    []TENS instruct                  []IFC  []Premod   []NMES                     []Other:  []w/US   []w/ice   []w/heat  Position:  Location:       []  Traction: [] Cervical       []Lumbar                       [] Prone          []Supine                       []Intermittent   []Continuous Lbs:  [] before manual  [] after manual  []w/heat    []  Ultrasound: []Continuous   [] Pulsed                       at: []1MHz   []3MHz Location:  W/cm2:   10 [x]  Ice     []  Heat  []  Ice massage Position: seated  Location: R shoulder      []  Vasopneumatic Device Pressure:       [] lo [] med [] hi   Temperature:      [x] Skin assessment post-treatment:  [x]intact []redness- no adverse reaction    []redness  adverse reaction:     40 min Therapeutic Exercise:  [x] See flow sheet : progressed per flow sheet   Rationale: increase ROM and increase strength to improve the patients ability to reach overhead, perform ADL's with dec'd symptoms    10 min Manual Therapy: PROM R shoulder IR, ER, flexion to tolerance  Rhythmic stabilization at 90 deg   Rationale: decrease pain, increase ROM, increase tissue extensibility and decrease trigger points to improve the patients ability to perform activities, lift objects, sleep with dec'd symptoms          With   [] TE   [] TA   [] Neuro   [] SC   [] other: Patient Education: [x] Review HEP    [] Progressed/Changed HEP based on:   [] positioning   [] body mechanics   [] transfers   [] heat/ice application    [] other:      Other Objective/Functional Measures:   AROM R shoulder  Flex= 158  Func IR= approx T10, slight \"pull\"    Strength:   Grossly 5/5, symmetrical    PROM R shoulder   Flex= 162 deg  ER at 90= 80 deg  IR at 90= 70 deg    Pain Level (0-10 scale) post treatment: 0    ASSESSMENT/Changes in Function:   Pt has completed 10 skilled PT visits. He has met or is progressing toward PT goals. Independent with HEP. He reports 80% improvement in symptoms, he is able to do activities such as mulching without R shoulder symptoms. Most of his symptoms continue to be in the morning/at night, however when asleep he finds his R arm up overhead when he wakes. We have discussed trying to avoid this position. Pt's main concern at this time is his balance/vestibular issues. Pt ready for D/C from PT at this time. [x]  See Discharge Summary         Progress towards goals / Updated goals:  Short Term Goals: To be accomplished in 3-4 weeks:  1) Pt will be independent in initial HEP MET  2) Pt will report >/=25% improvement in symptoms MET     Long Term Goals:  To be accomplished in 10-12 weeks:  1) Pt will report being able to play tennis without R shoulder symptoms not tested  2) Pt will report being able to play golf without R shoulder symptoms not tested  3) Pt will demonstrate >/=4+/5 R shoulder IR/ER strength in order to assist in ADL's MET  4) Pt will demonstrate >/=4+/5 R shoulder abduction strength in order to assist in lifting objects MET  5) Pt will be independent in final HEP MET    PLAN      [x]  Discharge due to: met/progress toward set PT goals    Charmayne Morita, PT 12/8/2021

## 2021-12-29 ENCOUNTER — TELEPHONE (OUTPATIENT)
Dept: FAMILY MEDICINE CLINIC | Age: 66
End: 2021-12-29

## 2021-12-29 NOTE — TELEPHONE ENCOUNTER
Loida Flores  Pt wants to now if Dr. Julienne Simpson can address shoulder pain and blood pressure in same visit.  Please advise

## 2021-12-29 NOTE — TELEPHONE ENCOUNTER
----- Message from Debo Amaral sent at 12/29/2021  1:38 PM EST -----  Subject: Appointment Request    Reason for Call: Routine Existing Condition Follow Up    QUESTIONS  Type of Appointment? Established Patient  Reason for appointment request? Available appointments did not meet   patient need  Additional Information for Provider? Earliest available appointment for   shoulder pain f/u was 3/7 and patient realized he already has a blood   pressure f/u the same date. Wanted to confirm Dr. Farzaneh Flanagan can address both   issues in that appointment - please contact patient if he needs to   schedule a separate appt for shoulder. ---------------------------------------------------------------------------  --------------  Jasmyne Grass INFO  What is the best way for the office to contact you? OK to leave message on   [employer], OK to respond with electronic message via [employer] portal (only   for patients who have registered [employer] account)  Preferred Call Back Phone Number? [phone]  ---------------------------------------------------------------------------  --------------  SCRIPT ANSWERS  Relationship to Patient? Self  Is this follow up request related to routine Diabetes Management? No  Have you been diagnosed with, awaiting test results for, or told that you   are suspected of having COVID-19 (Coronavirus)? (If patient has tested   negative or was tested as a requirement for work, school, or travel and   not based on symptoms, answer no)? No  Within the past two weeks have you developed any of the following symptoms   (answer no if symptoms have been present longer than 2 weeks or began   more than 2 weeks ago)? Fever or Chills, Cough, Shortness of breath or   difficulty breathing, Loss of taste or smell, Sore throat, Nasal   congestion, Sneezing or runny nose, Fatigue or generalized body aches   (answer no if pain is specific to a body part e.g. back pain), Diarrhea,   Headache?  No  Have you had close contact with someone with COVID-19 in the last 14 days? No  (Service Expert  click yes below to proceed with [facility] As Usual   Scheduling)?  Yes

## 2022-01-04 NOTE — ANCILLARY DISCHARGE INSTRUCTIONS
[facility]  [address]  [place], [address]  Phone: [phone]  Fax: [phone]    Discharge Summary  2-15    Patient name: Ez Kaur  : 1955  Provider#:[id number]  Referral source: Olena Krishna NP      Medical/Treatment Diagnosis: Pain in right shoulder [M25.511]     Prior Hospitalization: see medical history     Comorbidities: see evaluation  Prior Level of Function:see evaluation  Medications: Verified on Patient Summary List    Start of Care: 10/25/21      Onset Date:see evaluation   Visits from Start of Care: 10     Missed Visits: see CC  Reporting Period : 10/25/21 to 21    Summary of care:   AROM R shoulder  Flex= 158  Func IR= approx T10, slight \"pull\"     Strength:   Grossly 5/5, symmetrical     PROM R shoulder   Flex= 162 deg  ER at 90= 80 deg  IR at 90= 70 deg     Pain Level (0-10 scale) post treatment: 0     ASSESSMENT  Pt has completed 10 skilled PT visits. He has met or is progressing toward PT goals. Independent with HEP. He reports 80% improvement in symptoms, he is able to do activities such as mulching without R shoulder symptoms. Most of his symptoms continue to be in the morning/at night, however when asleep he finds his R arm up overhead when he wakes. We have discussed trying to avoid this position. Pt's main concern at this time is his balance/vestibular issues. Pt ready for D/C from PT at this time.      Progress towards goals  Short Term Goals: To be accomplished in 3-4 weeks:  1) Pt will be independent in initial HEP MET  2) Pt will report >/=25% improvement in symptoms MET     Long Term Goals:  1) Pt will report being able to play tennis without R shoulder symptoms not tested  2) Pt will report being able to play golf without R shoulder symptoms not tested  3) Pt will demonstrate >/=4+/5 R shoulder IR/ER strength in order to assist in ADL's MET  4) Pt will demonstrate >/=4+/5 R shoulder abduction strength in order to assist in lifting objects MET  5) Pt will be independent in final HEP MET      RECOMMENDATIONS:  [x]Discontinue therapy: [x]Patient has reached or is progressing toward set goals      []Patient is non-compliant or has abdicated      []Due to lack of appreciable progress towards set [address], PT 1/4/2022 12:53 PM

## 2022-03-01 ENCOUNTER — OFFICE VISIT (OUTPATIENT)
Dept: FAMILY MEDICINE CLINIC | Age: 67
End: 2022-03-01
Payer: MEDICARE

## 2022-03-01 DIAGNOSIS — I10 BENIGN ESSENTIAL HYPERTENSION: ICD-10-CM

## 2022-03-01 DIAGNOSIS — M48.061 SPINAL STENOSIS OF LUMBAR REGION, UNSPECIFIED WHETHER NEUROGENIC CLAUDICATION PRESENT: ICD-10-CM

## 2022-03-01 DIAGNOSIS — M50.30 DDD (DEGENERATIVE DISC DISEASE), CERVICAL: ICD-10-CM

## 2022-03-01 DIAGNOSIS — E66.9 OBESITY (BMI 30.0-34.9): ICD-10-CM

## 2022-03-01 DIAGNOSIS — M25.511 CHRONIC RIGHT SHOULDER PAIN: ICD-10-CM

## 2022-03-01 DIAGNOSIS — J30.1 SEASONAL ALLERGIC RHINITIS DUE TO POLLEN: ICD-10-CM

## 2022-03-01 DIAGNOSIS — E55.9 VITAMIN D DEFICIENCY: ICD-10-CM

## 2022-03-01 DIAGNOSIS — E78.5 HYPERLIPIDEMIA LDL GOAL <130: Primary | ICD-10-CM

## 2022-03-01 DIAGNOSIS — H81.20 VESTIBULAR NEURITIS, UNSPECIFIED LATERALITY: ICD-10-CM

## 2022-03-01 DIAGNOSIS — G89.29 CHRONIC RIGHT SHOULDER PAIN: ICD-10-CM

## 2022-03-01 LAB
25(OH)D3 SERPL-MCNC: 43.8 NG/ML (ref 30–100)
ALBUMIN SERPL-MCNC: 3.9 G/DL (ref 3.5–5)
ALBUMIN/GLOB SERPL: 1.3 {RATIO} (ref 1.1–2.2)
ALP SERPL-CCNC: 75 U/L (ref 45–117)
ALT SERPL-CCNC: 29 U/L (ref 12–78)
ANION GAP SERPL CALC-SCNC: 2 MMOL/L (ref 5–15)
AST SERPL-CCNC: 25 U/L (ref 15–37)
BILIRUB SERPL-MCNC: 0.6 MG/DL (ref 0.2–1)
BUN SERPL-MCNC: 14 MG/DL (ref 6–20)
BUN/CREAT SERPL: 13 (ref 12–20)
CALCIUM SERPL-MCNC: 9.3 MG/DL (ref 8.5–10.1)
CHLORIDE SERPL-SCNC: 106 MMOL/L (ref 97–108)
CHOLEST SERPL-MCNC: 150 MG/DL
CO2 SERPL-SCNC: 30 MMOL/L (ref 21–32)
CREAT SERPL-MCNC: 1.06 MG/DL (ref 0.7–1.3)
GLOBULIN SER CALC-MCNC: 3 G/DL (ref 2–4)
GLUCOSE SERPL-MCNC: 97 MG/DL (ref 65–100)
HDLC SERPL-MCNC: 48 MG/DL
HDLC SERPL: 3.1 {RATIO} (ref 0–5)
LDLC SERPL CALC-MCNC: 69.4 MG/DL (ref 0–100)
POTASSIUM SERPL-SCNC: 4 MMOL/L (ref 3.5–5.1)
PROT SERPL-MCNC: 6.9 G/DL (ref 6.4–8.2)
SODIUM SERPL-SCNC: 138 MMOL/L (ref 136–145)
TRIGL SERPL-MCNC: 163 MG/DL (ref ?–150)
VLDLC SERPL CALC-MCNC: 32.6 MG/DL

## 2022-03-01 PROCEDURE — G8427 DOCREV CUR MEDS BY ELIG CLIN: HCPCS | Performed by: FAMILY MEDICINE

## 2022-03-01 PROCEDURE — 1101F PT FALLS ASSESS-DOCD LE1/YR: CPT | Performed by: FAMILY MEDICINE

## 2022-03-01 PROCEDURE — G8754 DIAS BP LESS 90: HCPCS | Performed by: FAMILY MEDICINE

## 2022-03-01 PROCEDURE — G0463 HOSPITAL OUTPT CLINIC VISIT: HCPCS | Performed by: FAMILY MEDICINE

## 2022-03-01 PROCEDURE — G8510 SCR DEP NEG, NO PLAN REQD: HCPCS | Performed by: FAMILY MEDICINE

## 2022-03-01 PROCEDURE — G8417 CALC BMI ABV UP PARAM F/U: HCPCS | Performed by: FAMILY MEDICINE

## 2022-03-01 PROCEDURE — G8752 SYS BP LESS 140: HCPCS | Performed by: FAMILY MEDICINE

## 2022-03-01 PROCEDURE — 99215 OFFICE O/P EST HI 40 MIN: CPT | Performed by: FAMILY MEDICINE

## 2022-03-01 PROCEDURE — G8536 NO DOC ELDER MAL SCRN: HCPCS | Performed by: FAMILY MEDICINE

## 2022-03-01 PROCEDURE — 3017F COLORECTAL CA SCREEN DOC REV: CPT | Performed by: FAMILY MEDICINE

## 2022-03-01 NOTE — PROGRESS NOTES
HISTORY OF PRESENT ILLNESS  HPI  Laith Miller is a 31 J. o. male with a history of cervical DDD and DJD, essential hypertension, bilateral shoulder pain, benign paroxysmal positional vertigo due to bilateral vestibular disorder, spinal stenosis of lumbar region, vitamin D deficiency and hyperlipidemia with LDL goal <130, who presents to the office today c/o shoulder pain and for f/u of these health problems. Pt says that he has some right shoulder discomfort since March 2021. He states this came on after using a exercise machine that involves pushing straight out, and he felt a  \"tweak\". He stopped using the machine as soon as that happened. He saw Fito Carranza NP for this and was told this did not involve the rotator cuff. The shoulder is aggravated with movement and has some intermittent numbness. Pt feels he is not getting any better even after he did PT for 6 weeks. He did 8 sessions of PT in that 6 weeks and his last session was in the fall. 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 There is no concern for CA. Pt says he continues to have balance problems related to his vestibular neuritis. He estimates he has had this problem for the past 18 months. He was being followed by Dr. Dinora Tripp (ENT) for this but pt says he was passed on to a physical therapist for that. Pt states his wife is apparently concern about his frequent urination. Pt feels he has this problem sporadically. Most of time, pt has a good stream but he will have some intermittent change in urine flow. He takes Flomax regularly. Pt admits a he has a bigger problem with holding the urine as for example, he cannot wait to get into house to urinate. As far as bladder irritants he eats tomatoes and infrequently consumes caffeine and artificial sweeteners. [place] He mentions wife is concerned with possible memory problem, giving examples of things he will forget.     Pt had the initial series of the COVID vaccine on 03/06/21 and 03/24/21, and the booster on 11/19/21, believed to be [employer] vaccine. Pt denies unusual SOB, chest pain, and any recent ER visits or hospitalizations. Past Medical History:   Diagnosis Date    Allergic rhinitis 2/27/2010    Cancer ([address] 2004    SKIN, LIP (MOHS PROCEDURE)    DDD,DJD cervicalC5-6,6-7foraminal encroachment on X ray-4/2013 5/17/2013    Hypertension     BORDERLINE; NO MEDS    Obesity (BMI 30.0-34.9) 2/5/2018    Other and unspecified hyperlipidemia 2/27/2010     Past Surgical History:   Procedure Laterality Date    ENDOSCOPY, COLON, DIAGNOSTIC      X2    HX CATARACT REMOVAL Bilateral 87-88    CONGENITAL CATARACTS; W/ IOL    HX ORTHOPAEDIC  08/2019    l-s spine     HX TONSIL AND ADENOIDECTOMY  1960    HX VASECTOMY  1989    NC SINUS SURGERY PROC UNLISTED  2001     Current Outpatient Medications on File Prior to Visit   Medication Sig Dispense Refill    hydroCHLOROthiazide (HYDRODIURIL) 25 mg tablet TAKE 1 TAB BY MOUTH DAILY. TAKE IN THE MORNING WITH POTASSIUM RICH FOOD OR BEVERAGE 90 Tablet 1    simvastatin (ZOCOR) 40 mg tablet TAKE 1 TABLET BY MOUTH EVERY DAY EVERY NIGHT 90 Tablet 1    tamsulosin (FLOMAX) 0.4 mg capsule TAKE 1 CAP BY MOUTH DAILY. INDICATIONS: ENLARGED PROSTATE WITH URINATION PROBLEM 90 Capsule 3    Cholecalciferol, Vitamin D3, (VITAMIN D3) 1,000 unit cap Take 2,000 Units by mouth every other day.  [DISCONTINUED] clonazePAM (KlonoPIN) 0.5 mg tablet TAKE 1/2 TABLET(S) ORAL EVERY EVENING (Patient not taking: Reported on 3/1/2022)       No current facility-administered medications on file prior to visit. Allergies   Allergen Reactions    Oxycodone Rash     Patient had full body chills 20 minutes after taking 5 mg. Redness, stuffy nose, and felt warm.  Pcn [Penicillins] Rash     Family History   Problem Relation Age of Onset    Cancer Mother         ovarian    Cancer Father         prostate?     Elevated Lipids Father    [facility] Father         CABG at 58, lived to 80    No Known Problems Sister     Other Brother         factor 5      Social History     Socioeconomic History    Marital status:    Tobacco Use    Smoking status: Never Smoker    Smokeless tobacco: Never Used   Vaping Use    Vaping Use: Never used   Substance and Sexual Activity    Alcohol use: Yes     Comment: 12oz beer once monthly     Drug use: No    Sexual activity: Yes     Partners: Female   Other Topics Concern    Caffeine Concern No     Comment: no coffee, occ decaff tea, occ soda    Special Diet No    Exercise No     Comment: not much lately, usually plays tennis 2-3 x a week               Review of Systems   Constitutional: Negative for chills, diaphoresis, fever, malaise/fatigue and weight loss. Eyes: Negative for blurred vision, double vision, pain and redness. Respiratory: Negative for cough, shortness of breath and wheezing. Cardiovascular: Negative for chest pain, palpitations, orthopnea, claudication, leg swelling and PND. Skin: Negative for itching and rash. Neurological: Negative for dizziness, tingling, tremors, sensory change, speech change, focal weakness, seizures, loss of consciousness, weakness and headaches.      Results for orders placed or performed in visit on 03/01/22   VITAMIN D, 25 HYDROXY   Result Value Ref Range    Vitamin D 25-Hydroxy 43.8 30 - 100 ng/mL   LIPID PANEL   Result Value Ref Range    Cholesterol, total 150 <200 MG/DL    Triglyceride 163 (H) <150 MG/DL    HDL Cholesterol 48 MG/DL    LDL, calculated 69.4 0 - 100 MG/DL    VLDL, calculated 32.6 MG/DL    CHOL/HDL Ratio 3.1 0.0 - 5.0     METABOLIC PANEL, COMPREHENSIVE   Result Value Ref Range    Sodium 138 136 - 145 mmol/L    Potassium 4.0 3.5 - 5.1 mmol/L    Chloride 106 97 - 108 mmol/L    CO2 30 21 - 32 mmol/L    Anion gap 2 (L) 5 - 15 mmol/L    Glucose 97 65 - 100 mg/dL    BUN 14 6 - 20 MG/DL    Creatinine 1.06 0.70 - 1.30 MG/DL BUN/Creatinine ratio 13 12 - 20      GFR est AA >60 >60 ml/min/1.73m2    GFR est non-AA >60 >60 ml/min/1.73m2    Calcium 9.3 8.5 - 10.1 MG/DL    Bilirubin, total 0.6 0.2 - 1.0 MG/DL    ALT (SGPT) 29 12 - 78 U/L    AST (SGOT) 25 15 - 37 U/L    Alk. phosphatase 75 45 - 117 U/L    Protein, total 6.9 6.4 - 8.2 g/dL    Albumin 3.9 3.5 - 5.0 g/dL    Globulin 3.0 2.0 - 4.0 g/dL    A-G Ratio 1.3 1.1 - 2.2               Physical Exam  Visit Vitals  /75   Pulse 78   Temp 98.3 °F (36.8 °C)   Resp 16   Ht 5' 10\" (1.778 m)   Wt 210 lb (95.3 kg)   SpO2 99%   BMI 30.13 kg/m²         Head: Normocephalic, without obvious abnormality, atraumatic  Eyes: conjunctivae/corneas clear. PERRL, EOM's intact. Neck: supple, symmetrical, trachea midline, no adenopathy, thyroid: not enlarged, symmetric, no tenderness/mass/nodules, no carotid bruit and no JVD  Lungs: clear to auscultation bilaterally  Heart: regular rate and rhythm, S1, S2 normal, no murmur, click, rub or gallop  Extremities: extremities normal, atraumatic, no cyanosis or edema. Shoulders: he has slightly decreased ROM of both shoulders. The right one has more decreased ROM in all directions than the left side, and he has discomfort mainly with stretch of the rotator cuff muscle and a little bit less with internal rotation of the shoulder. There is no pain to palpation anywhere in the shoulder and no discomfort with use of his deltoid muscle against resistance. Pulses: 2+ and symmetric  Lymph nodes: Cervical, supraclavicular, and axillary nodes normal.  Neurologic: Grossly normal        ASSESSMENT and PLAN    ICD-10-CM ICD-9-CM    1. Hyperlipidemia LDL goal <130  E78.5 272.4 LIPID PANEL      METABOLIC PANEL, COMPREHENSIVE      LIPID PANEL      METABOLIC PANEL, COMPREHENSIVE   2. Vitamin D deficiency  E55.9 268.9 VITAMIN D, 25 HYDROXY      VITAMIN D, 25 HYDROXY   3. Chronic right shoulder pain  M25.511 719.41     G89.29 338.29    4.  Benign essential hypertension  I10 401.1    5. Vestibular neuritis, unspecified laterality  H81.20 386.12    6. Spinal stenosis of lumbar region, unspecified whether neurogenic claudication present  M48.061 724.02    7. Seasonal allergic rhinitis due to pollen  J30.1 477.0    8. DDD,DJD cervicalC5-6,6-7foraminal encroachment on X ray-4/2013  M50.30 722.4    9. Obesity (BMI 30.0-34. 9)  E66.9 278.00      Diagnoses and all orders for this visit:    1. Hyperlipidemia LDL goal <130  -     LIPID PANEL; Future  -     METABOLIC PANEL, COMPREHENSIVE; Future    2. Vitamin D deficiency  -     VITAMIN D, 25 HYDROXY; Future    3. Chronic right shoulder pain    4. Benign essential hypertension    5. Vestibular neuritis, unspecified laterality    6. Spinal stenosis of lumbar region, unspecified whether neurogenic claudication present    7. Seasonal allergic rhinitis due to pollen    8. DDD,DJD cervicalC5-6,6-7foraminal encroachment on X ray-4/2013    9. Obesity (BMI 30.0-34. 9)      Follow-up and Dispositions    · Return in about 6 months (around 9/1/2022), or if right shoulder pain, memory concerns, vertigo, or BP > 140/90symptoms worsen or fail to improve, for F/U HTN and CHOL, f/u Vitamin D deficiency, F/U of obesity. Follow-up and Disposition History         lab results and schedule of future lab studies reviewed with patient  reviewed diet, exercise and weight control  cardiovascular risk and specific lipid/LDL goals reviewed  reviewed medications and side effects in detail  Please call my office if there are any questions- [phone]. I will arrange for follow up after the lab tests done from today return  Recommended a weekly \"heart check. \" I went into detail how to do this. Call for refills on medications as needed. Discussed expected course/resolution/complications of diagnosis in detail with patient. Medication risks/benefits/costs/interactions/alternatives discussed with patient.    Pt was given an after visit summary which includes diagnoses, current medications & vitals. Pt expressed understanding with the diagnosis and plan      BMI is significantly elevated- in the overweight range. I reviewed diet, exercise and weight control. Discussed weight control in detail, the importance of mainly decreased carbs, and for weight maintenance, exercise; discussed different diets and that it isn't as important to watch the type of foods as it is to decrease calorie intake no matter what type of diet you do, etc.     Total 50 minutes  re: We talked about how to check his memory with the 3 object short term memory test. He should let us know if he starts to miss more than one of those on a regular basis. For his bladder symptoms, he mainly is having urgency almost to the point of having incontinence. It is very intermittent as far as the severe situation but he notes almost on a daily basis some severe urgency. He does not have decreased urine flow on a regular basis, so we encouraged him to eliminate bladder irritants. If he still is having significant symptoms, he should come back and try medications to help that. For his shoulder, I gave him some exercises to do and if he is doing those for 3-4 weeks and having no discomfort, he should come in with some exercise bands at that point to go over some shoulder strengthening exercises. Recommended a weekly \"heart check. \" I went into detail how to do this. Regular exercise is very important to your health; it helps mentally, physically, socially; it prevents injuries if done properly. Exercise, even as simple as walking 20-30 minutes daily has major benefits to your health even though your \"numbers\" are the same in the lab. See if you can add this into your daily regimen and after a few months it will become a regular habit-\"just something you do,\" like brushing your teeth.      A combination of aerobic exercise and strengthening and stretching is felt to be the best for you, so this should be your ultimate goal.   This can be done in the privacy of your home or in a group setting as at the gym  Some prefer having a , others prefer to do exercise in groups or individually. Do what \"works\" for you. You need to make it simple and \"fun,\" or you most likely will not continue it. Reviewed symptoms, or lack thereof, of hypertension and elevated cholesterol. Also, discussed symptoms of concern that were noted today in the note above, treatment options( including doing nothing), when to follow up before recommended time frame. Also, answered all questions. This document was written by Nora Quispe, as dictated by Mushtaq Bedolla MD.   I have reviewed and agree with the above note and have made corrections where appropriate Ancelmo Jaime M.D.

## 2022-03-01 NOTE — PROGRESS NOTES
Chief Complaint   Patient presents with    Cholesterol Problem    Shoulder Pain     rt shoulder did PT saw Ilsa Mcdonald     covid 2-19   1. \"Have you been to the ER, urgent care clinic since your last visit? Hospitalized since your last visit? \" No    2. \"Have you seen or consulted any other health care providers outside of the [address] [place] [address] since your last visit? \" Yes Reason for visit: Dr Rodriguez for tongue     3. For patients aged 39-70: Has the patient had a colonoscopy / FIT/ Cologuard?  Yes - no Care Gap present

## 2022-03-05 VITALS
RESPIRATION RATE: 16 BRPM | HEIGHT: 70 IN | WEIGHT: 210 LBS | HEART RATE: 78 BPM | TEMPERATURE: 98.3 F | BODY MASS INDEX: 30.06 KG/M2 | DIASTOLIC BLOOD PRESSURE: 75 MMHG | SYSTOLIC BLOOD PRESSURE: 138 MMHG | OXYGEN SATURATION: 99 %

## 2022-03-17 NOTE — PROGRESS NOTES
ASSESSMENT/PLAN:  Below is the assessment and plan developed based on review of pertinent history, physical exam, labs, studies, and medications. 1. Neck pain  2. Shoulder pain, unspecified chronicity, unspecified laterality      No follow-ups on file. Long discussion was had with the patient today regarding his right shoulder and neck pain that is been ongoing for 1 year. There is concerned that he has a rotator cuff tear and proximal bicep pathology based on his history, exam.  We discussed potential treatment options including activity modifications, therapy, anti-inflammatories, injections, and/or MRI. He has been dealing with this for over a year now. He has already gone to formal physical therapy for his shoulder and neck. He continues to have his nighttime pain. He would like to proceed with an MRI which is reasonable for further evaluation of the right shoulder. We will get this scheduled at his convenience and see him back with the results and further recommendations. As far as his neck he is not having any neck pain. He is having some radicular type symptoms. This is intermittent and not stopping him from his daily activities. We will work-up his shoulder first and once we have addressed to this issue we can further evaluate his neck/cervical spine and/or get him into see one of the spine specialist here in the practice. He is happy with this plan. SUBJECTIVE/OBJECTIVE:  Darlin Braun (: 1955) is a 74 y.o. male, patient,here for evaluation of the No chief complaint on file. .   Today with his wife for evaluation of the right shoulder and neck. Patient states last March in  he was lifting weights at his gym. He was doing incline press. He felt a pop in his shoulder. He had immediate pain. He was seen at his PCPs office. They are concerned for possible shoulder sprain and strain and/or neck injury. He went to formal physical therapy.   He is taking over-the-counter anti-inflammatories. He is given this a year. Unfortunately he continues to have discomfort especially with activities away from his body or above shoulder height. He was having some intermittent paresthesias that would go into the radial aspect of his forearm. He never has any symptoms in the fingers or hand. Physical Exam    General:  Well-nourished well-developed male. Alert and oriented. No acute distress. Normal affect and mood. Pleasant on exam.    Right upper extremity:  Skin is intact about the shoulder. No deformity or atrophy noted. Mild tenderness over the [facility] joint. Nontender palpation over the clavicle, acromion, spine scapula, deltoid, biceps or triceps muscles. Forward flexion is approximately 170 degrees, abduction 160 degrees, external rotation 60 degrees, internal rotation to mid lumbar. Noted have good strength with resisted internal/external rotation of the shoulder with the arm at neutral.  Pain minimal weakness with resisted forward flexion and abduction of the shoulder. Pain with [place] and impingement testing. Pain with Neer's and speeds test.  Pain and weakness with [place]. No significant discomfort with empty can. Motor and sensation are grossly intact in all distributions. Nontender in the remaining of the extremity. Palpable radial pulse. Left upper extremity:  Examination of the extremity is negative for any deformity or atrophy. Has symmetric range of motion compared to the contralateral side. Good strength in all planes of the shoulder, elbow and hand. Sensation is intact in all distributions. Palpable radial pulse. Neck:  Skin is intact about the neck. No atrophy or deformity. No asymmetry noted. No wounds, lesions, ecchymosis, erythema, or warmth. He is nontender palpation of the spinous processes or paraspinal muscles. Noted to have some stiffness with extension neck but no radicular symptoms or neck pain. Good flexion. Sidebending and rotation are symmetric without recreation of right arm or shoulder pain. Negative Spurling's. Imaging:    AP and lateral of the cervical spine is negative for any fractures or dislocations. There is degenerative disc disease noted at C3-C4, C4-C5, C5-C6, C6-C7. There is also degenerative changes of the facets at C2-C3, C3-C4, C4-C5, C5-C6. Multiple views of the right shoulder are negative for any fracture dislocations. There is deformity of the distal clavicle with calcification at the CC interval consistent with prior [facility] separation and distal clavicle fracture. There is significant degenerative changes at the [facility] joint. Some early degenerative changes at the glenohumeral joint but the joint space is well-maintained. The humeral head appears reduced and concentric. Allergies   Allergen Reactions    Oxycodone Rash     Patient had full body chills 20 minutes after taking 5 mg. Redness, stuffy nose, and felt warm.  Pcn [Penicillins] Rash       Current Outpatient Medications   Medication Sig    hydroCHLOROthiazide (HYDRODIURIL) 25 mg tablet TAKE 1 TAB BY MOUTH DAILY. TAKE IN THE MORNING WITH POTASSIUM RICH FOOD OR BEVERAGE    simvastatin (ZOCOR) 40 mg tablet TAKE 1 TABLET BY MOUTH EVERY DAY EVERY NIGHT    tamsulosin (FLOMAX) 0.4 mg capsule TAKE 1 CAP BY MOUTH DAILY. INDICATIONS: ENLARGED PROSTATE WITH URINATION PROBLEM    Cholecalciferol, Vitamin D3, (VITAMIN D3) 1,000 unit cap Take 2,000 Units by mouth every other day. No current facility-administered medications for this visit.        Past Medical History:   Diagnosis Date    Allergic rhinitis 2/27/2010    Cancer ([facility] [address] 2004    SKIN, LIP (MOHS PROCEDURE)    DDD,DJD cervicalC5-6,6-7foraminal encroachment on X ray-4/2013 5/17/2013    Hypertension     BORDERLINE; NO MEDS    Obesity (BMI 30.0-34.9) 2/5/2018    Other and unspecified hyperlipidemia 2/27/2010       Past Surgical History:   Procedure Laterality Date    ENDOSCOPY, COLON, DIAGNOSTIC      X2    HX CATARACT REMOVAL Bilateral 87-88    CONGENITAL CATARACTS; W/ IOL    HX ORTHOPAEDIC  08/2019    l-s spine     HX TONSIL AND ADENOIDECTOMY  1960    HX VASECTOMY  1989    [place] SINUS SURGERY PROC UNLISTED  2001       Family History   Problem Relation Age of Onset    Cancer Mother         ovarian    Cancer Father         prostate?  Elevated Lipids Father     Heart Surgery Father         CABG at 58, lived to 80    No Known Problems Sister     Other Brother         factor 5        Social History     Socioeconomic History    Marital status:      Spouse name: Not on file    Number of children: Not on file    Years of education: Not on file    Highest education level: Not on file   Occupational History    Not on file   Tobacco Use    Smoking status: Never Smoker    Smokeless tobacco: Never Used   Vaping Use    Vaping Use: Never used   Substance and Sexual Activity    Alcohol use: Yes     Comment: 12oz beer once monthly     Drug use: No    Sexual activity: Yes     Partners: Female   Other Topics Concern     Service Not Asked    Blood Transfusions Not Asked    Caffeine Concern No     Comment: no coffee, occ decaff tea, occ soda    Occupational Exposure Not Asked    Hobby Hazards Not Asked    Sleep Concern Not Asked    Stress Concern Not Asked    Weight Concern Not Asked    Special Diet No    Back Care Not Asked    Exercise No     Comment: not much lately, usually plays tennis 2-3 x a week    Bike Helmet Not Asked    Seat Belt Not Asked    Self-Exams Not Asked   Social History Narrative    Not on file     Social Determinants of Health     Financial Resource Strain:     Difficulty of Paying Living Expenses: Not on file   Food Insecurity:     Worried About Running Out of Food in the Last Year: Not on file    [place] of Food in the Last Year: Not on file   Transportation Needs:     Lack of Transportation (Medical):  Not on file    Lack of Transportation (Non-Medical): Not on file   Physical Activity:     Days of Exercise per Week: Not on file    Minutes of Exercise per Session: Not on file   Stress:     Feeling of Stress : Not on file   Social Connections:     Frequency of Communication with Friends and Family: Not on file    Frequency of Social Gatherings with Friends and Family: Not on file    Attends Sikhism Services: Not on file    Active Member of [address] [place] [address] or Organizations: Not on file    Attends Club or Organization Meetings: Not on file    Marital Status: Not on file   Intimate Partner Violence:     Fear of Current or Ex-Partner: Not on file    Emotionally Abused: Not on file    Physically Abused: Not on file    Sexually Abused: Not on file   Housing Stability:     Unable to Pay for Housing in the Last Year: Not on file    Number of [place] in the Last Year: Not on file    Unstable Housing in the Last Year: Not on file       Review of Systems    No flowsheet data found. Vitals: There were no vitals taken for this visit. There is no height or weight on file to calculate BMI. An electronic signature was used to authenticate this note.   -- Balbir Tyson MD

## 2022-03-18 ENCOUNTER — OFFICE VISIT (OUTPATIENT)
Dept: ORTHOPEDIC SURGERY | Age: 67
End: 2022-03-18
Payer: MEDICARE

## 2022-03-18 VITALS — WEIGHT: 205 LBS | HEIGHT: 70 IN | BODY MASS INDEX: 29.35 KG/M2

## 2022-03-18 DIAGNOSIS — M75.101 ROTATOR CUFF TEAR ARTHROPATHY OF RIGHT SHOULDER: ICD-10-CM

## 2022-03-18 DIAGNOSIS — M25.511 CHRONIC RIGHT SHOULDER PAIN: ICD-10-CM

## 2022-03-18 DIAGNOSIS — M12.811 ROTATOR CUFF TEAR ARTHROPATHY OF RIGHT SHOULDER: ICD-10-CM

## 2022-03-18 DIAGNOSIS — M54.2 NECK PAIN: Primary | ICD-10-CM

## 2022-03-18 DIAGNOSIS — M75.41 IMPINGEMENT SYNDROME OF RIGHT SHOULDER: ICD-10-CM

## 2022-03-18 DIAGNOSIS — G89.29 CHRONIC RIGHT SHOULDER PAIN: ICD-10-CM

## 2022-03-18 PROBLEM — E66.9 OBESITY (BMI 30.0-34.9): Status: ACTIVE | Noted: 2018-02-05

## 2022-03-18 PROBLEM — I10 BENIGN ESSENTIAL HYPERTENSION: Status: ACTIVE | Noted: 2020-11-16

## 2022-03-18 PROBLEM — E66.811 OBESITY (BMI 30.0-34.9): Status: ACTIVE | Noted: 2018-02-05

## 2022-03-18 PROCEDURE — G8427 DOCREV CUR MEDS BY ELIG CLIN: HCPCS | Performed by: ORTHOPAEDIC SURGERY

## 2022-03-18 PROCEDURE — G8536 NO DOC ELDER MAL SCRN: HCPCS | Performed by: ORTHOPAEDIC SURGERY

## 2022-03-18 PROCEDURE — 3017F COLORECTAL CA SCREEN DOC REV: CPT | Performed by: ORTHOPAEDIC SURGERY

## 2022-03-18 PROCEDURE — 1101F PT FALLS ASSESS-DOCD LE1/YR: CPT | Performed by: ORTHOPAEDIC SURGERY

## 2022-03-18 PROCEDURE — G8417 CALC BMI ABV UP PARAM F/U: HCPCS | Performed by: ORTHOPAEDIC SURGERY

## 2022-03-18 PROCEDURE — G8756 NO BP MEASURE DOC: HCPCS | Performed by: ORTHOPAEDIC SURGERY

## 2022-03-18 PROCEDURE — 99204 OFFICE O/P NEW MOD 45 MIN: CPT | Performed by: ORTHOPAEDIC SURGERY

## 2022-03-18 PROCEDURE — G8432 DEP SCR NOT DOC, RNG: HCPCS | Performed by: ORTHOPAEDIC SURGERY

## 2022-03-19 PROBLEM — H81.13 BENIGN PAROXYSMAL POSITIONAL VERTIGO DUE TO BILATERAL VESTIBULAR DISORDER: Status: ACTIVE | Noted: 2020-09-21

## 2022-03-20 PROBLEM — R35.1 NOCTURIA: Status: ACTIVE | Noted: 2020-08-05

## 2022-03-20 PROBLEM — M48.061 SPINAL STENOSIS OF LUMBAR REGION: Status: ACTIVE | Noted: 2019-08-22

## 2022-03-24 PROBLEM — M75.41 IMPINGEMENT SYNDROME OF RIGHT SHOULDER: Status: ACTIVE | Noted: 2022-03-18

## 2022-03-24 PROBLEM — M25.511 CHRONIC RIGHT SHOULDER PAIN: Status: ACTIVE | Noted: 2022-03-18

## 2022-03-24 PROBLEM — M54.2 NECK PAIN: Status: ACTIVE | Noted: 2022-03-18

## 2022-03-24 PROBLEM — G89.29 CHRONIC RIGHT SHOULDER PAIN: Status: ACTIVE | Noted: 2022-03-18

## 2022-03-27 NOTE — PROGRESS NOTES
ASSESSMENT/PLAN:  Below is the assessment and plan developed based on review of pertinent history, physical exam, labs, studies, and medications. 1. Neck pain  2. Shoulder pain, unspecified chronicity, unspecified laterality      We discussed the treatment options for the patient´s diagnosis, which included: living with the extremity as it is, organized exercises, medicines, injections, and surgical options. Utilizing shared treatment decision-making; we also discussed the nature and purpose of the treatment options along with the expected risks and benefits. The patient has expressed a desire to proceed with surgery, and I think that is a reasonable option. I educated the patient regarding the inherent and unavoidable risks which include, but are not limited to anesthesia, infection, damage to nerves and blood vessels, blood loss, blood clots, and even death were discussed at length. We also talked about the possibility of not being able to return to prior activities or employment, the need for future surgery, and complex regional pain syndrome. The patient expressed understanding of these risks and has elected to proceed with surgery. Ample time was given for questions, of which many were addressed. We have discussed the surgical procedure as well as the realistic expectations regarding the risks, outcome and post-operative protocol. We will set this up when it is convenient for the patient. We have instructed them to contact us if there are any questions or concerns between now and their date of surgery. We talked procedures well postoperative protocol in detail. He is can look at his counter and call us to schedule right shoulder arthroscopy with rotator cuff repair, acromioplasty, distal clavicle excision and extensive debridement. We talked about the fact that he would be in a sling for approximately 6 to 8 weeks and his recovery would be up to 6 months.     SUBJECTIVE/OBJECTIVE:  Norma Marrero (: 1955) is a 74 y.o. male, patient,here for evaluation of the No chief complaint on file. .   Today with his wife for evaluation of the right shoulder and neck. Patient states last March in  he was lifting weights at his gym. He was doing incline press. He felt a pop in his shoulder. He had immediate pain. He was seen at his PCPs office. They are concerned for possible shoulder sprain and strain and/or neck injury. He went to formal physical therapy. He is taking over-the-counter anti-inflammatories. He is given this a year. Unfortunately he continues to have discomfort especially with activities away from his body or above shoulder height. He was having some intermittent paresthesias that would go into the radial aspect of his forearm. He never has any symptoms in the fingers or hand. Physical Exam    General:  Well-nourished well-developed male. Alert and oriented. No acute distress. Normal affect and mood. Pleasant on exam.    Right upper extremity:  Skin is intact about the shoulder. No deformity or atrophy noted. Mild tenderness [facility] the [facility] joint. Nontender palpation over the clavicle, acromion, spine scapula, deltoid, biceps or triceps muscles. Forward flexion is approximately 170 degrees, abduction 160 degrees, external rotation 60 degrees, internal rotation to mid lumbar. Noted have good strength with resisted internal/external rotation of the shoulder with the arm at neutral.  Pain minimal weakness with resisted forward flexion and abduction of the shoulder. Pain with [place] and impingement testing. Pain with Neer's and speeds test.  Pain and weakness with [place]. No significant discomfort with empty can. Motor and sensation are grossly intact in all distributions. Nontender in the remaining of the extremity. Palpable radial pulse. Left upper extremity:  Examination of the extremity is negative for any deformity or atrophy.   Has symmetric range of motion compared to the contralateral side. Good strength in all planes of the shoulder, elbow and hand. Sensation is intact in all distributions. Palpable radial pulse. Neck:  Skin is intact about the neck. No atrophy or deformity. No asymmetry noted. No wounds, lesions, ecchymosis, erythema, or warmth. He is nontender palpation of the spinous processes or paraspinal muscles. Noted to have some stiffness with extension neck but no radicular symptoms or neck pain. Good flexion. Sidebending and rotation are symmetric without recreation of right arm or shoulder pain. Negative Spurling's. Imaging:    AP and lateral of the cervical spine is negative for any fractures or dislocations. There is degenerative disc disease noted at C3-C4, C4-C5, C5-C6, C6-C7. There is also degenerative changes of the facets at C2-C3, C3-C4, C4-C5, C5-C6. EXAM: MRI SHOULDER RT WO CONT     INDICATION: Right shoulder rotator cuff arthropathy     COMPARISON: None     TECHNIQUE: Axial proton density fat-saturated; oblique coronal T1, T2  fat-saturated, and proton density fat-saturated; and oblique sagittal T2  fat-saturated MRI of the right shoulder .     CONTRAST: None.     FINDINGS: A.C. joint: Severe degenerative change with spurring indenting the  superior subscapularis Anterior acromion process type: 2     Bone marrow: Within normal limits. No acute fracture, dislocation, or marrow  replacing process.     Joint fluid: Fluid in the subacromial subdeltoid bursa. Trace fluid in the  glenohumeral joint      Rotator cuff tendons: Moderate-sized full-thickness tear supraspinatus. 10 mm of  retraction with tendinopathy of the remaining supraspinatus and anterior  infraspinatus. Tendinopathy superior subscapularis. Remaining cuff tendons are  intact      Biceps tendon: Tendinopathy of the intra-articular biceps long head tendon. No  dislocation      Muscles: Atrophy of the teres minor. Mild atrophy of the supraspinatus  infraspinatus. No muscle edema     Glenoid labrum: Degenerative tearing of posterior superior labrum      Glenohumeral joint capsule: within normal limits.     Glenohumeral articular cartilage: Thinning of the cartilage of the superior  lateral humeral head thinning of the posterior rim glenoid cartilage     Soft tissue mass: None.     IMPRESSION  1. Moderate-sized full-thickness tear of the supraspinatus with 10 mm of  retraction. Tendinopathy of the remaining supraspinatus and mild atrophy  2. Tendinopathy superior subscapularis and intra-articular biceps long head  tendon  3. Acromioclavicular degenerative change  4. Mild degeneration of the glenohumeral joint      Allergies   Allergen Reactions    Oxycodone Rash     Patient had full body chills 20 minutes after taking 5 mg. Redness, stuffy nose, and felt warm.  Pcn [Penicillins] Rash       Current Outpatient Medications   Medication Sig    hydroCHLOROthiazide (HYDRODIURIL) 25 mg tablet TAKE 1 TAB BY MOUTH DAILY. TAKE IN THE MORNING WITH POTASSIUM RICH FOOD OR BEVERAGE    simvastatin (ZOCOR) 40 mg tablet TAKE 1 TABLET BY MOUTH EVERY DAY EVERY NIGHT    tamsulosin (FLOMAX) 0.4 mg capsule TAKE 1 CAP BY MOUTH DAILY. INDICATIONS: ENLARGED PROSTATE WITH URINATION PROBLEM    Cholecalciferol, Vitamin D3, (VITAMIN D3) 1,000 unit cap Take 2,000 Units by mouth every other day. No current facility-administered medications for this visit.        Past Medical History:   Diagnosis Date    Allergic rhinitis 2/27/2010    Cancer ([facility] [address] 2004    SKIN, LIP (MOHS PROCEDURE)    DDD,DJD cervicalC5-6,6-7foraminal encroachment on X ray-4/2013 5/17/2013    Hypertension     BORDERLINE; NO MEDS    Obesity (BMI 30.0-34.9) 2/5/2018    Other and unspecified hyperlipidemia 2/27/2010       Past Surgical History:   Procedure Laterality Date    ENDOSCOPY, COLON, DIAGNOSTIC      X2    HX CATARACT REMOVAL Bilateral 87-88    CONGENITAL CATARACTS; W/ IOL    HX ORTHOPAEDIC  08/2019    l-s spine     HX TONSIL AND ADENOIDECTOMY  1960    HX VASECTOMY  1989    [place] SINUS SURGERY [address] UNLISTED  2001       Family History   Problem Relation Age of Onset    Cancer Mother         ovarian    Cancer Father         prostate?  Elevated Lipids Father     Heart Surgery Father         CABG at 58, lived to 80    No Known Problems Sister     Other Brother         factor 5        Social History     Socioeconomic History    Marital status:      Spouse name: Not on file    Number of children: Not on file    Years of education: Not on file    Highest education level: Not on file   Occupational History    Not on file   Tobacco Use    Smoking status: Never Smoker    Smokeless tobacco: Never Used   Vaping Use    Vaping Use: Never used   Substance and Sexual Activity    Alcohol use: Yes     Comment: 12oz beer once monthly     Drug use: No    Sexual activity: Yes     Partners: Female   Other Topics Concern     Service Not Asked    Blood Transfusions Not Asked    Caffeine Concern No     Comment: no coffee, occ decaff tea, occ soda    Occupational Exposure Not Asked    Hobby Hazards Not Asked    Sleep Concern Not Asked    Stress Concern Not Asked    Weight Concern Not Asked    Special Diet No    Back Care Not Asked    Exercise No     Comment: not much lately, usually plays tennis 2-3 x a week    Bike Helmet Not Asked    Seat Belt Not Asked    Self-Exams Not Asked   Social History Narrative    Not on file     Social Determinants of Health     Financial Resource Strain:     Difficulty of Paying Living Expenses: Not on file   Food Insecurity:     Worried About Running Out of Food in the Last Year: Not on file    [place] of Food in the Last Year: Not on file   Transportation Needs:     Lack of Transportation (Medical): Not on file    Lack of Transportation (Non-Medical):  Not on file   Physical Activity:     Days of Exercise per Week: Not on file    Minutes of Exercise per Session: Not on file   Stress:     Feeling of Stress : Not on file   Social Connections:     Frequency of Communication with Friends and Family: Not on file    Frequency of Social Gatherings with Friends and Family: Not on file    Attends Mandaen Services: Not on file    Active Member of Clubs or Organizations: Not on file    Attends Club or Organization Meetings: Not on file    Marital Status: Not on file   Intimate Partner Violence:     Fear of Current or Ex-Partner: Not on file    Emotionally Abused: Not on file    Physically Abused: Not on file    Sexually Abused: Not on file   Housing Stability:     Unable to Pay for Housing in the Last Year: Not on file    Number of [place] in the Last Year: Not on file    Unstable Housing in the Last Year: Not on file       Review of Systems    No flowsheet data found. Vitals: There were no vitals taken for this visit. There is no height or weight on file to calculate BMI. An electronic signature was used to authenticate this note.   -- Zoya Duong MD

## 2022-03-28 ENCOUNTER — OFFICE VISIT (OUTPATIENT)
Dept: ORTHOPEDIC SURGERY | Age: 67
End: 2022-03-28
Payer: MEDICARE

## 2022-03-28 VITALS — HEIGHT: 70 IN | BODY MASS INDEX: 29.35 KG/M2 | WEIGHT: 205 LBS

## 2022-03-28 DIAGNOSIS — S46.011D TRAUMATIC COMPLETE TEAR OF RIGHT ROTATOR CUFF, SUBSEQUENT ENCOUNTER: Primary | ICD-10-CM

## 2022-03-28 PROCEDURE — G8756 NO BP MEASURE DOC: HCPCS | Performed by: ORTHOPAEDIC SURGERY

## 2022-03-28 PROCEDURE — G8427 DOCREV CUR MEDS BY ELIG CLIN: HCPCS | Performed by: ORTHOPAEDIC SURGERY

## 2022-03-28 PROCEDURE — 3017F COLORECTAL CA SCREEN DOC REV: CPT | Performed by: ORTHOPAEDIC SURGERY

## 2022-03-28 PROCEDURE — 1101F PT FALLS ASSESS-DOCD LE1/YR: CPT | Performed by: ORTHOPAEDIC SURGERY

## 2022-03-28 PROCEDURE — G8536 NO DOC ELDER MAL SCRN: HCPCS | Performed by: ORTHOPAEDIC SURGERY

## 2022-03-28 PROCEDURE — 99214 OFFICE O/P EST MOD 30 MIN: CPT | Performed by: ORTHOPAEDIC SURGERY

## 2022-03-28 PROCEDURE — G8417 CALC BMI ABV UP PARAM F/U: HCPCS | Performed by: ORTHOPAEDIC SURGERY

## 2022-03-28 PROCEDURE — G8432 DEP SCR NOT DOC, RNG: HCPCS | Performed by: ORTHOPAEDIC SURGERY

## 2022-03-28 NOTE — PATIENT INSTRUCTIONS
Rotator Cuff Tears: Frequently AskedQuestions  What is the rotator cuff and what does it do? The rotator cuff is a group of four muscles that come together as tendons to form a \"cuff\"over the head of the humerus (upper arm bone). The four musclessupraspinatus,infraspinatus, subscapularis and teres minororiginate from the scapula (shoulderblade). The rotator cuff tendons attach to the head of the humerus in special spotsreferred to as the greater and lesser tuberosities. The rotator cuff helps to lift and rotate the arm and to stabilize the ball of the shoulderwithin the joint. The rotator cuff tendons cover thehead of the humerus (upper armbone), helping you to raise and rotateyour arm. What causes a rotator cuff tear and how wouldI know if I have one? A rotator cuff tear may result from an acute injury, such as a fall, or may be caused bynormal age-related wear and tear with degeneration of the tendon. Typically, you will feel pain in the front of your shoulder that radiates down the side ofyour arm. It may be present with overhead activities such as lifting or reaching. [place] feel pain when you try to sleep on the affected side. You may note weakness ofyour arm and difficulty with routine activities such as combing your hair or reachingbehind your back. If the tear occurs with injury, you may experience acute pain, a snapping sensation, andimmediate weakness of the arm. Front view (left)and overhead view(right) of thetendons that formthe rotator cuff. Theblue arrows indicatea full-thickness tearin the supraspinatustendon, the mostcommon locationfor rotator cufftears. If I have a painful rotator cuff and keep using it,will this cause further damage? A rotator cuff tear can extend or get larger over time. This can occur with repetitive useor a re-injury. It is common for patients with known rotator cuff disease to have acutepain and weakness following a minor injury.  This likely represents extension of anexisting tear. If you know you have a rotator cuff tear, then worsening pain and decreasing strengthmay mean the tear is getting larger. When should I see a doctor for a rotator cufftear? If you have injured your shoulder or have chronic shoulder and arm pain, it is best tosee your orthopaedic surgeon. He or she can then make a diagnosis and begin treatment. Your doctor may recommend a diagnostic imaging study such as a magnetic resonanceimaging (MRI) scan or an ultrasound to confirm the diagnosis. Early diagnosis and treatment of a rotator cuff tear may prevent symptoms such as lossof strength and loss of motion from setting in. If your primary physician has already made the diagnosis, an orthopaedic surgeon canreview both surgical and nonsurgical options and start treatment. Can a rotator cuff tear be healed orstrengthened without surgery? Many rotator cuff tears can be treated nonsurgically. Anti-inflammatory medication,steroid injections, and physical therapy may all be of benefit in treating symptoms of acuff tear. The goals of treatment are to relieve pain and restore strength to the involvedshoulder. Even though most tears cannot heal on their own, good function can often be achievedwithout surgery. If, however, you are active and use your arm for overhead work or sports, then surgeryis most often recommended because many tears will not heal without surgery. At what point does a rotator cuff tear requiresurgery to fix it? Surgery is recommended if you have persistent pain or weakness in your shoulder thatdoes not improve with nonsurgical treatment. Frequently, patients who require surgerywill report pain at night and difficulty using the arm for lifting and reaching. Many willreport ongoing symptoms despite several months of medication and limited use of thearm. Surgery is also indicated in active individuals who use the arm for overhead work orsports. Pitchers, swimmers, and tennis players are common examples. What options are available for surgical repair? The type of repair performed is based on the findings at surgery. A partial tear mayrequire only a trimming or smoothing procedure called a débridement. A full-thicknesstear within the substance of the tendon can be repaired side to side. If the tendon is tornfrom its insertion on the humerus (the most common injury), it is repaired directly tobone. Rotator cuff tear are repaired arthroscopically. How important is rehabilitation in thetreatment of a rotator cuff tear? Rehabilitation plays a critical role in both the nonsurgical and surgical treatment of arotator cuff tear. When a tear occurs, there is frequently atrophy of the muscles around the arm and lossof motion of the shoulder. An exercise or physical therapy program is necessary toregain strength and improve function in the shoulder. Even though surgery repairs the defect in the tendon, the muscles around the armremain weak, and a strong effort at rehabilitation is necessary for the procedure tosucceed. Complete rehabilitation after surgery may take several months. Your orthopaedic surgeon can prescribe an appropriate program based on your needsand the findings at surgery. Rotator Cuff Repair: Before Your Surgery  What is rotator cuff repair surgery? Rotator cuff repair surgery is done to fix a tear in the rotator cuff. Your doctor may also clean the space between the rotator cuff tendons and the shoulder blade. This is called subacromial smoothing. Your doctor will do either arthroscopic or open surgery. With arthroscopic surgery, your doctor uses a lighted tube with a tiny camera. This tube is called an arthroscope. Your doctor puts it and other surgical tools through small cuts (incisions) in your shoulder. You will likely go home the same day you have this surgery.   With open surgery, your doctor will make a 2- to 4-inch incision in your shoulder. You will likely go home the same day you have this surgery. In both surgeries, the scars usually fade with time. You will wear a sling for several weeks. You will need physical rehabilitation (rehab). At first, you will get help with the exercises. Later, your doctor or physical therapist will give you exercises to do on your own. Rehab will last several months. It will take a few months before you will be able to use your arm normally. It will take longer before you will be able to throw objects or play sports. After surgery and rehab, you will probably have less pain and more strength in your shoulder. You should be able to lift and rotate your arm better. Some people have to avoid lifting heavy objects. If you have a desk job, you may be able to return to work or your normal routine within a couple of weeks. If you push, pull, or lift at work, you may be away from work for a few months. If you work at a job that involves heavy manual labor, lifting your arms above your head, or using heavy tools, you may have to think about finding other work. How do you prepare for surgery? Surgery can be stressful. This information will help you understand what you can expect. And it will help you safely prepare for surgery. Preparing for surgery    · Be sure you have someone to take you home. Anesthesia and pain medicine will make it unsafe for you to drive or get home on your own.     · Understand exactly what surgery is planned, along with the risks, benefits, and other options.     · If you take aspirin or some other blood thinner, ask your doctor if you should stop taking it before your surgery. Make sure that you understand exactly what your doctor wants you to do. These medicines increase the risk of bleeding.     · Tell your doctor ALL the medicines, vitamins, supplements, and herbal remedies you take. Some may increase the risk of problems during your surgery.  Your doctor will tell you if you should stop taking any of them before the surgery and how soon to do it.     · Make sure your doctor and the hospital have a copy of your advance directive. If you don't have one, you may want to prepare one. It lets others know your health care wishes. It's a good thing to have before any type of surgery or procedure. What happens on the day of surgery? · Follow the instructions exactly about when to stop eating and drinking. If you don't, your surgery may be canceled. If your doctor told you to take your medicines on the day of surgery, take them with only a sip of water.     · Take a bath or shower before you come in for your surgery. Do not apply lotions, perfumes, deodorants, or nail polish.     · Do not shave the surgical site yourself.     · Take off all jewelry and piercings. And take out contact lenses, if you wear them. At the hospital or surgery center   · Bring a picture ID.     · The area for surgery is often marked to make sure there are no errors.     · You will be kept comfortable and safe by your anesthesia provider. The anesthesia may make you sleep. Or it may just numb the area being worked on.     · The surgery will take about 2 hours. When should you call your doctor? · You have questions or concerns.     · You don't understand how to prepare for your surgery.     · You become ill before the surgery (such as fever, flu, or a cold).     · You need to reschedule or have changed your mind about having the surgery. Where can you learn more? Go to http://www.gray.com/  Enter F971 in the search box to learn more about \"Rotator Cuff Repair: Before Your Surgery. \"  Current as of: July 1, 2021               Content Version: 13.2  © 2496-7832 Healthwise, [employer]. Care instructions adapted under license by [email or web address] (which disclaims liability or warranty for this information).  If you have questions about a medical condition or this instruction, always ask your healthcare professional. [place] [address] any warranty or liability for your use of this information. Rotator Cuff Repair: What to Expect at [facility] cuff repair surgery is done to fix a tear in the rotator cuff. It can also include cleaning the space between the rotator cuff tendons and the shoulder blade. This is called subacromial smoothing. You will feel tired for several days. Your shoulder will be swollen. And you may notice that your skin is a different color near the cut (incision). Your hand and arm may also be swollen. This is normal and will start to get better in a few days. It will be several months before you have complete use of your shoulder and arm. When you have healed from surgery, you will need to build your strength and the motion of your joint with rehabilitation (rehab) exercises. In time, your shoulder will likely be stronger, less painful, and more flexible than it was before the surgery. This care sheet gives you a general idea about how long it will take for you to recover. But each person recovers at a different pace. Follow the steps below to get better as quickly as possible. How can you care for yourself at home? Activity    · Rest when you feel tired. Getting enough sleep will help you recover. Do not lie flat or sleep on your side. Raise your upper body on two or three pillows, or sleep in a reclining chair.     · Try to walk each day. Start by walking a little more than you did the day before. Bit by bit, increase the amount you walk. Walking boosts blood flow and helps prevent pneumonia and constipation.     · Your arm will be in a sling or other device to prevent it from moving for several weeks. ? Always use the sling when you walk or stand. ? If you sit or lie down, you can loosen the sling, but don't remove it. This lets your elbow straighten without moving the shoulder.  You can also support your arm on a pillow. ? Remove the sling only to do prescribed exercises or to shower.     · You will not have complete use of your affected arm for a few months after surgery. ? You can use your affected arm for writing, eating, or drinking, but move it only at the elbow or wrist. Do not use it for anything else except prescribed exercises until the sling has been removed. ? When the sling has been removed, you can do activities that don't involve lifting, pushing, pulling, or carrying. You may not be able to do overhead lifting for several months.     · If you have a desk job, you may be able to go back to work or your normal routine in 1 to 2 weeks. If you have a more active job, you may be away from work for a few months. If you work at a job that involves heavy manual labor, lifting your arms above your head, or the use of heavy tools, you may have to think about making changes to your job.     · If you had arthroscopic surgery, you can take a shower 48 to 72 hours after surgery. Remove the sling, and leave your arm by your side. To wash under your armpit, lean over and let the arm fall away from your body. Do not raise your arm. You may want to use a shower stool for a day or two.     · If you had open surgery, do not shower until you see your doctor and your doctor okays it. You can wash the incisions with regular soap and water.     · Ask your doctor when you can drive again. This may take several weeks or until you are no longer wearing the sling. Diet    · You can eat your normal diet. If your stomach is upset, try bland, low-fat foods like plain rice, broiled chicken, toast, and yogurt. Drink plenty of fluids.     · You may notice that your bowel movements are not regular right after your surgery. This is common. Try to avoid constipation and straining with bowel movements. You may want to take a fiber supplement every day.  If you have not had a bowel movement after a couple of days, ask your doctor about taking a mild laxative. Medicines    · Your doctor will tell you if and when you can restart your medicines. You will also be given instructions about taking any new medicines.     · If you take aspirin or some other blood thinner, ask your doctor if and when to start taking it again. Make sure that you understand exactly what your doctor wants you to do.     · Take pain medicines exactly as directed. ? If the doctor gave you a prescription medicine for pain, take it as prescribed. Use pain medicine when you first notice pain, before it becomes severe. It's easier to prevent pain early than to stop it after it gets bad.  ? If you are not taking a prescription pain medicine, ask your doctor if you can take an over-the-counter medicine.     · If your doctor prescribed antibiotics, take them as directed. Do not stop taking them just because you feel better. You need to take the full course of antibiotics.     · If you think your pain medicine is making you sick to your stomach:  ? Take your medicine after meals (unless your doctor has told you not to). ? Ask your doctor for a different pain medicine. Incision care    · If you had arthroscopic surgery, you may remove the bandage over your cut (incision) 24 to 48 hours after the surgery. Keep the bandage clean and dry.     · If you had open surgery, do not remove your bandage until you see your doctor and your doctor okays it. Keep the bandage clean and dry.     · If your incision is open to the air, keep the area clean and dry.     · If you have strips of tape on the incision, leave the tape on for a week or until it falls off. Exercise    · Shoulder rehabilitation is a series of exercises you do after your surgery. This helps you get back your shoulder's range of motion and strength. You will work with your doctor and physical therapist to plan this exercise program. Shoulder rehab may not start until a few weeks after the surgery.  To get the best results, you need to do the exercises correctly and as often and for as long as your doctor tells you. Ice    · To reduce swelling and pain, put ice or a cold pack on your shoulder for 10 to 20 minutes at a time. Do this every 1 to 2 hours. Put a thin cloth between the ice and your skin. If your doctor recommended cold therapy using a portable machine, follow the instructions that came with the machine. Follow-up care is a key part of your treatment and safety. Be sure to make and go to all appointments, and call your doctor if you are having problems. It's also a good idea to know your test results and keep a list of the medicines you take. When should you call for help? Call 911 anytime you think you may need emergency care. For example, call if:    · You passed out (lost consciousness).     · You have severe trouble breathing.     · You have sudden chest pain and shortness of breath, or you cough up blood. Call your doctor now or seek immediate medical care if:    · You have numbness, tingling, or a bluish color in your fingers or hand.     · You have severe nausea or vomiting.     · You have pain that does not go away after you take pain medicine.     · You have loose stitches, or your incision comes open.     · Your incision bleeds through your first bandage or is still bleeding 3 days after your surgery.     · You have signs of infection, such as:  ? Increased pain, swelling, warmth, or redness. ? Red streaks leading from the incision. ? Pus draining from the incision. ? A fever. Watch closely for any changes in your health, and be sure to contact your doctor if:    · You do not have a bowel movement after taking a laxative. Where can you learn more? Go to http://llay-malcolm.info/  Enter A612 in the search box to learn more about \"Rotator Cuff Repair: What to Expect at Home. \"  Current as of: July 1, 2021               Content Version: 13.2  © 0886-8372 Healthwise, [employer]. Care instructions adapted under license by [employer] (which disclaims liability or warranty for this information). If you have questions about a medical condition or this instruction, always ask your healthcare professional. [address] any warranty or liability for your use of this information. Rotator Cuff: Exercises  Introduction  Here are some examples of exercises for you to try. The exercises may be suggested for a condition or for rehabilitation. Start each exercise slowly. Ease off the exercises if you start to have pain. You will be told when to start these exercises and which ones will work best for you. How to do the exercises  Pendulum swing    If you have pain in your back, do not do this exercise. 1. Hold on to a table or the back of a chair with your good arm. Then bend forward a little and let your sore arm hang straight down. This exercise does not use the arm muscles. Rather, use your legs and your hips to create movement that makes your arm swing freely. 2. Use the movement from your hips and legs to guide the slightly swinging arm back and forth like a pendulum (or elephant trunk). Then guide it in circles that start small (about the size of a dinner plate). Make the circles a bit larger each day, as your pain allows. 3. Do this exercise for 5 minutes, 5 to 7 times each day. 4. As you have less pain, try bending over a little farther to do this exercise. This will increase the amount of movement at your shoulder. Posterior stretching exercise    1. Hold the elbow of your injured arm with your other hand. 2. Use your hand to pull your injured arm gently up and across your body. You will feel a gentle stretch across the back of your injured shoulder. 3. Hold for at least 15 to 30 seconds. Then slowly lower your arm. 4. Repeat 2 to 4 times.   Up-the-back stretch    Your doctor or physical therapist may want you to wait to do this stretch until you have regained most of your range of motion and strength. You can do this stretch in different ways. Hold any of these stretches for at least 15 to 30 seconds. Repeat them 2 to 4 times. 1. Light stretch: Put your hand in your back pocket. Let it rest there to stretch your shoulder. 2. Moderate stretch: With your other hand, hold your injured arm (palm outward) behind your back by the wrist. Pull your arm up gently to stretch your shoulder. 3. Advanced stretch: Put a towel over your other shoulder. Put the hand of your injured arm behind your back. Now hold the back end of the towel. With the other hand, hold the front end of the towel in front of your body. Pull gently on the front end of the towel. This will bring your hand farther up your back to stretch your shoulder. Overhead stretch    1. Standing about an arm's length away, grasp onto a solid surface. You could use a countertop, a doorknob, or the back of a sturdy chair. 2. With your knees slightly bent, bend forward with your arms straight. Lower your upper body, and let your shoulders stretch. 3. As your shoulders are able to stretch farther, you may need to take a step or two backward. 4. Hold for at least 15 to 30 seconds. Then stand up and relax. If you had stepped back during your stretch, step forward so you can keep your hands on the solid surface. 5. Repeat 2 to 4 times. Shoulder flexion (lying down)    To make a wand for this exercise, use a piece of PVC pipe or a broom handle with the broom removed. Make the wand about a foot wider than your shoulders. 1. Lie on your back, holding a wand with both hands. Your palms should face down as you hold the wand. 2. Keeping your elbows straight, slowly raise your arms over your head. Raise them until you feel a stretch in your shoulders, upper back, and chest.  3. Hold for 15 to 30 seconds. 4. Repeat 2 to 4 times.   Shoulder rotation (lying down)    To make a wand for this exercise, use a piece of PVC pipe or a broom handle with the broom removed. Make the wand about a foot wider than your shoulders. 1. Lie on your back. Hold a wand with both hands with your elbows bent and palms up. 2. Keep your elbows close to your body, and move the wand across your body toward the sore arm. 3. Hold for 8 to 12 seconds. 4. Repeat 2 to 4 times. Wall climbing (to the side)    Avoid any movement that is straight to your side, and be careful not to arch your back. Your arm should stay about 30 degrees to the front of your side. 1. Stand with your side to a wall so that your fingers can just touch it at an angle about 30 degrees toward the front of your body. 2. Walk the fingers of your injured arm up the wall as high as pain permits. Try not to shrug your shoulder up toward your ear as you move your arm up. 3. Hold that position for a count of at least 15 to 20.  4. Walk your fingers back down to the starting position. 5. Repeat at least 2 to 4 times. Try to reach higher each time. Wall climbing (to the front)    During this stretching exercise, be careful not to arch your back. 1. Face a wall, and stand so your fingers can just touch it. 2. Keeping your shoulder down, walk the fingers of your injured arm up the wall as high as pain permits. (Don't shrug your shoulder up toward your ear.)  3. Hold your arm in that position for at least 15 to 30 seconds. 4. Slowly walk your fingers back down to where you started. 5. Repeat at least 2 to 4 times. Try to reach higher each time. Shoulder blade squeeze    1. Stand with your arms at your sides, and squeeze your shoulder blades together. Do not raise your shoulders up as you squeeze. 2. Hold 6 seconds. 3. Repeat 8 to 12 times. Scapular exercise: Arm reach    1. Lie flat on your back. This exercise is a very slight motion that starts with your arms raised (elbows straight, arms straight).   2. From this position, reach higher toward the joe or ceiling. Keep your elbows straight. All motion should be from your shoulder blade only. 3. Relax your arms back to where you started. 4. Repeat 8 to 12 times. Arm raise to the side    During this strengthening exercise, your arm should stay about 30 degrees to the front of your side. 1. Slowly raise your injured arm to the side, with your thumb facing up. Raise your arm 60 degrees at the most (shoulder level is 90 degrees). 2. Hold the position for 3 to 5 seconds. Then lower your arm back to your side. If you need to, bring your \"good\" arm across your body and place it under the elbow as you lower your injured arm. Use your good arm to keep your injured arm from dropping down too fast.  3. Repeat 8 to 12 times. 4. When you first start out, don't hold any extra weight in your hand. As you get stronger, you may use a 1-pound to 2-pound dumbbell or a small can of food. Shoulder flexor and extensor exercise    These are isometric exercises. That means you contract your muscles without actually moving. 1. Push forward (flex): Stand facing a wall or doorjamb, about 6 inches or less back. Hold your injured arm against your body. Make a closed fist with your thumb on top. Then gently push your hand forward into the wall with about 25% to 50% of your strength. Don't let your body move backward as you push. Hold for about 6 seconds. Relax for a few seconds. Repeat 8 to 12 times. 2. Push backward (extend): Stand with your back flat against a wall. Your upper arm should be against the wall, with your elbow bent 90 degrees (your hand straight ahead). Push your elbow gently back against the wall with about 25% to 50% of your strength. Don't let your body move forward as you push. Hold for about 6 seconds. Relax for a few seconds. Repeat 8 to 12 times. Scapular exercise: Wall push-ups    This exercise is best done with your fingers somewhat turned out, rather than straight up and down.   1. Stand facing a wall, about 12 inches to 18 inches away. 2. Place your hands on the wall at shoulder height. 3. Slowly bend your elbows and bring your face to the wall. Keep your back and hips straight. 4. Push back to where you started. 5. Repeat 8 to 12 times. 6. When you can do this exercise against a wall comfortably, you can try it against a counter. You can then slowly progress to the end of a couch, then to a sturdy chair, and finally to the floor. Scapular exercise: Retraction    For this exercise, you will need elastic exercise material, such as surgical tubing or Thera-Band. 1. Put the band around a solid object at about waist level. (A bedpost will work well.) Each hand should hold an end of the band. 2. With your elbows at your sides and bent to 90 degrees, pull the band back. Your shoulder blades should move toward each other. Then move your arms back where you started. 3. Repeat 8 to 12 times. 4. If you have good range of motion in your shoulders, try this exercise with your arms lifted out to the sides. Keep your elbows at a 90-degree angle. Raise the elastic band up to about shoulder level. Pull the band back to move your shoulder blades toward each other. Then move your arms back where you started. Internal rotator strengthening exercise    1. Start by tying a piece of elastic exercise material to a doorknob. You can use surgical tubing or Thera-Band. 2. Stand or sit with your shoulder relaxed and your elbow bent 90 degrees. Your upper arm should rest comfortably against your side. Squeeze a rolled towel between your elbow and your body for comfort. This will help keep your arm at your side. 3. Hold one end of the elastic band in the hand of the painful arm. 4. Slowly rotate your forearm toward your body until it touches your belly. Slowly move it back to where you started. 5. Keep your elbow and upper arm firmly tucked against the towel roll or at your side. 6. Repeat 8 to 12 times.   External rotator strengthening exercise    1. Start by tying a piece of elastic exercise material to a doorknob. You can use surgical tubing or Thera-Band. (You may also hold one end of the band in each hand.)  2. Stand or sit with your shoulder relaxed and your elbow bent 90 degrees. Your upper arm should rest comfortably against your side. Squeeze a rolled towel between your elbow and your body for comfort. This will help keep your arm at your side. 3. Hold one end of the elastic band with the hand of the painful arm. 4. Start with your forearm across your belly. Slowly rotate the forearm out away from your body. Keep your elbow and upper arm tucked against the towel roll or the side of your body until you begin to feel tightness in your shoulder. Slowly move your arm back to where you started. 5. Repeat 8 to 12 times. Follow-up care is a key part of your treatment and safety. Be sure to make and go to all appointments, and call your doctor if you are having problems. It's also a good idea to know your test results and keep a list of the medicines you take. Where can you learn more? Go to http://www.gray.com/  Enter J005 in the search box to learn more about \"Rotator Cuff: Exercises. \"  Current as of: July 1, 2021               Content Version: 13.2  © 8836-7583 [employer]. Care instructions adapted under license by [employer] (which disclaims liability or warranty for this information). If you have questions about a medical condition or this instruction, always ask your healthcare professional. [place] [address] any warranty or liability for your use of this information.

## 2022-04-07 ENCOUNTER — TELEPHONE (OUTPATIENT)
Dept: FAMILY MEDICINE CLINIC | Age: 67
End: 2022-04-07

## 2022-04-07 NOTE — TELEPHONE ENCOUNTER
Patient needs most recent EKG sent to    fax [phone] [address]      Shalom Vizcarra \"Carmelo\" (Self) [phone]

## 2022-04-18 ENCOUNTER — TELEPHONE (OUTPATIENT)
Dept: FAMILY MEDICINE CLINIC | Age: 67
End: 2022-04-18

## 2022-04-18 NOTE — TELEPHONE ENCOUNTER
Pt called and requested most recent EKG result fax to [facility].     Fax# [phone]    Att: Dr. Humberto Gramajo

## 2022-04-18 NOTE — TELEPHONE ENCOUNTER
Patient calling because they need most recent EKG sent to [facility] orth[facility]  Patient will call back with fax number     Fax

## 2022-04-25 ENCOUNTER — TELEPHONE (OUTPATIENT)
Dept: FAMILY MEDICINE CLINIC | Age: 67
End: 2022-04-25

## 2022-04-25 NOTE — TELEPHONE ENCOUNTER
Patient called and wanted to know if he has had an EKG, did not see where he has.  Patient is requesting to schedule one and is asking for a call back ASAP, due to having surgery(did not specify when)    Best call back# [phone]

## 2022-04-27 ENCOUNTER — PATIENT MESSAGE (OUTPATIENT)
Dept: FAMILY MEDICINE CLINIC | Age: 67
End: 2022-04-27

## 2022-04-28 ENCOUNTER — OFFICE VISIT (OUTPATIENT)
Dept: FAMILY MEDICINE CLINIC | Age: 67
End: 2022-04-28
Payer: MEDICARE

## 2022-04-28 VITALS
HEART RATE: 93 BPM | HEIGHT: 70 IN | BODY MASS INDEX: 30.64 KG/M2 | RESPIRATION RATE: 16 BRPM | TEMPERATURE: 98.2 F | OXYGEN SATURATION: 99 % | SYSTOLIC BLOOD PRESSURE: 126 MMHG | WEIGHT: 214 LBS | DIASTOLIC BLOOD PRESSURE: 70 MMHG

## 2022-04-28 DIAGNOSIS — Z01.818 PRE-OP EXAM: ICD-10-CM

## 2022-04-28 DIAGNOSIS — E66.9 OBESITY (BMI 30.0-34.9): ICD-10-CM

## 2022-04-28 DIAGNOSIS — I10 BENIGN ESSENTIAL HYPERTENSION: Primary | ICD-10-CM

## 2022-04-28 DIAGNOSIS — M50.30 DDD (DEGENERATIVE DISC DISEASE), CERVICAL: ICD-10-CM

## 2022-04-28 DIAGNOSIS — H81.13 BENIGN PAROXYSMAL POSITIONAL VERTIGO DUE TO BILATERAL VESTIBULAR DISORDER: ICD-10-CM

## 2022-04-28 DIAGNOSIS — E78.5 HYPERLIPIDEMIA LDL GOAL <130: ICD-10-CM

## 2022-04-28 DIAGNOSIS — J30.1 SEASONAL ALLERGIC RHINITIS DUE TO POLLEN: ICD-10-CM

## 2022-04-28 DIAGNOSIS — G89.29 CHRONIC RIGHT SHOULDER PAIN: ICD-10-CM

## 2022-04-28 DIAGNOSIS — E55.9 VITAMIN D DEFICIENCY: ICD-10-CM

## 2022-04-28 DIAGNOSIS — M25.511 CHRONIC RIGHT SHOULDER PAIN: ICD-10-CM

## 2022-04-28 DIAGNOSIS — M48.061 SPINAL STENOSIS OF LUMBAR REGION, UNSPECIFIED WHETHER NEUROGENIC CLAUDICATION PRESENT: ICD-10-CM

## 2022-04-28 DIAGNOSIS — H81.20 VESTIBULAR NEURITIS, UNSPECIFIED LATERALITY: ICD-10-CM

## 2022-04-28 PROCEDURE — 93005 ELECTROCARDIOGRAM TRACING: CPT | Performed by: FAMILY MEDICINE

## 2022-04-28 PROCEDURE — G8427 DOCREV CUR MEDS BY ELIG CLIN: HCPCS | Performed by: FAMILY MEDICINE

## 2022-04-28 PROCEDURE — G8417 CALC BMI ABV UP PARAM F/U: HCPCS | Performed by: FAMILY MEDICINE

## 2022-04-28 PROCEDURE — 93010 ELECTROCARDIOGRAM REPORT: CPT | Performed by: FAMILY MEDICINE

## 2022-04-28 PROCEDURE — G0463 HOSPITAL OUTPT CLINIC VISIT: HCPCS | Performed by: FAMILY MEDICINE

## 2022-04-28 PROCEDURE — G8754 DIAS BP LESS 90: HCPCS | Performed by: FAMILY MEDICINE

## 2022-04-28 PROCEDURE — G8752 SYS BP LESS 140: HCPCS | Performed by: FAMILY MEDICINE

## 2022-04-28 PROCEDURE — 1101F PT FALLS ASSESS-DOCD LE1/YR: CPT | Performed by: FAMILY MEDICINE

## 2022-04-28 PROCEDURE — G8510 SCR DEP NEG, NO PLAN REQD: HCPCS | Performed by: FAMILY MEDICINE

## 2022-04-28 PROCEDURE — G8536 NO DOC ELDER MAL SCRN: HCPCS | Performed by: FAMILY MEDICINE

## 2022-04-28 PROCEDURE — 3017F COLORECTAL CA SCREEN DOC REV: CPT | Performed by: FAMILY MEDICINE

## 2022-04-28 PROCEDURE — 99215 OFFICE O/P EST HI 40 MIN: CPT | Performed by: FAMILY MEDICINE

## 2022-04-28 RX ORDER — SIMVASTATIN 40 MG/1
40 TABLET, FILM COATED ORAL
Qty: 90 TABLET | Refills: 1 | Status: SHIPPED | OUTPATIENT
Start: 2022-04-28 | End: 2022-10-25

## 2022-04-28 RX ORDER — HYDROCHLOROTHIAZIDE 25 MG/1
25 TABLET ORAL DAILY
Qty: 90 TABLET | Refills: 1 | Status: SHIPPED | OUTPATIENT
Start: 2022-04-28 | End: 2022-11-02

## 2022-04-28 NOTE — PROGRESS NOTES
Chief Complaint   Patient presents with   Alex Norman rt shoulder 5-26-22    1. \"Have you been to the ER, urgent care clinic since your last visit? Hospitalized since your last visit? \" No    2. \"Have you seen or consulted any other health care providers outside of the [address] [place] [address] since your last visit? \" Yes Reason for visit: Dr Radha Patrick     3. For patients aged 39-70: Has the patient had a colonoscopy / FIT/ Cologuard?  Yes - no Care Gap present

## 2022-05-02 NOTE — PROGRESS NOTES
HISTORY OF PRESENT ILLNESS  HPI  Baudilio Miller is a 35 V. o. male with a history of cervical DDD and DJD, essential hypertension, bilateral shoulder pain, benign paroxysmal positional vertigo due to bilateral vestibular disorder, spinal stenosis of lumbar region, vitamin D deficiency and hyperlipidemia with LDL goal <130, who presents to the office today for a pre-op exam for right shoulder Sx on 05/26/22 and for f/u of these health problems. Pt comes in for pre-op for his right shoulder. It has been bothering him for year off and on and has just gotten worse. He has never had Sx on his shoulder before. He has troubles with his left shoulder as well but it is not bothering him much. He is being treated for dizziness and that seems to be getting better but that seems to bother him with certain head movements. He does check his BP fairly intermittent at home and it is consistently below 140/90. Pt denies unusual SOB, chest pain, and any recent ER visits or hospitalizations. Past Medical History:   Diagnosis Date    Allergic rhinitis 2/27/2010    Cancer ([facility] [address] 2004    SKIN, LIP (MOHS PROCEDURE)    Hypertension     BORDERLINE; NO MEDS    Obesity (BMI 30.0-34.9) 2/5/2018    Other and unspecified hyperlipidemia 2/27/2010     Past Surgical History:   Procedure Laterality Date    ENDOSCOPY, COLON, DIAGNOSTIC      X2    HX CATARACT REMOVAL Bilateral 87-88    CONGENITAL CATARACTS; W/ IOL    HX LUMBAR LAMINECTOMY      HX ORTHOPAEDIC  08/2019    l-s spine     HX TONSIL AND ADENOIDECTOMY  1960    HX VASECTOMY  1989    [place] SINUS SURGERY PROC UNLISTED  2001     Current Outpatient Medications on File Prior to Visit   Medication Sig Dispense Refill    tamsulosin (FLOMAX) 0.4 mg capsule TAKE 1 CAP BY MOUTH DAILY. INDICATIONS: ENLARGED PROSTATE WITH URINATION PROBLEM 90 Capsule 3    Cholecalciferol, Vitamin D3, (VITAMIN D3) 1,000 unit cap Take 2,000 Units by mouth every other day.        No current facility-administered medications on file prior to visit. Allergies   Allergen Reactions    Oxycodone Rash     Patient had full body chills 20 minutes after taking 5 mg. Redness, stuffy nose, and felt warm.  Pcn [Penicillins] Rash     Family History   Problem Relation Age of Onset    Cancer Mother         ovarian    Cancer Father         prostate?  Elevated Lipids Father     Heart Surgery Father         CABG at 58, lived to 80    No Known Problems Sister     Other Brother         factor 5      Social History     Socioeconomic History    Marital status:    Tobacco Use    Smoking status: Never Smoker    Smokeless tobacco: Never Used   Vaping Use    Vaping Use: Never used   Substance and Sexual Activity    Alcohol use: Yes     Comment: 12oz beer once monthly     Drug use: No    Sexual activity: Yes     Partners: Female   Other Topics Concern    Caffeine Concern No     Comment: no coffee, occ decaff tea, occ soda    Special Diet No    Exercise No     Comment: not much lately, usually plays tennis 2-3 x a week                 Review of Systems   Constitutional: Negative for chills, diaphoresis, fever, malaise/fatigue and weight loss. Eyes: Negative for blurred vision, double vision, pain and redness. Respiratory: Negative for cough, shortness of breath and wheezing. Cardiovascular: Negative for chest pain, palpitations, orthopnea, claudication, leg swelling and PND. Skin: Negative for itching and rash. Neurological: Negative for dizziness, tingling, tremors, sensory change, speech change, focal weakness, seizures, loss of consciousness, weakness and headaches.      Results for orders placed or performed in visit on 03/01/22   VITAMIN D, 25 HYDROXY   Result Value Ref Range    Vitamin D 25-Hydroxy 43.8 30 - 100 ng/mL   LIPID PANEL   Result Value Ref Range    Cholesterol, total 150 <200 MG/DL    Triglyceride 163 (H) <150 MG/DL    HDL Cholesterol 48 MG/DL    LDL, calculated 69.4 0 - 100 MG/DL    VLDL, calculated 32.6 MG/DL    CHOL/HDL Ratio 3.1 0.0 - 5.0     METABOLIC PANEL, COMPREHENSIVE   Result Value Ref Range    Sodium 138 136 - 145 mmol/L    Potassium 4.0 3.5 - 5.1 mmol/L    Chloride 106 97 - 108 mmol/L    CO2 30 21 - 32 mmol/L    Anion gap 2 (L) 5 - 15 mmol/L    Glucose 97 65 - 100 mg/dL    BUN 14 6 - 20 MG/DL    Creatinine 1.06 0.70 - 1.30 MG/DL    BUN/Creatinine ratio 13 12 - 20      GFR est AA >60 >60 ml/min/1.73m2    GFR est non-AA >60 >60 ml/min/1.73m2    Calcium 9.3 8.5 - 10.1 MG/DL    Bilirubin, total 0.6 0.2 - 1.0 MG/DL    ALT (SGPT) 29 12 - 78 U/L    AST (SGOT) 25 15 - 37 U/L    Alk. phosphatase 75 45 - 117 U/L    Protein, total 6.9 6.4 - 8.2 g/dL    Albumin 3.9 3.5 - 5.0 g/dL    Globulin 3.0 2.0 - 4.0 g/dL    A-G Ratio 1.3 1.1 - 2.2               Physical Exam  Visit Vitals  /70 (BP 1 Location: Left arm, BP Patient Position: Sitting, BP Cuff Size: Large adult)   Pulse 93   Temp 98.2 °F (36.8 °C)   Resp 16   Ht 5' 10\" (1.778 m)   Wt 214 lb (97.1 kg)   SpO2 99%   BMI 30.71 kg/m²         General:  Alert, cooperative, no distress, appears stated age. Head:  Normocephalic, without obvious abnormality, atraumatic. Eyes:  Conjunctivae/corneas clear. PERRL, EOMs intact. Fundi benign   Ears:  Normal TMs and external ear canals both ears. Nose: Nares normal. Septum midline. Mucosa normal. No drainage or sinus tenderness. Throat: Lips, mucosa, and tongue normal. Teeth and gums normal.   Neck: Supple, symmetrical, trachea midline, no adenopathy, thyroid: no enlargement/tenderness/nodules, no carotid bruit and no JVD. Back:   Symmetric, no curvature. ROM normal. No CVA tenderness. Lungs:   Clear to auscultation bilaterally. Chest wall:  No tenderness or deformity. Heart:  Regular rate and rhythm, S1, S2 normal, no murmur, click, rub or gallop. Abdomen:   Soft, non-tender. Bowel sounds normal. No masses,  No organomegaly.    Extremities: Extremities normal, atraumatic, no cyanosis or edema. He has decreased ROM especially with abduction with both shoulders. He has a little bit discomfort at the [facility] joint of the right shoulder and has some discomfort with stretch and use of his right rotator cuff muscle. Pulses: 2+ and symmetric all extremities. Skin: Skin color, texture, turgor normal. No rashes or lesions   Lymph nodes: Cervical, supraclavicular, and axillary nodes normal.   Neurologic: CNII-XII intact. Normal strength, sensation and reflexes throughout. ASSESSMENT and PLAN    ICD-10-CM ICD-9-CM    1. Benign essential hypertension  I10 401.1 hydroCHLOROthiazide (HYDRODIURIL) 25 mg tablet   2. Pre-op exam  Z01.818 V72.84 AMB POC EKG ROUTINE W/ 12 LEADS, INTER & REP   3. Hyperlipidemia LDL goal <130  E78.5 272.4 simvastatin (ZOCOR) 40 mg tablet   4. Chronic right shoulder pain  M25.511 719.41     G89.29 338.29    5. Obesity (BMI 30.0-34. 9)  E66.9 278.00    6. Vitamin D deficiency  E55.9 268.9    7. Seasonal allergic rhinitis due to pollen  J30.1 477.0    8. DDD,DJD cervicalC5-6,6-7foraminal encroachment on X ray-4/2013  M50.30 722.4    9. Vestibular neuritis, unspecified laterality  H81.20 386.12    10. Spinal stenosis of lumbar region, unspecified whether neurogenic claudication present  M48.061 724.02    11. Benign paroxysmal positional vertigo due to bilateral vestibular disorder  H81.13 386.11      Diagnoses and all orders for this visit:    1. Benign essential hypertension  -     hydroCHLOROthiazide (HYDRODIURIL) 25 mg tablet; Take 1 Tablet by mouth daily. Take in the morning with potassium rich food or beverage    2. Pre-op exam  -     AMB POC EKG ROUTINE W/ 12 LEADS, INTER & REP    3. Hyperlipidemia LDL goal <130  -     simvastatin (ZOCOR) 40 mg tablet; Take 1 Tablet by mouth nightly. 4. Chronic right shoulder pain    5. Obesity (BMI 30.0-34.9)    6. Vitamin D deficiency    7. Seasonal allergic rhinitis due to pollen    8.  DDD,DJD Emelyn Sickle encroachment on X ray-4/2013    9. Vestibular neuritis, unspecified laterality    10. Spinal stenosis of lumbar region, unspecified whether neurogenic claudication present    11. Benign paroxysmal positional vertigo due to bilateral vestibular disorder      Follow-up and Dispositions    · Return in about 6 months (around 10/28/2022), or new joint pain begins, for F/U HTN and CHOL, f/u Vitamin D deficiency. reviewed medications and side effects in detail  Please call my office if there are any questions- [phone]. Discussed expected course/resolution/complications of diagnosis in detail with patient. Medication risks/benefits/costs/interactions/alternatives discussed with patient. Pt was given an after visit summary which includes diagnoses, current medications & vitals. Pt expressed understanding with the diagnosis and plan. Patient to call if no better in 3 -4 days and prn new problems. BMI is significantly elevated- in the obese range. I reviewed diet, exercise and weight control. Discussed weight control in detail, the importance of mainly decreased carbs, and for weight maintenance, exercise; discussed different diets and that it isn't as important to watch the type of foods as it is to decrease calorie intake no matter what type of diet you do, etc.       Total 30 minutes  re: Review of  the proper technique of checking the blood pressure- check it on an average day only, not on a stressful day, sitting, no exercise for at least 1 hour and not experiencing any new pain( chronic pain is OK). Patient encouraged to check BP sitting and standing at least once a month and to report these readings to me if > 140/ 90 on average , or if the standing BP is >  15 points lower than the sitting. Always check it twice and if there is > 5 points decrease from the previous reading( top reading or systolic) keep checking it until it does not drop 5 points.  Write only this final reading down, not the preceding readings. If out of these readings there is only 1 out of 4 or less > 573, or > 90 diastolic then your blood pressure is OK; it needs further treatment if it is above this. Also, don't forget,  as noted above, to check your blood pressure standing once a month; this is to detect a drop in your BP that might lead to fainting and serious injury; you check it standing with your arm hanging straight down and relaxed. Check it twice waiting 1 minute between the two readings. If, with either one of these 2 readings there is a > 15 point drop of the systolic compared to your sitting pressure( done before the standing BP), then let me know. Following these guidelines, continue to check your BP and write down only the ones described above and it will help me to effectively treat your blood pressure. Reviewed symptoms, or lack thereof, of hypertension and elevated cholesterol. Regular exercise is very important to your health; it helps mentally, physically, socially; it prevents injuries if done properly. Exercise, even as simple as walking 20-30 minutes daily has major benefits to your health even though your \"numbers\" are the same in the lab. See if you can add this into your daily regimen and after a few months it will become a regular habit-\"just something you do,\" like brushing your teeth. A combination of aerobic exercise and strengthening and stretching is felt to be the best for you, so this should be your ultimate goal.   This can be done in the privacy of your home or in a group setting as at the gym  Some prefer having a , others prefer to do exercise in groups or individually. Do what \"works\" for you. You need to make it simple and \"fun,\" or you most likely will not continue it. Recommended a weekly \"heart check. \" I went into detail how to do this.  Also, discussed symptoms of concern that were noted today in the note above, treatment options( including doing nothing), when to follow up before recommended time frame. Also, answered all questions. His EKG showed normal sinus rhythm and no acute changes when compared to prior EKG's    He is cleared for his Sx pending pre-op lab work. This document was written by Medardo Heck, as dictated by Richard Hernandez MD. I have reviewed and agree with the above note and have made corrections where appropriate Ancelmo Cardenas M.D. This is the Subsequent Medicare Annual Wellness Exam, performed 12 months or more after the Initial AWV or the last Subsequent AWV    I have reviewed the patient's medical history in detail and updated the computerized patient record. Assessment/Plan   Education and counseling provided:  Are appropriate based on today's review and evaluation    1. Benign essential hypertension  -     hydroCHLOROthiazide (HYDRODIURIL) 25 mg tablet; Take 1 Tablet by mouth daily. Take in the morning with potassium rich food or beverage, Normal, Disp-90 Tablet, [place]  2. Pre-op exam  -     AMB POC EKG ROUTINE W/ 12 LEADS, INTER & REP  3. Hyperlipidemia LDL goal <130  -     simvastatin (ZOCOR) 40 mg tablet; Take 1 Tablet by mouth nightly., Normal, Disp-90 Tablet, [place]  4. Chronic right shoulder pain  5. Obesity (BMI 30.0-34.9)  6. Vitamin D deficiency  7. Seasonal allergic rhinitis due to pollen  8. DDD,DJD cervicalC5-6,6-7foraminal encroachment on X ray-4/2013  9. Vestibular neuritis, unspecified laterality  10. Spinal stenosis of lumbar region, unspecified whether neurogenic claudication present  11.  Benign paroxysmal positional vertigo due to bilateral vestibular disorder       Depression Risk Factor Screening     3 most recent PHQ Screens 4/28/2022   Little interest or pleasure in doing things Not at all   Feeling down, depressed, irritable, or hopeless Not at all   Total Score PHQ 2 0       Alcohol & Drug Abuse Risk Screen    Do you average more than 1 drink per night or more than 7 drinks a week: No    In the past three months have you have had more than 4 drinks containing alcohol on one occasion: No          Functional Ability and Level of Safety    Hearing: Hearing is good. Activities of Daily Living: The home contains: no safety equipment. Patient does total self care      Ambulation: with no difficulty     Fall Risk:  Fall Risk Assessment, last 12 mths 4/28/2022   Able to walk? Yes   Fall in past 12 months? 0   Do you feel unsteady? 0   Are you worried about falling 0      Abuse Screen:  Patient is not abused       Cognitive Screening    Has your family/caregiver stated any concerns about your memory: no     Cognitive Screening: Normal - Mini Cog Test  I reviewed advanced directive information and written information given to patient; patient to make follow up appointment with a NN for any questions,to review choices made for health care, agent, and anatomical gifts that are on the advanced directive at home. Health Maintenance Due     Health Maintenance Due   Topic Date Due    Shingrix Vaccine Age 49> (1 of 2) Never done    Pneumococcal 65+ years (2 - PCV) 03/03/2022       Patient Care Team   Patient Care Team:  Melanie Dueñas MD as PCP - General  Melanie Dueñas MD as PCP - [facility] Empaneled Provider  Holly Adkins MD as Physician (Orthopedic Surgery)  Eriberto Clarke MD as Physician (Orthopedic Surgery)    History     Patient Active Problem List   Diagnosis Code    Vitamin D deficiency E55.9    DDD,DJD cervicalC5-6,6-7foraminal encroachment on X ray-4/2013 M50.30    Hyperlipidemia LDL goal <130 E78.5    Chronic pain of both shoulders- worse in AM and loosens up with activity. M25.511, G89.29, M25.512    Seasonal allergic rhinitis due to pollen J30.1    Obesity (BMI 30.0-34. 9) E66.9    Spinal stenosis of lumbar region M48.061    Nocturia R35.1    Benign paroxysmal positional vertigo due to bilateral vestibular disorder H81.13    Benign essential hypertension I10    Impingement syndrome of right shoulder M75.41    Chronic right shoulder pain M25.511, G89.29    Neck pain M54.2     Past Medical History:   Diagnosis Date    Allergic rhinitis 2/27/2010    Cancer ([address] 2004    SKIN, LIP (MOHS PROCEDURE)    Hypertension     BORDERLINE; NO MEDS    Obesity (BMI 30.0-34.9) 2/5/2018    Other and unspecified hyperlipidemia 2/27/2010      Past Surgical History:   Procedure Laterality Date    ENDOSCOPY, COLON, DIAGNOSTIC      X2    HX CATARACT REMOVAL Bilateral 87-88    CONGENITAL CATARACTS; W/ IOL    HX LUMBAR LAMINECTOMY      HX ORTHOPAEDIC  08/2019    l-s spine     HX TONSIL AND ADENOIDECTOMY  1960    HX VASECTOMY  1989    [place] SINUS SURGERY PROC UNLISTED  2001     Current Outpatient Medications   Medication Sig Dispense Refill    hydroCHLOROthiazide (HYDRODIURIL) 25 mg tablet Take 1 Tablet by mouth daily. Take in the morning with potassium rich food or beverage 90 Tablet 1    simvastatin (ZOCOR) 40 mg tablet Take 1 Tablet by mouth nightly. 90 Tablet 1    tamsulosin (FLOMAX) 0.4 mg capsule TAKE 1 CAP BY MOUTH DAILY. INDICATIONS: ENLARGED PROSTATE WITH URINATION PROBLEM 90 Capsule 3    Cholecalciferol, Vitamin D3, (VITAMIN D3) 1,000 unit cap Take 2,000 Units by mouth every other day. Allergies   Allergen Reactions    Oxycodone Rash     Patient had full body chills 20 minutes after taking 5 mg. Redness, stuffy nose, and felt warm.  Pcn [Penicillins] Rash       Family History   Problem Relation Age of Onset    Cancer Mother         ovarian    Cancer Father         prostate?     Elevated Lipids Father     Heart Surgery Father         CABG at 58, lived to 80    No Known Problems Sister     Other Brother         factor 5      Social History     Tobacco Use    Smoking status: Never Smoker    Smokeless tobacco: Never Used   Substance Use Topics    Alcohol use: Yes     Comment: 12oz beer once monthly          Marcoson Florentino Bailey MD

## 2022-05-08 NOTE — PATIENT INSTRUCTIONS
Medicare Wellness Visit, Male    The best way to live healthy is to have a lifestyle where you eat a well-balanced diet, exercise regularly, limit alcohol use, and quit all forms of tobacco/nicotine, if applicable. Regular preventive services are another way to keep healthy. Preventive services (vaccines, screening tests, monitoring & exams) can help personalize your care plan, which helps you manage your own care. Screening tests can find health problems at the earliest stages, when they are easiest to treat. [facility] follows the current, evidence-based guidelines published by the [facility] ([facility]) when recommending preventive services for our patients. Because we follow these guidelines, sometimes recommendations change over time as research supports it. (For example, a prostate screening blood test is no longer routinely recommended for men with no symptoms). Of course, you and your doctor may decide to screen more often for some diseases, based on your risk and co-morbidities (chronic disease you are already diagnosed with). Preventive services for you include:  - Medicare offers their members a free annual wellness visit, which is time for you and your primary care provider to discuss and plan for your preventive service needs. Take advantage of this benefit every year!  -All adults over age 72 should receive the recommended pneumonia vaccines. Current USPSTF guidelines recommend a series of two vaccines for the best pneumonia protection.   -All adults should have a flu vaccine yearly and tetanus vaccine every 10 years.  -All adults age 48 and older should receive the shingles vaccines (series of two vaccines).        -All adults age 38-68 who are overweight should have a diabetes screening test once every three years.   -Other screening tests & preventive services for persons with diabetes include: an eye exam to screen for diabetic retinopathy, a kidney function test, a foot exam, and stricter control over your cholesterol.   -Cardiovascular screening for adults with routine risk involves an electrocardiogram (ECG) at intervals determined by the provider.   -Colorectal cancer screening should be done for adults age 54-65 with no increased risk factors for colorectal cancer. There are a number of acceptable methods of screening for this type of cancer. Each test has its own benefits and drawbacks. Discuss with your provider what is most appropriate for you during your annual wellness visit. The different tests include: colonoscopy (considered the best screening method), a fecal occult blood test, a fecal DNA test, and sigmoidoscopy.  -All adults born between [facility] should be screened once for Hepatitis C.  -An Abdominal Aortic Aneurysm (AAA) Screening is recommended for men age 73-68 who has ever smoked in their lifetime.      Here is a list of your current Health Maintenance items (your personalized list of preventive services) with a due date:  Health Maintenance Due   Topic Date Due    Shingles Vaccine (1 of 2) Never done

## 2022-05-31 DIAGNOSIS — S46.011D TRAUMATIC COMPLETE TEAR OF RIGHT ROTATOR CUFF, SUBSEQUENT ENCOUNTER: Primary | ICD-10-CM

## 2022-05-31 RX ORDER — HYDROMORPHONE HYDROCHLORIDE 2 MG/1
2 TABLET ORAL
Qty: 42 TABLET | Refills: 0 | Status: SHIPPED | OUTPATIENT
Start: 2022-05-31 | End: 2022-06-07

## 2022-05-31 RX ORDER — CLINDAMYCIN HYDROCHLORIDE 300 MG/1
300 CAPSULE ORAL 3 TIMES DAILY
Qty: 30 CAPSULE | Refills: 0 | Status: SHIPPED | OUTPATIENT
Start: 2022-05-31 | End: 2022-06-10

## 2022-06-09 NOTE — PROGRESS NOTES
ASSESSMENT/PLAN:  Below is the assessment and plan developed based on review of pertinent history, physical exam, labs, studies, and medications. 1. S/P arthroscopy of right shoulder  -     REFERRAL TO PHYSICAL THERAPY      Return in about 3 weeks (around 2022). Patient is 2 weeks status post right shoulder rotator cuff repair, subacromial decompression, distal clavicle excision. I am happy with his progress so far following this procedure. After discussing treatment options, we have decided to proceed with formal physical therapy as well as a home exercise program for rehabilitation of the shoulder. We went over the arthroscopic pictures and removed the stitches during today´s visit. We will continue with ice and elevation of the shoulder to decrease swelling and pain. He will continue wearing his sling and abduction pillow. We will continue to utilize early mobilization and mechanical prophylaxis to reduce the chances of a deep vein thrombosis. We will wean them off any narcotic medications and progress to anti-inflammatories and Tylenol as long as there are no contraindications to these medications. We also discussed the risk and benefits and common side effects of taking these medications at today´s visit. We also had a discussion regarding not driving while on narcotic medications and while impaired from a surgical or medical condition. I will see them back in three weeks time to evaluate their progress. They will call us in the interim if they have any questions or concerns prior to their follow up visit. He will follow-up with us in 3 weeks for reevaluation. SUBJECTIVE/OBJECTIVE:  Nancy Silva (: 1955) is a 74 y.o. male, patient,here for evaluation of the right shoulder. Patient is two weeks s/p right shoulder rotator cuff repair, SAD, DCE. Patient states he is overall doing well following this procedure. He has been consistent about wearing his sling.   He has been icing and taking Advil for his residual symptoms. He denies any fever, chills, wound dehiscence or drainage. Physical Exam    Examination of the right shoulder reveals that the incisions are healing well, without evidence of drainage, erythema or warmth. There is limited range of motion secondary to discomfort. Strength is 5/5 distally. There is no tenderness at the elbow and wrist. Sensation is intact to light touch distally and there is a brisk capillary refill. Allergies   Allergen Reactions    Oxycodone Rash     Patient had full body chills 20 minutes after taking 5 mg. Redness, stuffy nose, and felt warm.  Pcn [Penicillins] Rash       Current Outpatient Medications   Medication Sig    hydroCHLOROthiazide (HYDRODIURIL) 25 mg tablet Take 1 Tablet by mouth daily. Take in the morning with potassium rich food or beverage    simvastatin (ZOCOR) 40 mg tablet Take 1 Tablet by mouth nightly.  tamsulosin (FLOMAX) 0.4 mg capsule TAKE 1 CAP BY MOUTH DAILY. INDICATIONS: ENLARGED PROSTATE WITH URINATION PROBLEM    Cholecalciferol, Vitamin D3, (VITAMIN D3) 1,000 unit cap Take 2,000 Units by mouth every other day. No current facility-administered medications for this visit.        Past Medical History:   Diagnosis Date    Allergic rhinitis 2/27/2010    Cancer ([facility] [address] 2004    SKIN, LIP (MOHS PROCEDURE)    Hypertension     BORDERLINE; NO MEDS    Obesity (BMI 30.0-34.9) 2/5/2018    Other and unspecified hyperlipidemia 2/27/2010       Past Surgical History:   Procedure Laterality Date    ENDOSCOPY, COLON, DIAGNOSTIC      X2    HX CATARACT REMOVAL Bilateral 87-88    CONGENITAL CATARACTS; W/ IOL    HX LUMBAR LAMINECTOMY      HX ORTHOPAEDIC  08/2019    l-s spine     HX TONSIL AND ADENOIDECTOMY  1960    HX VASECTOMY  1989    ME ANESTH,SURGERY OF SHOULDER      ME SINUS SURGERY [address] UNLISTED  2001       Family History   Problem Relation Age of Onset    Cancer Mother         ovarian    Cancer Father prostate?  Elevated Lipids Father     Heart Surgery Father         CABG at 58, lived to 80    No Known Problems Sister     Other Brother         factor 5        Social History     Socioeconomic History    Marital status:      Spouse name: Not on file    Number of children: Not on file    Years of education: Not on file    Highest education level: Not on file   Occupational History    Not on file   Tobacco Use    Smoking status: Never Smoker    Smokeless tobacco: Never Used   Vaping Use    Vaping Use: Never used   Substance and Sexual Activity    Alcohol use: Yes     Comment: 12oz beer once monthly     Drug use: No    Sexual activity: Yes     Partners: Female   Other Topics Concern     Service Not Asked    Blood Transfusions Not Asked    Caffeine Concern No     Comment: no coffee, occ decaff tea, occ soda    Occupational Exposure Not Asked    Hobby Hazards Not Asked    Sleep Concern Not Asked    Stress Concern Not Asked    Weight Concern Not Asked    Special Diet No    Back Care Not Asked    Exercise No     Comment: not much lately, usually plays tennis 2-3 x a week    Bike Helmet Not Asked    Seat Belt Not Asked    Self-Exams Not Asked   Social History Narrative    Not on file     Social Determinants of Health     Financial Resource Strain:     Difficulty of Paying Living Expenses: Not on file   Food Insecurity:     Worried About Running Out of Food in the Last Year: Not on file    Topher of Food in the Last Year: Not on file   Transportation Needs:     Lack of Transportation (Medical): Not on file    Lack of Transportation (Non-Medical):  Not on file   Physical Activity:     Days of Exercise per Week: Not on file    Minutes of Exercise per Session: Not on file   Stress:     Feeling of Stress : Not on file   Social Connections:     Frequency of Communication with Friends and Family: Not on file    Frequency of Social Gatherings with Friends and Family: Not on file    Attends Uats[employer] Services: Not on file    Active Member of Clubs or Organizations: Not on file    Attends Club or Organization Meetings: Not on file    Marital Status: Not on file   Intimate Partner Violence:     Fear of Current or Ex-Partner: Not on file    Emotionally Abused: Not on file    Physically Abused: Not on file    Sexually Abused: Not on file   Housing Stability:     Unable to Pay for Housing in the Last Year: Not on file    Number of [place] in the Last Year: Not on file    Unstable Housing in the Last Year: Not on file       Review of Systems    No flowsheet data found. Vitals:  Ht 5' 10\" (1.778 m)   Wt 214 lb (97.1 kg)   BMI 30.71 kg/m²    Body mass index is 30.71 kg/m². An electronic signature was used to authenticate this note.   -- Kade Lombardi PA-C

## 2022-06-10 ENCOUNTER — OFFICE VISIT (OUTPATIENT)
Dept: ORTHOPEDIC SURGERY | Age: 67
End: 2022-06-10
Payer: MEDICARE

## 2022-06-10 VITALS — WEIGHT: 214 LBS | BODY MASS INDEX: 30.64 KG/M2 | HEIGHT: 70 IN

## 2022-06-10 DIAGNOSIS — Z98.890 S/P ARTHROSCOPY OF RIGHT SHOULDER: Primary | ICD-10-CM

## 2022-06-10 PROCEDURE — 99024 POSTOP FOLLOW-UP VISIT: CPT | Performed by: PHYSICIAN ASSISTANT

## 2022-06-14 ENCOUNTER — OFFICE VISIT (OUTPATIENT)
Dept: ORTHOPEDIC SURGERY | Age: 67
End: 2022-06-14
Payer: MEDICARE

## 2022-06-14 DIAGNOSIS — S46.011D TRAUMATIC COMPLETE TEAR OF RIGHT ROTATOR CUFF, SUBSEQUENT ENCOUNTER: ICD-10-CM

## 2022-06-14 DIAGNOSIS — G89.29 CHRONIC RIGHT SHOULDER PAIN: Primary | ICD-10-CM

## 2022-06-14 DIAGNOSIS — Z98.890 S/P ARTHROSCOPY OF RIGHT SHOULDER: ICD-10-CM

## 2022-06-14 DIAGNOSIS — M25.511 CHRONIC RIGHT SHOULDER PAIN: Primary | ICD-10-CM

## 2022-06-14 DIAGNOSIS — M25.611 STIFFNESS OF RIGHT SHOULDER JOINT: ICD-10-CM

## 2022-06-14 PROCEDURE — 97140 MANUAL THERAPY 1/> REGIONS: CPT | Performed by: PHYSICAL THERAPIST

## 2022-06-14 PROCEDURE — 97161 PT EVAL LOW COMPLEX 20 MIN: CPT | Performed by: PHYSICAL THERAPIST

## 2022-06-14 PROCEDURE — 97530 THERAPEUTIC ACTIVITIES: CPT | Performed by: PHYSICAL THERAPIST

## 2022-06-15 NOTE — PROGRESS NOTES
Patient Name: Renu Sherman  Date:6/15/2022  : 1955  [x]  Patient  Verified  Payor: Broad [employer] Robert / Plan: [place] MED[place] & [place] / Product Type: Medicare /      Total Treatment Time (min): 50  Total Timed Codes (min): 50    Visit #: 1 of 20    Shoulder evaluation tight     Referring Provider: Brooke Thacker MD  Treatment Area: right shoulder    Subjective: The patient is a 74 y.o. male referred to physical therapy by   Brooke Thacker MD with a diagnosis of right shoulder pain and mobility restriction following a arthroscopic rotator cuff repair distal clavicle excision and subacromial decompression and extensive debridement of the right shoulder. The patient describes a progressive multi month history of functional loss of the right upper extremity with pain impeding his functional and recreational activities of living. He does report injuring his arm in approximately 2021 while lifting weights. Patient reports that he has been recovering well has been utilizing sling and abduction pillow. He does report secondary diagnosis of vertigo. His past medical history has been reviewed in detail. Including medications and prior surgical history. Objective:    General: Well-nourished well-developed. Alert and oriented. No acute distress. Normal affect and mood. Pleasant on exam.     Inspection:  Skin intact left shoulder. No erythema, masses, lesions, or signs of infection. He has ecchymosis and bruising noted over the medial biceps and around the proximal glenohumeral region    Active range of motion:     Active range of motion of the shoulder was deferred secondary to the postoperative course. Wrist, elbow hand and finger mobility is normal.    Strength:    No formalized strength testing at the right shoulder was completed he had good  strength was able to actively move through 3/5 elbow and wrist range of motion. **    Passive range of motion:  External rotation-35@ 30° Dulce Face plane-   Internal rotation -40@ 30° /scapular plane-  Forward flexion -115  Lateral abduction -110 degrees    Arthrokinematics/joint mobility  restriction in the glenohumeral joint, posterior and inferior primary. Graded 1+ on Kaltenborn scale. Pain: VAS scale:    1/10 at rest.    5/10 with exercise and use. Posture:  Patient has moderate scapular elevation, protraction of the scapulothoracic joint. The patient was utilizing sling it was positioned incorrectly and ill fit    Special tests:     AIN, PIN, ulnar motor grossly intact. Palpable radial pulse. Neurologic testing-he is neurovascularly intact in the upper extremity  Spurling sign is negative    Palpation: Soft tissue pain is noted with palpation around the periarticular area of the shoulder. He does have soft tissue restriction noted of the levator and upper trapezius    Shoulder Pain and Disability Index:  Patient only completed 15% of questions listed. He reported at least a 50% disability restriction. Treatment:  Evaluation of the involved shoulder-20 minutes  Therapeutic activity instruction 10 minutes  1. Passive assisted table slide utilizing foam roller  2. Active scapular retraction and depression strengthening in standing and supine  3. Pendulum passive range of motion focusing on body swing  4. Elbow flexion extension as well as digital mobility instruction  Manual therapy 20 minutes  Grade II & III  arthrokinematic, and capsule mobilization with concurrent passive osteokinematic range of motion techniques were applied to the involved upper extremity. Medial stabilization of the scapular was employed as well as concomitant grade 2 and 3 glenohumeral distractions. Joint mobility techniques employed to restore correct mechanical mobility at the involved joints to allow restoration of range of motion and strength. Joints included the glenohumeral scapulothoracic and sternoclavicular regions.       Assessment:  Patient is now approximately 2 weeks status post rotator cuff repair in conjunction with distal clavicle excision and subacromial decompression debridement. He is doing well postoperatively require cues with functional use of the sling. His wife was accompanying him to assist with passive range of motion instruction today in the clinic he is a good candidate to return to premorbid functional level        ICD-10-CM ICD-9-CM    1. Chronic right shoulder pain  M25.511 719.41     G89.29 338.29    2. Traumatic complete tear of right rotator cuff, subsequent encounter  S46.011D V58.89      840.4    3. Stiffness of right shoulder joint  M25.611 719.51    4. S/P arthroscopy of right shoulder  Z98.890 V45.89            Physical therapy goals:    Long-term functional goal 6 months  1. Patient will return to playing tennis     Long-term goals 12 weeks  1. The patient with active range of motion above shoulder height to allow for independence with all aspects of ADLs. 2.  The patient reports pain to be 0/10 with functional ADLs. 3.  The patient will demonstrate posture with good scapular retraction and depression without substitution during active range of motion. 4. 15% improvement in shoulder pain and disability index    Short-term goal 4 weeks. 1.  Improve passive range of motion by 30% from baseline values. 2.  Reduce pain by 50% from baseline reports. 3.  The patient will demonstrate independence with home exercise program to facilitate recovery. Physical therapy plan of care:      Treatment frequency 1-2X weekly. Duration 20 visits. Focus of therapy will be on progressive restoration of range of motion and strength, balance, and functional mobility. Therapeutic applications will include but are not limited to:  Home exercise program development and implementation with updating as needed. Intramuscular dry needling to the involved region.   Manual therapy, joint mobilization, myofascial release, therapeutic exercises. Modalities including ultrasound and electric stimulation heat and ice. Kinesiotape and Lane taping for joint reeducation and approximation of tissue for neuromuscular reeducation. Daisy Dixon, PT    6/15/2022      The referring physician has reviewed and approved this evaluation and plan of care as noted by the electronic signature attached to note.

## 2022-06-16 ENCOUNTER — OFFICE VISIT (OUTPATIENT)
Dept: ORTHOPEDIC SURGERY | Age: 67
End: 2022-06-16
Payer: MEDICARE

## 2022-06-16 DIAGNOSIS — M25.511 CHRONIC RIGHT SHOULDER PAIN: Primary | ICD-10-CM

## 2022-06-16 DIAGNOSIS — G89.29 CHRONIC RIGHT SHOULDER PAIN: Primary | ICD-10-CM

## 2022-06-16 DIAGNOSIS — M25.611 STIFFNESS OF RIGHT SHOULDER JOINT: ICD-10-CM

## 2022-06-16 DIAGNOSIS — Z98.890 S/P ARTHROSCOPY OF RIGHT SHOULDER: ICD-10-CM

## 2022-06-16 PROCEDURE — 97140 MANUAL THERAPY 1/> REGIONS: CPT | Performed by: PHYSICAL THERAPIST

## 2022-06-16 PROCEDURE — 97110 THERAPEUTIC EXERCISES: CPT | Performed by: PHYSICAL THERAPIST

## 2022-06-16 NOTE — PROGRESS NOTES
PT DAILY TREATMENT NOTE    Patient Name: Yael Triplett  Date:2022  : 1955  [x]  Patient  Verified  Payor: Randolph Quiroz / Plan: [place] MEDICARE PART A & B / Product Type: Medicare /    Total Treatment Time (min): 40  Total Timed Codes (min): 40  1:1 Treatment Time ( [address] only): 40   Referring Physician: Patricio Rosario MD     Treatment Area: Right shoulder    SUBJECTIVE  Patient reports doing well. He does have a question about pendulum exercise. OBJECTIVE  Modality:   []  E-Stim: type _ x _ min     []att   []unatt   []w/US   []w/ice   []w/heat  []  Ultrasound: []cont   []pulse    _ W/cm2 x _  min   []1MHz   []3MHz  []  Ice pack _  min       []  Hot pack _  min       []  Other:     Therapeutic Exercise: (minutes: 10, one-on-one throughout )  [x] see exercise log      Added/Changed Exercises:  []  Added:   []  Changed:       Manual Therapy: (minutes: 30)  Gentle rotator cuff release, oscillations, traction. Grade 1-2 posterior/inferior glenohumeral joint mobilization. Passive range of motion all planes to tolerance. Patient Education: [x] Review HEP    [] Progressed/Changed HEP:      Other Objective/Functional Measures:     ASSESSMENT  []  See Plan of Care  []  See progress note/recertification  [x]  Patient will continue to benefit from skilled therapy to address remaining functional deficits:     Good early passive motion. Does have more pain with overhead mobility versus at his side. Home exercises reviewed, has been compliant 3 times daily. We will follow him twice next week. ICD-10-CM ICD-9-CM    1. Chronic right shoulder pain  M25.511 719.41     G89.29 338.29    2. Stiffness of right shoulder joint  M25.611 719.51    3.  S/P arthroscopy of right shoulder  Z98.890 V45.89        PLAN  [x] Progress as tolerated under current plan towards long-term goals  [] Discharge  [] Other:        PT Exercise Log         Activity/Exercise Date  22      Pendulums x     Scap squeezes x      Wand ER x     Table roll x                                                                                       Return in about 5 days (around 6/21/2022) for Continued skilled physical therapy.     Ketty Lockwood, PT 6/16/2022  8:34 AM

## 2022-06-21 ENCOUNTER — OFFICE VISIT (OUTPATIENT)
Dept: ORTHOPEDIC SURGERY | Age: 67
End: 2022-06-21
Payer: MEDICARE

## 2022-06-21 DIAGNOSIS — Z98.890 S/P ARTHROSCOPY OF RIGHT SHOULDER: ICD-10-CM

## 2022-06-21 DIAGNOSIS — S46.011D TRAUMATIC COMPLETE TEAR OF RIGHT ROTATOR CUFF, SUBSEQUENT ENCOUNTER: Primary | ICD-10-CM

## 2022-06-21 DIAGNOSIS — M25.511 CHRONIC RIGHT SHOULDER PAIN: ICD-10-CM

## 2022-06-21 DIAGNOSIS — G89.29 CHRONIC RIGHT SHOULDER PAIN: ICD-10-CM

## 2022-06-21 DIAGNOSIS — M25.611 STIFFNESS OF RIGHT SHOULDER JOINT: ICD-10-CM

## 2022-06-21 PROCEDURE — 97110 THERAPEUTIC EXERCISES: CPT | Performed by: PHYSICAL THERAPIST

## 2022-06-21 PROCEDURE — 97140 MANUAL THERAPY 1/> REGIONS: CPT | Performed by: PHYSICAL THERAPIST

## 2022-06-22 NOTE — PROGRESS NOTES
PT DAILY TREATMENT NOTE    Patient Name: Hao Huerta  Date:2022  : 1955  [x]  Patient  Verified  Payor: Yanni Irene / Plan: [place] MEDICARE PART A & B / Product Type: Medicare /    Total Treatment Time (min): 40  Total Timed Codes (min): 40  1:1 Treatment Time ([facility] only): 40   Referring Physician: Salima Clark MD     Treatment Area: Right shoulder    SUBJECTIVE  Patient reports doing well. He does have a question about pendulum exercise. OBJECTIVE  Modality:   []  E-Stim: type _ x _ min     []att   []unatt   []w/US   []w/ice   []w/heat  []  Ultrasound: []cont   []pulse    _ W/cm2 x _  min   []1MHz   []3MHz  []  Ice pack _  min       []  Hot pack _  min       []  Other:     Therapeutic Exercise: (minutes: 25, one-on-one throughout )  [x] see exercise log      Added/Changed Exercises:  []  Added:   []  Changed:       Manual Therapy: (minutes: 15)    Grade II & III  arthrokinematic, and capsule mobilization with concurrent passive osteokinematic range of motion techniques were applied to the involved upper extremity. Medial stabilization of the scapular was employed as well as concomitant grade 2 and 3 glenohumeral distractions. Joint mobility techniques employed to restore correct mechanical mobility at the involved joints to allow restoration of range of motion and strength. Joints included the glenohumeral scapulothoracic and sternoclavicular regions. Patient Education: [x] Review HEP    [] Progressed/Changed HEP:      Other Objective/Functional Measures:     ASSESSMENT  []  See Plan of Care  []  See progress note/recertification  [x]  Patient will continue to benefit from skilled therapy to address remaining functional deficits:     Continues with good compliance with home exercise program verbal and tactile cues required to complete the exercises with 100% accuracy      ICD-10-CM ICD-9-CM    1.  Traumatic complete tear of right rotator cuff, subsequent encounter  S46.011D V58.89 840.4    2. Chronic right shoulder pain  M25.511 719.41     G89.29 338.29    3. Stiffness of right shoulder joint  M25.611 719.51    4. S/P arthroscopy of right shoulder  Z98.890 V45.89        PLAN  [x] Progress as tolerated under current plan towards long-term goals  [] Discharge  [] Other:        PT Exercise Log         Activity/Exercise  Exercises were completed with supervision and direction Date        Pendulums x     Scap squeezes x      Wand ER x     Table roll x     Passive range of motion shoulder  X                                                                                 Return in about 2 days (around 6/23/2022).     Ronel Kaur, PT 6/22/2022  8:34 AM

## 2022-06-24 ENCOUNTER — OFFICE VISIT (OUTPATIENT)
Dept: ORTHOPEDIC SURGERY | Age: 67
End: 2022-06-24
Payer: MEDICARE

## 2022-06-24 DIAGNOSIS — M25.511 CHRONIC RIGHT SHOULDER PAIN: ICD-10-CM

## 2022-06-24 DIAGNOSIS — M25.611 STIFFNESS OF RIGHT SHOULDER JOINT: ICD-10-CM

## 2022-06-24 DIAGNOSIS — G89.29 CHRONIC RIGHT SHOULDER PAIN: ICD-10-CM

## 2022-06-24 DIAGNOSIS — S46.011D TRAUMATIC COMPLETE TEAR OF RIGHT ROTATOR CUFF, SUBSEQUENT ENCOUNTER: Primary | ICD-10-CM

## 2022-06-24 DIAGNOSIS — Z98.890 S/P ARTHROSCOPY OF RIGHT SHOULDER: ICD-10-CM

## 2022-06-24 PROCEDURE — 97110 THERAPEUTIC EXERCISES: CPT | Performed by: PHYSICAL THERAPIST

## 2022-06-24 PROCEDURE — 97140 MANUAL THERAPY 1/> REGIONS: CPT | Performed by: PHYSICAL THERAPIST

## 2022-06-24 NOTE — PROGRESS NOTES
PT DAILY TREATMENT NOTE    Patient Name: Marlon Sanchez  Date:2022  : 1955  [x]  Patient  Verified  Payor: Cira Tawnya / Plan: [place] MEDICARE PART A & B / Product Type: Medicare /    Total Treatment Time (min): 45  Total Timed Codes (min): 45  1:1 Treatment Time ([facility] only): 45   Referring Physician: Ezequiel Quinonez MD     Treatment Area: Right shoulder    SUBJECTIVE  Patient reports doing well. He does have a question about pendulum exercise. OBJECTIVE  Modality:   []  E-Stim: type _ x _ min     []att   []unatt   []w/US   []w/ice   []w/heat  []  Ultrasound: []cont   []pulse    _ W/cm2 x _  min   []1MHz   []3MHz  []  Ice pack _  min       []  Hot pack _  min       []  Other:     Therapeutic Exercise: (minutes: 25, one-on-one throughout )  [x] see exercise log      Added/Changed Exercises:  []  Added:   []  Changed:       Manual Therapy: (minutes: 15)    Grade II & III  arthrokinematic, and capsule mobilization with concurrent passive osteokinematic range of motion techniques were applied to the involved upper extremity. Medial stabilization of the scapular was employed as well as concomitant grade 2 and 3 glenohumeral distractions. Joint mobility techniques employed to restore correct mechanical mobility at the involved joints to allow restoration of range of motion and strength. Joints included the glenohumeral scapulothoracic and sternoclavicular regions. Patient Education: [x] Review HEP    [] Progressed/Changed HEP:      Other Objective/Functional Measures:     ASSESSMENT  []  See Plan of Care  []  See progress note/recertification  [x]  Patient will continue to benefit from skilled therapy to address remaining functional deficits:     continues to do quite well with his PROM. Cues  for better sleep habits      ICD-10-CM ICD-9-CM    1. Traumatic complete tear of right rotator cuff, subsequent encounter  S46.011D V58.89      840.4    2.  Chronic right shoulder pain  M25.511 719.41 G89.29 338.29    3. Stiffness of right shoulder joint  M25.611 719.51    4. S/P arthroscopy of right shoulder  Z98.890 V45.89        PLAN  [x] Progress as tolerated under current plan towards long-term goals  [] Discharge  [] Other:        PT Exercise Log         Activity/Exercise  Exercises were completed with supervision and direction Date        Pendulums x     Scap squeezes x      Wand ER      Table roll x     Passive range of motion shoulder  X                                                                                 Return in about 5 days (around 6/29/2022).     Lucero Apodaca, PT 6/24/2022  8:34 AM

## 2022-06-29 ENCOUNTER — OFFICE VISIT (OUTPATIENT)
Dept: ORTHOPEDIC SURGERY | Age: 67
End: 2022-06-29
Payer: MEDICARE

## 2022-06-29 DIAGNOSIS — M25.611 STIFFNESS OF RIGHT SHOULDER JOINT: ICD-10-CM

## 2022-06-29 DIAGNOSIS — G89.29 CHRONIC RIGHT SHOULDER PAIN: Primary | ICD-10-CM

## 2022-06-29 DIAGNOSIS — Z98.890 S/P ARTHROSCOPY OF RIGHT SHOULDER: ICD-10-CM

## 2022-06-29 DIAGNOSIS — M25.511 CHRONIC RIGHT SHOULDER PAIN: Primary | ICD-10-CM

## 2022-06-29 PROCEDURE — 97110 THERAPEUTIC EXERCISES: CPT | Performed by: PHYSICAL THERAPIST

## 2022-06-29 PROCEDURE — 97140 MANUAL THERAPY 1/> REGIONS: CPT | Performed by: PHYSICAL THERAPIST

## 2022-06-29 NOTE — PROGRESS NOTES
PT DAILY TREATMENT NOTE    Patient Name: Hao Huerta  Date:2022  : 1955  [x]  Patient  Verified  Payor: Yanni Irene / Plan: [place] MED[place] A & B / Product Type: Medicare /    Total Treatment Time (min): 55  Total Timed Codes (min): 38  1:1 Treatment Time ( [address] only): 38  Referring Physician: Salima Clark MD     Treatment Area: Right shoulder    SUBJECTIVE  Still having some pain sleeping at night. OBJECTIVE  Modality:   []  E-Stim: type _ x _ min     []att   []unatt   []w/US   []w/ice   []w/heat  []  Ultrasound: []cont   []pulse    _ W/cm2 x _  min   []1MHz   []3MHz  []  Ice pack _  min       []  Hot pack _  min       []  Other:     Therapeutic Exercise: (minutes: 25, one-on-one 8)  [x] see exercise log      Added/Changed Exercises:  []  Added:   []  Changed:       Manual Therapy: (minutes: 30)    Grade II & III  arthrokinematic, and capsule mobilization with concurrent passive osteokinematic range of motion techniques were applied to the involved upper extremity. Medial stabilization of the scapular was employed as well as concomitant grade 2 and 3 glenohumeral distractions. Joint mobility techniques employed to restore correct mechanical mobility at the involved joints to allow restoration of range of motion and strength. Joints included the glenohumeral scapulothoracic and sternoclavicular regions. Patient Education: [x] Review HEP    [] Progressed/Changed HEP:      Other Objective/Functional Measures:     ASSESSMENT  []  See Plan of Care  []  See progress note/recertification  [x]  Patient will continue to benefit from skilled therapy to address remaining functional deficits:     Did great with new passive/active assisted range of motion exercises. One of his chief complaints remains difficulty sleeping through the night. Overall he is progressing appropriately for 4 weeks postop. He has the most discomfort with overhead mobility, ensuring not to push too much.   We will follow him again end of this week. ICD-10-CM ICD-9-CM    1. Chronic right shoulder pain  M25.511 719.41     G89.29 338.29    2. Stiffness of right shoulder joint  M25.611 719.51    3. S/P arthroscopy of right shoulder  Z98.890 V45.89        PLAN  [x] Progress as tolerated under current plan towards long-term goals  [] Discharge  [] Other:        PT Exercise Log         Activity/Exercise  Exercises were completed with supervision and direction Date        Pendulums x     Scap squeezes x      Wand ER/flexion x     Table roll x     Passive range of motion shoulder  X     Pulleys x                                                                           Return in about 2 days (around 7/1/2022) for Continued skilled physical therapy.     Denton Garcia, PT 6/29/2022  8:34 AM

## 2022-06-30 NOTE — PROGRESS NOTES
ASSESSMENT/PLAN:  Below is the assessment and plan developed based on review of pertinent history, physical exam, labs, studies, and medications. 1. S/P arthroscopy of right shoulder  -     REFERRAL TO PHYSICAL THERAPY      Continue physical therapy. We will discontinue his pillow. We will focus primarily on passive range of motion with external rotation and forward flexion. I will see him back in 4 weeks time where we hope to discontinue his sling and start some internal rotation as well as some isometrics. SUBJECTIVE/OBJECTIVE:  Jozef Mann (: 1955) is a 74 y.o. male, patient,here for evaluation of the right shoulder. Patient is s/p right shoulder rotator cuff repair, SAD, DCE on 6/3/22. Patient states he is overall doing well following this procedure. He has been consistent about wearing his sling. He has been icing and taking Advil for his residual symptoms. He denies any fever, chills, wound dehiscence or drainage. Physical Exam    Examination of the right shoulder reveals that the incisions are healing well, without evidence of drainage, erythema or warmth. There is limited range of motion secondary to discomfort. Strength is 5/5 distally. There is no tenderness at the elbow and wrist. Sensation is intact to light touch distally and there is a brisk capillary refill. Allergies   Allergen Reactions    Oxycodone Rash     Patient had full body chills 20 minutes after taking 5 mg. Redness, stuffy nose, and felt warm.  Pcn [Penicillins] Rash       Current Outpatient Medications   Medication Sig    hydroCHLOROthiazide (HYDRODIURIL) 25 mg tablet Take 1 Tablet by mouth daily. Take in the morning with potassium rich food or beverage    simvastatin (ZOCOR) 40 mg tablet Take 1 Tablet by mouth nightly.  tamsulosin (FLOMAX) 0.4 mg capsule TAKE 1 CAP BY MOUTH DAILY.  INDICATIONS: ENLARGED PROSTATE WITH URINATION PROBLEM    Cholecalciferol, Vitamin D3, (VITAMIN D3) 1,000 unit cap Take 2,000 Units by mouth every other day. No current facility-administered medications for this visit. Past Medical History:   Diagnosis Date    Allergic rhinitis 2/27/2010    Cancer ([facility] [address] 2004    SKIN, LIP (MOHS PROCEDURE)    Hypertension     BORDERLINE; NO MEDS    Obesity (BMI 30.0-34.9) 2/5/2018    Other and unspecified hyperlipidemia 2/27/2010       Past Surgical History:   Procedure Laterality Date    ENDOSCOPY, COLON, DIAGNOSTIC      X2    HX CATARACT REMOVAL Bilateral 87-88    CONGENITAL CATARACTS; W/ IOL    HX LUMBAR LAMINECTOMY      HX ORTHOPAEDIC  08/2019    l-s spine     HX TONSIL AND ADENOIDECTOMY  1960    HX VASECTOMY  1989    [place] AN[place],SURGERY OF [place]OULD[place]      [place] [address] UNLISTED  2001       Family History   Problem Relation Age of Onset    Cancer Mother         ovarian    Cancer Father         prostate?     Elevated Lipids Father     Heart Surgery Father         CABG at 58, lived to 80    No Known Problems Sister     Other Brother         factor 5        Social History     Socioeconomic History    Marital status:      Spouse name: Not on file    Number of children: Not on file    Years of education: Not on file    Highest education level: Not on file   Occupational History    Not on file   Tobacco Use    Smoking status: Never Smoker    Smokeless tobacco: Never Used   Vaping Use    Vaping Use: Never used   Substance and Sexual Activity    Alcohol use: Yes     Comment: 12oz beer once monthly     Drug use: No    Sexual activity: Yes     Partners: Female   Other Topics Concern     Service Not Asked    Blood Transfusions Not Asked    Caffeine Concern No     Comment: no coffee, occ decaff tea, occ soda    Occupational Exposure Not Asked    Hobby Hazards Not Asked    Sleep Concern Not Asked    Stress Concern Not Asked    Weight Concern Not Asked    Special Diet No    Back Care Not Asked    Exercise No     Comment: not much lately, usually plays tennis 2-3 x a week    Bike Helmet Not Asked    Seat Belt Not Asked    Self-Exams Not Asked   Social History Narrative    Not on file     Social Determinants of Health     Financial Resource Strain:     Difficulty of Paying Living Expenses: Not on file   Food Insecurity:     Worried About Running Out of Food in the Last Year: Not on file    Topher of Food in the Last Year: Not on file   Transportation Needs:     Lack of Transportation (Medical): Not on file    Lack of Transportation (Non-Medical): Not on file   Physical Activity:     Days of Exercise per Week: Not on file    Minutes of Exercise per Session: Not on file   Stress:     Feeling of Stress : Not on file   Social Connections:     Frequency of Communication with Friends and Family: Not on file    Frequency of Social Gatherings with Friends and Family: Not on file    Attends Zoroastrian Services: Not on file    Active Member of [address] [place] [address] or Organizations: Not on file    Attends Club or Organization Meetings: Not on file    Marital Status: Not on file   Intimate Partner Violence:     Fear of Current or Ex-Partner: Not on file    Emotionally Abused: Not on file    Physically Abused: Not on file    Sexually Abused: Not on file   Housing Stability:     Unable to Pay for Housing in the Last Year: Not on file    Number of [place] in the Last Year: Not on file    Unstable Housing in the Last Year: Not on file       Review of Systems    No flowsheet data found. Vitals:  Ht 5' 10\" (1.778 m)   Wt 214 lb (97.1 kg)   BMI 30.71 kg/m²    Body mass index is 30.71 kg/m². An electronic signature was used to authenticate this note.   -- Casimiro Kendall PA-C

## 2022-07-01 ENCOUNTER — OFFICE VISIT (OUTPATIENT)
Dept: ORTHOPEDIC SURGERY | Age: 67
End: 2022-07-01
Payer: MEDICARE

## 2022-07-01 ENCOUNTER — OFFICE VISIT (OUTPATIENT)
Dept: ORTHOPEDIC SURGERY | Age: 67
End: 2022-07-01

## 2022-07-01 VITALS — BODY MASS INDEX: 30.64 KG/M2 | HEIGHT: 70 IN | WEIGHT: 214 LBS

## 2022-07-01 DIAGNOSIS — G89.29 CHRONIC RIGHT SHOULDER PAIN: Primary | ICD-10-CM

## 2022-07-01 DIAGNOSIS — M25.511 CHRONIC RIGHT SHOULDER PAIN: Primary | ICD-10-CM

## 2022-07-01 DIAGNOSIS — Z98.890 S/P ARTHROSCOPY OF RIGHT SHOULDER: ICD-10-CM

## 2022-07-01 DIAGNOSIS — M25.611 STIFFNESS OF RIGHT SHOULDER JOINT: ICD-10-CM

## 2022-07-01 PROCEDURE — 99024 POSTOP FOLLOW-UP VISIT: CPT | Performed by: ORTHOPAEDIC SURGERY

## 2022-07-01 PROCEDURE — 97110 THERAPEUTIC EXERCISES: CPT | Performed by: PHYSICAL THERAPIST

## 2022-07-01 PROCEDURE — 97140 MANUAL THERAPY 1/> REGIONS: CPT | Performed by: PHYSICAL THERAPIST

## 2022-07-01 NOTE — PROGRESS NOTES
PT DAILY TREATMENT NOTE    Patient Name: Rina Travis  VUDO:[id number]  : 1955  [x]  Patient  Verified  Payor: Rogelio Alt / Plan: [place] MEDICARE PART A & B / Product Type: Medicare /    Total Treatment Time (min): 50  Total Timed Codes (min): 38  1:1 Treatment Time ( [address] only): 38  Referring Physician: Rupesh Burciaga MD     Treatment Area: Right shoulder    SUBJECTIVE  Patient had a good visit with Dr. Shaniqua Temple earlier today. Removed his abduction pillow. OBJECTIVE  Modality:   []  E-Stim: type _ x _ min     []att   []unatt   []w/US   []w/ice   []w/heat  []  Ultrasound: []cont   []pulse    _ W/cm2 x _  min   []1MHz   []3MHz  [x]  Ice pack 10 min       []  Hot pack _  min       []  Other:     Therapeutic Exercise: (minutes: 25, one-on-one 13)  [x] see exercise log      Added/Changed Exercises:  []  Added:   []  Changed:       Manual Therapy: (minutes: 25)    Grade II & III  arthrokinematic, and capsule mobilization with concurrent passive osteokinematic range of motion techniques were applied to the involved upper extremity. Medial stabilization of the scapular was employed as well as concomitant grade 2 and 3 glenohumeral distractions. Joint mobility techniques employed to restore correct mechanical mobility at the involved joints to allow restoration of range of motion and strength. Joints included the glenohumeral scapulothoracic and sternoclavicular regions. Patient Education: [x] Review HEP    [] Progressed/Changed HEP:      Other Objective/Functional Measures:     ASSESSMENT  []  See Plan of Care  []  See progress note/recertification  [x]  Patient will continue to benefit from skilled therapy to address remaining functional deficits:     Patient continues to make appropriate progress for 4 weeks postop rotator cuff repair. His pain has remained controlled, making steady gains with passive motion. He does continue with moderate inferior capsule tightness.   Going to purchase a set of pulleys for home. We will follow him twice next week. ICD-10-CM ICD-9-CM    1. Chronic right shoulder pain  M25.511 719.41     G89.29 338.29    2. Stiffness of right shoulder joint  M25.611 719.51    3. S/P arthroscopy of right shoulder  Z98.890 V45.89        PLAN  [x] Progress as tolerated under current plan towards long-term goals  [] Discharge  [] Other:        PT Exercise Log         Activity/Exercise  Exercises were completed with supervision and direction Date        Pendulums x     Scap squeezes x      Wand ER/flexion x     Table roll x     Passive range of motion shoulder  X     Pulleys x                                                                           Return in about 4 days (around 7/5/2022) for Continued skilled physical therapy.     Giovanny Valentin, PT 7/1/2022  8:34 AM

## 2022-07-05 ENCOUNTER — OFFICE VISIT (OUTPATIENT)
Dept: ORTHOPEDIC SURGERY | Age: 67
End: 2022-07-05
Payer: MEDICARE

## 2022-07-05 DIAGNOSIS — Z98.890 S/P ARTHROSCOPY OF RIGHT SHOULDER: ICD-10-CM

## 2022-07-05 DIAGNOSIS — M25.511 CHRONIC RIGHT SHOULDER PAIN: Primary | ICD-10-CM

## 2022-07-05 DIAGNOSIS — G89.29 CHRONIC RIGHT SHOULDER PAIN: Primary | ICD-10-CM

## 2022-07-05 DIAGNOSIS — M25.611 STIFFNESS OF RIGHT SHOULDER JOINT: ICD-10-CM

## 2022-07-05 DIAGNOSIS — S46.011D TRAUMATIC COMPLETE TEAR OF RIGHT ROTATOR CUFF, SUBSEQUENT ENCOUNTER: ICD-10-CM

## 2022-07-05 PROCEDURE — 97140 MANUAL THERAPY 1/> REGIONS: CPT | Performed by: PHYSICAL THERAPIST

## 2022-07-05 PROCEDURE — 97110 THERAPEUTIC EXERCISES: CPT | Performed by: PHYSICAL THERAPIST

## 2022-07-07 NOTE — PROGRESS NOTES
PT DAILY TREATMENT NOTE    Patient Name: Rina Travis  WPYY:[id number]  : 1955  [x]  Patient  Verified  Payor: Rogelio Alt / Plan: [place] MEDICARE PART A & B / Product Type: Medicare /    Total Treatment Time (min): 45  Total Timed Codes (min): 25  1:1 Treatment Time ([facility] only): 25  Referring Physician: Rupesh Burciaga MD     Treatment Area: Right shoulder    SUBJECTIVE  Patient reports that he will be on vacation starting next week. We outlined his progressive rehabilitation    OBJECTIVE  Modality:   []  E-Stim: type _ x _ min     []att   []unatt   []w/US   []w/ice   []w/heat  []  Ultrasound: []cont   []pulse    _ W/cm2 x _  min   []1MHz   []3MHz  []  Ice pack 10 min       []  Hot pack _  min       []  Other:     Therapeutic Exercise: (minutes: 30, one-on-one 10)  [x] see exercise log      Added/Changed Exercises:  []  Added:   []  Changed:       Manual Therapy: (minutes: 15)    Grade II & III  arthrokinematic, and capsule mobilization with concurrent passive osteokinematic range of motion techniques were applied to the involved upper extremity. Medial stabilization of the scapular was employed as well as concomitant grade 2 and 3 glenohumeral distractions. Joint mobility techniques employed to restore correct mechanical mobility at the involved joints to allow restoration of range of motion and strength. Joints included the glenohumeral scapulothoracic and sternoclavicular regions. Patient Education: [x] Review HEP    [] Progressed/Changed HEP:      Other Objective/Functional Measures:     ASSESSMENT  []  See Plan of Care  []  See progress note/recertification  [x]  Patient will continue to benefit from skilled therapy to address remaining functional deficits:     Capsule mobility restriction remains with passive range of motion limitation. At this point his recovery is doing well he continues to be on passive limitation.   We will update his home exercise program continue to address scapulothoracic posture and position. ICD-10-CM ICD-9-CM    1. Chronic right shoulder pain  M25.511 719.41     G89.29 338.29    2. Traumatic complete tear of right rotator cuff, subsequent encounter  S46.011D V58.89      840.4    3. Stiffness of right shoulder joint  M25.611 719.51    4. S/P arthroscopy of right shoulder  Z98.890 V45.89        PLAN  [x] Progress as tolerated under current plan towards long-term goals  [] Discharge  [] Other:        PT Exercise Log         Activity/Exercise  Exercises were completed with supervision and direction Date        Pendulums x     Scap squeezes x      Wand ER/flexion x     Table roll x     Passive range of motion shoulder  X     Pulleys x     PROM shoulder X                                      Return in about 2 days (around 7/7/2022).     Paige Turner, PT 7/7/2022  8:34 AM

## 2022-07-08 ENCOUNTER — OFFICE VISIT (OUTPATIENT)
Dept: ORTHOPEDIC SURGERY | Age: 67
End: 2022-07-08
Payer: MEDICARE

## 2022-07-08 DIAGNOSIS — G89.29 CHRONIC RIGHT SHOULDER PAIN: ICD-10-CM

## 2022-07-08 DIAGNOSIS — S46.011D TRAUMATIC COMPLETE TEAR OF RIGHT ROTATOR CUFF, SUBSEQUENT ENCOUNTER: Primary | ICD-10-CM

## 2022-07-08 DIAGNOSIS — M25.611 STIFFNESS OF RIGHT SHOULDER JOINT: ICD-10-CM

## 2022-07-08 DIAGNOSIS — M25.511 CHRONIC RIGHT SHOULDER PAIN: ICD-10-CM

## 2022-07-08 DIAGNOSIS — Z98.890 S/P ARTHROSCOPY OF RIGHT SHOULDER: ICD-10-CM

## 2022-07-08 PROCEDURE — 97140 MANUAL THERAPY 1/> REGIONS: CPT | Performed by: PHYSICAL THERAPIST

## 2022-07-08 PROCEDURE — 97110 THERAPEUTIC EXERCISES: CPT | Performed by: PHYSICAL THERAPIST

## 2022-07-08 PROCEDURE — 97112 NEUROMUSCULAR REEDUCATION: CPT | Performed by: PHYSICAL THERAPIST

## 2022-07-08 NOTE — PROGRESS NOTES
PT DAILY TREATMENT NOTE    Patient Name: Taty Kamara  Date:2022  : 1955  [x]  Patient  Verified  Payor: Ana Sorto / Plan: [place] MEDICARE PART A & B / Product Type: Medicare /    Total Treatment Time (min): 50  Total Timed Codes (min): 40  1:1 Treatment Time ( [address] only): 40  Referring Physician: Terrence Butler MD     Treatment Area: Right shoulder    SUBJECTIVE  Patient reports that he will be on vacation starting next week. We outlined his progressive rehabilitation    OBJECTIVE  Modality:   []  E-Stim: type _ x _ min     []att   []unatt   []w/US   []w/ice   []w/heat  []  Ultrasound: []cont   []pulse    _ W/cm2 x _  min   []1MHz   []3MHz  [x]  Ice pack 10 min       []  Hot pack _  min       []  Other:     Therapeutic Exercise: (minutes: 30, one-on-one 15)  [x] see exercise log      Added/Changed Exercises:  []  Added:   []  Changed:       Manual Therapy: (minutes: 15)    Grade II & III  arthrokinematic, and capsule mobilization with concurrent passive osteokinematic range of motion techniques were applied to the involved upper extremity. Medial stabilization of the scapular was employed as well as concomitant grade 2 and 3 glenohumeral distractions. Joint mobility techniques employed to restore correct mechanical mobility at the involved joints to allow restoration of range of motion and strength. Joints included the glenohumeral scapulothoracic and sternoclavicular regions. Neuromuscular reeducation 10 minutes  neuromuscular down regulation to the myofascial soft tissue structures of the Glenohumeral and Scapulothoracic region applied with manual as well as instrument assisted techniques    Hold relax techniques were employed to assist with change in the neuromuscular firing pattern of the involved upperextremity for restoration of glenohumeral range of motion dissociated from the scapulothoracic joint.   PNF to the scapulothoracic joint for improved position of retraction and depression    Patient Education: [x] Review HEP    [] Progressed/Changed HEP:      Other Objective/Functional Measures:     ASSESSMENT  []  See Plan of Care  []  See progress note/recertification  [x]  Patient will continue to benefit from skilled therapy to address remaining functional deficits:     Some cues required with a home exercise program better understanding posttreatment passive range of motion is improving nicely he will be away for 2 weeks be progressing independently. ICD-10-CM ICD-9-CM    1. Traumatic complete tear of right rotator cuff, subsequent encounter  S46.011D V58.89      840.4    2. Chronic right shoulder pain  M25.511 719.41     G89.29 338.29    3. Stiffness of right shoulder joint  M25.611 719.51    4. S/P arthroscopy of right shoulder  Z98.890 V45.89        PLAN  [x] Progress as tolerated under current plan towards long-term goals  [] Discharge  [] Other:        PT Exercise Log         Activity/Exercise  Exercises were completed with supervision and direction Date        Pendulums x     Scap squeezes x      Wand ER/flexion x     Table roll x     Passive range of motion shoulder  X     Pulleys x     PROM shoulder X                                 Exercises were reviewed in detail for improved technique and application. He will be away on vacation for 2 weeks we ensured independent ability to complete these exercises his wife was in attendance. She was taking written notes    Return in about 2 weeks (around 7/22/2022) for Patient will be on vacation.     Vijay Redmond, PT 7/8/2022  8:34 AM

## 2022-07-26 ENCOUNTER — OFFICE VISIT (OUTPATIENT)
Dept: ORTHOPEDIC SURGERY | Age: 67
End: 2022-07-26
Payer: MEDICARE

## 2022-07-26 DIAGNOSIS — M25.611 STIFFNESS OF RIGHT SHOULDER JOINT: ICD-10-CM

## 2022-07-26 DIAGNOSIS — G89.29 CHRONIC RIGHT SHOULDER PAIN: ICD-10-CM

## 2022-07-26 DIAGNOSIS — S46.011D TRAUMATIC COMPLETE TEAR OF RIGHT ROTATOR CUFF, SUBSEQUENT ENCOUNTER: Primary | ICD-10-CM

## 2022-07-26 DIAGNOSIS — M25.511 CHRONIC RIGHT SHOULDER PAIN: ICD-10-CM

## 2022-07-26 PROCEDURE — 97140 MANUAL THERAPY 1/> REGIONS: CPT | Performed by: PHYSICAL THERAPIST

## 2022-07-26 PROCEDURE — 97112 NEUROMUSCULAR REEDUCATION: CPT | Performed by: PHYSICAL THERAPIST

## 2022-07-26 PROCEDURE — 97110 THERAPEUTIC EXERCISES: CPT | Performed by: PHYSICAL THERAPIST

## 2022-07-26 NOTE — PROGRESS NOTES
PT DAILY TREATMENT NOTE    Patient Name: Ez Kaur  Date:2022  : 1955  [x]  Patient  Verified  Payor: Rufina Somers / Plan: [place] MEDICARE PART A & B / Product Type: Medicare /    Total Treatment Time (min): 40  Total Timed Codes (min): 40  1:1 Treatment Time ( [address] only): 40  Referring Physician: Jo Preciado MD     Treatment Area: Right shoulder    SUBJECTIVE    Patient reports he has been compliant with his home exercise program.  He continues use his sling. He is approximately 8 weeks postop. He does have a physician follow-up on Friday    OBJECTIVE  Modality:   []  E-Stim: type _ x _ min     []att   []unatt   []w/US   []w/ice   []w/heat  []  Ultrasound: []cont   []pulse    _ W/cm2 x _  min   []1MHz   []3MHz  [x]  Ice pack 10 min       []  Hot pack _  min       []  Other:     Therapeutic Exercise: (minutes: 15)  [x] see exercise log      Added/Changed Exercises:  []  Added:   []  Changed:       Manual Therapy: (minutes: 15)    Grade II & III  arthrokinematic, and capsule mobilization with concurrent passive osteokinematic range of motion techniques were applied to the involved upper extremity. Medial stabilization of the scapular was employed as well as concomitant grade 2 and 3 glenohumeral distractions. Joint mobility techniques employed to restore correct mechanical mobility at the involved joints to allow restoration of range of motion and strength. Joints included the glenohumeral scapulothoracic and sternoclavicular regions. Neuromuscular reeducation 10 minutes  neuromuscular down regulation to the myofascial soft tissue structures of the Glenohumeral and Scapulothoracic region applied with manual as well as instrument assisted techniques    Hold relax techniques were employed to assist with change in the neuromuscular firing pattern of the involved upperextremity for restoration of glenohumeral range of motion dissociated from the scapulothoracic joint.   PNF to the scapulothoracic joint for improved position of retraction and depression    Patient Education: [x] Review HEP    [] Progressed/Changed HEP:      Other Objective/Functional Measures:     Passive range of motion right shoulder   forward elevation 150 degrees   lateral abduction 135   external rotation at 90 degrees of abduction at 75   external rotation scapular plane 50 degrees  Internal rotation scapular plane 60    ASSESSMENT  []  See Plan of Care  []  See progress note/recertification  [x]  Patient will continue to benefit from skilled therapy to address remaining functional deficits:     He returns today after a 2-week hiatus on vacation has some stiffness noted in the shoulder but overall is doing quite well. He is now approximately 8 weeks postop he follows with a physician on Friday we will discuss coming out of the sling and adding isometric based strengthening exercises as well as beginning some active assisted range of motion to active activities        ICD-10-CM ICD-9-CM    1. Traumatic complete tear of right rotator cuff, subsequent encounter  S46.011D V58.89      840.4       2. Chronic right shoulder pain  M25.511 719.41     G89.29 338.29       3. Stiffness of right shoulder joint  M25.611 719.51           PLAN  [x] Progress as tolerated under current plan towards long-term goals  [] Discharge  [] Other:        PT Exercise Log         Activity/Exercise  Exercises were completed with supervision and direction Date        Pendulums x     Scap squeezes x      Wand ER/flexion x     Table roll x     Passive range of motion shoulder  X     Pulleys x     PROM shoulder X                                     Return in about 3 days (around 7/29/2022) for CONTIUED SKILLED physical therapy.     Jed Bowens, PT 7/26/2022  8:34 AM

## 2022-07-28 PROBLEM — S46.011A TRAUMATIC COMPLETE TEAR OF RIGHT ROTATOR CUFF: Status: ACTIVE | Noted: 2022-07-28

## 2022-07-28 NOTE — PROGRESS NOTES
ASSESSMENT/PLAN:  Below is the assessment and plan developed based on review of pertinent history, physical exam, labs, studies, and medications. 1. S/P arthroscopy of right shoulder  -     REFERRAL TO PHYSICAL THERAPY      We will discontinue his sling. We will continue with formal physical therapy. We will start some isometrics as well as some internal rotation stretching. I will see him back in 1 month's time where we hope to start some strengthening at that time. SUBJECTIVE/OBJECTIVE:  Teo Medrano (: 1955) is a 74 y.o. male, patient,here for evaluation of the right shoulder. Patient is s/p right shoulder rotator cuff repair, SAD, DCE on 6/3/22. Patient states he is overall doing well following this procedure. He has been consistent about wearing his sling. He has been icing and taking Advil for his residual symptoms. He denies any fever, chills, wound dehiscence or drainage. Physical Exam    Examination of the right shoulder reveals that the incisions are healing well, without evidence of drainage, erythema or warmth. There is limited range of motion secondary to discomfort. Strength is 5/5 distally. There is no tenderness at the elbow and wrist. Sensation is intact to light touch distally and there is a brisk capillary refill. Allergies   Allergen Reactions    Oxycodone Rash     Patient had full body chills 20 minutes after taking 5 mg. Redness, stuffy nose, and felt warm. Pcn [Penicillins] Rash       Current Outpatient Medications   Medication Sig    hydroCHLOROthiazide (HYDRODIURIL) 25 mg tablet Take 1 Tablet by mouth daily. Take in the morning with potassium rich food or beverage    simvastatin (ZOCOR) 40 mg tablet Take 1 Tablet by mouth nightly. tamsulosin (FLOMAX) 0.4 mg capsule TAKE 1 CAP BY MOUTH DAILY. INDICATIONS: ENLARGED PROSTATE WITH URINATION PROBLEM    Cholecalciferol, Vitamin D3, (VITAMIN D3) 1,000 unit cap Take 2,000 Units by mouth every other day. No current facility-administered medications for this visit. Past Medical History:   Diagnosis Date    Allergic rhinitis 2/27/2010    Cancer ([facility] [address] 2004    SKIN, LIP (MOHS PROCEDURE)    Hypertension     BORDERLINE; NO MEDS    Obesity (BMI 30.0-34.9) 2/5/2018    Other and unspecified hyperlipidemia 2/27/2010       Past Surgical History:   Procedure Laterality Date    ENDOSCOPY, COLON, DIAGNOSTIC      X2    HX CATARACT REMOVAL Bilateral 87-88    CONGENITAL CATARACTS; W/ IOL    HX LUMBAR LAMINECTOMY      HX ORTHOPAEDIC  08/2019    l-s spine     HX TONSIL AND ADENOIDECTOMY  1960    HX VASECTOMY  1989    MI ANESTH,SURGERY OF SHOULDER      MI SINUS SURGERY [address] UNLISTED  2001       Family History   Problem Relation Age of Onset    Cancer Mother         ovarian    Cancer Father         prostate?     Elevated Lipids Father     Heart Surgery Father         CABG at 58, lived to 80    No Known Problems Sister     Other Brother         factor 5        Social History     Socioeconomic History    Marital status:      Spouse name: Not on file    Number of children: Not on file    Years of education: Not on file    Highest education level: Not on file   Occupational History    Not on file   Tobacco Use    Smoking status: Never Smoker    Smokeless tobacco: Never Used   Vaping Use    Vaping Use: Never used   Substance and Sexual Activity    Alcohol use: Yes     Comment: 12oz beer once monthly     Drug use: No    Sexual activity: Yes     Partners: Female   Other Topics Concern     Service Not Asked    Blood Transfusions Not Asked    Caffeine Concern No     Comment: no coffee, occ decaff tea, occ soda    Occupational Exposure Not Asked    Hobby Hazards Not Asked    Sleep Concern Not Asked    Stress Concern Not Asked    Weight Concern Not Asked    Special Diet No    Back Care Not Asked    Exercise No     Comment: not much lately, usually plays tennis 2-3 x a week    Bike Helmet Not Asked    Seat Belt Not Asked Self-Exams Not Asked   Social History Narrative    Not on file     Social Determinants of Health     Financial Resource Strain:     Difficulty of Paying Living Expenses: Not on file   Food Insecurity:     Worried About [address] in the Last Year: Not on file    [place] of Food in the Last Year: Not on file   Transportation Needs:     Lack of Transportation (Medical): Not on file    Lack of Transportation (Non-Medical): Not on file   Physical Activity:     Days of Exercise per Week: Not on file    Minutes of Exercise per Session: Not on file   Stress:     Feeling of Stress : Not on file   Social Connections:     Frequency of Communication with Friends and Family: Not on file    Frequency of Social Gatherings with Friends and Family: Not on file    Attends Restoration Services: Not on file    Active Member of Clubs or Organizations: Not on file    Attends Club or Organization Meetings: Not on file    Marital Status: Not on file   Intimate Partner Violence:     Fear of Current or Ex-Partner: Not on file    Emotionally Abused: Not on file    Physically Abused: Not on file    Sexually Abused: Not on file   Housing Stability:     Unable to Pay for Housing in the Last Year: Not on file    Number of [place] in the Last Year: Not on file    Unstable Housing in the Last Year: Not on file       Review of Systems    No flowsheet data found. Vitals:  Ht 5' 10\" (1.778 m)   Wt 214 lb (97.1 kg)   BMI 30.71 kg/m²    Body mass index is 30.71 kg/m². An electronic signature was used to authenticate this note.   -- Beulah Closs, PA-C

## 2022-07-29 ENCOUNTER — OFFICE VISIT (OUTPATIENT)
Dept: ORTHOPEDIC SURGERY | Age: 67
End: 2022-07-29

## 2022-07-29 ENCOUNTER — OFFICE VISIT (OUTPATIENT)
Dept: ORTHOPEDIC SURGERY | Age: 67
End: 2022-07-29
Payer: MEDICARE

## 2022-07-29 VITALS — HEIGHT: 70 IN | BODY MASS INDEX: 30.64 KG/M2 | WEIGHT: 214 LBS

## 2022-07-29 DIAGNOSIS — S46.011D TRAUMATIC COMPLETE TEAR OF RIGHT ROTATOR CUFF, SUBSEQUENT ENCOUNTER: Primary | ICD-10-CM

## 2022-07-29 DIAGNOSIS — Z98.890 S/P ARTHROSCOPY OF RIGHT SHOULDER: ICD-10-CM

## 2022-07-29 DIAGNOSIS — M25.511 CHRONIC RIGHT SHOULDER PAIN: ICD-10-CM

## 2022-07-29 DIAGNOSIS — M25.611 STIFFNESS OF RIGHT SHOULDER JOINT: ICD-10-CM

## 2022-07-29 DIAGNOSIS — G89.29 CHRONIC RIGHT SHOULDER PAIN: ICD-10-CM

## 2022-07-29 PROCEDURE — 97112 NEUROMUSCULAR REEDUCATION: CPT | Performed by: PHYSICAL THERAPIST

## 2022-07-29 PROCEDURE — 99024 POSTOP FOLLOW-UP VISIT: CPT | Performed by: ORTHOPAEDIC SURGERY

## 2022-07-29 PROCEDURE — 97110 THERAPEUTIC EXERCISES: CPT | Performed by: PHYSICAL THERAPIST

## 2022-07-29 PROCEDURE — 97140 MANUAL THERAPY 1/> REGIONS: CPT | Performed by: PHYSICAL THERAPIST

## 2022-07-29 NOTE — PROGRESS NOTES
PT DAILY TREATMENT NOTE  and [facility]    Patient Name: Laura Juarez  Date:2022  : 1955  [x]  Patient  Verified  Payor: Alicia Members / Plan: [place] MED[place] / Product Type: Medicare /    Total Treatment Time (min): 40  Total Timed Codes (min): 40  1:1 Treatment Time ( [address] only): 40  Referring Physician: Kwaku Mauricio MD     Treatment Area: Right shoulder    SUBJECTIVE    Patient reports that he saw Dr. Shefali Woodall this morning. He reports that he can discontinue his sling start some isometrics and some active range of motion activities. OBJECTIVE  Modality:   []  E-Stim: type _ x _ min     []att   []unatt   []w/US   []w/ice   []w/heat  []  Ultrasound: []cont   []pulse    _ W/cm2 x _  min   []1MHz   []3MHz  [x]  Ice pack 10 min       []  Hot pack _  min       []  Other:     Therapeutic Exercise: (minutes: 10)  [x] see exercise log      Added/Changed Exercises:  []  Added:   []  Changed:       Manual Therapy: (minutes: 15)    Grade II & III  arthrokinematic, and capsule mobilization with concurrent passive osteokinematic range of motion techniques were applied to the involved upper extremity. Medial stabilization of the scapular was employed as well as concomitant grade 2 and 3 glenohumeral distractions. Joint mobility techniques employed to restore correct mechanical mobility at the involved joints to allow restoration of range of motion and strength. Joints included the glenohumeral scapulothoracic and sternoclavicular regions. Neuromuscular reeducation 15 minutes  neuromuscular down regulation to the myofascial soft tissue structures of the Glenohumeral and Scapulothoracic region applied with manual as well as instrument assisted techniques    Hold relax techniques were employed to assist with change in the neuromuscular firing pattern of the involved upperextremity for restoration of glenohumeral range of motion dissociated from the scapulothoracic joint.   PNF to the scapulothoracic joint for improved position of retraction and depression    Patient Education: [x] Review HEP    [] Progressed/Changed HEP:      Other Objective/Functional Measures:     Passive range of motion right shoulder   forward elevation 150 degrees   lateral abduction 135   external rotation at 90 degrees of abduction at 75   external rotation scapular plane 50 degrees  Internal rotation scapular plane 60    ASSESSMENT  []  See Plan of Care  []  See progress note/recertification  [x]  Patient will continue to benefit from skilled therapy to address remaining functional deficits:     He continues to do quite well were able to upgrade his exercise program.  We have done that today adding isometric strengthening to the cuff and deltoids. He will be away for a week. We have cautioned him not to lift the arm away from his side or do too much. ICD-10-CM ICD-9-CM    1. Traumatic complete tear of right rotator cuff, subsequent encounter  S46.011D V58.89      840.4       2. Chronic right shoulder pain  M25.511 719.41     G89.29 338.29       3. Stiffness of right shoulder joint  M25.611 719.51       4. S/P arthroscopy of right shoulder  Z98.890 V45.89             PLAN  [x] Progress as tolerated under current plan towards long-term goals  [] Discharge  [] Other:      Plan of care for physical therapy    PT biweekly 6 weeks  Long-term goal functional use of involved upper extremity above shoulder height  Strength 4/5 prime movers 3+/5 rotator cuff      PT Exercise Log         Activity/Exercise  Exercises were completed with supervision and direction Date        Pendulums x     Scap squeezes x      Wand ER/flexion      Table roll      Passive range of motion shoulder  X     Pulleys x     PROM shoulder X     Isometric er /ir/a-delt ,P-delt x     Internal rotation belt stretch x                         Return in about 1 week (around 8/5/2022) for CONTIUED SKILLED physical therapy.     Shraddha Plascencia, PT 7/29/2022  8:34 AM

## 2022-08-09 ENCOUNTER — OFFICE VISIT (OUTPATIENT)
Dept: ORTHOPEDIC SURGERY | Age: 67
End: 2022-08-09
Payer: MEDICARE

## 2022-08-09 DIAGNOSIS — S46.011D TRAUMATIC COMPLETE TEAR OF RIGHT ROTATOR CUFF, SUBSEQUENT ENCOUNTER: Primary | ICD-10-CM

## 2022-08-09 DIAGNOSIS — M25.511 CHRONIC RIGHT SHOULDER PAIN: ICD-10-CM

## 2022-08-09 DIAGNOSIS — G89.29 CHRONIC RIGHT SHOULDER PAIN: ICD-10-CM

## 2022-08-09 DIAGNOSIS — Z98.890 S/P ARTHROSCOPY OF RIGHT SHOULDER: ICD-10-CM

## 2022-08-09 DIAGNOSIS — M25.611 STIFFNESS OF RIGHT SHOULDER JOINT: ICD-10-CM

## 2022-08-09 PROCEDURE — 97140 MANUAL THERAPY 1/> REGIONS: CPT | Performed by: PHYSICAL THERAPIST

## 2022-08-09 PROCEDURE — 97112 NEUROMUSCULAR REEDUCATION: CPT | Performed by: PHYSICAL THERAPIST

## 2022-08-09 PROCEDURE — 97110 THERAPEUTIC EXERCISES: CPT | Performed by: PHYSICAL THERAPIST

## 2022-08-09 NOTE — PROGRESS NOTES
PT DAILY TREATMENT NOTE  and PLAN OF CARE UPDATE    Patient Name: Crescencio Rollins  Date:2022  : 1955  [x]  Patient  Verified  Payor: Taylor Gan / Plan: [place] MEDICARE PART A & B / Product Type: Medicare /    Total Treatment Time (min): 55  Total Timed Codes (min): 30  1:1 Treatment Time ( [address] only): 30  Referring Physician: Jasen Garcia MD     Treatment Area: Right shoulder    SUBJECTIVE    Patient reports he has done well while on vacation. He reports no setbacks or exacerbation of pain. He reports compliance with his HEP    OBJECTIVE  Modality:   []  E-Stim: type _ x _ min     []att   []unatt   []w/US   []w/ice   []w/heat  []  Ultrasound: []cont   []pulse    _ W/cm2 x _  min   []1MHz   []3MHz  [x]  Ice pack 10 min       []  Hot pack _  min       []  Other:     Therapeutic Exercise: (minutes: 32 min) 12 min 1-1  [x] see exercise log      Added/Changed Exercises:  []  Added:   []  Changed:       Manual Therapy: (minutes: 8)    Grade II & III  arthrokinematic, and capsule mobilization with concurrent passive osteokinematic range of motion techniques were applied to the involved upper extremity. Medial stabilization of the scapular was employed as well as concomitant grade 2 and 3 glenohumeral distractions. Joint mobility techniques employed to restore correct mechanical mobility at the involved joints to allow restoration of range of motion and strength. Joints included the glenohumeral scapulothoracic and sternoclavicular regions. Neuromuscular reeducation 15 minutes  neuromuscular down regulation to the myofascial soft tissue structures of the Glenohumeral and Scapulothoracic region applied with manual as well as instrument assisted techniques    Hold relax techniques were employed to assist with change in the neuromuscular firing pattern of the involved upperextremity for restoration of glenohumeral range of motion dissociated from the scapulothoracic joint.   PNF to the scapulothoracic joint for improved position of retraction and depression    Patient Education: [x] Review HEP    [] Progressed/Changed HEP:      Other Objective/Functional Measures:     Passive range of motion right shoulder   forward elevation 155 degrees   lateral abduction 140  external rotation at 90 degrees of abduction at 75   external rotation scapular plane 50 degrees  Internal rotation scapular plane 60    ASSESSMENT  []  See Plan of Care  []  See progress note/recertification  [x]  Patient will continue to benefit from skilled therapy to address remaining functional deficits: We continued advance the patient's home exercise program or work with more active assisted range of motion standing position as well as scapular retraction and depression. We worked towards active scapular row continued isometric rotator cuff walk. ICD-10-CM ICD-9-CM    1. Traumatic complete tear of right rotator cuff, subsequent encounter  S46.011D V58.89      840.4       2. Chronic right shoulder pain  M25.511 719.41     G89.29 338.29       3. Stiffness of right shoulder joint  M25.611 719.51       4. S/P arthroscopy of right shoulder  Z98.890 V45.89             PLAN  [x] Progress as tolerated under current plan towards long-term goals  [] Discharge  [] Other:      Plan of care for physical therapy    PT biweekly 6 weeks  Long-term goal functional use of involved upper extremity above shoulder height  Strength 4/5 prime movers 3+/5 rotator cuff      PT Exercise Log         Activity/Exercise  Exercises were completed with supervision and direction Date        Pendulums x     Ball on total gym flexion and scapula retraction x      Wand ER/flexion 2# x     Table roll x     Passive range of motion shoulder  X     Pulleys x     PROM shoulder X     Isometric er /ir/a-delt ,P-delt x     Internal rotation belt stretch x                         Return in about 2 days (around 8/11/2022) for CONTIUED SKILLED physical therapy.     Jeannie Perez, PT 8/9/2022  8:34 AM

## 2022-08-12 ENCOUNTER — OFFICE VISIT (OUTPATIENT)
Dept: ORTHOPEDIC SURGERY | Age: 67
End: 2022-08-12
Payer: MEDICARE

## 2022-08-12 DIAGNOSIS — M25.511 CHRONIC RIGHT SHOULDER PAIN: ICD-10-CM

## 2022-08-12 DIAGNOSIS — Z98.890 S/P ARTHROSCOPY OF RIGHT SHOULDER: ICD-10-CM

## 2022-08-12 DIAGNOSIS — S46.011D TRAUMATIC COMPLETE TEAR OF RIGHT ROTATOR CUFF, SUBSEQUENT ENCOUNTER: Primary | ICD-10-CM

## 2022-08-12 DIAGNOSIS — G89.29 CHRONIC RIGHT SHOULDER PAIN: ICD-10-CM

## 2022-08-12 DIAGNOSIS — M25.611 STIFFNESS OF RIGHT SHOULDER JOINT: ICD-10-CM

## 2022-08-12 PROCEDURE — 97112 NEUROMUSCULAR REEDUCATION: CPT | Performed by: PHYSICAL THERAPIST

## 2022-08-12 PROCEDURE — 97110 THERAPEUTIC EXERCISES: CPT | Performed by: PHYSICAL THERAPIST

## 2022-08-12 PROCEDURE — 97140 MANUAL THERAPY 1/> REGIONS: CPT | Performed by: PHYSICAL THERAPIST

## 2022-08-12 NOTE — PROGRESS NOTES
PT DAILY TREATMENT NOTE  and [facility]    Patient Name: Althea Alvarado  Date:2022  : 1955  [x]  Patient  Verified  Payor: Jes Pattie / Plan: [place] MEDICARE PART A & B / Product Type: Medicare /    Total Treatment Time (min): 59  Total Timed Codes (min): 39  1:1 Treatment Time ( [address] only): 39  Referring Physician: Reyna Wheeler MD     Treatment Area: Right shoulder    SUBJECTIVE    Doing well no new problems expressed    OBJECTIVE  Modality:   []  E-Stim: type _ x _ min     []att   []unatt   []w/US   []w/ice   []w/heat  []  Ultrasound: []cont   []pulse    _ W/cm2 x _  min   []1MHz   []3MHz  [x]  Ice pack 10 min       []  Hot pack _  min       []  Other:     Therapeutic Exercise: (minutes: 32 min) 12 min 1-1  [x] see exercise log      Added/Changed Exercises:  []  Added:   []  Changed:       Manual Therapy: (minutes: 12)    Grade II & III  arthrokinematic, and capsule mobilization with concurrent passive osteokinematic range of motion techniques were applied to the involved upper extremity. Medial stabilization of the scapular was employed as well as concomitant grade 2 and 3 glenohumeral distractions. Joint mobility techniques employed to restore correct mechanical mobility at the involved joints to allow restoration of range of motion and strength. Joints included the glenohumeral scapulothoracic and sternoclavicular regions. Neuromuscular reeducation 15 minutes  neuromuscular down regulation to the myofascial soft tissue structures of the Glenohumeral and Scapulothoracic region applied with manual as well as instrument assisted techniques    Hold relax techniques were employed to assist with change in the neuromuscular firing pattern of the involved upperextremity for restoration of glenohumeral range of motion dissociated from the scapulothoracic joint.   PNF to the scapulothoracic joint for improved position of retraction and depression    Patient Education: [x] Review HEP    [] Progressed/Changed HEP:      Other Objective/Functional Measures:     Passive range of motion right shoulder   forward elevation 155 degrees   lateral abduction 140  external rotation at 90 degrees of abduction at 80  external rotation scapular plane 55 degrees  Internal rotation scapular plane 60    ASSESSMENT  []  See Plan of Care  []  See progress note/recertification  [x]  Patient will continue to benefit from skilled therapy to address remaining functional deficits:     He continues to quite well with mobility some gentle isometric strengthening without setbacks continue to caution overuse        ICD-10-CM ICD-9-CM    1. Traumatic complete tear of right rotator cuff, subsequent encounter  S46.011D V58.89      840.4       2. Chronic right shoulder pain  M25.511 719.41     G89.29 338.29       3. Stiffness of right shoulder joint  M25.611 719.51       4. S/P arthroscopy of right shoulder  Z98.890 V45.89               PLAN  [x] Progress as tolerated under current plan towards long-term goals  [] Discharge  [] Other:      Plan of care for physical therapy    PT biweekly 6 weeks  Long-term goal functional use of involved upper extremity above shoulder height  Strength 4/5 prime movers 3+/5 rotator cuff      PT Exercise Log         Activity/Exercise  Exercises were completed with supervision and direction Date        Pendulums x     Ball on total gym flexion and scapula retraction x      Wand ER/flexion 2# x     Table roll x     Passive range of motion shoulder  X     Pulleys x     PROM shoulder X     Isometric er /ir/a-delt ,P-delt x     Internal rotation belt stretch x                         Return in about 5 days (around 8/17/2022) for CONTIUED SKILLED physical therapy.     Saman Gill, PT 8/12/2022  8:34 AM

## 2022-08-16 ENCOUNTER — OFFICE VISIT (OUTPATIENT)
Dept: ORTHOPEDIC SURGERY | Age: 67
End: 2022-08-16
Payer: MEDICARE

## 2022-08-16 DIAGNOSIS — M25.511 CHRONIC RIGHT SHOULDER PAIN: ICD-10-CM

## 2022-08-16 DIAGNOSIS — S46.011D TRAUMATIC COMPLETE TEAR OF RIGHT ROTATOR CUFF, SUBSEQUENT ENCOUNTER: Primary | ICD-10-CM

## 2022-08-16 DIAGNOSIS — Z98.890 S/P ARTHROSCOPY OF RIGHT SHOULDER: ICD-10-CM

## 2022-08-16 DIAGNOSIS — M25.611 STIFFNESS OF RIGHT SHOULDER JOINT: ICD-10-CM

## 2022-08-16 DIAGNOSIS — G89.29 CHRONIC RIGHT SHOULDER PAIN: ICD-10-CM

## 2022-08-16 PROCEDURE — 97110 THERAPEUTIC EXERCISES: CPT | Performed by: PHYSICAL THERAPIST

## 2022-08-16 PROCEDURE — 97140 MANUAL THERAPY 1/> REGIONS: CPT | Performed by: PHYSICAL THERAPIST

## 2022-08-16 PROCEDURE — 97112 NEUROMUSCULAR REEDUCATION: CPT | Performed by: PHYSICAL THERAPIST

## 2022-08-16 NOTE — PROGRESS NOTES
PT DAILY TREATMENT NOTE  and [facility]    Patient Name: Isabelle Wolfe  Date:2022  : 1955  [x]  Patient  Verified  Payor: [facility] [employer] / Plan: [place] MEDICARE PART A & B / Product Type: Medicare /    Total Treatment Time (min): 60  Total Timed Codes (min): 40  1:1 Treatment Time ( [address] only): 40  Referring Physician: Lajuana Apgar, MD     Treatment Area: Right shoulder    SUBJECTIVE    Continue to improve each day. OBJECTIVE  Modality:   []  E-Stim: type _ x _ min     []att   []unatt   []w/US   []w/ice   []w/heat  []  Ultrasound: []cont   []pulse    _ W/cm2 x _  min   []1MHz   []3MHz  [x]  Ice pack 10 min       []  Hot pack _  min       []  Other:     Therapeutic Exercise: (minutes: 32 min) 12 min 1-1  [x] see exercise log      Added/Changed Exercises:  []  Added:   []  Changed:       Manual Therapy: (minutes: 12)    Grade II & III  arthrokinematic, and capsule mobilization with concurrent passive osteokinematic range of motion techniques were applied to the involved upper extremity. Medial stabilization of the scapular was employed as well as concomitant grade 2 and 3 glenohumeral distractions. Joint mobility techniques employed to restore correct mechanical mobility at the involved joints to allow restoration of range of motion and strength. Joints included the glenohumeral scapulothoracic and sternoclavicular regions. Neuromuscular reeducation 16 minutes  neuromuscular down regulation to the myofascial soft tissue structures of the Glenohumeral and Scapulothoracic region applied with manual as well as instrument assisted techniques    Hold relax techniques were employed to assist with change in the neuromuscular firing pattern of the involved upperextremity for restoration of glenohumeral range of motion dissociated from the scapulothoracic joint.   PNF to the scapulothoracic joint for improved position of retraction and depression    Patient Education: [x] Review HEP    [] Progressed/Changed HEP:      Other Objective/Functional Measures:     Passive range of motion right shoulder   forward elevation 155 degrees   lateral abduction 140  external rotation at 90 degrees of abduction at 80  external rotation scapular plane 55 degrees  Internal rotation scapular plane 60    ASSESSMENT  []  See Plan of Care  []  See progress note/recertification  [x]  Patient will continue to benefit from skilled therapy to address remaining functional deficits:     All aspects of care moving forward with out set backs  ahead of schedule. ICD-10-CM ICD-9-CM    1. Traumatic complete tear of right rotator cuff, subsequent encounter  S46.011D V58.89      840.4       2. Chronic right shoulder pain  M25.511 719.41     G89.29 338.29       3. Stiffness of right shoulder joint  M25.611 719.51       4. S/P arthroscopy of right shoulder  Z98.890 V45.89               PLAN  [x] Progress as tolerated under current plan towards long-term goals  [] Discharge  [] Other:      Plan of care for physical therapy    PT biweekly 6 weeks  Long-term goal functional use of involved upper extremity above shoulder height  Strength 4/5 prime movers 3+/5 rotator cuff      PT Exercise Log         Activity/Exercise  Exercises were completed with supervision and direction Date        Pendulums x     Ball on total gym flexion and scapula retraction x      Wand ER/flexion 2# x     Table roll x     Passive range of motion shoulder  X     Pulleys x     PROM shoulder X     Iactiveer /ir/a-delt ,P-delt x     Internal rotation belt stretch x     Sl er   Sl flex/scaption   1#  1#                   Return in about 2 days (around 8/18/2022) for CONTIUED SKILLED physical therapy.     Stephanie Palomo, PT 8/18/2022  8:34 AM

## 2022-08-19 ENCOUNTER — OFFICE VISIT (OUTPATIENT)
Dept: ORTHOPEDIC SURGERY | Age: 67
End: 2022-08-19
Payer: MEDICARE

## 2022-08-19 DIAGNOSIS — Z98.890 S/P ARTHROSCOPY OF RIGHT SHOULDER: ICD-10-CM

## 2022-08-19 DIAGNOSIS — M25.611 STIFFNESS OF RIGHT SHOULDER JOINT: ICD-10-CM

## 2022-08-19 DIAGNOSIS — M25.511 CHRONIC RIGHT SHOULDER PAIN: ICD-10-CM

## 2022-08-19 DIAGNOSIS — G89.29 CHRONIC RIGHT SHOULDER PAIN: ICD-10-CM

## 2022-08-19 DIAGNOSIS — S46.011D TRAUMATIC COMPLETE TEAR OF RIGHT ROTATOR CUFF, SUBSEQUENT ENCOUNTER: Primary | ICD-10-CM

## 2022-08-19 PROCEDURE — 97112 NEUROMUSCULAR REEDUCATION: CPT | Performed by: PHYSICAL THERAPIST

## 2022-08-19 PROCEDURE — 97110 THERAPEUTIC EXERCISES: CPT | Performed by: PHYSICAL THERAPIST

## 2022-08-19 PROCEDURE — 97140 MANUAL THERAPY 1/> REGIONS: CPT | Performed by: PHYSICAL THERAPIST

## 2022-08-19 NOTE — PROGRESS NOTES
PT DAILY TREATMENT NOTE  and PLAN OF CARE UPDATE    Patient Name: Ivonne Pike  Date:2022  : 1955  [x]  Patient  Verified  Payor: Gayle Hazard / Plan: [place] / Product Type: Medicare /    Total Treatment Time (min): 62  Total Timed Codes (min): 42  1:1 Treatment Time ( [address] only): 42  Referring Physician: Tamika Miller MD     Treatment Area: Right shoulder    SUBJECTIVE    Continue to improve each day. OBJECTIVE  Modality:   []  E-Stim: type _ x _ min     []att   []unatt   []w/US   []w/ice   []w/heat  []  Ultrasound: []cont   []pulse    _ W/cm2 x _  min   []1MHz   []3MHz  [x]  Ice pack 10 min       []  Hot pack _  min       []  Other:     Therapeutic Exercise: (minutes: 32 min) 12 min 1-1  [x] see exercise log      Added/Changed Exercises:  []  Added:   []  Changed:       Manual Therapy: (minutes: 12)    Grade II & III  arthrokinematic, and capsule mobilization with concurrent passive osteokinematic range of motion techniques were applied to the involved upper extremity. Medial stabilization of the scapular was employed as well as concomitant grade 2 and 3 glenohumeral distractions. Joint mobility techniques employed to restore correct mechanical mobility at the involved joints to allow restoration of range of motion and strength. Joints included the glenohumeral scapulothoracic and sternoclavicular regions. Neuromuscular reeducation 18 minutes  neuromuscular down regulation to the myofascial soft tissue structures of the Glenohumeral and Scapulothoracic region applied with manual as well as instrument assisted techniques    Hold relax techniques were employed to assist with change in the neuromuscular firing pattern of the involved upperextremity for restoration of glenohumeral range of motion dissociated from the scapulothoracic joint.   PNF to the scapulothoracic joint for improved position of retraction and depression    Patient Education: [x] Review HEP    [] Progressed/Changed HEP:      Other Objective/Functional Measures:     Passive range of motion right shoulder   forward elevation 155 degrees   lateral abduction 140  external rotation at 90 degrees of abduction at 80  external rotation scapular plane 55 degrees  Internal rotation scapular plane 60    ASSESSMENT  []  See Plan of Care  []  See progress note/recertification  [x]  Patient will continue to benefit from skilled therapy to address remaining functional deficits:     All aspects of care moving forward with out set backs  ahead of schedule. ICD-10-CM ICD-9-CM    1. Traumatic complete tear of right rotator cuff, subsequent encounter  S46.011D V58.89      840.4       2. Chronic right shoulder pain  M25.511 719.41     G89.29 338.29       3. Stiffness of right shoulder joint  M25.611 719.51       4. S/P arthroscopy of right shoulder  Z98.890 V45.89               PLAN  [x] Progress as tolerated under current plan towards long-term goals  [] Discharge  [] Other:      Plan of care for physical therapy    PT biweekly 6 weeks  Long-term goal functional use of involved upper extremity above shoulder height  Strength 4/5 prime movers 3+/5 rotator cuff      PT Exercise Log         Activity/Exercise  Exercises were completed with supervision and direction Date        Pendulums x     Ball on total gym flexion and scapula retraction x      Wand ER/flexion 2# x     Table roll x     Passive range of motion shoulder  X     Pulleys x     PROM shoulder X     Iactiveer /ir/a-delt ,P-delt x     Internal rotation belt stretch x     Sl er   Sl flex/scaption   1#  1#     Bebe Deem active assisted shoulder elevation              Return in about 4 days (around 8/23/2022) for CONTIUED SKILLED physical therapy.     Moises Arroyo, PT 8/19/2022  8:34 AM

## 2022-08-23 ENCOUNTER — OFFICE VISIT (OUTPATIENT)
Dept: ORTHOPEDIC SURGERY | Age: 67
End: 2022-08-23
Payer: MEDICARE

## 2022-08-23 DIAGNOSIS — Z98.890 S/P ARTHROSCOPY OF RIGHT SHOULDER: ICD-10-CM

## 2022-08-23 DIAGNOSIS — M25.611 STIFFNESS OF RIGHT SHOULDER JOINT: ICD-10-CM

## 2022-08-23 DIAGNOSIS — G89.29 CHRONIC RIGHT SHOULDER PAIN: ICD-10-CM

## 2022-08-23 DIAGNOSIS — S46.011D TRAUMATIC COMPLETE TEAR OF RIGHT ROTATOR CUFF, SUBSEQUENT ENCOUNTER: Primary | ICD-10-CM

## 2022-08-23 DIAGNOSIS — M25.511 CHRONIC RIGHT SHOULDER PAIN: ICD-10-CM

## 2022-08-23 PROCEDURE — 97112 NEUROMUSCULAR REEDUCATION: CPT | Performed by: PHYSICAL THERAPIST

## 2022-08-23 PROCEDURE — 97140 MANUAL THERAPY 1/> REGIONS: CPT | Performed by: PHYSICAL THERAPIST

## 2022-08-23 PROCEDURE — 97110 THERAPEUTIC EXERCISES: CPT | Performed by: PHYSICAL THERAPIST

## 2022-08-24 NOTE — PROGRESS NOTES
PT DAILY TREATMENT NOTE  and [facility]    Patient Name: Julia Del Real  Date:2022  : 1955  [x]  Patient  Verified  Payor: Nati Beyer / Plan: [place] MEDICARE PART A & B / Product Type: Medicare /    Total Treatment Time (min): 62  Total Timed Codes (min): 45  1:1 Treatment Time ( [address] only): 45  Referring Physician: Brenden Reed MD     Treatment Area: Right shoulder    SUBJECTIVE    Continue to improve each day. OBJECTIVE  Modality:   []  E-Stim: type _ x _ min     []att   []unatt   []w/US   []w/ice   []w/heat  []  Ultrasound: []cont   []pulse    _ W/cm2 x _  min   []1MHz   []3MHz  [x]  Ice pack 10 min       []  Hot pack _  min       []  Other:     Therapeutic Exercise: (minutes: 32 min) 15 min 1-1  [x] see exercise log      Added/Changed Exercises:  []  Added:   []  Changed:       Manual Therapy: (minutes: 12)    Grade II & III  arthrokinematic, and capsule mobilization with concurrent passive osteokinematic range of motion techniques were applied to the involved upper extremity. Medial stabilization of the scapular was employed as well as concomitant grade 2 and 3 glenohumeral distractions. Joint mobility techniques employed to restore correct mechanical mobility at the involved joints to allow restoration of range of motion and strength. Joints included the glenohumeral scapulothoracic and sternoclavicular regions. Neuromuscular reeducation 18 minutes  neuromuscular down regulation to the myofascial soft tissue structures of the Glenohumeral and Scapulothoracic region applied with manual as well as instrument assisted techniques    Hold relax techniques were employed to assist with change in the neuromuscular firing pattern of the involved upperextremity for restoration of glenohumeral range of motion dissociated from the scapulothoracic joint.   PNF to the scapulothoracic joint for improved position of retraction and depression    Patient Education: [x] Review HEP    [] Progressed/Changed HEP:      Other Objective/Functional Measures:     Passive range of motion right shoulder   forward elevation 155 degrees   lateral abduction 140  external rotation at 90 degrees of abduction at 80  external rotation scapular plane 55 degrees  Internal rotation scapular plane 60    ASSESSMENT  []  See Plan of Care  []  See progress note/recertification  [x]  Patient will continue to benefit from skilled therapy to address remaining functional deficits:     All aspects of care moving forward with out set backs  ahead of schedule. ICD-10-CM ICD-9-CM    1. Traumatic complete tear of right rotator cuff, subsequent encounter  S46.011D V58.89      840.4       2. Chronic right shoulder pain  M25.511 719.41     G89.29 338.29       3. Stiffness of right shoulder joint  M25.611 719.51       4. S/P arthroscopy of right shoulder  Z98.890 V45.89               PLAN  [x] Progress as tolerated under current plan towards long-term goals  [] Discharge  [] Other:      Plan of care for physical therapy    PT biweekly 6 weeks  Long-term goal functional use of involved upper extremity above shoulder height  Strength 4/5 prime movers 3+/5 rotator cuff      PT Exercise Log         Activity/Exercise  Exercises were completed with supervision and direction Date        Pendulums x     Ball on total gym flexion and scapula retraction x      Wand ER/flexion 3# x     Table roll x     Passive range of motion shoulder  X     Pulleys x     PROM shoulder X     Iactiveer /ir/a-delt ,P-delt x     Internal rotation belt stretch x     Sl er   Sl flex/scaption   1#  1#     Phan Kirby active assisted shoulder elevation x     Serratus push 3 pound stick       Return in about 3 days (around 8/26/2022) for CONTIUED SKILLED physical therapy.     Bob Quintanilla, PT 8/24/2022  8:34 AM

## 2022-08-25 NOTE — PROGRESS NOTES
ASSESSMENT/PLAN:  Below is the assessment and plan developed based on review of pertinent history, physical exam, labs, studies, and medications. 1. S/P arthroscopy of right shoulder  -     REFERRAL TO PHYSICAL THERAPY    We will discontinue his sling. We will continue therapy twice a week. Our goal at this point will be to regain his full range of motion so we can start strengthening. I will see him back in 1 month's time. SUBJECTIVE/OBJECTIVE:  Jonathan Limon (: 1955) is a 74 y.o. male, patient,here for evaluation of the right shoulder. Patient is s/p right shoulder rotator cuff repair, SAD, DCE on 6/3/22. Patient states he is overall doing well following this procedure. He has been consistent about wearing his sling. He has been icing and taking Advil for his residual symptoms. He denies any fever, chills, wound dehiscence or drainage. Physical Exam    Examination of the right shoulder reveals that the incisions are healing well, without evidence of drainage, erythema or warmth. There is limited range of motion secondary to discomfort. Strength is 5/5 distally. There is no tenderness at the elbow and wrist. Sensation is intact to light touch distally and there is a brisk capillary refill. Allergies   Allergen Reactions    Oxycodone Rash     Patient had full body chills 20 minutes after taking 5 mg. Redness, stuffy nose, and felt warm. Pcn [Penicillins] Rash       Current Outpatient Medications   Medication Sig    hydroCHLOROthiazide (HYDRODIURIL) 25 mg tablet Take 1 Tablet by mouth daily. Take in the morning with potassium rich food or beverage    simvastatin (ZOCOR) 40 mg tablet Take 1 Tablet by mouth nightly. tamsulosin (FLOMAX) 0.4 mg capsule TAKE 1 CAP BY MOUTH DAILY. INDICATIONS: ENLARGED PROSTATE WITH URINATION PROBLEM    Cholecalciferol, Vitamin D3, (VITAMIN D3) 1,000 unit cap Take 2,000 Units by mouth every other day.      No current facility-administered medications for this visit. Past Medical History:   Diagnosis Date    Allergic rhinitis 2/27/2010    Cancer ([address] 2004    SKIN, LIP (MOHS PROCEDURE)    Hypertension     BORDERLINE; NO MEDS    Obesity (BMI 30.0-34.9) 2/5/2018    Other and unspecified hyperlipidemia 2/27/2010       Past Surgical History:   Procedure Laterality Date    ENDOSCOPY, COLON, DIAGNOSTIC      X2    HX CATARACT REMOVAL Bilateral 87-88    CONGENITAL CATARACTS; W/ IOL    HX LUMBAR LAMINECTOMY      HX ORTHOPAEDIC  08/2019    l-s spine     HX TONSIL AND ADENOIDECTOMY  1960    HX VASECTOMY  1989    [place] ANESTH,SURGERY OF SHOULDER      [place] SINUS SURGERY [address] UNLISTED  2001       Family History   Problem Relation Age of Onset    Cancer Mother         ovarian    Cancer Father         prostate?     Elevated Lipids Father     Heart Surgery Father         CABG at 58, lived to 80    No Known Problems Sister     Other Brother         factor 5        Social History     Socioeconomic History    Marital status:      Spouse name: Not on file    Number of children: Not on file    Years of education: Not on file    Highest education level: Not on file   Occupational History    Not on file   Tobacco Use    Smoking status: Never Smoker    Smokeless tobacco: Never Used   Vaping Use    Vaping Use: Never used   Substance and Sexual Activity    Alcohol use: Yes     Comment: 12oz beer once monthly     Drug use: No    Sexual activity: Yes     Partners: Female   Other Topics Concern     Service Not Asked    Blood Transfusions Not Asked    Caffeine Concern No     Comment: no coffee, occ decaff tea, occ soda    Occupational Exposure Not Asked    Hobby Hazards Not Asked    Sleep Concern Not Asked    Stress Concern Not Asked    Weight Concern Not Asked    Special Diet No    Back Care Not Asked    Exercise No     Comment: not much lately, usually plays tennis 2-3 x a week    Bike Helmet Not Asked    Seat Belt Not Asked    Self-Exams Not Asked   Social History Narrative    Not on file     Social Determinants of Health     Financial Resource Strain:     Difficulty of Paying Living Expenses: Not on file   Food Insecurity:     Worried About [address] in the Last Year: Not on file    Ran Out of Food in the Last Year: Not on file   Transportation Needs:     Lack of Transportation (Medical): Not on file    Lack of Transportation (Non-Medical): Not on file   Physical Activity:     Days of Exercise per Week: Not on file    Minutes of Exercise per Session: Not on file   Stress:     Feeling of Stress : Not on file   Social Connections:     Frequency of Communication with Friends and Family: Not on file    Frequency of Social Gatherings with Friends and Family: Not on file    Attends Alevism Services: Not on file    Active Member of Clubs or Organizations: Not on file    Attends Club or Organization Meetings: Not on file    Marital Status: Not on file   Intimate Partner Violence:     Fear of Current or Ex-Partner: Not on file    Emotionally Abused: Not on file    Physically Abused: Not on file    Sexually Abused: Not on file   Housing Stability:     Unable to Pay for Housing in the Last Year: Not on file    Number of [place] in the Last Year: Not on file    Unstable Housing in the Last Year: Not on file       Review of Systems    No flowsheet data found. Vitals:  Ht 5' 10\" (1.778 m)   Wt 214 lb (97.1 kg)   BMI 30.71 kg/m²    Body mass index is 30.71 kg/m². An electronic signature was used to authenticate this note.   -- Tonio Gabriel PA-C

## 2022-08-26 ENCOUNTER — OFFICE VISIT (OUTPATIENT)
Dept: ORTHOPEDIC SURGERY | Age: 67
End: 2022-08-26

## 2022-08-26 ENCOUNTER — OFFICE VISIT (OUTPATIENT)
Dept: ORTHOPEDIC SURGERY | Age: 67
End: 2022-08-26
Payer: MEDICARE

## 2022-08-26 DIAGNOSIS — M75.101 ROTATOR CUFF TEAR ARTHROPATHY OF RIGHT SHOULDER: ICD-10-CM

## 2022-08-26 DIAGNOSIS — S46.011D TRAUMATIC COMPLETE TEAR OF RIGHT ROTATOR CUFF, SUBSEQUENT ENCOUNTER: Primary | ICD-10-CM

## 2022-08-26 DIAGNOSIS — Z98.890 S/P ARTHROSCOPY OF RIGHT SHOULDER: ICD-10-CM

## 2022-08-26 DIAGNOSIS — G89.29 CHRONIC RIGHT SHOULDER PAIN: ICD-10-CM

## 2022-08-26 DIAGNOSIS — M25.511 CHRONIC RIGHT SHOULDER PAIN: ICD-10-CM

## 2022-08-26 DIAGNOSIS — M25.611 STIFFNESS OF RIGHT SHOULDER JOINT: ICD-10-CM

## 2022-08-26 DIAGNOSIS — M12.811 ROTATOR CUFF TEAR ARTHROPATHY OF RIGHT SHOULDER: ICD-10-CM

## 2022-08-26 DIAGNOSIS — S46.011A TRAUMATIC COMPLETE TEAR OF RIGHT ROTATOR CUFF, INITIAL ENCOUNTER: Primary | ICD-10-CM

## 2022-08-26 PROCEDURE — 97140 MANUAL THERAPY 1/> REGIONS: CPT | Performed by: PHYSICAL THERAPIST

## 2022-08-26 PROCEDURE — 99024 POSTOP FOLLOW-UP VISIT: CPT | Performed by: ORTHOPAEDIC SURGERY

## 2022-08-26 PROCEDURE — 97112 NEUROMUSCULAR REEDUCATION: CPT | Performed by: PHYSICAL THERAPIST

## 2022-08-26 PROCEDURE — 97110 THERAPEUTIC EXERCISES: CPT | Performed by: PHYSICAL THERAPIST

## 2022-08-26 NOTE — PROGRESS NOTES
PT DAILY TREATMENT NOTE  [facility]    Patient Name: Anne Figueroa  Date:2022  : 1955  [x]  Patient  Verified  Payor: Surjit Keep / Plan: [place] MEDICARE PART A & B / Product Type: Medicare /    Total Treatment Time (min): 65  Total Timed Codes (min): 45  1:1 Treatment Time ( [address] only): 45  Referring Physician: Greg Luo MD     Treatment Area: Right shoulder    SUBJECTIVE    Continue to improve each day. OBJECTIVE  Modality:   []  E-Stim: type _ x _ min     []att   []unatt   []w/US   []w/ice   []w/heat  []  Ultrasound: []cont   []pulse    _ W/cm2 x _  min   []1MHz   []3MHz  [x]  Ice pack 10 min       []  Hot pack _  min       []  Other:     Therapeutic Exercise: (minutes: 35 min) 15 min 1-1  [x] see exercise log      Added/Changed Exercises:  [x]  Added: Added increased passive stretching activities for internal and external rotation. Looked at modified sleeper stretch as well as progressive assisted internal rotation stretch utilizing a belt. Horizontal adduction stretch utilizing the door for cross body motion. []  Changed:       Manual Therapy: (minutes: 12)    Grade II & III  arthrokinematic, and capsule mobilization with concurrent passive osteokinematic range of motion techniques were applied to the involved upper extremity. Medial stabilization of the scapular was employed as well as concomitant grade 2 and 3 glenohumeral distractions. Joint mobility techniques employed to restore correct mechanical mobility at the involved joints to allow restoration of range of motion and strength. Joints included the glenohumeral scapulothoracic and sternoclavicular regions.       Neuromuscular reeducation 18 minutes  neuromuscular down regulation to the myofascial soft tissue structures of the Glenohumeral and Scapulothoracic region applied with manual as well as instrument assisted techniques    Hold relax techniques were employed to assist with change in the neuromuscular firing pattern of the involved upperextremity for restoration of glenohumeral range of motion dissociated from the scapulothoracic joint. PNF to the scapulothoracic joint for improved position of retraction and depression    Patient Education: [x] Review HEP    [] Progressed/Changed HEP:      Other Objective/Functional Measures:     Passive range of motion right shoulder   forward elevation 160 degrees   lateral abduction 140  external rotation at 90 degrees of abduction at 80  external rotation scapular plane 55 degrees  Internal rotation scapular plane 60   Active ir thumb to L4 segment    ASSESSMENT  []  See Plan of Care  []  See progress note/recertification  [x]  Patient will continue to benefit from skilled therapy to address remaining functional deficits:     All aspects of care moving forward with out set backs  ahead of schedule. ICD-10-CM ICD-9-CM    1. Traumatic complete tear of right rotator cuff, subsequent encounter  S46.011D V58.89      840.4       2. Chronic right shoulder pain  M25.511 719.41     G89.29 338.29       3. Stiffness of right shoulder joint  M25.611 719.51       4. S/P arthroscopy of right shoulder  Z98.890 V45.89       5.  Rotator cuff tear arthropathy of right shoulder  M75.101 716.81     M12.811                PLAN  [x] Progress as tolerated under current plan towards long-term goals  [] Discharge  [] Other:      Plan of care for physical therapy    PT biweekly 6 weeks  Long-term goal functional use of involved upper extremity above shoulder height  Strength 4/5 prime movers 3+/5 rotator cuff      PT Exercise Log         Activity/Exercise  Exercises were completed with supervision and direction Date        Pendulums x     Ball on total gym flexion and scapula retraction x      Wand ER/flexion 3# x     Table roll x     Passive range of motion shoulder  X     Pulleys x     PROM shoulder X     Iactiveer /ir/a-delt ,P-delt x     Internal rotation belt stretch x     Sl er   Sl flex/scaption   1#  1#     Donovan Contreras active assisted shoulder elevation x     Serratus push 3 pound stick       Return in about 5 days (around 8/31/2022) for CONTIUED SKILLED physical therapy.     Miky Main, PT 8/26/2022  8:34 AM

## 2022-08-31 ENCOUNTER — OFFICE VISIT (OUTPATIENT)
Dept: ORTHOPEDIC SURGERY | Age: 67
End: 2022-08-31
Payer: MEDICARE

## 2022-08-31 DIAGNOSIS — Z98.890 S/P ARTHROSCOPY OF RIGHT SHOULDER: ICD-10-CM

## 2022-08-31 DIAGNOSIS — M75.101 ROTATOR CUFF TEAR ARTHROPATHY OF RIGHT SHOULDER: ICD-10-CM

## 2022-08-31 DIAGNOSIS — M12.811 ROTATOR CUFF TEAR ARTHROPATHY OF RIGHT SHOULDER: ICD-10-CM

## 2022-08-31 DIAGNOSIS — M25.611 STIFFNESS OF RIGHT SHOULDER JOINT: ICD-10-CM

## 2022-08-31 DIAGNOSIS — S46.011D TRAUMATIC COMPLETE TEAR OF RIGHT ROTATOR CUFF, SUBSEQUENT ENCOUNTER: Primary | ICD-10-CM

## 2022-08-31 DIAGNOSIS — G89.29 CHRONIC RIGHT SHOULDER PAIN: ICD-10-CM

## 2022-08-31 DIAGNOSIS — M25.511 CHRONIC RIGHT SHOULDER PAIN: ICD-10-CM

## 2022-08-31 PROCEDURE — 97110 THERAPEUTIC EXERCISES: CPT | Performed by: PHYSICAL THERAPIST

## 2022-08-31 PROCEDURE — 97140 MANUAL THERAPY 1/> REGIONS: CPT | Performed by: PHYSICAL THERAPIST

## 2022-08-31 PROCEDURE — 97112 NEUROMUSCULAR REEDUCATION: CPT | Performed by: PHYSICAL THERAPIST

## 2022-08-31 NOTE — PROGRESS NOTES
PT DAILY TREATMENT NOTE  and [facility]    Patient Name: Efren Ortiz  Date:2022  : 1955  [x]  Patient  Verified  Payor: Winter Pickering / Plan: [place] MEDICARE PART A & B / Product Type: Medicare /    Total Treatment Time (min): 70  Total Timed Codes (min): 45  1:1 Treatment Time ( [address] only): 45  Referring Physician: Chrissy Saha MD     Treatment Area: Right shoulder    SUBJECTIVE  Need cues with HEP    OBJECTIVE  Modality:   []  E-Stim: type _ x _ min     []att   []unatt   []w/US   []w/ice   []w/heat  []  Ultrasound: []cont   []pulse    _ W/cm2 x _  min   []1MHz   []3MHz  [x]  Ice pack 10 min       []  Hot pack _  min       []  Other:     Therapeutic Exercise: (minutes: 30 min) 15 min 1-1  [x] see exercise log      Added/Changed Exercises:  [x]  Added: Added increased passive stretching activities for internal and external rotation. Looked at modified sleeper stretch as well as progressive assisted internal rotation stretch utilizing a belt. Horizontal adduction stretch utilizing the door for cross body motion. []  Changed:       Manual Therapy: (minutes: 15)    Grade II & III  arthrokinematic, and capsule mobilization with concurrent passive osteokinematic range of motion techniques were applied to the involved upper extremity. Medial stabilization of the scapular was employed as well as concomitant grade 2 and 3 glenohumeral distractions. Joint mobility techniques employed to restore correct mechanical mobility at the involved joints to allow restoration of range of motion and strength. Joints included the glenohumeral scapulothoracic and sternoclavicular regions.       Neuromuscular reeducation 15 minutes  neuromuscular down regulation to the myofascial soft tissue structures of the Glenohumeral and Scapulothoracic region applied with manual as well as instrument assisted techniques    Hold relax techniques were employed to assist with change in the neuromuscular firing pattern of the involved upperextremity for restoration of glenohumeral range of motion dissociated from the scapulothoracic joint. PNF to the scapulothoracic joint for improved position of retraction and depression    Patient Education: [x] Review HEP    [] Progressed/Changed HEP:      Other Objective/Functional Measures:     Passive range of motion right shoulder   forward elevation 160 degrees   lateral abduction 140  external rotation at 90 degrees of abduction at 80  external rotation scapular plane 55 degrees  Internal rotation scapular plane 60   Active ir thumb to L4 segment    ASSESSMENT  []  See Plan of Care  []  See progress note/recertification  [x]  Patient will continue to benefit from skilled therapy to address remaining functional deficits:     continues to recover  ahead of schedule. ICD-10-CM ICD-9-CM    1. Traumatic complete tear of right rotator cuff, subsequent encounter  S46.011D V58.89      840.4       2. Chronic right shoulder pain  M25.511 719.41     G89.29 338.29       3. Stiffness of right shoulder joint  M25.611 719.51       4. S/P arthroscopy of right shoulder  Z98.890 V45.89       5.  Rotator cuff tear arthropathy of right shoulder  M75.101 716.81     M12.811                  PLAN  [x] Progress as tolerated under current plan towards long-term goals  [] Discharge  [] Other:      Plan of care for physical therapy    PT biweekly 6 weeks  Long-term goal functional use of involved upper extremity above shoulder height  Strength 4/5 prime movers 3+/5 rotator cuff      PT Exercise Log         Activity/Exercise  Exercises were completed with supervision and direction Date        Pendulums x     Ball on total gym flexion and scapula retraction x      Wand ER/flexion 3# x     Table roll x     Passive range of motion shoulder  X     Pulleys x     PROM shoulder X     Iactiveer /ir/a-delt ,P-delt x     Internal rotation belt stretch x     Sl er   Sl flex/scaption   1#  1#     [place] active assisted shoulder elevation x     Serratus push 3 pound stick       Return in about 2 days (around 9/2/2022) for CONTIUED SKILLED physical therapy.     Jalen Michel, PT 8/31/2022  8:34 AM

## 2022-09-02 ENCOUNTER — OFFICE VISIT (OUTPATIENT)
Dept: ORTHOPEDIC SURGERY | Age: 67
End: 2022-09-02
Payer: MEDICARE

## 2022-09-02 DIAGNOSIS — Z98.890 S/P ARTHROSCOPY OF RIGHT SHOULDER: ICD-10-CM

## 2022-09-02 DIAGNOSIS — M25.511 CHRONIC RIGHT SHOULDER PAIN: Primary | ICD-10-CM

## 2022-09-02 DIAGNOSIS — G89.29 CHRONIC RIGHT SHOULDER PAIN: Primary | ICD-10-CM

## 2022-09-02 DIAGNOSIS — M25.611 STIFFNESS OF RIGHT SHOULDER JOINT: ICD-10-CM

## 2022-09-02 PROCEDURE — 97112 NEUROMUSCULAR REEDUCATION: CPT | Performed by: PHYSICAL THERAPIST

## 2022-09-02 PROCEDURE — 97110 THERAPEUTIC EXERCISES: CPT | Performed by: PHYSICAL THERAPIST

## 2022-09-02 PROCEDURE — 97140 MANUAL THERAPY 1/> REGIONS: CPT | Performed by: PHYSICAL THERAPIST

## 2022-09-02 NOTE — PROGRESS NOTES
PT DAILY TREATMENT NOTE  and [facility]    Patient Name: Jeremías Hough  Date:2022  : 1955  [x]  Patient  Verified  Payor: Elise Jaimered / Plan: [place] / Product Type: Medicare /    Total Treatment Time (min): 60  Total Timed Codes (min): 50  1:1 Treatment Time ( [address] only): 50  Referring Physician: Fritz Zhang MD     Treatment Area: Right shoulder    SUBJECTIVE  Able to lay on his right side for about 5 minutes now    OBJECTIVE  Modality:   []  E-Stim: type _ x _ min     []att   []unatt   []w/US   []w/ice   []w/heat  []  Ultrasound: []cont   []pulse    _ W/cm2 x _  min   []1MHz   []3MHz  [x]  Ice pack 10 min       []  Hot pack _  min       []  Other:     Therapeutic Exercise: (minutes: 30 min) 20 min 1-1  [x] see exercise log      Added/Changed Exercises:  []  Added:   []  Changed:       Manual Therapy: (minutes: 15)    Grade II & III  arthrokinematic, and capsule mobilization with concurrent passive osteokinematic range of motion techniques were applied to the involved upper extremity. Medial stabilization of the scapular was employed as well as concomitant grade 2 and 3 glenohumeral distractions. Joint mobility techniques employed to restore correct mechanical mobility at the involved joints to allow restoration of range of motion and strength. Joints included the glenohumeral scapulothoracic and sternoclavicular regions. Neuromuscular reeducation 15 minutes  neuromuscular down regulation to the myofascial soft tissue structures of the Glenohumeral and Scapulothoracic region applied with manual as well as instrument assisted techniques    Hold relax techniques were employed to assist with change in the neuromuscular firing pattern of the involved upperextremity for restoration of glenohumeral range of motion dissociated from the scapulothoracic joint.   PNF to the scapulothoracic joint for improved position of retraction and depression    Patient Education: [x] Review HEP    [] Progressed/Changed HEP:      Other Objective/Functional Measures:     Passive range of motion right shoulder   forward elevation 160 degrees   lateral abduction 140  external rotation at 90 degrees of abduction at 80  external rotation scapular plane 55 degrees  Internal rotation scapular plane 60   Active ir thumb to L4 segment    ASSESSMENT  []  See Plan of Care  []  See progress note/recertification  [x]  Patient will continue to benefit from skilled therapy to address remaining functional deficits:     Doing very well for 3 months postop. Remains most limited with behind the back reach. Still having some tightness with endrange ER and flexion. Does still require cueing for scapular engagement in proper form with exercise at times. Continue working to restore full range of motion and gradually progress cuff strengthening. ICD-10-CM ICD-9-CM    1. Chronic right shoulder pain  M25.511 719.41     G89.29 338.29       2. Stiffness of right shoulder joint  M25.611 719.51       3.  S/P arthroscopy of right shoulder  Z98.890 V45.89                 PLAN  [x] Progress as tolerated under current plan towards long-term goals  [] Discharge  [] Other:      Plan of care for physical therapy    PT biweekly 6 weeks  Long-term goal functional use of involved upper extremity above shoulder height  Strength 4/5 prime movers 3+/5 rotator cuff      PT Exercise Log         Activity/Exercise  Exercises were completed with supervision and direction Date        Pendulums x     Ball on total gym flexion and scapula retraction x      Wand ER/flexion 3# x     Table roll x     Passive range of motion shoulder  X     Pulleys x     PROM shoulder X     Iactiveer /ir/a-delt ,P-delt x     Internal rotation belt stretch x     Sl er   Sl flex/scaption   1#  1#     Margert Belts active assisted shoulder elevation x     Serratus push 3 pound stick       Return in about 4 days (around 9/6/2022) for Continued skilled physical therapy.     Effie Garcia, PT 9/2/2022  8:34 AM

## 2022-09-06 ENCOUNTER — OFFICE VISIT (OUTPATIENT)
Dept: ORTHOPEDIC SURGERY | Age: 67
End: 2022-09-06
Payer: MEDICARE

## 2022-09-06 DIAGNOSIS — S46.011D TRAUMATIC COMPLETE TEAR OF RIGHT ROTATOR CUFF, SUBSEQUENT ENCOUNTER: ICD-10-CM

## 2022-09-06 DIAGNOSIS — G89.29 CHRONIC RIGHT SHOULDER PAIN: Primary | ICD-10-CM

## 2022-09-06 DIAGNOSIS — M25.611 STIFFNESS OF RIGHT SHOULDER JOINT: ICD-10-CM

## 2022-09-06 DIAGNOSIS — M25.511 CHRONIC RIGHT SHOULDER PAIN: Primary | ICD-10-CM

## 2022-09-06 DIAGNOSIS — Z98.890 S/P ARTHROSCOPY OF RIGHT SHOULDER: ICD-10-CM

## 2022-09-06 PROCEDURE — 97112 NEUROMUSCULAR REEDUCATION: CPT | Performed by: PHYSICAL THERAPIST

## 2022-09-06 PROCEDURE — 97140 MANUAL THERAPY 1/> REGIONS: CPT | Performed by: PHYSICAL THERAPIST

## 2022-09-06 PROCEDURE — 97110 THERAPEUTIC EXERCISES: CPT | Performed by: PHYSICAL THERAPIST

## 2022-09-06 NOTE — PROGRESS NOTES
PT DAILY TREATMENT NOTE  and PLAN OF CARE UPDATE    Patient Name: Kumar Soto  Date:2022  : 1955  [x]  Patient  Verified  Payor: Primitivo Murphy / Plan: [place] MED[place] PART A & B / Product Type: Medicare /    Total Treatment Time (min): 60  Total Timed Codes (min): 40  1:1 Treatment Time ( [address] only): 40  Referring Physician: Oumou Cid MD     Treatment Area: Right shoulder    SUBJECTIVE    No new or changed complaints    OBJECTIVE  Modality:   []  E-Stim: type _ x _ min     []att   []unatt   []w/US   []w/ice   []w/heat  []  Ultrasound: []cont   []pulse    _ W/cm2 x _  min   []1MHz   []3MHz  [x]  Ice pack 10 min       []  Hot pack _  min       []  Other:     Therapeutic Exercise: (minutes: 30 min) 10 min 1-1  [x] see exercise log      Added/Changed Exercises:  []  Added:   []  Changed:       Manual Therapy: (minutes: 15)    Grade II & III  arthrokinematic, and capsule mobilization with concurrent passive osteokinematic range of motion techniques were applied to the involved upper extremity. Medial stabilization of the scapular was employed as well as concomitant grade 2 and 3 glenohumeral distractions. Joint mobility techniques employed to restore correct mechanical mobility at the involved joints to allow restoration of range of motion and strength. Joints included the glenohumeral scapulothoracic and sternoclavicular regions. Neuromuscular reeducation 15 minutes  neuromuscular down regulation to the myofascial soft tissue structures of the Glenohumeral and Scapulothoracic region applied with manual as well as instrument assisted techniques    Hold relax techniques were employed to assist with change in the neuromuscular firing pattern of the involved upperextremity for restoration of glenohumeral range of motion dissociated from the scapulothoracic joint.   PNF to the scapulothoracic joint for improved position of retraction and depression    Patient Education: [x] Review HEP    [] Progressed/Changed HEP:      Other Objective/Functional Measures:     Passive range of motion right shoulder   forward elevation 160 degrees   lateral abduction 140  external rotation at 90 degrees of abduction at 85  external rotation scapular plane 60 degrees  Internal rotation scapular plane 65   Active ir thumb to L4 segment    ASSESSMENT  []  See Plan of Care  []  See progress note/recertification  [x]  Patient will continue to benefit from skilled therapy to address remaining functional deficits:     Endrange motion and strength loss remain. Continues to need cueing with improved scapular position with overhead function  Continues to benefit from manual based care as well as progressive supervised exercise        ICD-10-CM ICD-9-CM    1. Chronic right shoulder pain  M25.511 719.41     G89.29 338.29       2. Stiffness of right shoulder joint  M25.611 719.51       3. S/P arthroscopy of right shoulder  Z98.890 V45.89       4.  Traumatic complete tear of right rotator cuff, subsequent encounter  S46.011D V58.89      840.4                 PLAN  [x] Progress as tolerated under current plan towards long-term goals  [] Discharge  [] Other:      Plan of care for physical therapy    PT biweekly 6 weeks  Long-term goal functional use of involved upper extremity above shoulder height  Strength 4/5 prime movers 3+/5 rotator cuff      PT Exercise Log  Patient did require cues to complete the exercises with 100% accuracy both verbal and tactile cueing       Activity/Exercise  Exercises were completed with supervision and direction Date        Pendulums x     Ball on total gym flexion and scapula retraction x      Wand ER/flexion 3# x     Table roll x     Passive range of motion shoulder  X     Pulleys x     PROM shoulder X     Iactiveer /ir/a-delt ,P-delt x     Internal rotation belt stretch x     Sl er   Sl flex/scaption   1#  1#     Phan Kirby active assisted shoulder elevation x     Serratus push 3 pound stick       Return in about 2 days (around 9/8/2022) for [id number] [place] [address] physical therapy.     Ector Collet, PT 9/6/2022  8:34 AM

## 2022-09-09 ENCOUNTER — OFFICE VISIT (OUTPATIENT)
Dept: ORTHOPEDIC SURGERY | Age: 67
End: 2022-09-09
Payer: MEDICARE

## 2022-09-09 DIAGNOSIS — Z98.890 S/P ARTHROSCOPY OF RIGHT SHOULDER: ICD-10-CM

## 2022-09-09 DIAGNOSIS — G89.29 CHRONIC RIGHT SHOULDER PAIN: Primary | ICD-10-CM

## 2022-09-09 DIAGNOSIS — M25.611 STIFFNESS OF RIGHT SHOULDER JOINT: ICD-10-CM

## 2022-09-09 DIAGNOSIS — M25.511 CHRONIC RIGHT SHOULDER PAIN: Primary | ICD-10-CM

## 2022-09-09 DIAGNOSIS — S46.011D TRAUMATIC COMPLETE TEAR OF RIGHT ROTATOR CUFF, SUBSEQUENT ENCOUNTER: ICD-10-CM

## 2022-09-09 PROCEDURE — 97112 NEUROMUSCULAR REEDUCATION: CPT | Performed by: PHYSICAL THERAPIST

## 2022-09-09 PROCEDURE — 97110 THERAPEUTIC EXERCISES: CPT | Performed by: PHYSICAL THERAPIST

## 2022-09-09 PROCEDURE — 97140 MANUAL THERAPY 1/> REGIONS: CPT | Performed by: PHYSICAL THERAPIST

## 2022-09-09 NOTE — PROGRESS NOTES
PT DAILY TREATMENT NOTE  and PLAN OF CARE UPDATE    Patient Name: Taty Kamara  Date:2022  : 1955  [x]  Patient  Verified  Payor: Ana Sorto / Plan: [place] MEDICARE PART A & B / Product Type: Medicare /    Total Treatment Time (min): 60  Total Timed Codes (min): 40  1:1 Treatment Time ([facility] only): 40  Referring Physician: Terrence Butler MD     Treatment Area: Right shoulder    SUBJECTIVE    No new or changed complaints    OBJECTIVE  Modality:   []  E-Stim: type _ x _ min     []att   []unatt   []w/US   []w/ice   []w/heat  []  Ultrasound: []cont   []pulse    _ W/cm2 x _  min   []1MHz   []3MHz  [x]  Ice pack 10 min       []  Hot pack _  min       []  Other:     Therapeutic Exercise: (minutes: 30 min) 10 min 1-1  [x] see exercise log      Added/Changed Exercises:  []  Added:   []  Changed:       Manual Therapy: (minutes: 15)    Grade II & III  arthrokinematic, and capsule mobilization with concurrent passive osteokinematic range of motion techniques were applied to the involved upper extremity. Medial stabilization of the scapular was employed as well as concomitant grade 2 and 3 glenohumeral distractions. Joint mobility techniques employed to restore correct mechanical mobility at the involved joints to allow restoration of range of motion and strength. Joints included the glenohumeral scapulothoracic and sternoclavicular regions. Neuromuscular reeducation 15 minutes  neuromuscular down regulation to the myofascial soft tissue structures of the Glenohumeral and Scapulothoracic region applied with manual as well as instrument assisted techniques    Hold relax techniques were employed to assist with change in the neuromuscular firing pattern of the involved upperextremity for restoration of glenohumeral range of motion dissociated from the scapulothoracic joint.   PNF to the scapulothoracic joint for improved position of retraction and depression    Patient Education: [x] Review HEP    [] Progressed/Changed HEP:      Other Objective/Functional Measures:     Passive range of motion right shoulder   forward elevation 165 degrees   lateral abduction 140  external rotation at 90 degrees of abduction at 85  external rotation scapular plane 60 degrees  Internal rotation scapular plane 65   Active ir thumb to L4 segment    ASSESSMENT  []  See Plan of Care  []  See progress note/recertification  [x]  Patient will continue to benefit from skilled therapy to address remaining functional deficits:     Endrange motion and strength loss remain. Continues to need cueing with improved scapular position with overhead function  Continues to benefit from manual based care as well as progressive supervised exercise        ICD-10-CM ICD-9-CM    1. Chronic right shoulder pain  M25.511 719.41     G89.29 338.29       2. Stiffness of right shoulder joint  M25.611 719.51       3. S/P arthroscopy of right shoulder  Z98.890 V45.89       4. Traumatic complete tear of right rotator cuff, subsequent encounter  S46.011D V58.89      840.4                 PLAN  [x] Progress as tolerated under current plan towards long-term goals  [] Discharge  [] Other:      Plan of care for physical therapy        PT Exercise Log  Patient did require cues to complete the exercises with 100% accuracy both verbal and tactile cueing       Activity/Exercise  Exercises were completed with supervision and direction Date        Pendulums x     Ball on total gym flexion and scapula retraction x      Wand ER/flexion 3# x     Table roll x     Passive range of motion shoulder  X     Pulleys x     PROM shoulder X     Iactiveer /ir/a-delt ,P-delt x     Internal rotation belt stretch x     Sl er   Sl flex/scaption   1#  1#     Terrence Merlin active assisted shoulder elevation x     Serratus push 3 pound stick       Return in about 5 days (around 9/14/2022) for CONTIUED SKILLED physical therapy.     Brandon Hemphill, PT 9/9/2022  8:34 AM

## 2022-09-13 ENCOUNTER — OFFICE VISIT (OUTPATIENT)
Dept: ORTHOPEDIC SURGERY | Age: 67
End: 2022-09-13
Payer: MEDICARE

## 2022-09-13 DIAGNOSIS — M25.511 CHRONIC RIGHT SHOULDER PAIN: Primary | ICD-10-CM

## 2022-09-13 DIAGNOSIS — M25.611 STIFFNESS OF RIGHT SHOULDER JOINT: ICD-10-CM

## 2022-09-13 DIAGNOSIS — Z98.890 S/P ARTHROSCOPY OF RIGHT SHOULDER: ICD-10-CM

## 2022-09-13 DIAGNOSIS — S46.011D TRAUMATIC COMPLETE TEAR OF RIGHT ROTATOR CUFF, SUBSEQUENT ENCOUNTER: ICD-10-CM

## 2022-09-13 DIAGNOSIS — G89.29 CHRONIC RIGHT SHOULDER PAIN: Primary | ICD-10-CM

## 2022-09-13 PROCEDURE — 97110 THERAPEUTIC EXERCISES: CPT | Performed by: PHYSICAL THERAPIST

## 2022-09-13 PROCEDURE — 97112 NEUROMUSCULAR REEDUCATION: CPT | Performed by: PHYSICAL THERAPIST

## 2022-09-14 NOTE — PROGRESS NOTES
PT DAILY TREATMENT NOTE  and [facility]    Patient Name: Mary Alice Medina  Date:2022  : 1955  [x]  Patient  Verified  Payor:  Alli / Plan: [place] MEDICARE PART A & B / Product Type: Medicare /    Total Treatment Time (min): 60  Total Timed Codes (min): 30  1:1 Treatment Time ( [address] only): 30  Referring Physician: Ac Carmona MD     Treatment Area: Right shoulder    SUBJECTIVE    No new or changed complaints    OBJECTIVE  Modality:   []  E-Stim: type _ x _ min     []att   []unatt   []w/US   []w/ice   []w/heat  []  Ultrasound: []cont   []pulse    _ W/cm2 x _  min   []1MHz   []3MHz  [x]  Ice pack 10 min       []  Hot pack _  min       []  Other:     Therapeutic Exercise: (minutes: 40 min) 10 min 1-1  [x] see exercise log      Added/Changed Exercises:  []  Added:   []  Changed:       Manual Therapy: (minutes: 5)    Grade II & III  arthrokinematic, and capsule mobilization with concurrent passive osteokinematic range of motion techniques were applied to the involved upper extremity. Medial stabilization of the scapular was employed as well as concomitant grade 2 and 3 glenohumeral distractions. Joint mobility techniques employed to restore correct mechanical mobility at the involved joints to allow restoration of range of motion and strength. Joints included the glenohumeral scapulothoracic and sternoclavicular regions. Neuromuscular reeducation 15 minutes  neuromuscular down regulation to the myofascial soft tissue structures of the Glenohumeral and Scapulothoracic region applied with manual as well as instrument assisted techniques    Hold relax techniques were employed to assist with change in the neuromuscular firing pattern of the involved upperextremity for restoration of glenohumeral range of motion dissociated from the scapulothoracic joint.   PNF to the scapulothoracic joint for improved position of retraction and depression    Patient Education: [x] Review HEP    [] Progressed/Changed HEP:      Other Objective/Functional Measures:     Passive range of motion right shoulder   forward elevation 165 degrees   lateral abduction 140  external rotation at 90 degrees of abduction at 85  external rotation scapular plane 60 degrees  Internal rotation scapular plane 65   Active ir thumb to L4 segment    ASSESSMENT  []  See Plan of Care  []  See progress note/recertification  [x]  Patient will continue to benefit from skilled therapy to address remaining functional deficits:     Internal rotation motion and strength loss remain. Verbal and tactile cues with exercises. Continues to benefit from skilled care with need to advance functional strength and mobility        ICD-10-CM ICD-9-CM    1. Chronic right shoulder pain  M25.511 719.41     G89.29 338.29       2. S/P arthroscopy of right shoulder  Z98.890 V45.89       3. Stiffness of right shoulder joint  M25.611 719.51       4. Traumatic complete tear of right rotator cuff, subsequent encounter  S46.011D V58.89      840.4                   PLAN  [x] Progress as tolerated under current plan towards long-term goals  [] Discharge  [] Other:      Plan of care for physical therapy        PT Exercise Log  Patient did require cues to complete the exercises with 100% accuracy both verbal and tactile cueing       Activity/Exercise  Exercises were completed with supervision and direction Date        Pendulums x     Ball on total gym flexion and scapula retraction x      Wand ER/flexion 3# x     Table roll x     Passive range of motion shoulder  X     Pulleys x     PROM shoulder X     Iactiveer /ir/a-delt ,P-delt x     Internal rotation belt stretch x     Sl er   Sl flex/scaption   1#  1#     Therisa Trisha active assisted shoulder elevation x     Serratus push 3 pound stick       Return in about 3 days (around 9/16/2022) for CONTIUED SKILLED physical therapy.     Kalli Ricks, PT 9/14/2022  8:34 AM

## 2022-09-16 ENCOUNTER — OFFICE VISIT (OUTPATIENT)
Dept: ORTHOPEDIC SURGERY | Age: 67
End: 2022-09-16
Payer: MEDICARE

## 2022-09-16 DIAGNOSIS — G89.29 CHRONIC RIGHT SHOULDER PAIN: Primary | ICD-10-CM

## 2022-09-16 DIAGNOSIS — S46.011D TRAUMATIC COMPLETE TEAR OF RIGHT ROTATOR CUFF, SUBSEQUENT ENCOUNTER: ICD-10-CM

## 2022-09-16 DIAGNOSIS — M25.611 STIFFNESS OF RIGHT SHOULDER JOINT: ICD-10-CM

## 2022-09-16 DIAGNOSIS — M25.511 CHRONIC RIGHT SHOULDER PAIN: Primary | ICD-10-CM

## 2022-09-16 DIAGNOSIS — Z98.890 S/P ARTHROSCOPY OF RIGHT SHOULDER: ICD-10-CM

## 2022-09-16 PROCEDURE — 97140 MANUAL THERAPY 1/> REGIONS: CPT | Performed by: PHYSICAL THERAPIST

## 2022-09-16 PROCEDURE — 97110 THERAPEUTIC EXERCISES: CPT | Performed by: PHYSICAL THERAPIST

## 2022-09-16 PROCEDURE — 97112 NEUROMUSCULAR REEDUCATION: CPT | Performed by: PHYSICAL THERAPIST

## 2022-09-20 ENCOUNTER — OFFICE VISIT (OUTPATIENT)
Dept: ORTHOPEDIC SURGERY | Age: 67
End: 2022-09-20
Payer: MEDICARE

## 2022-09-20 DIAGNOSIS — Z98.890 S/P ARTHROSCOPY OF RIGHT SHOULDER: ICD-10-CM

## 2022-09-20 DIAGNOSIS — M25.611 STIFFNESS OF RIGHT SHOULDER JOINT: ICD-10-CM

## 2022-09-20 DIAGNOSIS — G89.29 CHRONIC RIGHT SHOULDER PAIN: Primary | ICD-10-CM

## 2022-09-20 DIAGNOSIS — M75.101 ROTATOR CUFF TEAR ARTHROPATHY OF RIGHT SHOULDER: ICD-10-CM

## 2022-09-20 DIAGNOSIS — M25.511 CHRONIC RIGHT SHOULDER PAIN: Primary | ICD-10-CM

## 2022-09-20 DIAGNOSIS — M12.811 ROTATOR CUFF TEAR ARTHROPATHY OF RIGHT SHOULDER: ICD-10-CM

## 2022-09-20 DIAGNOSIS — S46.011D TRAUMATIC COMPLETE TEAR OF RIGHT ROTATOR CUFF, SUBSEQUENT ENCOUNTER: ICD-10-CM

## 2022-09-20 PROCEDURE — 97110 THERAPEUTIC EXERCISES: CPT | Performed by: PHYSICAL THERAPIST

## 2022-09-20 PROCEDURE — 97112 NEUROMUSCULAR REEDUCATION: CPT | Performed by: PHYSICAL THERAPIST

## 2022-09-20 PROCEDURE — 97140 MANUAL THERAPY 1/> REGIONS: CPT | Performed by: PHYSICAL THERAPIST

## 2022-09-20 NOTE — PROGRESS NOTES
PT DAILY TREATMENT NOTE  and [facility]    Patient Name: Ez Kaur  Date:2022  : 1955  [x]  Patient  Verified  Payor: Rufina Somers / Plan: [place] MEDICARE PART A & B / Product Type: Medicare /    Total Treatment Time (min): 80  Total Timed Codes (min): 40  Referring Physician: Jo Preciado MD     Treatment Area: Right shoulder    SUBJECTIVE    Patient reports no setbacks continue to see improvement    OBJECTIVE  Modality:   []  E-Stim: type _ x _ min     []att   []unatt   []w/US   []w/ice   []w/heat  []  Ultrasound: []cont   []pulse    _ W/cm2 x _  min   []1MHz   []3MHz  [x]  Ice pack 10 min       []  Hot pack _  min       []  Other:     Therapeutic Exercise: (minutes: 40 min) 10 min 1-1  [x] see exercise log      Added/Changed Exercises:  []  Added:   []  Changed:       Manual Therapy: (minutes: 15)    Grade II & III  arthrokinematic, and capsule mobilization with concurrent passive osteokinematic range of motion techniques were applied to the involved upper extremity. Medial stabilization of the scapular was employed as well as concomitant grade 2 and 3 glenohumeral distractions. Joint mobility techniques employed to restore correct mechanical mobility at the involved joints to allow restoration of range of motion and strength. Joints included the glenohumeral scapulothoracic and sternoclavicular regions. Neuromuscular reeducation 15 minutes  neuromuscular down regulation to the myofascial soft tissue structures of the Glenohumeral and Scapulothoracic region applied with manual as well as instrument assisted techniques    Hold relax techniques were employed to assist with change in the neuromuscular firing pattern of the involved upperextremity for restoration of glenohumeral range of motion dissociated from the scapulothoracic joint.   PNF to the scapulothoracic joint for improved position of retraction and depression    Patient Education: [x] Review HEP    [] Progressed/Changed HEP:      Other Objective/Functional Measures:     Passive range of motion right shoulder   forward elevation 165 degrees   lateral abduction 140  external rotation at 90 degrees of abduction at 85  external rotation scapular plane 60 degrees  Internal rotation scapular plane 65   Active ir thumb to T/Lsegment    ASSESSMENT  []  See Plan of Care  []  See progress note/recertification  [x]  Patient will continue to benefit from skilled therapy to address remaining functional deficits:   Continues with endrange mobility loss of strength is slowly improving functional deficits reaching above shoulder height remain        ICD-10-CM ICD-9-CM    1. Chronic right shoulder pain  M25.511 719.41     G89.29 338.29       2. S/P arthroscopy of right shoulder  Z98.890 V45.89       3. Stiffness of right shoulder joint  M25.611 719.51       4. Traumatic complete tear of right rotator cuff, subsequent encounter  S46.011D V58.89      840.4       5. Rotator cuff tear arthropathy of right shoulder  M75.101 716.81     M12.811                        PLAN  [x] Progress as tolerated under current plan towards long-term goals  [] Discharge  [] Other: Continue progressive strengthening            PT Exercise Log  Patient did require cues to complete the exercises with 100% accuracy both verbal and tactile cueing       Activity/Exercise  Exercises were completed with supervision and direction Date        Pendulums x     Ball on total gym flexion and scapula retraction x      Wand ER/flexion 3# x     Table roll x     Passive range of motion shoulder  X     Pulleys x     PROM shoulder X     Iactiveer /ir/a-delt ,P-delt x     Internal rotation belt stretch x     Sl er   Sl flex/scaption   1#  1#     James Peralta active assisted shoulder elevation x     Serratus push 3 pound stick       Return in about 3 days (around 9/23/2022) for CONTIUED SKILLED physical therapy.     Saman Gill, PT 9/20/2022  8:34 AM

## 2022-09-22 ENCOUNTER — OFFICE VISIT (OUTPATIENT)
Dept: ORTHOPEDIC SURGERY | Age: 67
End: 2022-09-22
Payer: MEDICARE

## 2022-09-22 DIAGNOSIS — G89.29 CHRONIC RIGHT SHOULDER PAIN: Primary | ICD-10-CM

## 2022-09-22 DIAGNOSIS — S46.011D TRAUMATIC COMPLETE TEAR OF RIGHT ROTATOR CUFF, SUBSEQUENT ENCOUNTER: ICD-10-CM

## 2022-09-22 DIAGNOSIS — M25.511 CHRONIC RIGHT SHOULDER PAIN: Primary | ICD-10-CM

## 2022-09-22 DIAGNOSIS — Z98.890 S/P ARTHROSCOPY OF RIGHT SHOULDER: ICD-10-CM

## 2022-09-22 DIAGNOSIS — M25.611 STIFFNESS OF RIGHT SHOULDER JOINT: ICD-10-CM

## 2022-09-22 PROCEDURE — 97112 NEUROMUSCULAR REEDUCATION: CPT | Performed by: PHYSICAL THERAPIST

## 2022-09-22 PROCEDURE — 97140 MANUAL THERAPY 1/> REGIONS: CPT | Performed by: PHYSICAL THERAPIST

## 2022-09-22 PROCEDURE — 97110 THERAPEUTIC EXERCISES: CPT | Performed by: PHYSICAL THERAPIST

## 2022-09-22 NOTE — PROGRESS NOTES
PT DAILY TREATMENT NOTE  and [facility]    Patient Name: Kumar Soto  Date:2022  : 1955  [x]  Patient  Verified  Payor: Primitivo Murphy / Plan: [place] MEDICARE PART A & B / Product Type: Medicare /    Total Treatment Time (min): 65  Total Timed Codes (min): 40  Referring Physician: Oumou Cid MD     Treatment Area: Right shoulder    SUBJECTIVE    Will need advance activities to return to gym when ready      OBJECTIVE  Modality:   []  E-Stim: type _ x _ min     []att   []unatt   []w/US   []w/ice   []w/heat  []  Ultrasound: []cont   []pulse    _ W/cm2 x _  min   []1MHz   []3MHz  [x]  Ice pack 10 min       []  Hot pack _  min       []  Other:     Therapeutic Exercise: (minutes: 40 min) 20 min 1-1  [x] see exercise log      Added/Changed Exercises:  []  Added:   []  Changed:       Manual Therapy: (minutes: 15)    Grade II & III  arthrokinematic, and capsule mobilization with concurrent passive osteokinematic range of motion techniques were applied to the involved upper extremity. Medial stabilization of the scapular was employed as well as concomitant grade 2 and 3 glenohumeral distractions. Joint mobility techniques employed to restore correct mechanical mobility at the involved joints to allow restoration of range of motion and strength. Joints included the glenohumeral scapulothoracic and sternoclavicular regions. Neuromuscular reeducation 10 minutes  neuromuscular down regulation to the myofascial soft tissue structures of the Glenohumeral and Scapulothoracic region applied with manual as well as instrument assisted techniques    Hold relax techniques were employed to assist with change in the neuromuscular firing pattern of the involved upperextremity for restoration of glenohumeral range of motion dissociated from the scapulothoracic joint.   PNF to the scapulothoracic joint for improved position of retraction and depression    Patient Education: [x] Review HEP    [] Progressed/Changed HEP:      Other Objective/Functional Measures:     Passive range of motion right shoulder   forward elevation 165 degrees   lateral abduction 140  external rotation at 90 degrees of abduction at 85  external rotation scapular plane 60 degrees  Internal rotation scapular plane 65   Active ir thumb to T/Lsegment    ASSESSMENT  []  See Plan of Care  []  See progress note/recertification  [x]  Patient will continue to benefit from skilled therapy to address remaining functional deficits:           ICD-10-CM ICD-9-CM    1. Chronic right shoulder pain  M25.511 719.41     G89.29 338.29       2. S/P arthroscopy of right shoulder  Z98.890 V45.89       3. Stiffness of right shoulder joint  M25.611 719.51       4. Traumatic complete tear of right rotator cuff, subsequent encounter  S46.011D V58.89      840.4                         PLAN  [x] Progress as tolerated under current plan towards long-term goals  [] Discharge  [] Other: Continue progressive strengthening            PT Exercise Log  Patient did require cues to complete the exercises with 100% accuracy both verbal and tactile cueing       Activity/Exercise  Exercises were completed with supervision and direction Date        Pendulums x     Ball on total gym flexion and scapula retraction x      Wand ER/flexion 3# x     Table roll x     Passive range of motion shoulder  X     Pulleys x     PROM shoulder X     Iactiveer /ir/a-delt ,P-delt x     Internal rotation belt stretch x     Sl er   Sl flex/scaption   1#  1#     Marline Martinez active assisted shoulder elevation x     Serratus push 3 pound stick       Return in about 5 days (around 9/27/2022) for CONTIUED SKILLED physical therapy.     Jed Bowens, PT 9/23/2022  8:34 AM

## 2022-09-25 NOTE — PROGRESS NOTES
ASSESSMENT/PLAN:  Below is the assessment and plan developed based on review of pertinent history, physical exam, labs, studies, and medications. 1. S/P arthroscopy of right shoulder  -     REFERRAL TO PHYSICAL THERAPY    In discussion with the patient, we considered the numerus possible diagnoses that could be contributing to their present symptoms. We also deliberated on the extensive management options that must be considered to treat their current condition. We reviewed their accessible prior medical records, diagnostic tests, and current health and employment information. We considered how these symptoms were affecting the patient´s activities of daily living as well as employment and fitness activities. The patient had various questions regarding the possible risks, benefits, complications, morbidity and mortality regarding their diagnosis and treatment options. The patients´ comorbidities were considered, and I advocated that they consider maximizing lifestyle modification through nutrition and exercise to aid in addressing their symptoms. Shared decision making yielded an understanding to move forward with conservation treatment preferences. The patient expressed understanding that if conservative management fails to alleviate the present symptoms they will return to office for re-evaluation and consideration of additional diagnostic tests and potential surgical options. In the interim, we have recommended ice, elevation, and take prescription anti-inflammatory medications along with a physician directed home exercise program. We discussed the risks and common side effects of anti-inflammatory medications and instructed the patient to discontinue the medication and contact us if they experienced any side effects. The patient was encouraged to discuss the possible side effects with their family physician or pharmacist prior to initiating any new medications.     We discussed the fact that many of the recommended treatment options presented are significantly limited by the patient´s social determinants of health. We also reviewed the circumstances surrounding the environment that they live and work which affect a wide range of health risk. We considered the limited access to appropriate educational resources regarding proper nutrition and exercise as well as the economic and social support necessary to maintain health and wellbeing. Given that the patient's symptoms are increasing in frequency and duration we have decided to prescribe physical therapy. We talked about the fact that the goal of physical therapy is for the therapist to assist in developing a program to help return the patient to full strength, function and mobility and decrease pain. We also discussed that the therapist may combine several techniques to help decrease pain. These include but are not limited to stretching, balance exercises, strength training, massage, cold and heat therapy, and electrical stimulation. Although, physical therapy is generally safe, we went over the potential risks to include the worsening of pre-existing conditions, continued pain and no improvement in flexibility, mobility, and strength. We will have the patient follow up after physical therapy to closely monitor their progress. We talked about following up sooner if therapy is not progressing on a weekly basis. We will continue physical therapy and progressive strengthening at home. I will see him back 6 weeks time to evaluate his progress. SUBJECTIVE/OBJECTIVE:  Julia Del Real (: 1955) is a 74 y.o. male, patient,here for evaluation of the right shoulder. Patient is s/p right shoulder rotator cuff repair, SAD, DCE on 6/3/22. Patient states he is overall doing well following this procedure. He has been attending physical therapy regularly. He has been advancing with his range of motion and transitioning to some strengthening.   He is anxious to progress his activities. Physical Exam    Examination of the right shoulder reveals that the incisions are healing well, without evidence of drainage, erythema or warmth. There is limited range of motion secondary to discomfort. Strength is 5/5 distally. There is no tenderness at the elbow and wrist. Sensation is intact to light touch distally and there is a brisk capillary refill. Allergies   Allergen Reactions    Oxycodone Rash     Patient had full body chills 20 minutes after taking 5 mg. Redness, stuffy nose, and felt warm. Pcn [Penicillins] Rash       Current Outpatient Medications   Medication Sig    hydroCHLOROthiazide (HYDRODIURIL) 25 mg tablet Take 1 Tablet by mouth daily. Take in the morning with potassium rich food or beverage    simvastatin (ZOCOR) 40 mg tablet Take 1 Tablet by mouth nightly. tamsulosin (FLOMAX) 0.4 mg capsule TAKE 1 CAP BY MOUTH DAILY. INDICATIONS: ENLARGED PROSTATE WITH URINATION PROBLEM    Cholecalciferol, Vitamin D3, (VITAMIN D3) 1,000 unit cap Take 2,000 Units by mouth every other day. No current facility-administered medications for this visit. Past Medical History:   Diagnosis Date    Allergic rhinitis 2/27/2010    Cancer ([facility] [address] 2004    SKIN, LIP (MOHS PROCEDURE)    Hypertension     BORDERLINE; NO MEDS    Obesity (BMI 30.0-34.9) 2/5/2018    Other and unspecified hyperlipidemia 2/27/2010       Past Surgical History:   Procedure Laterality Date    ENDOSCOPY, COLON, DIAGNOSTIC      X2    HX CATARACT REMOVAL Bilateral 87-88    CONGENITAL CATARACTS; W/ IOL    HX LUMBAR LAMINECTOMY      HX ORTHOPAEDIC  08/2019    l-s spine     HX TONSIL AND ADENOIDECTOMY  1960    HX VASECTOMY  1989    RI ANESTH,SURGERY OF SHOULDER      RI SINUS SURGERY [address] UNLISTED  2001       Family History   Problem Relation Age of Onset    Cancer Mother         ovarian    Cancer Father         prostate?     Elevated Lipids Father     Heart Surgery Father         CABG at 58, lived to 80    No Known Problems Sister     Other Brother         factor 5        Social History     Socioeconomic History    Marital status:      Spouse name: Not on file    Number of children: Not on file    Years of education: Not on file    Highest education level: Not on file   Occupational History    Not on file   Tobacco Use    Smoking status: Never Smoker    Smokeless tobacco: Never Used   Vaping Use    Vaping Use: Never used   Substance and Sexual Activity    Alcohol use: Yes     Comment: 12oz beer once monthly     Drug use: No    Sexual activity: Yes     Partners: Female   Other Topics Concern     Service Not Asked    Blood Transfusions Not Asked    Caffeine Concern No     Comment: no coffee, occ decaff tea, occ soda    Occupational Exposure Not Asked    Hobby Hazards Not Asked    Sleep Concern Not Asked    Stress Concern Not Asked    Weight Concern Not Asked    Special Diet No    Back Care Not Asked    Exercise No     Comment: not much lately, usually plays tennis 2-3 x a week    Bike Helmet Not Asked    Seat Belt Not Asked    Self-Exams Not Asked   Social History Narrative    Not on file     Social Determinants of Health     Financial Resource Strain:     Difficulty of Paying Living Expenses: Not on file   Food Insecurity:     Worried About Running Out of Food in the Last Year: Not on file    [place] of Food in the Last Year: Not on file   Transportation Needs:     Lack of Transportation (Medical): Not on file    Lack of Transportation (Non-Medical):  Not on file   Physical Activity:     Days of Exercise per Week: Not on file    Minutes of Exercise per Session: Not on file   Stress:     Feeling of Stress : Not on file   Social Connections:     Frequency of Communication with Friends and Family: Not on file    Frequency of Social Gatherings with Friends and Family: Not on file    Attends Scientologist Services: Not on file    Active Member of Clubs or Organizations: Not on file    Attends Club or Organization Meetings: Not on file    Marital Status: Not on file   Intimate Partner Violence:     Fear of Current or Ex-Partner: Not on file    Emotionally Abused: Not on file    Physically Abused: Not on file    Sexually Abused: Not on file   Housing Stability:     Unable to Pay for Housing in the Last Year: Not on file    Number of Places Lived in the Last Year: Not on file    Unstable Housing in the Last Year: Not on file       Review of Systems    No flowsheet data found. Vitals:  Ht 5' 10\" (1.778 m)   Wt 214 lb (97.1 kg)   BMI 30.71 kg/m²    Body mass index is 30.71 kg/m². An electronic signature was used to authenticate this note.   -- Elizabeth Dudley PA-C

## 2022-09-26 ENCOUNTER — OFFICE VISIT (OUTPATIENT)
Dept: ORTHOPEDIC SURGERY | Age: 67
End: 2022-09-26

## 2022-09-26 ENCOUNTER — OFFICE VISIT (OUTPATIENT)
Dept: ORTHOPEDIC SURGERY | Age: 67
End: 2022-09-26
Payer: MEDICARE

## 2022-09-26 VITALS — WEIGHT: 205 LBS | HEIGHT: 70 IN | BODY MASS INDEX: 29.35 KG/M2

## 2022-09-26 DIAGNOSIS — M25.611 STIFFNESS OF RIGHT SHOULDER JOINT: ICD-10-CM

## 2022-09-26 DIAGNOSIS — S46.011A TRAUMATIC COMPLETE TEAR OF RIGHT ROTATOR CUFF, INITIAL ENCOUNTER: Primary | ICD-10-CM

## 2022-09-26 DIAGNOSIS — M25.511 CHRONIC RIGHT SHOULDER PAIN: Primary | ICD-10-CM

## 2022-09-26 DIAGNOSIS — G89.29 CHRONIC RIGHT SHOULDER PAIN: Primary | ICD-10-CM

## 2022-09-26 DIAGNOSIS — Z98.890 S/P ARTHROSCOPY OF RIGHT SHOULDER: ICD-10-CM

## 2022-09-26 PROCEDURE — 3017F COLORECTAL CA SCREEN DOC REV: CPT | Performed by: ORTHOPAEDIC SURGERY

## 2022-09-26 PROCEDURE — 1123F ACP DISCUSS/DSCN MKR DOCD: CPT | Performed by: ORTHOPAEDIC SURGERY

## 2022-09-26 PROCEDURE — G8536 NO DOC ELDER MAL SCRN: HCPCS | Performed by: ORTHOPAEDIC SURGERY

## 2022-09-26 PROCEDURE — G8756 NO BP MEASURE DOC: HCPCS | Performed by: ORTHOPAEDIC SURGERY

## 2022-09-26 PROCEDURE — G8432 DEP SCR NOT DOC, RNG: HCPCS | Performed by: ORTHOPAEDIC SURGERY

## 2022-09-26 PROCEDURE — G8427 DOCREV CUR MEDS BY ELIG CLIN: HCPCS | Performed by: ORTHOPAEDIC SURGERY

## 2022-09-26 PROCEDURE — 97112 NEUROMUSCULAR REEDUCATION: CPT | Performed by: PHYSICAL THERAPIST

## 2022-09-26 PROCEDURE — 1101F PT FALLS ASSESS-DOCD LE1/YR: CPT | Performed by: ORTHOPAEDIC SURGERY

## 2022-09-26 PROCEDURE — 97140 MANUAL THERAPY 1/> REGIONS: CPT | Performed by: PHYSICAL THERAPIST

## 2022-09-26 PROCEDURE — 97110 THERAPEUTIC EXERCISES: CPT | Performed by: PHYSICAL THERAPIST

## 2022-09-26 PROCEDURE — 99213 OFFICE O/P EST LOW 20 MIN: CPT | Performed by: ORTHOPAEDIC SURGERY

## 2022-09-26 PROCEDURE — G8417 CALC BMI ABV UP PARAM F/U: HCPCS | Performed by: ORTHOPAEDIC SURGERY

## 2022-10-04 ENCOUNTER — OFFICE VISIT (OUTPATIENT)
Dept: ORTHOPEDIC SURGERY | Age: 67
End: 2022-10-04
Payer: MEDICARE

## 2022-10-04 DIAGNOSIS — Z98.890 S/P ARTHROSCOPY OF RIGHT SHOULDER: ICD-10-CM

## 2022-10-04 DIAGNOSIS — S46.011D TRAUMATIC COMPLETE TEAR OF RIGHT ROTATOR CUFF, SUBSEQUENT ENCOUNTER: ICD-10-CM

## 2022-10-04 DIAGNOSIS — M25.611 STIFFNESS OF RIGHT SHOULDER JOINT: ICD-10-CM

## 2022-10-04 DIAGNOSIS — M25.511 CHRONIC RIGHT SHOULDER PAIN: Primary | ICD-10-CM

## 2022-10-04 DIAGNOSIS — G89.29 CHRONIC RIGHT SHOULDER PAIN: Primary | ICD-10-CM

## 2022-10-04 PROCEDURE — 97110 THERAPEUTIC EXERCISES: CPT | Performed by: PHYSICAL THERAPIST

## 2022-10-04 PROCEDURE — 97140 MANUAL THERAPY 1/> REGIONS: CPT | Performed by: PHYSICAL THERAPIST

## 2022-10-04 PROCEDURE — 97112 NEUROMUSCULAR REEDUCATION: CPT | Performed by: PHYSICAL THERAPIST

## 2022-10-04 NOTE — PROGRESS NOTES
PT DAILY TREATMENT NOTE  and [facility]    Patient Name: Faith Edouard  Date:10/4/2022  : 1955  [x]  Patient  Verified  Payor: Jolly Candida / Plan: [place] / Product Type: Medicare /    Total Treatment Time (min): 70  Total Timed Codes (min): 50  Referring Physician: Cherelle Owusu MD     Treatment Area: Right shoulder    SUBJECTIVE    Please do voice questions with his home exercise program as well as working for assistance as he is simulates into a gym related program.      OBJECTIVE  Modality:   []  E-Stim: type _ x _ min     []att   []unatt   []w/US   []w/ice   []w/heat  []  Ultrasound: []cont   []pulse    _ W/cm2 x _  min   []1MHz   []3MHz  [x]  Ice pack 10 min       []  Hot pack _  min       []  Other:     Therapeutic Exercise: (minutes: 40 min) 20 min 1-1  [x] see exercise log          PT Exercise Log  Patient did require cues to complete the exercises with 100% accuracy both verbal and tactile cueing       Activity/Exercise  Exercises were completed with supervision and direction Date        Pendulums x     Ball on total gym flexion and scapula retraction x      Wand ER/flexion 3# x     Table roll x     Passive range of motion shoulder  X     Pulleys x     PROM shoulder X     Active\er /ir/a-delt ,P-delt x Thera-Band     Internal rotation belt stretch x     Sl er   Sl flex/scaption   1#  1#     Marlan Olszewski active assisted shoulder elevation x   4#serratus push X   UB E 5 minutes   NuStep arms 5 minutes   Prone Y's and T's on Total Gym 1 pound 30 reps         Manual Therapy: (minutes: 15)    Grade II & III  arthrokinematic, and capsule mobilization with concurrent passive osteokinematic range of motion techniques were applied to the involved upper extremity. Medial stabilization of the scapular was employed as well as concomitant grade 2 and 3 glenohumeral distractions.   Joint mobility techniques employed to restore correct mechanical mobility at the involved joints to allow restoration of range of motion and strength. Joints included the glenohumeral scapulothoracic and sternoclavicular regions. Neuromuscular reeducation 15 minutes  neuromuscular down regulation to the myofascial soft tissue structures of the Glenohumeral and Scapulothoracic region applied with manual as well as instrument assisted techniques    Hold relax techniques were employed to assist with change in the neuromuscular firing pattern of the involved upperextremity for restoration of glenohumeral range of motion dissociated from the scapulothoracic joint. PNF to the scapulothoracic joint for improved position of retraction and depression    Patient Education: [x] Review HEP    [] Progressed/Changed HEP:      Other Objective/Functional Measures:     Passive range of motion right shoulder   forward elevation 165 degrees   lateral abduction 140  external rotation at 90 degrees of abduction at 85  external rotation scapular plane 60 degrees  Internal rotation scapular plane 65   Active ir thumb to T/Lsegment    ASSESSMENT  []  See Plan of Care  []  See progress note/recertification  [x]  Patient will continue to benefit from skilled therapy to address remaining functional deficits:     Active mobility continues to improve. He is still a bit tight with endrange internal rotation. He is about 4 months postop now. Continue working towards endrange mobility progress strengthening for return to golf and tennis. ICD-10-CM ICD-9-CM    1. Chronic right shoulder pain  M25.511 719.41     G89.29 338.29       2. Stiffness of right shoulder joint  M25.611 719.51       3. S/P arthroscopy of right shoulder  Z98.890 V45.89       4.  Traumatic complete tear of right rotator cuff, subsequent encounter  S46.011D V58.89      840.4           PLAN  [x] Progress as tolerated under current plan towards long-term goals  [] Discharge  [] Other: Continue progressive strengthening      Progress more home program        Return in about 2 days (around 10/6/2022) for [id number] [facility] physical therapy.     Aline Chino, PT 10/4/2022  8:34 AM

## 2022-10-06 ENCOUNTER — OFFICE VISIT (OUTPATIENT)
Dept: ORTHOPEDIC SURGERY | Age: 67
End: 2022-10-06
Payer: MEDICARE

## 2022-10-06 DIAGNOSIS — M75.101 ROTATOR CUFF TEAR ARTHROPATHY OF RIGHT SHOULDER: ICD-10-CM

## 2022-10-06 DIAGNOSIS — M12.811 ROTATOR CUFF TEAR ARTHROPATHY OF RIGHT SHOULDER: ICD-10-CM

## 2022-10-06 DIAGNOSIS — M25.611 STIFFNESS OF RIGHT SHOULDER JOINT: ICD-10-CM

## 2022-10-06 DIAGNOSIS — Z98.890 S/P ARTHROSCOPY OF RIGHT SHOULDER: ICD-10-CM

## 2022-10-06 DIAGNOSIS — S46.011D TRAUMATIC COMPLETE TEAR OF RIGHT ROTATOR CUFF, SUBSEQUENT ENCOUNTER: ICD-10-CM

## 2022-10-06 DIAGNOSIS — M25.511 CHRONIC RIGHT SHOULDER PAIN: Primary | ICD-10-CM

## 2022-10-06 DIAGNOSIS — G89.29 CHRONIC RIGHT SHOULDER PAIN: Primary | ICD-10-CM

## 2022-10-06 PROCEDURE — 97112 NEUROMUSCULAR REEDUCATION: CPT | Performed by: PHYSICAL THERAPIST

## 2022-10-06 PROCEDURE — 97140 MANUAL THERAPY 1/> REGIONS: CPT | Performed by: PHYSICAL THERAPIST

## 2022-10-06 PROCEDURE — 97110 THERAPEUTIC EXERCISES: CPT | Performed by: PHYSICAL THERAPIST

## 2022-10-07 NOTE — PROGRESS NOTES
PT DAILY TREATMENT NOTE  and [facility]    Patient Name: Iris Sawant  Date:10/7/2022  : 1955  [x]  Patient  Verified  Payor: Serene Cooper / Plan: [place] MEDICARE PART A & B / Product Type: Medicare /    Total Treatment Time (min): 70  Total Timed Codes (min): 50  Referring Physician: Yara Walker MD     Treatment Area: Right shoulder    SUBJECTIVE    Please do voice questions with his home exercise program as well as working for assistance as he is simulates into a gym related program.      OBJECTIVE  Modality:   []  E-Stim: type _ x _ min     []att   []unatt   []w/US   []w/ice   []w/heat  []  Ultrasound: []cont   []pulse    _ W/cm2 x _  min   []1MHz   []3MHz  [x]  Ice pack 10 min       []  Hot pack _  min       []  Other:     Therapeutic Exercise: (minutes: 40 min) 20 min 1-1  [x] see exercise log          PT Exercise Log  Patient did require cues to complete the exercises with 100% accuracy both verbal and tactile cueing       Activity/Exercise  Exercises were completed with supervision and direction Date        Pendulums x     Ball on total gym flexion and scapula retraction x      Wand ER/flexion 3# x     Table roll x     Passive range of motion shoulder  X     Pulleys x     PROM shoulder X     Active\er /ir/a-delt ,P-delt x Thera-Band     Internal rotation belt stretch x     Sl er   Sl flex/scaption   1#  1#     Zach Chetopa active assisted shoulder elevation x   4#serratus push X   UB E 5 minutes   NuStep arms 5 minutes   Prone Y's and T's on Total Gym 1 pound 30 reps         Manual Therapy: (minutes: 15)    Grade II & III  arthrokinematic, and capsule mobilization with concurrent passive osteokinematic range of motion techniques were applied to the involved upper extremity. Medial stabilization of the scapular was employed as well as concomitant grade 2 and 3 glenohumeral distractions.   Joint mobility techniques employed to restore correct mechanical mobility at the involved joints to allow restoration of range of motion and strength. Joints included the glenohumeral scapulothoracic and sternoclavicular regions. Neuromuscular reeducation 15 minutes  neuromuscular down regulation to the myofascial soft tissue structures of the Glenohumeral and Scapulothoracic region applied with manual as well as instrument assisted techniques    Hold relax techniques were employed to assist with change in the neuromuscular firing pattern of the involved upperextremity for restoration of glenohumeral range of motion dissociated from the scapulothoracic joint. PNF to the scapulothoracic joint for improved position of retraction and depression    Patient Education: [x] Review HEP    [] Progressed/Changed HEP:      Other Objective/Functional Measures:     Passive range of motion right shoulder   forward elevation 165 degrees   lateral abduction 140  external rotation at 90 degrees of abduction at 85  external rotation scapular plane 60 degrees  Internal rotation scapular plane 65   Active ir thumb to L5 segment    ASSESSMENT  []  See Plan of Care  []  See progress note/recertification  [x]  Patient will continue to benefit from skilled therapy to address remaining functional deficits:     Active mobility continues to improve. He is still a bit tight with endrange internal rotation. He is about 4 months postop now. Continue working towards endrange mobility progress strengthening for return to golf and tennis. ICD-10-CM ICD-9-CM    1. Chronic right shoulder pain  M25.511 719.41     G89.29 338.29       2. Stiffness of right shoulder joint  M25.611 719.51       3. Traumatic complete tear of right rotator cuff, subsequent encounter  S46.011D V58.89      840.4       4. S/P arthroscopy of right shoulder  Z98.890 V45.89       5.  Rotator cuff tear arthropathy of right shoulder  M75.101 716.81     M12.811              PLAN  [x] Progress as tolerated under current plan towards long-term goals  [] Discharge  [] Other: Continue progressive strengthening      Extensive progression updating of the patient's home exercise program was completed today he continues to require extensive cueing and progression of the program for 100% accuracy and compliance. His functional strength and use continue to show nice improvement. We will begin more weightbearing activities as well as focus on improved scapular position        No follow-ups on file.     Court Urbina, PT 10/7/2022  8:34 AM

## 2022-10-11 ENCOUNTER — OFFICE VISIT (OUTPATIENT)
Dept: ORTHOPEDIC SURGERY | Age: 67
End: 2022-10-11
Payer: MEDICARE

## 2022-10-11 DIAGNOSIS — M25.611 STIFFNESS OF RIGHT SHOULDER JOINT: ICD-10-CM

## 2022-10-11 DIAGNOSIS — S46.011D TRAUMATIC COMPLETE TEAR OF RIGHT ROTATOR CUFF, SUBSEQUENT ENCOUNTER: ICD-10-CM

## 2022-10-11 DIAGNOSIS — Z98.890 S/P ARTHROSCOPY OF RIGHT SHOULDER: ICD-10-CM

## 2022-10-11 DIAGNOSIS — M25.511 CHRONIC RIGHT SHOULDER PAIN: Primary | ICD-10-CM

## 2022-10-11 DIAGNOSIS — G89.29 CHRONIC RIGHT SHOULDER PAIN: Primary | ICD-10-CM

## 2022-10-11 PROCEDURE — 97112 NEUROMUSCULAR REEDUCATION: CPT | Performed by: PHYSICAL THERAPIST

## 2022-10-11 PROCEDURE — 97110 THERAPEUTIC EXERCISES: CPT | Performed by: PHYSICAL THERAPIST

## 2022-10-11 PROCEDURE — 97140 MANUAL THERAPY 1/> REGIONS: CPT | Performed by: PHYSICAL THERAPIST

## 2022-10-11 NOTE — PROGRESS NOTES
PT DAILY TREATMENT NOTE  and [facility]    Patient Name: Miriam Bajwa  Date:10/11/2022  : 1955  [x]  Patient  Verified  Payor: Roseanna Awais / Plan: [place] MEDICARE PART A & B / Product Type: Medicare /    Total Treatment Time (min): 75  Total Timed Codes (min): 40  Referring Physician: Nathaly Last MD     Treatment Area: Right shoulder    SUBJECTIVE    Please do voice questions with his home exercise program as well as working for assistance as he is simulates into a gym related program.      OBJECTIVE  Modality:   []  E-Stim: type _ x _ min     []att   []unatt   []w/US   []w/ice   []w/heat  []  Ultrasound: []cont   []pulse    _ W/cm2 x _  min   []1MHz   []3MHz  [x]  Ice pack 10 min       []  Hot pack _  min       []  Other:     Therapeutic Exercise: (minutes: 50 min) 20 min 1-1  [x] see exercise log          PT Exercise Log  Patient did require cues to complete the exercises with 100% accuracy both verbal and tactile cueing       Activity/Exercise  Exercises were completed with supervision and direction Date        Pendulums x     Ball on total gym flexion and scapula retraction x      Wand ER/flexion 3# x     Table roll x     Passive range of motion shoulder  X     Pulleys x     PROM shoulder X     Active\er /ir/a-delt ,P-delt x Thera-Band     Internal rotation belt stretch x     Sl er   Sl flex/scaption   1#  1#     Nina Tatyana active assisted shoulder elevation x   4#serratus push X   UB E 5 minutes   NuStep arms 5 minutes   Prone Y's and T's on Total Gym 1 pound 30 reps   External rotation wall walk x   Bodyblade x                         Manual Therapy: (minutes: 10)    Grade II & III  arthrokinematic, and capsule mobilization with concurrent passive osteokinematic range of motion techniques were applied to the involved upper extremity. Medial stabilization of the scapular was employed as well as concomitant grade 2 and 3 glenohumeral distractions.   Joint mobility techniques employed to restore correct mechanical mobility at the involved joints to allow restoration of range of motion and strength. Joints included the glenohumeral scapulothoracic and sternoclavicular regions. Neuromuscular reeducation 10 minutes  neuromuscular down regulation to the myofascial soft tissue structures of the Glenohumeral and Scapulothoracic region applied with manual as well as instrument assisted techniques    Hold relax techniques were employed to assist with change in the neuromuscular firing pattern of the involved upperextremity for restoration of glenohumeral range of motion dissociated from the scapulothoracic joint. PNF to the scapulothoracic joint for improved position of retraction and depression    Patient Education: [x] Review HEP    [] Progressed/Changed HEP:      Other Objective/Functional Measures:     Passive range of motion right shoulder   forward elevation 165 degrees   lateral abduction 140  external rotation at 90 degrees of abduction at 85  external rotation scapular plane 65 degrees  Internal rotation scapular plane 70  Active ir thumb to L3 segment    ASSESSMENT  []  See Plan of Care  []  See progress note/recertification  [x]  Patient will continue to benefit from skilled therapy to address remaining functional deficits:     Patient is going to try to hit some tennis balls today. He is continuing to do well with learning his exercises for home compliance. Begin to work a little more dynamic strength to the lower and mid trap as well as continued weightbearing activities. ICD-10-CM ICD-9-CM    1. Chronic right shoulder pain  M25.511 719.41     G89.29 338.29       2. Stiffness of right shoulder joint  M25.611 719.51       3. Traumatic complete tear of right rotator cuff, subsequent encounter  S46.011D V58.89      840.4       4.  S/P arthroscopy of right shoulder  Z98.890 V45.89             PLAN  [x] Progress as tolerated under current plan towards long-term goals  [] Discharge  [] Other: Continue progressive strengthening            Return in about 3 days (around 10/14/2022).     James Brumfield, PT 10/11/2022  8:34 AM

## 2022-10-14 ENCOUNTER — OFFICE VISIT (OUTPATIENT)
Dept: ORTHOPEDIC SURGERY | Age: 67
End: 2022-10-14
Payer: MEDICARE

## 2022-10-14 DIAGNOSIS — M25.611 STIFFNESS OF RIGHT SHOULDER JOINT: ICD-10-CM

## 2022-10-14 DIAGNOSIS — M25.511 CHRONIC RIGHT SHOULDER PAIN: Primary | ICD-10-CM

## 2022-10-14 DIAGNOSIS — S46.011D TRAUMATIC COMPLETE TEAR OF RIGHT ROTATOR CUFF, SUBSEQUENT ENCOUNTER: ICD-10-CM

## 2022-10-14 DIAGNOSIS — G89.29 CHRONIC RIGHT SHOULDER PAIN: Primary | ICD-10-CM

## 2022-10-14 DIAGNOSIS — Z98.890 S/P ARTHROSCOPY OF RIGHT SHOULDER: ICD-10-CM

## 2022-10-14 PROCEDURE — 97110 THERAPEUTIC EXERCISES: CPT | Performed by: PHYSICAL THERAPIST

## 2022-10-14 PROCEDURE — 97140 MANUAL THERAPY 1/> REGIONS: CPT | Performed by: PHYSICAL THERAPIST

## 2022-10-14 NOTE — PROGRESS NOTES
PT DAILY TREATMENT NOTE  and [place]    Patient Name: Lroe Blackwood  Date:10/16/2022  : 1955  [x]  Patient  Verified  Payor: Matthew Mckinley / Plan: [place] & [place] / Product Type: Medicare /    Total Treatment Time (min): 55  Total Timed Codes (min): 30  Referring Physician: Ced Lawrence MD     Treatment Area: Right shoulder    SUBJECTIVE    Patient reports she has not yet been to the gym but reports he has been working with his home exercise program.      OBJECTIVE  Modality:   []  E-Stim: type _ x _ min     []att   []unatt   []w/US   []w/ice   []w/heat  []  Ultrasound: []cont   []pulse    _ W/cm2 x _  min   []1MHz   []3MHz  [x]  Ice pack 10 min       []  Hot pack _  min       []  Other:     Therapeutic Exercise: (minutes: 30 min) 15 min 1-1  [x] see exercise log          PT Exercise Log  Patient did require cues to complete the exercises with 100% accuracy both verbal and tactile cueing       Activity/Exercise  Exercises were completed with supervision and direction Date        Pendulums x     Ball on total gym flexion and scapula retraction x      Wand ER/flexion 3# x     Table roll x     Passive range of motion shoulder  X     Pulleys x     PROM shoulder X     Active\er /ir/a-delt ,P-delt x Thera-Band     Internal rotation belt stretch x     Sl er   Sl flex/scaption   1#  1#     Shirleyann Crew active assisted shoulder elevation x   4#serratus push X   UB E 5 minutes   NuStep arms 5 minutes   Prone Y's and T's on Total Gym 1 pound 30 reps   External rotation wall walk x   Bodyblade x                         Manual Therapy: (minutes: 10)    Grade II & III  arthrokinematic, and capsule mobilization with concurrent passive osteokinematic range of motion techniques were applied to the involved upper extremity. Medial stabilization of the scapular was employed as well as concomitant grade 2 and 3 glenohumeral distractions.   Joint mobility techniques employed to restore correct mechanical mobility at the involved joints to allow restoration of range of motion and strength. Joints included the glenohumeral scapulothoracic and sternoclavicular regions. Neuromuscular reeducation 5 minutes  neuromuscular down regulation to the myofascial soft tissue structures of the Glenohumeral and Scapulothoracic region applied with manual as well as instrument assisted techniques    Hold relax techniques were employed to assist with change in the neuromuscular firing pattern of the involved upperextremity for restoration of glenohumeral range of motion dissociated from the scapulothoracic joint. PNF to the scapulothoracic joint for improved position of retraction and depression    Patient Education: [x] Review HEP    [] Progressed/Changed HEP:      Other Objective/Functional Measures:     Passive range of motion right shoulder   forward elevation 165 degrees   lateral abduction 140  external rotation at 90 degrees of abduction at 85  external rotation scapular plane 65 degrees  Internal rotation scapular plane 70  Active ir thumb to L2 segment    ASSESSMENT  []  See Plan of Care  []  See progress note/recertification  [x]  Patient will continue to benefit from skilled therapy to address remaining functional deficits:     Patient played tennis he had no setbacks can continue independently with exercise over the next 10 days and will follow him to advance          ICD-10-CM ICD-9-CM    1. Chronic right shoulder pain  M25.511 719.41     G89.29 338.29       2. Stiffness of right shoulder joint  M25.611 719.51       3. Traumatic complete tear of right rotator cuff, subsequent encounter  S46.011D V58.89      840.4       4.  S/P arthroscopy of right shoulder  Z98.890 V45.89               PLAN  [x] Progress as tolerated under current plan towards long-term goals  [] Discharge  [] Other: Continue progressive strengthening            Return in about 2 weeks (around 10/28/2022) for [address] [place] [address] therapy.     Regino Hoyt, PT 10/16/2022  8:34 AM

## 2022-10-19 RX ORDER — TAMSULOSIN HYDROCHLORIDE 0.4 MG/1
0.4 CAPSULE ORAL DAILY
Qty: 90 CAPSULE | Refills: 3 | Status: SHIPPED | OUTPATIENT
Start: 2022-10-19

## 2022-10-25 ENCOUNTER — OFFICE VISIT (OUTPATIENT)
Dept: FAMILY MEDICINE CLINIC | Age: 67
End: 2022-10-25
Payer: MEDICARE

## 2022-10-25 VITALS
RESPIRATION RATE: 17 BRPM | HEART RATE: 75 BPM | TEMPERATURE: 97.5 F | BODY MASS INDEX: 30.92 KG/M2 | DIASTOLIC BLOOD PRESSURE: 78 MMHG | SYSTOLIC BLOOD PRESSURE: 138 MMHG | HEIGHT: 70 IN | WEIGHT: 216 LBS | OXYGEN SATURATION: 95 %

## 2022-10-25 DIAGNOSIS — E78.5 HYPERLIPIDEMIA LDL GOAL <130: ICD-10-CM

## 2022-10-25 DIAGNOSIS — G89.29 CHRONIC PAIN OF BOTH SHOULDERS: ICD-10-CM

## 2022-10-25 DIAGNOSIS — R97.20 ELEVATED PSA MEASUREMENT: ICD-10-CM

## 2022-10-25 DIAGNOSIS — I10 BENIGN ESSENTIAL HYPERTENSION: Primary | ICD-10-CM

## 2022-10-25 DIAGNOSIS — M25.511 CHRONIC PAIN OF BOTH SHOULDERS: ICD-10-CM

## 2022-10-25 DIAGNOSIS — J30.1 SEASONAL ALLERGIC RHINITIS DUE TO POLLEN: ICD-10-CM

## 2022-10-25 DIAGNOSIS — Z71.89 ACP (ADVANCE CARE PLANNING): ICD-10-CM

## 2022-10-25 DIAGNOSIS — R41.3 MEMORY CHANGES: ICD-10-CM

## 2022-10-25 DIAGNOSIS — H81.13 BENIGN PAROXYSMAL POSITIONAL VERTIGO DUE TO BILATERAL VESTIBULAR DISORDER: ICD-10-CM

## 2022-10-25 DIAGNOSIS — M50.30 DDD (DEGENERATIVE DISC DISEASE), CERVICAL: ICD-10-CM

## 2022-10-25 DIAGNOSIS — M25.512 CHRONIC PAIN OF BOTH SHOULDERS: ICD-10-CM

## 2022-10-25 DIAGNOSIS — E55.9 VITAMIN D DEFICIENCY: ICD-10-CM

## 2022-10-25 DIAGNOSIS — Z00.00 MEDICARE ANNUAL WELLNESS VISIT, SUBSEQUENT: ICD-10-CM

## 2022-10-25 DIAGNOSIS — E66.9 OBESITY (BMI 30.0-34.9): ICD-10-CM

## 2022-10-25 DIAGNOSIS — Z23 NEEDS FLU SHOT: ICD-10-CM

## 2022-10-25 DIAGNOSIS — Z12.5 PROSTATE CANCER SCREENING: ICD-10-CM

## 2022-10-25 DIAGNOSIS — M48.061 SPINAL STENOSIS OF LUMBAR REGION, UNSPECIFIED WHETHER NEUROGENIC CLAUDICATION PRESENT: ICD-10-CM

## 2022-10-25 PROCEDURE — 1123F ACP DISCUSS/DSCN MKR DOCD: CPT | Performed by: FAMILY MEDICINE

## 2022-10-25 PROCEDURE — 90694 VACC AIIV4 NO PRSRV 0.5ML IM: CPT | Performed by: FAMILY MEDICINE

## 2022-10-25 PROCEDURE — 3017F COLORECTAL CA SCREEN DOC REV: CPT | Performed by: FAMILY MEDICINE

## 2022-10-25 PROCEDURE — G8510 SCR DEP NEG, NO PLAN REQD: HCPCS | Performed by: FAMILY MEDICINE

## 2022-10-25 PROCEDURE — 99215 OFFICE O/P EST HI 40 MIN: CPT | Performed by: FAMILY MEDICINE

## 2022-10-25 PROCEDURE — G8536 NO DOC ELDER MAL SCRN: HCPCS | Performed by: FAMILY MEDICINE

## 2022-10-25 PROCEDURE — G8417 CALC BMI ABV UP PARAM F/U: HCPCS | Performed by: FAMILY MEDICINE

## 2022-10-25 PROCEDURE — G8427 DOCREV CUR MEDS BY ELIG CLIN: HCPCS | Performed by: FAMILY MEDICINE

## 2022-10-25 PROCEDURE — 3074F SYST BP LT 130 MM HG: CPT | Performed by: FAMILY MEDICINE

## 2022-10-25 PROCEDURE — G8754 DIAS BP LESS 90: HCPCS | Performed by: FAMILY MEDICINE

## 2022-10-25 PROCEDURE — G0463 HOSPITAL OUTPT CLINIC VISIT: HCPCS | Performed by: FAMILY MEDICINE

## 2022-10-25 PROCEDURE — 3078F DIAST BP <80 MM HG: CPT | Performed by: FAMILY MEDICINE

## 2022-10-25 PROCEDURE — G8752 SYS BP LESS 140: HCPCS | Performed by: FAMILY MEDICINE

## 2022-10-25 PROCEDURE — 1101F PT FALLS ASSESS-DOCD LE1/YR: CPT | Performed by: FAMILY MEDICINE

## 2022-10-25 PROCEDURE — G0439 PPPS, SUBSEQ VISIT: HCPCS | Performed by: FAMILY MEDICINE

## 2022-10-25 RX ORDER — ROSUVASTATIN CALCIUM 20 MG/1
20 TABLET, COATED ORAL
Qty: 90 TABLET | Refills: 1 | Status: SHIPPED | OUTPATIENT
Start: 2022-10-25

## 2022-10-25 NOTE — PROGRESS NOTES
Chief Complaint   Patient presents with    Annual Wellness Visit         1. \"Have you been to the ER, urgent care clinic since your last visit? Hospitalized since your last visit? \" No    2. \"Have you seen or consulted any other health care providers outside of the [address] [place] [address] since your last visit? \" No     3. For patients aged 39-70: Has the patient had a colonoscopy / FIT/ Cologuard? Yes - no Care Gap present      If the patient is female:    4. For patients aged 41-77: Has the patient had a mammogram within the past 2 years? NA - based on age or sex      11. For patients aged 21-65: Has the patient had a pap smear? NA - based on age or sex         3 most recent PHQ Screens 10/25/2022   Little interest or pleasure in doing things Not at all   Feeling down, depressed, irritable, or hopeless Not at all   Total Score PHQ 2 0   Trouble falling or staying asleep, or sleeping too much Not at all   Feeling tired or having little energy Not at all   Poor appetite, weight loss, or overeating Not at all   Feeling bad about yourself - or that you are a failure or have let yourself or your family down Not at all   Trouble concentrating on things such as school, work, reading, or watching TV Not at all   Moving or speaking so slowly that other people could have noticed; or the opposite being so fidgety that others notice Not at all   Thoughts of being better off dead, or hurting yourself in some way Not at all   PHQ 9 Score 0   How difficult have these problems made it for you to do your work, take care of your home and get along with others Not difficult at all       Health Maintenance Due   Topic Date Due    Shingrix Vaccine Age 49> (1 of 2) Never done    COVID-19 Vaccine (4 - Booster for [employer] series) 03/19/2022    Flu Vaccine (1) 08/01/2022    Medicare Yearly Exam  08/17/2022      Andrzej Fried is a 74 y.o. male  who presents for routine immunizations.   Influenza Vaccine  She denies any symptoms , reactions or allergies that would exclude them from being immunized today. Risks and adverse reactions were discussed and the VIS was given to them. All questions were addressed. She was observed for 10 min post injection. There were no reactions observed.

## 2022-10-25 NOTE — PROGRESS NOTES
HISTORY OF PRESENT ILLNESS  HPI  Marina Rosales is a 74 y.o. male with a history of cervical DDD and DJD, essential hypertension, bilateral shoulder pain, benign paroxysmal positional vertigo due to bilateral vestibular disorder, spinal stenosis of lumbar region, vitamin D deficiency and hyperlipidemia with LDL goal <130, who presents to the office today for an annual wellness visit and for f/u of these health problems. Pt is seeing orthopedist for his right shoulder and right rotator cuff. He is doing PT and he feels it has improved his shoulder. His vertigo aggravates him more with moving, especially with looking far up or far down. Pt denies unusual SOB, chest pain, and any recent ER visits or hospitalizations. Past Medical History:   Diagnosis Date    Allergic rhinitis 02/27/2010    Arthritis 5/17/2013    Cancer ([facility] [address] 2004    SKIN, LIP (MOHS PROCEDURE)    Hypertension     BORDERLINE; NO MEDS    Obesity (BMI 30.0-34.9) 02/05/2018    Other and unspecified hyperlipidemia 02/27/2010     Past Surgical History:   Procedure Laterality Date    ENDOSCOPY, COLON, DIAGNOSTIC      X2    HX CATARACT REMOVAL Bilateral 87-88    CONGENITAL CATARACTS; W/ IOL    HX LUMBAR LAMINECTOMY      HX ORTHOPAEDIC  08/2019    l-s spine     HX TONSIL AND ADENOIDECTOMY  1960    HX VASECTOMY  1989    KS ANESTH,SURGERY OF SHOULDER      KS SINUS SURGERY PROC UNLISTED  2001     Current Outpatient Medications on File Prior to Visit   Medication Sig Dispense Refill    tamsulosin (FLOMAX) 0.4 mg capsule TAKE 1 CAP BY MOUTH DAILY. INDICATIONS: ENLARGED PROSTATE WITH URINATION PROBLEM 90 Capsule 3    cholecalciferol (VITAMIN D3) 25 mcg (1,000 unit) cap Take 2,000 Units by mouth every other day. No current facility-administered medications on file prior to visit. Allergies   Allergen Reactions    Oxycodone Rash     Patient had full body chills 20 minutes after taking 5 mg. Redness, stuffy nose, and felt warm.     Pcn [Penicillins] Rash     Family History   Problem Relation Age of Onset    Cancer Mother         ovarian    Gall Bladder Disease Mother     Cancer Father         prostate? Elevated Lipids Father     Heart Surgery Father         CABG at 58, lived to 80    Heart Disease Father     No Known Problems Sister     Other Brother         factor 5      Social History     Socioeconomic History    Marital status:    Tobacco Use    Smoking status: Never    Smokeless tobacco: Never   Vaping Use    Vaping Use: Never used   Substance and Sexual Activity    Alcohol use: Yes     Comment: sporadic beer or very sporadic wine or mixed drink    Drug use: No    Sexual activity: Yes     Partners: Female   Other Topics Concern    Caffeine Concern No     Comment: no coffee, occ decaff tea, occ soda    Special Diet No    Exercise No     Comment: not much lately, usually plays tennis 2-3 x a week     Social Determinants of Health     Financial Resource Strain: Low Risk     Difficulty of Paying Living Expenses: Not hard at all   Food Insecurity: No Food Insecurity    Worried About Running Out of Food in the Last Year: Never true    Ran Out of Food in the Last Year: Never true               Review of Systems   Constitutional:  Negative for chills, diaphoresis, fever, malaise/fatigue and weight loss. HENT:  Negative for congestion, ear discharge, ear pain, hearing loss, nosebleeds, sore throat and tinnitus. Eyes:  Negative for blurred vision, double vision, photophobia, pain, discharge and redness. Respiratory:  Negative for cough, hemoptysis, sputum production, shortness of breath, wheezing and stridor. Cardiovascular:  Negative for chest pain, palpitations, orthopnea, claudication, leg swelling and PND. Gastrointestinal:  Negative for abdominal pain, blood in stool, constipation, diarrhea, heartburn, melena, nausea and vomiting. Genitourinary:  Negative for dysuria, flank pain, frequency, hematuria and urgency. Musculoskeletal:  Positive for joint pain (shoulder pain). Negative for back pain, falls, myalgias and neck pain. Skin:  Negative for itching and rash. Neurological:  Positive for dizziness (vertigo). Negative for tingling, tremors, sensory change, speech change, focal weakness, seizures, loss of consciousness, weakness and headaches. Endo/Heme/Allergies:  Negative for environmental allergies and polydipsia. Does not bruise/bleed easily. Psychiatric/Behavioral:  Negative for depression, hallucinations, memory loss, substance abuse and suicidal ideas. The patient is not nervous/anxious and does not have insomnia.     Results for orders placed or performed in visit on 10/25/22   URINALYSIS W/MICROSCOPIC   Result Value Ref Range    Color YELLOW/STRAW      Appearance CLEAR CLEAR      Specific gravity 1.021 1.003 - 1.030      pH (UA) 6.5 5.0 - 8.0      Protein Negative Negative mg/dL    Glucose Negative Negative mg/dL    Ketone Negative Negative mg/dL    Bilirubin Negative Negative      Blood Negative Negative      Urobilinogen 0.2 0.2 - 1.0 EU/dL    Nitrites Negative Negative      Leukocyte Esterase Negative Negative      WBC 0-4 0 - 4 /hpf    RBC 0-5 0 - 5 /hpf    Epithelial cells FEW FEW /lpf    Bacteria Negative Negative /hpf    Hyaline cast 0-2 0 - 5 /lpf   PSA SCREENING (SCREENING)   Result Value Ref Range    Prostate Specific Ag 3.9 0.01 - 4.0 ng/mL   LIPID PANEL   Result Value Ref Range    Cholesterol, total 143 <200 MG/DL    Triglyceride 136 <150 MG/DL    HDL Cholesterol 45 MG/DL    LDL, calculated 70.8 0 - 100 MG/DL    VLDL, calculated 27.2 MG/DL    CHOL/HDL Ratio 3.2 0.0 - 5.0     METABOLIC PANEL, COMPREHENSIVE   Result Value Ref Range    Sodium 140 136 - 145 mmol/L    Potassium 4.2 3.5 - 5.1 mmol/L    Chloride 107 97 - 108 mmol/L    CO2 27 21 - 32 mmol/L    Anion gap 6 5 - 15 mmol/L    Glucose 89 65 - 100 mg/dL    BUN 14 6 - 20 MG/DL    Creatinine 1.13 0.70 - 1.30 MG/DL    BUN/Creatinine ratio 12 12 - 20      eGFR >60 >60 ml/min/1.73m2    Calcium 9.1 8.5 - 10.1 MG/DL    Bilirubin, total 0.6 0.2 - 1.0 MG/DL    ALT (SGPT) 30 12 - 78 U/L    AST (SGOT) 23 15 - 37 U/L    Alk. phosphatase 72 45 - 117 U/L    Protein, total 6.7 6.4 - 8.2 g/dL    Albumin 4.1 3.5 - 5.0 g/dL    Globulin 2.6 2.0 - 4.0 g/dL    A-G Ratio 1.6 1.1 - 2.2           Physical Exam  Visit Vitals  /78 (BP 1 Location: Left upper arm, BP Patient Position: Sitting, BP Cuff Size: Adult)   Pulse 75   Temp 97.5 °F (36.4 °C) (Temporal)   Resp 17   Ht 5' 10\" (1.778 m)   Wt 216 lb (98 kg)   SpO2 95%   BMI 30.99 kg/m²         General:  Alert, cooperative, no distress, appears stated age. Head:  Normocephalic, without obvious abnormality, atraumatic. Eyes:  Conjunctivae/corneas clear. PERRL, EOMs intact. Fundi benign   Ears:  Normal TMs and external ear canals both ears. Nose: Nares normal. Septum midline. Mucosa normal. No drainage or sinus tenderness. Throat: Lips, mucosa, and tongue normal. Teeth and gums normal.   Neck: Supple, symmetrical, trachea midline, no adenopathy, thyroid: no enlargement/tenderness/nodules, no carotid bruit and no JVD. Back:   Symmetric, no curvature. ROM normal. No CVA tenderness. Lungs:   Clear to auscultation bilaterally. Chest wall:  No tenderness or deformity. Heart:  Regular rate and rhythm, S1, S2 normal, no murmur, click, rub or gallop. Abdomen:   Soft, non-tender. Bowel sounds normal. No masses,  No organomegaly. Extremities: Extremities normal, atraumatic, no cyanosis or edema. Pulses: 2+ and symmetric all extremities. Skin: Skin color, texture, turgor normal. No rashes or lesions   Lymph nodes: Cervical, supraclavicular, and axillary nodes normal.   Neurologic: CNII-XII intact. Normal strength, sensation and reflexes throughout. ASSESSMENT and PLAN    ICD-10-CM ICD-9-CM    1. Benign essential hypertension  I10 401.1       2.  Needs flu shot  Z23 V04.81 INFLUENZA, FLUAD, (AGE 72 Y+), IM, PF, 0.5 ML      3. Hyperlipidemia LDL goal <130  E78.5 272.4       4. Obesity (BMI 30.0-34. 9)  E66.9 278.00       5. Seasonal allergic rhinitis due to pollen  J30.1 477.0       6. Spinal stenosis of lumbar region, unspecified whether neurogenic claudication present  M48.061 724.02       7. DDD,DJD cervicalC5-6,6-7foraminal encroachment on X ray-4/2013  M50.30 722.4       8. Vitamin D deficiency  E55.9 268.9       9. Medicare annual wellness visit, subsequent  C06.69 P61.7 METABOLIC PANEL, COMPREHENSIVE      LIPID PANEL      PSA SCREENING (SCREENING)      URINALYSIS W/MICROSCOPIC      URINALYSIS W/MICROSCOPIC      PSA SCREENING (SCREENING)      LIPID PANEL      METABOLIC PANEL, COMPREHENSIVE      10. Prostate cancer screening  Z12.5 V76.44 PSA SCREENING (SCREENING)      PSA SCREENING (SCREENING)      11. Benign paroxysmal positional vertigo due to bilateral vestibular disorder  H81.13 386.11       12. Chronic pain of both shoulders- worse in AM and loosens up with activity. M25.511 719.41     G89.29 338.29     M25.512        13. ACP (advance care planning)  Z71.89 V65.49       14. Elevated PSA measurement  R97.20 790.93       15. Memory changes  R41.3 780.93         Diagnoses and all orders for this visit:    1. Benign essential hypertension    2. Needs flu shot  -     INFLUENZA, FLUAD, (AGE 65 Y+), IM, PF, 0.5 ML    3. Hyperlipidemia LDL goal <130    4. Obesity (BMI 30.0-34.9)    5. Seasonal allergic rhinitis due to pollen    6. Spinal stenosis of lumbar region, unspecified whether neurogenic claudication present    7. DDD,DJD cervicalC5-6,6-7foraminal encroachment on X ray-4/2013    8. Vitamin D deficiency    9. Medicare annual wellness visit, subsequent  -     METABOLIC PANEL, COMPREHENSIVE; Future  -     LIPID PANEL; Future  -     PSA SCREENING (SCREENING); Future  -     URINALYSIS W/MICROSCOPIC; Future    10. Prostate cancer screening  -     PSA SCREENING (SCREENING); Future    11.  Benign paroxysmal positional vertigo due to bilateral vestibular disorder    12. Chronic pain of both shoulders- worse in AM and loosens up with activity. 13. ACP (advance care planning)    14. Elevated PSA measurement    15. Memory changes    Other orders  -     rosuvastatin (CRESTOR) 20 mg tablet; Take 1 Tablet by mouth nightly. Follow-up and Dispositions    Return in about 6 months (around 4/25/2023) for F/U HTN and CHOL, osteoarthritis, F/U of obesity. lab results and schedule of future lab studies reviewed with patient  reviewed diet, exercise and weight control  cardiovascular risk and specific lipid/LDL goals reviewed  reviewed medications and side effects in detail  Please call my office if there are any questions- [phone]. I will arrange for follow up after the lab tests done from today return  Recommended a weekly \"heart check. \" I went into detail how to do this. Call for refills on medications as needed. Discussed expected course/resolution/complications of diagnosis in detail with patient. Medication risks/benefits/costs/interactions/alternatives discussed with patient. Pt was given an after visit summary which includes diagnoses, current medications & vitals. Pt expressed understanding with the diagnosis and plan    BMI is significantly elevated- in the overweight range. I reviewed diet, exercise and weight control. Discussed weight control in detail, the importance of mainly decreased carbs, and for weight maintenance, exercise; discussed different diets and that it isn't as important to watch the type of foods as it is to decrease calorie intake no matter what type of diet you do, etc. Mindful eating also discussed- Naturally Slim( now W[employer]) or Noom are 2 options  Total 50 minutes( in addition to the time doing the [employer] Visit)  re: Recommended a weekly \"heart check. \" I went into detail how to do this.    Regular exercise is very important to your health; it helps mentally, physically, socially; it prevents injuries if done properly. Exercise, even as simple as walking 20-30 minutes daily has major benefits to your health even though your \"numbers\" are the same in the lab. See if you can add this into your daily regimen and after a few months it will become a regular habit-\"just something you do,\" like brushing your teeth. A combination of aerobic exercise and strengthening and stretching is felt to be the best for you, so this should be your ultimate goal.   This can be done in the privacy of your home or in a group setting as at the gym  Some prefer having a , others prefer to do exercise in groups or individually. Do what \"works\" for you. You need to make it simple and \"fun,\" or you most likely will not continue it. Reviewed symptoms, or lack thereof, of hypertension and elevated cholesterol. Also, discussed symptoms of concern that were noted today in the note above, treatment options( including doing nothing), when to follow up before recommended time frame. Also, answered all questions. I encouraged pt to start doing a heart check weekly because he has not been doing that. He asked about monitoring his brain function and we talked about, similar to previous visit, doing the 3 object memory test and he admits to not doing that on a regular basis. He will let me know if he sees any significant change in his ability to do that. He should continue vitamin D. 2000 units is what he believes he is on. His level has been normal for years after being low 10 years ago. Because he is on simvastatin, we decided to change him to rosuvastatin to avoid potential drug interactions in the future. This document was written by Miguel Ángel Martinez, as dictated by Sarai Patten MD.   I have reviewed and agree with the above note and have made corrections where appropriate Ancelmo Gregg M.D.   This is the Subsequent Medicare Annual Wellness Exam, performed 12 months or more after the Initial AWV or the last Subsequent AWV    I have reviewed the patient's medical history in detail and updated the computerized patient record. Assessment/Plan   Education and counseling provided:  Are appropriate based on today's review and evaluation    1. Benign essential hypertension  2. Needs flu shot  -     INFLUENZA, FLUAD, (AGE 65 Y+), IM, PF, 0.5 ML  3. Hyperlipidemia LDL goal <130  4. Obesity (BMI 30.0-34.9)  5. Seasonal allergic rhinitis due to pollen  6. Spinal stenosis of lumbar region, unspecified whether neurogenic claudication present  7. DDD,DJD cervicalC5-6,6-7foraminal encroachment on X ray-4/2013  8. Vitamin D deficiency  9. Medicare annual wellness visit, subsequent  -     METABOLIC PANEL, COMPREHENSIVE; Future  -     LIPID PANEL; Future  -     PSA SCREENING (SCREENING); Future  -     URINALYSIS W/MICROSCOPIC; Future  10. Prostate cancer screening  -     PSA SCREENING (SCREENING); Future  11. Benign paroxysmal positional vertigo due to bilateral vestibular disorder  12. Chronic pain of both shoulders- worse in AM and loosens up with activity. 13. ACP (advance care planning)  14. Elevated PSA measurement  15.  Memory changes       Depression Risk Factor Screening     3 most recent PHQ Screens 10/25/2022   Little interest or pleasure in doing things Not at all   Feeling down, depressed, irritable, or hopeless Not at all   Total Score PHQ 2 0   Trouble falling or staying asleep, or sleeping too much Not at all   Feeling tired or having little energy Not at all   Poor appetite, weight loss, or overeating Not at all   Feeling bad about yourself - or that you are a failure or have let yourself or your family down Not at all   Trouble concentrating on things such as school, work, reading, or watching TV Not at all   Moving or speaking so slowly that other people could have noticed; or the opposite being so fidgety that others notice Not at all   Thoughts of being better off dead, or hurting yourself in some way Not at all   PHQ 9 Score 0   How difficult have these problems made it for you to do your work, take care of your home and get along with others Not difficult at all       Alcohol & Drug Abuse Risk Screen    Do you average more than 1 drink per night or more than 7 drinks a week: No    In the past three months have you have had more than 4 drinks containing alcohol on one occasion: No          Functional Ability and Level of Safety    Hearing: Hearing is good. Activities of Daily Living: The home contains: no safety equipment. Patient does total self care      Ambulation: with no difficulty     Fall Risk:  Fall Risk Assessment, last 12 mths 10/25/2022   Able to walk? Yes   Fall in past 12 months? 0   Do you feel unsteady? 1   Are you worried about falling 0      Abuse Screen:  Patient is not abused       Cognitive Screening    Has your family/caregiver stated any concerns about your memory: no     Cognitive Screening: Normal - Mini Cog Test  I reviewed advanced directive information and written information given to patient; patient to make follow up appointment with a NN for any questions,to review choices made for health care, agent, and anatomical gifts that are on the advanced directive at home.      Health Maintenance Due     Health Maintenance Due   Topic Date Due    Shingrix Vaccine Age 49> (1 of 2) Never done    COVID-19 Vaccine (4 - Booster for Demarco Peter series) 01/14/2022       Patient Care Team   Patient Care Team:  Raymond Martinez MD as PCP - [facility]  Raymond Martinez MD as PCP - [facility]  Roseanna Moreno MD as Physician (Orthopedic Surgery)  Miguel Ángel Garcia MD as Physician (Orthopedic Surgery)  Gayl Mourning, Mancil Siemens as Physician Assistant (Physician Assistant)    History     Patient Active Problem List   Diagnosis Code    Vitamin D deficiency E55.9    DDD,DJD cervicalC5-6,6-7foraminal encroachment on X ray-4/2013 M50.30    Hyperlipidemia LDL goal <130 E78.5    Chronic pain of both shoulders- worse in AM and loosens up with activity. M25.511, G89.29, M25.512    Seasonal allergic rhinitis due to pollen J30.1    Obesity (BMI 30.0-34. 9) E66.9    Spinal stenosis of lumbar region M48.061    Nocturia R35.1    Benign paroxysmal positional vertigo due to bilateral vestibular disorder H81.13    Benign essential hypertension I10    Impingement syndrome of right shoulder M75.41    Chronic right shoulder pain M25.511, G89.29    Neck pain M54.2    Traumatic complete tear of right rotator cuff S46.011A     Past Medical History:   Diagnosis Date    Allergic rhinitis 02/27/2010    Arthritis 5/17/2013    Cancer ([facility] [address] 2004    SKIN, LIP (MOHS PROCEDURE)    Hypertension     BORDERLINE; NO MEDS    Obesity (BMI 30.0-34.9) 02/05/2018    Other and unspecified hyperlipidemia 02/27/2010      Past Surgical History:   Procedure Laterality Date    ENDOSCOPY, COLON, DIAGNOSTIC      X2    HX CATARACT REMOVAL Bilateral 87-88    CONGENITAL CATARACTS; W/ IOL    HX LUMBAR LAMINECTOMY      HX ORTHOPAEDIC  08/2019    l-s spine     HX TONSIL AND ADENOIDECTOMY  1960    HX VASECTOMY  1989    MD ANESTH,SURGERY OF SHOULDER      MD SINUS SURGERY PROC UNLISTED  2001     Current Outpatient Medications   Medication Sig Dispense Refill    rosuvastatin (CRESTOR) 20 mg tablet Take 1 Tablet by mouth nightly. 90 Tablet 1    tamsulosin (FLOMAX) 0.4 mg capsule TAKE 1 CAP BY MOUTH DAILY. INDICATIONS: ENLARGED PROSTATE WITH URINATION PROBLEM 90 Capsule 3    cholecalciferol (VITAMIN D3) 25 mcg (1,000 unit) cap Take 2,000 Units by mouth every other day. hydroCHLOROthiazide (HYDRODIURIL) 25 mg tablet TAKE 1 TABLET BY MOUTH DAILY. TAKE IN THE MORNING WITH POTASSIUM RICH FOOD OR BEVERAGE 90 Tablet 1     Allergies   Allergen Reactions    Oxycodone Rash     Patient had full body chills 20 minutes after taking 5 mg. Redness, stuffy nose, and felt warm.     Pcn [Penicillins] Rash       Family History   Problem Relation Age of Onset    Cancer Mother         ovarian    Gall Bladder Disease Mother     Cancer Father         prostate?     Elevated Lipids Father     Heart Surgery Father         CABG at 58, lived to 80    Heart Disease Father     No Known Problems Sister     Other Brother         factor 5      Social History     Tobacco Use    Smoking status: Never    Smokeless tobacco: Never   Substance Use Topics    Alcohol use: Yes     Comment: sporadic beer or very sporadic wine or mixed drink         Juanito Marcus MD

## 2022-10-26 ENCOUNTER — OFFICE VISIT (OUTPATIENT)
Dept: ORTHOPEDIC SURGERY | Age: 67
End: 2022-10-26
Payer: MEDICARE

## 2022-10-26 DIAGNOSIS — S46.011D TRAUMATIC COMPLETE TEAR OF RIGHT ROTATOR CUFF, SUBSEQUENT ENCOUNTER: ICD-10-CM

## 2022-10-26 DIAGNOSIS — M25.611 STIFFNESS OF RIGHT SHOULDER JOINT: ICD-10-CM

## 2022-10-26 DIAGNOSIS — G89.29 CHRONIC RIGHT SHOULDER PAIN: Primary | ICD-10-CM

## 2022-10-26 DIAGNOSIS — M12.811 ROTATOR CUFF TEAR ARTHROPATHY OF RIGHT SHOULDER: ICD-10-CM

## 2022-10-26 DIAGNOSIS — M75.101 ROTATOR CUFF TEAR ARTHROPATHY OF RIGHT SHOULDER: ICD-10-CM

## 2022-10-26 DIAGNOSIS — Z98.890 S/P ARTHROSCOPY OF RIGHT SHOULDER: ICD-10-CM

## 2022-10-26 DIAGNOSIS — M25.511 CHRONIC RIGHT SHOULDER PAIN: Primary | ICD-10-CM

## 2022-10-26 LAB
ALBUMIN SERPL-MCNC: 4.1 G/DL (ref 3.5–5)
ALBUMIN/GLOB SERPL: 1.6 {RATIO} (ref 1.1–2.2)
ALP SERPL-CCNC: 72 U/L (ref 45–117)
ALT SERPL-CCNC: 30 U/L (ref 12–78)
ANION GAP SERPL CALC-SCNC: 6 MMOL/L (ref 5–15)
APPEARANCE UR: CLEAR
AST SERPL-CCNC: 23 U/L (ref 15–37)
BACTERIA URNS QL MICRO: NEGATIVE /HPF
BILIRUB SERPL-MCNC: 0.6 MG/DL (ref 0.2–1)
BILIRUB UR QL: NEGATIVE
BUN SERPL-MCNC: 14 MG/DL (ref 6–20)
BUN/CREAT SERPL: 12 (ref 12–20)
CALCIUM SERPL-MCNC: 9.1 MG/DL (ref 8.5–10.1)
CHLORIDE SERPL-SCNC: 107 MMOL/L (ref 97–108)
CHOLEST SERPL-MCNC: 143 MG/DL
CO2 SERPL-SCNC: 27 MMOL/L (ref 21–32)
COLOR UR: NORMAL
CREAT SERPL-MCNC: 1.13 MG/DL (ref 0.7–1.3)
EPITH CASTS URNS QL MICRO: NORMAL /LPF
GLOBULIN SER CALC-MCNC: 2.6 G/DL (ref 2–4)
GLUCOSE SERPL-MCNC: 89 MG/DL (ref 65–100)
GLUCOSE UR STRIP.AUTO-MCNC: NEGATIVE MG/DL
HDLC SERPL-MCNC: 45 MG/DL
HDLC SERPL: 3.2 {RATIO} (ref 0–5)
HGB UR QL STRIP: NEGATIVE
HYALINE CASTS URNS QL MICRO: NORMAL /LPF (ref 0–5)
KETONES UR QL STRIP.AUTO: NEGATIVE MG/DL
LDLC SERPL CALC-MCNC: 70.8 MG/DL (ref 0–100)
LEUKOCYTE ESTERASE UR QL STRIP.AUTO: NEGATIVE
NITRITE UR QL STRIP.AUTO: NEGATIVE
PH UR STRIP: 6.5 [PH] (ref 5–8)
POTASSIUM SERPL-SCNC: 4.2 MMOL/L (ref 3.5–5.1)
PROT SERPL-MCNC: 6.7 G/DL (ref 6.4–8.2)
PROT UR STRIP-MCNC: NEGATIVE MG/DL
PSA SERPL-MCNC: 3.9 NG/ML (ref 0.01–4)
RBC #/AREA URNS HPF: NORMAL /HPF (ref 0–5)
SODIUM SERPL-SCNC: 140 MMOL/L (ref 136–145)
SP GR UR REFRACTOMETRY: 1.02 (ref 1–1.03)
TRIGL SERPL-MCNC: 136 MG/DL (ref ?–150)
UROBILINOGEN UR QL STRIP.AUTO: 0.2 EU/DL (ref 0.2–1)
VLDLC SERPL CALC-MCNC: 27.2 MG/DL
WBC URNS QL MICRO: NORMAL /HPF (ref 0–4)

## 2022-10-26 PROCEDURE — 97110 THERAPEUTIC EXERCISES: CPT | Performed by: PHYSICAL THERAPIST

## 2022-10-26 PROCEDURE — 97112 NEUROMUSCULAR REEDUCATION: CPT | Performed by: PHYSICAL THERAPIST

## 2022-10-26 NOTE — PROGRESS NOTES
PT DAILY TREATMENT NOTE  and [place]    Patient Name: Nati Jorge  Date:10/26/2022  : 1955  [x]  Patient  Verified  Payor: Madalyn Cruz / Plan: [place] PART A & B / Product Type: Medicare /    Total Treatment Time (min): 55  Total Timed Codes (min): 30  Referring Physician: Maureen Melendrez MD     Treatment Area: Right shoulder    SUBJECTIVE    Try to hit some golf balls was not as effective have played tennis twice will play again today continue to have some pain and endrange motion loss      OBJECTIVE  Modality:   []  E-Stim: type _ x _ min     []att   []unatt   []w/US   []w/ice   []w/heat  []  Ultrasound: []cont   []pulse    _ W/cm2 x _  min   []1MHz   []3MHz  Ice pack 10 min       []  Hot pack _  min       []  Other:     Therapeutic Exercise: (minutes: 40 min) 15 min 1-1  [x] see exercise log          PT Exercise Log  Patient did require cues to complete the exercises with 100% accuracy both verbal and tactile cueing       Activity/Exercise  Exercises were completed with supervision and direction Date        Pendulums x     Ball on total gym flexion and scapula retraction x      Wand ER/flexion 3# x     Table roll x     Passive range of motion shoulder  X     Pulleys x     PROM shoulder X     Active\er /ir/a-delt ,P-delt x Thera-Band     Internal rotation belt stretch x     Sl er   Sl flex/scaption   1#  1#     George Said active assisted shoulder elevation x   4#serratus push X   UB E 5 minutes   NuStep arms 5 minutes   Prone Y's and T's on Total Gym 1 pound 30 reps   External rotation wall walk x   Bodyblade x                         Manual Therapy: (minutes:  )      Neuromuscular reeducation 15 minutes  neuromuscular down regulation to the myofascial soft tissue structures of the Glenohumeral and Scapulothoracic region applied with manual as well as instrument assisted techniques    Hold relax techniques were employed to assist with change in the neuromuscular firing pattern of the involved upperextremity for restoration of glenohumeral range of motion dissociated from the scapulothoracic joint. PNF to the scapulothoracic joint for improved position of retraction and depression    Patient Education: [x] Review HEP    [] Progressed/Changed HEP:      Other Objective/Functional Measures:     Passive range of motion right shoulder   forward elevation 165 degrees   lateral abduction 140  external rotation at 90 degrees of abduction at 85  external rotation scapular plane 65 degrees  Internal rotation scapular plane 70  Active ir thumb to L2 segment    ASSESSMENT  []  See Plan of Care  []  See progress note/recertification  [x]  Patient will continue to benefit from skilled therapy to address remaining functional deficits:     He is returning to physical activity including tennis and golf. Tennis more trouble than his groundstrokes with tennis. He has just some slight loss of endrange internal rotation and functional strength but overall is doing well. We will follow him in 10 days to review his exercise program and advance          ICD-10-CM ICD-9-CM    1. Chronic right shoulder pain  M25.511 719.41     G89.29 338.29       2. Traumatic complete tear of right rotator cuff, subsequent encounter  S46.011D V58.89      840.4       3. Stiffness of right shoulder joint  M25.611 719.51       4. S/P arthroscopy of right shoulder  Z98.890 V45.89       5. Rotator cuff tear arthropathy of right shoulder  M75.101 716.81     M12.811                PLAN  [x] Progress as tolerated under current plan towards long-term goals  [] Discharge  [] Other: Continue progressive strengthening      Return in about 10 days (around 11/5/2022) for [address] [place] [address] physical therapy.     Francois Bahena, PT 10/26/2022  8:34 AM

## 2022-11-02 DIAGNOSIS — I10 BENIGN ESSENTIAL HYPERTENSION: ICD-10-CM

## 2022-11-02 RX ORDER — HYDROCHLOROTHIAZIDE 25 MG/1
25 TABLET ORAL DAILY
Qty: 90 TABLET | Refills: 1 | Status: SHIPPED | OUTPATIENT
Start: 2022-11-02

## 2022-11-04 ENCOUNTER — OFFICE VISIT (OUTPATIENT)
Dept: ORTHOPEDIC SURGERY | Age: 67
End: 2022-11-04
Payer: MEDICARE

## 2022-11-04 DIAGNOSIS — S46.011D TRAUMATIC COMPLETE TEAR OF RIGHT ROTATOR CUFF, SUBSEQUENT ENCOUNTER: ICD-10-CM

## 2022-11-04 DIAGNOSIS — G89.29 CHRONIC RIGHT SHOULDER PAIN: Primary | ICD-10-CM

## 2022-11-04 DIAGNOSIS — M25.611 STIFFNESS OF RIGHT SHOULDER JOINT: ICD-10-CM

## 2022-11-04 DIAGNOSIS — M12.811 ROTATOR CUFF TEAR ARTHROPATHY OF RIGHT SHOULDER: ICD-10-CM

## 2022-11-04 DIAGNOSIS — M25.511 CHRONIC RIGHT SHOULDER PAIN: Primary | ICD-10-CM

## 2022-11-04 DIAGNOSIS — M75.101 ROTATOR CUFF TEAR ARTHROPATHY OF RIGHT SHOULDER: ICD-10-CM

## 2022-11-04 DIAGNOSIS — Z98.890 S/P ARTHROSCOPY OF RIGHT SHOULDER: ICD-10-CM

## 2022-11-04 PROCEDURE — 97112 NEUROMUSCULAR REEDUCATION: CPT | Performed by: PHYSICAL THERAPIST

## 2022-11-04 PROCEDURE — 97110 THERAPEUTIC EXERCISES: CPT | Performed by: PHYSICAL THERAPIST

## 2022-11-04 NOTE — PROGRESS NOTES
PT DAILY TREATMENT NOTE  and PLAN OF CARE UPDATE    Patient Name: Patricia Ross  Date:2022  : 1955  [x]  Patient  Verified  Payor: Radha Squires / Plan: [place] & [place] / Product Type: Medicare /    Total Treatment Time (min): 55  Total Timed Codes (min): 40  Referring Physician: Harpreet Prince MD     Treatment Area: Right shoulder    SUBJECTIVE    Reports he was playing tennis x45 minutes with no shoulder pain. He reports he is independent with his exercise program.    OBJECTIVE  Modality:     Therapeutic Exercise: (minutes: 40 min) 25 min 1-1  [x] see exercise log          PT Exercise Log  Patient did require cues to complete the exercises with 100% accuracy both verbal and tactile cueing       Activity/Exercise  Exercises were completed with supervision and direction Date        Pendulums x     Ball on total gym flexion and scapula retraction x      Wand ER/flexion 3# x     Table roll x     Passive range of motion shoulder  X     Pulleys x     PROM shoulder X     Active\er /ir/a-delt ,P-delt x Thera-Band     Internal rotation belt stretch x     Sl er   Sl flex/scaption   1#  1#     Tejeda Dundas active assisted shoulder elevation x   4#serratus push X   UB E 5 minutes   NuStep arms 5 minutes   Prone Y's and T's on Total Gym 1 pound 30 reps   External rotation wall walk x   Bodyblade x                         Neuromuscular reeducation 15 minutes  neuromuscular down regulation to the myofascial soft tissue structures of the Glenohumeral and Scapulothoracic region applied with manual as well as instrument assisted techniques    Hold relax techniques were employed to assist with change in the neuromuscular firing pattern of the involved upperextremity for restoration of glenohumeral range of motion dissociated from the scapulothoracic joint.   PNF to the scapulothoracic joint for improved position of retraction and depression    Patient Education: [x] Review HEP    [] Progressed/Changed HEP:      Other Objective/Functional Measures:     Passive range of motion right shoulder   forward elevation 165 degrees   lateral abduction 145  external rotation at 90 degrees of abduction at 85  external rotation scapular plane 65 degrees  Internal rotation scapular plane 70  Active ir thumb to L2 segment    ASSESSMENT  []  See Plan of Care  []  See progress note/recertification  [x]  Patient will continue to benefit from skilled therapy to address remaining functional deficits: The patient has returned to playing tennis. He is independent with a home exercise program.   I have encouraged him to exercise on a daily basis for maintaining his mobility including forward elevation stretch internal and external rotation stretch. Like him to work with strengthening at least 3 days a week. He has had a little bit of internal rotation motion loss that he continues to address this with self-directed stretching. He is going to continue over the next 6 to 8 weeks independently working with progressive strengthening and mobility he will follow if he has any setbacks. ICD-10-CM ICD-9-CM    1. Chronic right shoulder pain  M25.511 719.41     G89.29 338.29       2. Traumatic complete tear of right rotator cuff, subsequent encounter  S46.011D V58.89      840.4       3. Stiffness of right shoulder joint  M25.611 719.51       4. S/P arthroscopy of right shoulder  Z98.890 V45.89       5. Rotator cuff tear arthropathy of right shoulder  M75.101 716.81     M12.811                  PLAN  [x] Progress as tolerated under current plan towards long-term goals  [] Discharge  [] Other: Continue progressive strengthening      Return if symptoms worsen or fail to improve, for discharge to . Yelitza Romo MET.     Dahiana Montez, PT 11/4/2022  8:34 AM

## 2022-11-05 NOTE — PROGRESS NOTES
ASSESSMENT/PLAN:  Below is the assessment and plan developed based on review of pertinent history, physical exam, labs, studies, and medications. 1. S/P arthroscopy of right shoulder  -     REFERRAL TO PHYSICAL THERAPY    In discussion with the patient, we considered the numerus possible diagnoses that could be contributing to their present symptoms. We also deliberated on the extensive management options that must be considered to treat their current condition. We reviewed their accessible prior medical records, diagnostic tests, and current health and employment information. We considered how these symptoms were affecting the patient´s activities of daily living as well as employment and fitness activities. The patient had various questions regarding the possible risks, benefits, complications, morbidity and mortality regarding their diagnosis and treatment options. The patients´ comorbidities were considered, and I advocated that they consider maximizing lifestyle modification through nutrition and exercise to aid in addressing their symptoms. Shared decision making yielded an understanding to move forward with conservation treatment preferences. The patient expressed understanding that if conservative management fails to alleviate the present symptoms they will return to office for re-evaluation and consideration of additional diagnostic tests and potential surgical options. In the interim, we have recommended ice, elevation, and take prescription anti-inflammatory medications along with a physician directed home exercise program. We discussed the risks and common side effects of anti-inflammatory medications and instructed the patient to discontinue the medication and contact us if they experienced any side effects. The patient was encouraged to discuss the possible side effects with their family physician or pharmacist prior to initiating any new medications.     We discussed the fact that many of the recommended treatment options presented are significantly limited by the patient´s social determinants of health. We also reviewed the circumstances surrounding the environment that they live and work which affect a wide range of health risk. We considered the limited access to appropriate educational resources regarding proper nutrition and exercise as well as the economic and social support necessary to maintain health and wellbeing. Given that the patient's symptoms are increasing in frequency and duration we have decided to prescribe physical therapy. We talked about the fact that the goal of physical therapy is for the therapist to assist in developing a program to help return the patient to full strength, function and mobility and decrease pain. We also discussed that the therapist may combine several techniques to help decrease pain. These include but are not limited to stretching, balance exercises, strength training, massage, cold and heat therapy, and electrical stimulation. Although, physical therapy is generally safe, we went over the potential risks to include the worsening of pre-existing conditions, continued pain and no improvement in flexibility, mobility, and strength. We will have the patient follow up after physical therapy to closely monitor their progress. We talked about following up sooner if therapy is not progressing on a weekly basis. We will progress him to home exercise program.  We will continue him on throwers 10. I am happy to see him back in the future. SUBJECTIVE/OBJECTIVE:  Marina Rosales (: 1955) is a 74 y.o. male, patient,here for evaluation of the right shoulder. Patient is s/p right shoulder rotator cuff repair, SAD, DCE on 6/3/22. Patient states he is overall doing well following this procedure. He has been attending physical therapy regularly. He has been advancing with his range of motion and transitioning to some strengthening.   He is anxious to progress his activities. Reports he is back to playing tennis. Reports he played up to an hour. He reports he has minimal pain in his shoulder. Physical Exam    Examination of the right shoulder reveals that the incisions are healing well, without evidence of drainage, erythema or warmth. There is limited range of motion secondary to discomfort. Strength is 5/5 distally. There is no tenderness at the elbow and wrist. Sensation is intact to light touch distally and there is a brisk capillary refill. Allergies   Allergen Reactions    Oxycodone Rash     Patient had full body chills 20 minutes after taking 5 mg. Redness, stuffy nose, and felt warm. Pcn [Penicillins] Rash       Current Outpatient Medications   Medication Sig    hydroCHLOROthiazide (HYDRODIURIL) 25 mg tablet Take 1 Tablet by mouth daily. Take in the morning with potassium rich food or beverage    simvastatin (ZOCOR) 40 mg tablet Take 1 Tablet by mouth nightly. tamsulosin (FLOMAX) 0.4 mg capsule TAKE 1 CAP BY MOUTH DAILY. INDICATIONS: ENLARGED PROSTATE WITH URINATION PROBLEM    Cholecalciferol, Vitamin D3, (VITAMIN D3) 1,000 unit cap Take 2,000 Units by mouth every other day. No current facility-administered medications for this visit.        Past Medical History:   Diagnosis Date    Allergic rhinitis 2/27/2010    Cancer ([facility] [address] 2004    SKIN, LIP (MOHS PROCEDURE)    Hypertension     BORDERLINE; NO MEDS    Obesity (BMI 30.0-34.9) 2/5/2018    Other and unspecified hyperlipidemia 2/27/2010       Past Surgical History:   Procedure Laterality Date    ENDOSCOPY, COLON, DIAGNOSTIC      X2    HX CATARACT REMOVAL Bilateral 87-88    CONGENITAL CATARACTS; W/ IOL    HX LUMBAR LAMINECTOMY      HX ORTHOPAEDIC  08/2019    l-s spine     HX TONSIL AND ADENOIDECTOMY  1960    HX VASECTOMY  1989    CT ANESTH,SURGERY OF SHOULDER      CT SINUS SURGERY PROC UNLISTED  2001       Family History   Problem Relation Age of Onset    Cancer Mother         ovarian Cancer Father         prostate? Elevated Lipids Father     Heart Surgery Father         CABG at 58, lived to 80    No Known Problems Sister     Other Brother         factor 5        Social History     Socioeconomic History    Marital status:      Spouse name: Not on file    Number of children: Not on file    Years of education: Not on file    Highest education level: Not on file   Occupational History    Not on file   Tobacco Use    Smoking status: Never Smoker    Smokeless tobacco: Never Used   Vaping Use    Vaping Use: Never used   Substance and Sexual Activity    Alcohol use: Yes     Comment: 12oz beer once monthly     Drug use: No    Sexual activity: Yes     Partners: Female   Other Topics Concern     Service Not Asked    Blood Transfusions Not Asked    Caffeine Concern No     Comment: no coffee, occ decaff tea, occ soda    Occupational Exposure Not Asked    Hobby Hazards Not Asked    Sleep Concern Not Asked    Stress Concern Not Asked    Weight Concern Not Asked    Special Diet No    Back Care Not Asked    Exercise No     Comment: not much lately, usually plays tennis 2-3 x a week    Bike Helmet Not Asked    Seat Belt Not Asked    Self-Exams Not Asked   Social History Narrative    Not on file     Social Determinants of Health     Financial Resource Strain:     Difficulty of Paying Living Expenses: Not on file   Food Insecurity:     Worried About Running Out of Food in the Last Year: Not on file    [place] of Food in the Last Year: Not on file   Transportation Needs:     Lack of Transportation (Medical): Not on file    Lack of Transportation (Non-Medical):  Not on file   Physical Activity:     Days of Exercise per Week: Not on file    Minutes of Exercise per Session: Not on file   Stress:     Feeling of Stress : Not on file   Social Connections:     Frequency of Communication with Friends and Family: Not on file    Frequency of Social Gatherings with Friends and Family: Not on file    Attends Latter-day Services: Not on file    Active Member of Clubs or Organizations: Not on file    Attends Club or Organization Meetings: Not on file    Marital Status: Not on file   Intimate Partner Violence:     Fear of Current or Ex-Partner: Not on file    Emotionally Abused: Not on file    Physically Abused: Not on file    Sexually Abused: Not on file   Housing Stability:     Unable to Pay for Housing in the Last Year: Not on file    Number of Places Lived in the Last Year: Not on file    Unstable Housing in the Last Year: Not on file       Review of Systems    No flowsheet data found. Vitals:  Ht 5' 10\" (1.778 m)   Wt 214 lb (97.1 kg)   BMI 30.71 kg/m²    Body mass index is 30.71 kg/m². An electronic signature was used to authenticate this note.   -- Geovany Ramírez PA-C

## 2022-11-07 ENCOUNTER — OFFICE VISIT (OUTPATIENT)
Dept: ORTHOPEDIC SURGERY | Age: 67
End: 2022-11-07
Payer: MEDICARE

## 2022-11-07 VITALS — BODY MASS INDEX: 30.92 KG/M2 | HEIGHT: 70 IN | WEIGHT: 216 LBS

## 2022-11-07 DIAGNOSIS — S46.011A TRAUMATIC COMPLETE TEAR OF RIGHT ROTATOR CUFF, INITIAL ENCOUNTER: Primary | ICD-10-CM

## 2022-11-07 PROCEDURE — 1123F ACP DISCUSS/DSCN MKR DOCD: CPT | Performed by: ORTHOPAEDIC SURGERY

## 2022-11-07 PROCEDURE — G8432 DEP SCR NOT DOC, RNG: HCPCS | Performed by: ORTHOPAEDIC SURGERY

## 2022-11-07 PROCEDURE — G8427 DOCREV CUR MEDS BY ELIG CLIN: HCPCS | Performed by: ORTHOPAEDIC SURGERY

## 2022-11-07 PROCEDURE — G8536 NO DOC ELDER MAL SCRN: HCPCS | Performed by: ORTHOPAEDIC SURGERY

## 2022-11-07 PROCEDURE — 1101F PT FALLS ASSESS-DOCD LE1/YR: CPT | Performed by: ORTHOPAEDIC SURGERY

## 2022-11-07 PROCEDURE — 3017F COLORECTAL CA SCREEN DOC REV: CPT | Performed by: ORTHOPAEDIC SURGERY

## 2022-11-07 PROCEDURE — G8756 NO BP MEASURE DOC: HCPCS | Performed by: ORTHOPAEDIC SURGERY

## 2022-11-07 PROCEDURE — G8417 CALC BMI ABV UP PARAM F/U: HCPCS | Performed by: ORTHOPAEDIC SURGERY

## 2022-11-07 PROCEDURE — 99213 OFFICE O/P EST LOW 20 MIN: CPT | Performed by: ORTHOPAEDIC SURGERY

## 2023-01-06 ENCOUNTER — TELEPHONE (OUTPATIENT)
Dept: FAMILY MEDICINE CLINIC | Age: 68
End: 2023-01-06

## 2023-01-06 DIAGNOSIS — H81.20 VESTIBULAR NEURITIS, UNSPECIFIED LATERALITY: Primary | ICD-10-CM

## 2023-01-06 NOTE — TELEPHONE ENCOUNTER
Pt needs a referral to physical therapy for balance issues. Don't see no available appointments for Dr. Daniel braswell 2/14/23. -TM

## 2023-01-06 NOTE — TELEPHONE ENCOUNTER
Has been going to rehab/therapy  for the past 2 years off and on for vestibular disorder but stopped due to surgery and he used all of his 2022 benefit   Has appt to resume on 01/10/23  Fax number [phone]

## 2023-01-06 NOTE — TELEPHONE ENCOUNTER
----- Message from Dax Gold sent at 1/5/2023  4:46 PM EST -----  Subject: Referral Request    Reason for referral request? Patient is requesting a referral to physical   therapy to address balance issues. Patient would like the referral for   [address] [place] [address]. Please contact patient with any questions   regarding request for physical therapy referral.   Provider patient wants to be referred to(if known):     Provider Phone Number(if known):     Additional Information for Provider?   ---------------------------------------------------------------------------  --------------  [facility]    [phone]; OK to leave message on voicemail, OK to respond with   electronic message via [employer] portal (only for patients who have   registered [employer] account)  ---------------------------------------------------------------------------  --------------

## 2023-03-01 ENCOUNTER — TELEPHONE (OUTPATIENT)
Dept: FAMILY MEDICINE CLINIC | Age: 68
End: 2023-03-01

## 2023-03-01 NOTE — TELEPHONE ENCOUNTER
Dr office called looking for a fax number to send over notes from last visit basically stating pt is showing signs of sleep apnea and is wondering if Dr. Demarco Dominguez wants to place orders for a sleep study the office was given the fax number and will be faxing over shortly.

## 2023-04-28 DIAGNOSIS — I10 BENIGN ESSENTIAL HYPERTENSION: ICD-10-CM

## 2023-04-28 RX ORDER — HYDROCHLOROTHIAZIDE 25 MG/1
25 TABLET ORAL DAILY
Qty: 90 TABLET | Refills: 1 | Status: SHIPPED | OUTPATIENT
Start: 2023-04-28

## 2023-07-24 ENCOUNTER — OFFICE VISIT (OUTPATIENT)
Age: 68
End: 2023-07-24
Payer: MEDICARE

## 2023-07-24 ENCOUNTER — NURSE TRIAGE (OUTPATIENT)
Dept: OTHER | Facility: CLINIC | Age: 68
End: 2023-07-24

## 2023-07-24 VITALS
WEIGHT: 210.6 LBS | OXYGEN SATURATION: 97 % | TEMPERATURE: 97.6 F | DIASTOLIC BLOOD PRESSURE: 74 MMHG | RESPIRATION RATE: 18 BRPM | BODY MASS INDEX: 30.15 KG/M2 | HEART RATE: 74 BPM | HEIGHT: 70 IN | SYSTOLIC BLOOD PRESSURE: 118 MMHG

## 2023-07-24 DIAGNOSIS — M54.42 ACUTE LEFT-SIDED LOW BACK PAIN WITH LEFT-SIDED SCIATICA: Primary | ICD-10-CM

## 2023-07-24 PROCEDURE — 3074F SYST BP LT 130 MM HG: CPT | Performed by: NURSE PRACTITIONER

## 2023-07-24 PROCEDURE — 3017F COLORECTAL CA SCREEN DOC REV: CPT | Performed by: NURSE PRACTITIONER

## 2023-07-24 PROCEDURE — 99213 OFFICE O/P EST LOW 20 MIN: CPT | Performed by: NURSE PRACTITIONER

## 2023-07-24 PROCEDURE — G8427 DOCREV CUR MEDS BY ELIG CLIN: HCPCS | Performed by: NURSE PRACTITIONER

## 2023-07-24 PROCEDURE — G8417 CALC BMI ABV UP PARAM F/U: HCPCS | Performed by: NURSE PRACTITIONER

## 2023-07-24 PROCEDURE — 3078F DIAST BP <80 MM HG: CPT | Performed by: NURSE PRACTITIONER

## 2023-07-24 PROCEDURE — 1036F TOBACCO NON-USER: CPT | Performed by: NURSE PRACTITIONER

## 2023-07-24 PROCEDURE — 1123F ACP DISCUSS/DSCN MKR DOCD: CPT | Performed by: NURSE PRACTITIONER

## 2023-07-24 ASSESSMENT — PATIENT HEALTH QUESTIONNAIRE - PHQ9
SUM OF ALL RESPONSES TO PHQ QUESTIONS 1-9: 0
2. FEELING DOWN, DEPRESSED OR HOPELESS: 0
SUM OF ALL RESPONSES TO PHQ QUESTIONS 1-9: 0
SUM OF ALL RESPONSES TO PHQ QUESTIONS 1-9: 0
1. LITTLE INTEREST OR PLEASURE IN DOING THINGS: 0
SUM OF ALL RESPONSES TO PHQ QUESTIONS 1-9: 0
SUM OF ALL RESPONSES TO PHQ9 QUESTIONS 1 & 2: 0

## 2023-07-24 ASSESSMENT — ENCOUNTER SYMPTOMS: BACK PAIN: 1

## 2023-07-24 NOTE — TELEPHONE ENCOUNTER
Location of patient: [address] [facility] call from [facility] at [facility] with [employer]. Subjective: Caller states \"left lower back, to thigh pain. Standing on concrete worsens pain\"     Current Symptoms: intermittent left lower back pain going down to thigh, periodic weakness(feels like leg will buckle)    Tingling/Numbness    Denies chest pain, SOB, injury    Onset: 2 -3 weeks ago; intermittent    Associated Symptoms: reduced activity    Pain Severity: 0-7/10; sharp, tingling; intermittent    Temperature: none     What has been tried: Lyla Crowell    LMP: NA Pregnant: NA    Recommended disposition: See in Office Today    Care advice provided, patient verbalizes understanding; denies any other questions or concerns; instructed to call back for any new or worsening symptoms. Patient/Caller agrees with recommended disposition; writer provided warm transfer to Mariano Atowod at [facility] for appointment scheduling    Attention Provider: Thank you for allowing me to participate in the care of your patient. The patient was connected to triage in response to information provided to the ECC/PSC. Please do not respond through this encounter as the response is not directed to a shared pool.       Reason for Disposition   Numbness in a leg or foot (i.e., loss of sensation)    Protocols used: Back Pain-ADULT-[place]

## 2023-08-17 ENCOUNTER — TELEPHONE (OUTPATIENT)
Age: 68
End: 2023-08-17

## 2023-08-17 NOTE — TELEPHONE ENCOUNTER
Patient called stating that he saw ALETA Floyd a bit ago, and she had recommended a Ortho Specialist to him. Patient is hoping to find out the name and place of the specialist of the person that was recommended to him. Patient is hoping to have a call back with this information today if possible.     Best call back number  [phone]

## 2023-08-17 NOTE — TELEPHONE ENCOUNTER
Since Dr. Ami Majano retired, I recommended Dr. Dagmar Meredith or anybody in that group. I can put in referral if needed.     Lm with info

## 2023-10-27 SDOH — ECONOMIC STABILITY: INCOME INSECURITY: HOW HARD IS IT FOR YOU TO PAY FOR THE VERY BASICS LIKE FOOD, HOUSING, MEDICAL CARE, AND HEATING?: NOT HARD AT ALL

## 2023-10-27 SDOH — ECONOMIC STABILITY: FOOD INSECURITY: WITHIN THE PAST 12 MONTHS, THE FOOD YOU BOUGHT JUST DIDN'T LAST AND YOU DIDN'T HAVE MONEY TO GET MORE.: PATIENT DECLINED

## 2023-10-27 SDOH — ECONOMIC STABILITY: FOOD INSECURITY: WITHIN THE PAST 12 MONTHS, YOU WORRIED THAT YOUR FOOD WOULD RUN OUT BEFORE YOU GOT MONEY TO BUY MORE.: PATIENT DECLINED

## 2023-10-27 SDOH — HEALTH STABILITY: PHYSICAL HEALTH: ON AVERAGE, HOW MANY DAYS PER WEEK DO YOU ENGAGE IN MODERATE TO STRENUOUS EXERCISE (LIKE A BRISK WALK)?: 3 DAYS

## 2023-10-27 SDOH — ECONOMIC STABILITY: TRANSPORTATION INSECURITY
IN THE PAST 12 MONTHS, HAS LACK OF TRANSPORTATION KEPT YOU FROM MEETINGS, WORK, OR FROM GETTING THINGS NEEDED FOR DAILY LIVING?: PATIENT DECLINED

## 2023-10-27 SDOH — ECONOMIC STABILITY: HOUSING INSECURITY
IN THE LAST 12 MONTHS, WAS THERE A TIME WHEN YOU DID NOT HAVE A STEADY PLACE TO SLEEP OR SLEPT IN A SHELTER (INCLUDING NOW)?: PATIENT REFUSED

## 2023-10-27 SDOH — HEALTH STABILITY: PHYSICAL HEALTH: ON AVERAGE, HOW MANY MINUTES DO YOU ENGAGE IN EXERCISE AT THIS LEVEL?: 90 MIN

## 2023-10-27 ASSESSMENT — LIFESTYLE VARIABLES
HOW MANY STANDARD DRINKS CONTAINING ALCOHOL DO YOU HAVE ON A TYPICAL DAY: 1 OR 2
HOW MANY STANDARD DRINKS CONTAINING ALCOHOL DO YOU HAVE ON A TYPICAL DAY: 1
HOW OFTEN DO YOU HAVE SIX OR MORE DRINKS ON ONE OCCASION: 1
HOW OFTEN DO YOU HAVE A DRINK CONTAINING ALCOHOL: MONTHLY OR LESS
HOW OFTEN DO YOU HAVE A DRINK CONTAINING ALCOHOL: 2

## 2023-10-27 ASSESSMENT — PATIENT HEALTH QUESTIONNAIRE - PHQ9
SUM OF ALL RESPONSES TO PHQ QUESTIONS 1-9: 0
SUM OF ALL RESPONSES TO PHQ9 QUESTIONS 1 & 2: 0
2. FEELING DOWN, DEPRESSED OR HOPELESS: 0
1. LITTLE INTEREST OR PLEASURE IN DOING THINGS: 0

## 2023-10-29 DIAGNOSIS — I10 ESSENTIAL (PRIMARY) HYPERTENSION: ICD-10-CM

## 2023-10-30 ENCOUNTER — OFFICE VISIT (OUTPATIENT)
Age: 68
End: 2023-10-30
Payer: MEDICARE

## 2023-10-30 VITALS
WEIGHT: 205 LBS | HEART RATE: 62 BPM | HEIGHT: 70 IN | BODY MASS INDEX: 29.35 KG/M2 | OXYGEN SATURATION: 98 % | SYSTOLIC BLOOD PRESSURE: 132 MMHG | TEMPERATURE: 97.2 F | RESPIRATION RATE: 16 BRPM | DIASTOLIC BLOOD PRESSURE: 72 MMHG

## 2023-10-30 DIAGNOSIS — Z12.5 SCREENING PSA (PROSTATE SPECIFIC ANTIGEN): ICD-10-CM

## 2023-10-30 DIAGNOSIS — Z23 ENCOUNTER FOR IMMUNIZATION: ICD-10-CM

## 2023-10-30 DIAGNOSIS — E78.5 DYSLIPIDEMIA, GOAL LDL BELOW 130: ICD-10-CM

## 2023-10-30 DIAGNOSIS — Z11.59 NEED FOR HEPATITIS C SCREENING TEST: ICD-10-CM

## 2023-10-30 DIAGNOSIS — Z71.89 ACP (ADVANCE CARE PLANNING): ICD-10-CM

## 2023-10-30 DIAGNOSIS — R35.1 BPH ASSOCIATED WITH NOCTURIA: ICD-10-CM

## 2023-10-30 DIAGNOSIS — E55.9 VITAMIN D DEFICIENCY, UNSPECIFIED: ICD-10-CM

## 2023-10-30 DIAGNOSIS — N40.1 BPH ASSOCIATED WITH NOCTURIA: ICD-10-CM

## 2023-10-30 DIAGNOSIS — Z00.00 MEDICARE ANNUAL WELLNESS VISIT, SUBSEQUENT: ICD-10-CM

## 2023-10-30 DIAGNOSIS — E55.9 VITAMIN D DEFICIENCY: ICD-10-CM

## 2023-10-30 DIAGNOSIS — Z00.00 ENCOUNTER FOR ANNUAL WELLNESS VISIT (AWV) IN MEDICARE PATIENT: ICD-10-CM

## 2023-10-30 DIAGNOSIS — I10 ESSENTIAL (PRIMARY) HYPERTENSION: Primary | ICD-10-CM

## 2023-10-30 LAB
ALBUMIN SERPL-MCNC: 4.1 G/DL (ref 3.5–5)
ALBUMIN/GLOB SERPL: 1.4 (ref 1.1–2.2)
ALP SERPL-CCNC: 74 U/L (ref 45–117)
ALT SERPL-CCNC: 32 U/L (ref 12–78)
ANION GAP SERPL CALC-SCNC: 5 MMOL/L (ref 5–15)
APPEARANCE UR: CLEAR
AST SERPL-CCNC: 22 U/L (ref 15–37)
BACTERIA URNS QL MICRO: NEGATIVE /HPF
BILIRUB SERPL-MCNC: 0.6 MG/DL (ref 0.2–1)
BILIRUB UR QL: NEGATIVE
BUN SERPL-MCNC: 17 MG/DL (ref 6–20)
BUN/CREAT SERPL: 15 (ref 12–20)
CALCIUM SERPL-MCNC: 9.2 MG/DL (ref 8.5–10.1)
CHLORIDE SERPL-SCNC: 107 MMOL/L (ref 97–108)
CHOLEST SERPL-MCNC: 151 MG/DL
CO2 SERPL-SCNC: 29 MMOL/L (ref 21–32)
COLOR UR: NORMAL
COMMENT:: NORMAL
CREAT SERPL-MCNC: 1.13 MG/DL (ref 0.7–1.3)
EPITH CASTS URNS QL MICRO: NORMAL /LPF
GLOBULIN SER CALC-MCNC: 2.9 G/DL (ref 2–4)
GLUCOSE SERPL-MCNC: 95 MG/DL (ref 65–100)
GLUCOSE UR STRIP.AUTO-MCNC: NEGATIVE MG/DL
HDLC SERPL-MCNC: 51 MG/DL
HDLC SERPL: 3 (ref 0–5)
HGB UR QL STRIP: NEGATIVE
HYALINE CASTS URNS QL MICRO: NORMAL /LPF (ref 0–5)
KETONES UR QL STRIP.AUTO: NEGATIVE MG/DL
LDLC SERPL CALC-MCNC: 72 MG/DL (ref 0–100)
LEUKOCYTE ESTERASE UR QL STRIP.AUTO: NEGATIVE
NITRITE UR QL STRIP.AUTO: NEGATIVE
PH UR STRIP: 6 (ref 5–8)
POTASSIUM SERPL-SCNC: 3.6 MMOL/L (ref 3.5–5.1)
PROT SERPL-MCNC: 7 G/DL (ref 6.4–8.2)
PROT UR STRIP-MCNC: NEGATIVE MG/DL
PSA SERPL-MCNC: 3 NG/ML (ref 0.01–4)
RBC #/AREA URNS HPF: NORMAL /HPF (ref 0–5)
SODIUM SERPL-SCNC: 141 MMOL/L (ref 136–145)
SP GR UR REFRACTOMETRY: 1.02 (ref 1–1.03)
SPECIMEN HOLD: NORMAL
TRIGL SERPL-MCNC: 140 MG/DL
UROBILINOGEN UR QL STRIP.AUTO: 0.2 EU/DL (ref 0.2–1)
VLDLC SERPL CALC-MCNC: 28 MG/DL
WBC URNS QL MICRO: NORMAL /HPF (ref 0–4)

## 2023-10-30 PROCEDURE — G8417 CALC BMI ABV UP PARAM F/U: HCPCS | Performed by: FAMILY MEDICINE

## 2023-10-30 PROCEDURE — 3074F SYST BP LT 130 MM HG: CPT | Performed by: FAMILY MEDICINE

## 2023-10-30 PROCEDURE — G8427 DOCREV CUR MEDS BY ELIG CLIN: HCPCS | Performed by: FAMILY MEDICINE

## 2023-10-30 PROCEDURE — 3017F COLORECTAL CA SCREEN DOC REV: CPT | Performed by: FAMILY MEDICINE

## 2023-10-30 PROCEDURE — PBSHW PBB SHADOW CHARGE: Performed by: FAMILY MEDICINE

## 2023-10-30 PROCEDURE — G8484 FLU IMMUNIZE NO ADMIN: HCPCS | Performed by: FAMILY MEDICINE

## 2023-10-30 PROCEDURE — G0439 PPPS, SUBSEQ VISIT: HCPCS | Performed by: FAMILY MEDICINE

## 2023-10-30 PROCEDURE — 99214 OFFICE O/P EST MOD 30 MIN: CPT | Performed by: FAMILY MEDICINE

## 2023-10-30 PROCEDURE — 1036F TOBACCO NON-USER: CPT | Performed by: FAMILY MEDICINE

## 2023-10-30 PROCEDURE — 90694 VACC AIIV4 NO PRSRV 0.5ML IM: CPT | Performed by: FAMILY MEDICINE

## 2023-10-30 PROCEDURE — PBSHW INFLUENZA, FLUAD, (AGE 65 Y+), IM, PF, 0.5 ML: Performed by: FAMILY MEDICINE

## 2023-10-30 PROCEDURE — 1123F ACP DISCUSS/DSCN MKR DOCD: CPT | Performed by: FAMILY MEDICINE

## 2023-10-30 PROCEDURE — 3078F DIAST BP <80 MM HG: CPT | Performed by: FAMILY MEDICINE

## 2023-10-30 RX ORDER — TAMSULOSIN HYDROCHLORIDE 0.4 MG/1
0.4 CAPSULE ORAL DAILY
Qty: 90 CAPSULE | Refills: 3 | Status: SHIPPED | OUTPATIENT
Start: 2023-10-30

## 2023-10-30 RX ORDER — ROSUVASTATIN CALCIUM 20 MG/1
20 TABLET, COATED ORAL
Qty: 90 TABLET | Refills: 1 | OUTPATIENT
Start: 2023-10-30

## 2023-10-30 RX ORDER — HYDROCHLOROTHIAZIDE 25 MG/1
TABLET ORAL
Qty: 90 TABLET | Refills: 1 | OUTPATIENT
Start: 2023-10-30

## 2023-10-30 RX ORDER — HYDROCHLOROTHIAZIDE 25 MG/1
25 TABLET ORAL DAILY
Qty: 90 TABLET | Refills: 3 | Status: SHIPPED | OUTPATIENT
Start: 2023-10-30

## 2023-10-30 RX ORDER — ROSUVASTATIN CALCIUM 20 MG/1
20 TABLET, COATED ORAL DAILY
Qty: 90 TABLET | Refills: 3 | Status: SHIPPED | OUTPATIENT
Start: 2023-10-30

## 2023-10-30 RX ORDER — TAMSULOSIN HYDROCHLORIDE 0.4 MG/1
CAPSULE ORAL
Qty: 90 CAPSULE | Refills: 3 | OUTPATIENT
Start: 2023-10-30

## 2023-10-30 ASSESSMENT — ENCOUNTER SYMPTOMS
EYE DISCHARGE: 0
WHEEZING: 0
SORE THROAT: 0
STRIDOR: 0
ABDOMINAL PAIN: 0
DIARRHEA: 0
EYE PAIN: 0
CONSTIPATION: 0
BLOOD IN STOOL: 0
NAUSEA: 0
BACK PAIN: 0
VOMITING: 0
EYE REDNESS: 0
SHORTNESS OF BREATH: 0
COUGH: 0

## 2023-10-31 LAB
HCV AB SER IA-ACNC: 0.03 INDEX
HCV AB SERPL QL IA: NONREACTIVE

## 2023-10-31 NOTE — PATIENT INSTRUCTIONS
family, and various assessments and screenings as appropriate. After reviewing your medical record and screening and assessments performed today your provider may have ordered immunizations, labs, imaging, and/or referrals for you. A list of these orders (if applicable) as well as your Preventive Care list are included within your After Visit Summary for your review. Other Preventive Recommendations:    A preventive eye exam performed by an eye specialist is recommended every 1-2 years to screen for glaucoma; cataracts, macular degeneration, and other eye disorders. A preventive dental visit is recommended every 6 months. Try to get at least 150 minutes of exercise per week or 10,000 steps per day on a pedometer . Order or download the FREE \"Exercise & Physical Activity: Your Everyday Guide\" from [employer]. Call 5-114.495.3145 or search [employer] online. You need 8821-6089 mg of calcium and 5538-2993 IU of vitamin D per day. It is possible to meet your calcium requirement with diet alone, but a vitamin D supplement is usually necessary to meet this goal.  When exposed to the sun, use a sunscreen that protects against both UVA and UVB radiation with an SPF of 30 or greater. Reapply every 2 to 3 hours or after sweating, drying off with a towel, or swimming. Always wear a seat belt when traveling in a car. Always wear a helmet when riding a bicycle or motorcycle.

## 2023-11-02 ENCOUNTER — HOSPITAL ENCOUNTER (OUTPATIENT)
Facility: HOSPITAL | Age: 68
Setting detail: RECURRING SERIES
Discharge: HOME | End: 2023-11-05
Payer: MEDICARE

## 2023-11-02 PROCEDURE — 97161 PT EVAL LOW COMPLEX 20 MIN: CPT

## 2023-11-02 PROCEDURE — 97110 THERAPEUTIC EXERCISES: CPT

## 2023-11-02 NOTE — THERAPY EVALUATION
Physical Therapy at [facility],   a part of [address] [place] [address] Thi Ivey, [address]  [phone] CSF:[phone]                                                                            PHYSICAL THERAPY - MEDICARE EVALUATION/PLAN OF CARE NOTE (updated 3/23)      Date: 2023          Patient Name:  Shahrzad Ponce :  1955   Medical   Diagnosis:  Chronic left-sided low back pain without sciatica [M54.50, G89.29]  Neck pain [M54.2] Treatment Diagnosis:  M54.2  NECK PAIN and M54.59, G89.29  CHRONIC LOWER BACK PAIN    Referral Source:  Quiana Lemus, [facility] Provider #:  [id number]                  Insurance: Payor: MEDICARE / Plan: MEDICARE PART A AND B / Product Type: *No Product type* /      Patient  verified yes     Visit #   Current  / Total 1 24   Time   In / Out 12:40 P 1:10 P   Total Treatment Time 30   Total Timed Codes 10   1:1 Treatment Time 10      [facility] Totals Reminder:  bill using total billable   min of TIMED therapeutic procedures and modalities. 8-22 min = 1 unit; 23-37 min = 2 units; 38-52 min = 3 units;  53-67 min = 4 units; 68-82 min = 5 units           SUBJECTIVE  Pain Level (0-10 scale): 2  []constant [x]intermittent [x]improving []worsening []no change since onset    Any medication changes, allergies to medications, adverse drug reactions, diagnosis change, or new procedure performed?: [x] No    [] Yes (see summary sheet for update)  Medications: Verified on Patient Summary List    Subjective functional status/changes:     \"What bothers me most is the vestibular neuritis\" He has seen vestibular PT several years ago for this issue. He has the exercises she gave him however has not been doing them recently. Presents with referral for lower back and neck pain. \"Arthritis degeneration\" in lower back. Lumbar laminectomy L4-S1 ~ 4 years ago. ~3-4 mo ago he started having L LE radicular pain ~3-4x/wk.  Went to see Dr. Claude Curd-- referred to

## 2023-11-07 ENCOUNTER — HOSPITAL ENCOUNTER (OUTPATIENT)
Facility: HOSPITAL | Age: 68
Setting detail: RECURRING SERIES
Discharge: HOME | End: 2023-11-10
Payer: MEDICARE

## 2023-11-07 PROCEDURE — 97110 THERAPEUTIC EXERCISES: CPT

## 2023-11-07 NOTE — PROGRESS NOTES
PHYSICAL THERAPY - MEDICARE DAILY TREATMENT NOTE (updated 3/23)      Date: 2023          Patient Name:  Bette Pichardo :  1955   Medical   Diagnosis:  Chronic left-sided low back pain without sciatica [M54.50, G89.29]  Neck pain [M54.2] Treatment Diagnosis:  M54.2  NECK PAIN and M54.59, G89.29  CHRONIC LOWER BACK PAIN    Referral Source:  Stormy Naval, [place] Insurance:   Payor: Dixie Pino / Plan: MEDICARE PART A AND B / Product Type: *No Product type* /                     Patient  verified yes     Visit #   Current  / Total 2 24   Time   In / Out 2:00 P 2:50 P   Total Treatment Time 50   Total Timed Codes 40   1:1 Treatment Time 40       [facility] Totals Reminder:  bill using total billable   min of TIMED therapeutic procedures and modalities. 8-22 min = 1 unit; 23-37 min = 2 units; 38-52 min = 3 units; 53-67 min = 4 units; 68-82 min = 5 units            SUBJECTIVE    Pain Level (0-10 scale): 0    Any medication changes, allergies to medications, adverse drug reactions, diagnosis change, or new procedure performed?: [x] No    [] Yes (see summary sheet for update)  Medications: Verified on Patient Summary List    Subjective functional status/changes:     Pt states that he went to the basketball games in [facility] over the weekend-- he did not notice any back pain while sitting in bleachers/watching game or the drive home. OBJECTIVE      Therapeutic Procedures: Tx Min Billable or 1:1 Min (if diff from Tx Min) Procedure, Rationale, Specifics   40  06233 Therapeutic Exercise (timed):  increase ROM, strength, coordination, balance, and proprioception to improve patient's ability to progress to PLOF and address remaining functional goals. (see flow sheet as applicable)     Details if applicable:     40     Total Total         Modalities Rationale:     decrease inflammation and decrease pain to improve patient's ability to progress to PLOF and address remaining functional goals.        min [] Estim

## 2023-11-08 ENCOUNTER — APPOINTMENT (OUTPATIENT)
Facility: HOSPITAL | Age: 68
End: 2023-11-08
Payer: MEDICARE

## 2023-11-09 ENCOUNTER — HOSPITAL ENCOUNTER (OUTPATIENT)
Facility: HOSPITAL | Age: 68
Setting detail: RECURRING SERIES
Discharge: HOME | End: 2023-11-12
Payer: MEDICARE

## 2023-11-09 PROCEDURE — 97110 THERAPEUTIC EXERCISES: CPT

## 2023-11-09 NOTE — PROGRESS NOTES
PHYSICAL THERAPY - MEDICARE DAILY TREATMENT NOTE (updated 3/23)      Date: 2023          Patient Name:  Grace Miller :  1955   Medical   Diagnosis:  Chronic left-sided low back pain without sciatica [M54.50, G89.29]  Neck pain [M54.2] Treatment Diagnosis:  M54.2  NECK PAIN and M54.59, G89.29  CHRONIC LOWER BACK PAIN    Referral Source:  Kali Luis, [facility] Insurance:   Payor: Desiree Colindres / Plan: MEDICARE PART A AND B / Product Type: *No Product type* /                     Patient  verified yes     Visit #   Current  / Total 3 24   Time   In / Out 9:00    Total Treatment Time    Total Timed Codes    1:1 Treatment Time       [facility] Totals Reminder:  bill using total billable   min of TIMED therapeutic procedures and modalities. 8-22 min = 1 unit; 23-37 min = 2 units; 38-52 min = 3 units; 53-67 min = 4 units; 68-82 min = 5 units            SUBJECTIVE    Pain Level (0-10 scale): 0    Any medication changes, allergies to medications, adverse drug reactions, diagnosis change, or new procedure performed?: [x] No    [] Yes (see summary sheet for update)  Medications: Verified on Patient Summary List    Subjective functional status/changes:     Pt stated that he played 18 holes of golf with no issues, and took advil. OBJECTIVE      Therapeutic Procedures: Tx Min Billable or 1:1 Min (if diff from Tx Min) Procedure, Rationale, Specifics   50 30 [id number] Therapeutic Exercise (timed):  increase ROM, strength, coordination, balance, and proprioception to improve patient's ability to progress to PLOF and address remaining functional goals. (see flow sheet as applicable)     Details if applicable:     50 30    Total Total         Modalities Rationale:     decrease inflammation and decrease pain to improve patient's ability to progress to PLOF and address remaining functional goals.        min [] Estim Unattended,             type/location:       []  w/ice    []  w/heat        min [] Estim Attended,

## 2023-11-14 ENCOUNTER — TELEPHONE (OUTPATIENT)
Age: 68
End: 2023-11-14

## 2023-11-14 ENCOUNTER — HOSPITAL ENCOUNTER (OUTPATIENT)
Facility: HOSPITAL | Age: 68
Setting detail: RECURRING SERIES
Discharge: HOME | End: 2023-11-17
Payer: MEDICARE

## 2023-11-14 PROCEDURE — 97110 THERAPEUTIC EXERCISES: CPT

## 2023-11-14 NOTE — TELEPHONE ENCOUNTER
Pt Rechlima requesting to have the result for his medicare wellness with labs result. Pt said he could not see the result on my chart . He had the medicare wellness on  10/30/23. Pt can be reached at [phone].  Thank you

## 2023-11-14 NOTE — PROGRESS NOTES
PHYSICAL THERAPY - MEDICARE DAILY TREATMENT NOTE (updated 3/23)      Date: 2023          Patient Name:  Bette Pichardo :  1955   Medical   Diagnosis:  Chronic left-sided low back pain without sciatica [M54.50, G89.29]  Neck pain [M54.2] Treatment Diagnosis:  M54.2  NECK PAIN and M54.59, G89.29  CHRONIC LOWER BACK PAIN    Referral Source:  [place], [place] Insurance:   Payor: Dixie Pino / Plan: MEDICARE PART A AND B / Product Type: *No Product type* /                     Patient  verified yes     Visit #   Current  / Total 4 24   Time   In / Out 12:00 P 1250 P   Total Treatment Time 50   Total Timed Codes 40   1:1 Treatment Time 30      [facility] Totals Reminder:  bill using total billable   min of TIMED therapeutic procedures and modalities. 8-22 min = 1 unit; 23-37 min = 2 units; 38-52 min = 3 units; 53-67 min = 4 units; 68-82 min = 5 units            SUBJECTIVE    Pain Level (0-10 scale): 0    Any medication changes, allergies to medications, adverse drug reactions, diagnosis change, or new procedure performed?: [x] No    [] Yes (see summary sheet for update)  Medications: Verified on Patient Summary List    Subjective functional status/changes:     He had some stiffness in his back this AM. He raked leaves, mowed the lawn this AM. \"I would have iced afterwards but I was coming here\"    OBJECTIVE      Therapeutic Procedures: Tx Min Billable or 1:1 Min (if diff from Tx Min) Procedure, Rationale, Specifics   40 30 [id number] Therapeutic Exercise (timed):  increase ROM, strength, coordination, balance, and proprioception to improve patient's ability to progress to PLOF and address remaining functional goals. (see flow sheet as applicable)     Details if applicable:     40 30    Total Total         Modalities Rationale:     decrease inflammation and decrease pain to improve patient's ability to progress to PLOF and address remaining functional goals.        min [] Estim Unattended,

## 2023-11-16 ENCOUNTER — APPOINTMENT (OUTPATIENT)
Facility: HOSPITAL | Age: 68
End: 2023-11-16
Payer: MEDICARE

## 2023-11-17 ENCOUNTER — HOSPITAL ENCOUNTER (OUTPATIENT)
Facility: HOSPITAL | Age: 68
Setting detail: RECURRING SERIES
Discharge: HOME | End: 2023-11-20
Payer: MEDICARE

## 2023-11-17 PROCEDURE — 97110 THERAPEUTIC EXERCISES: CPT

## 2023-11-17 NOTE — PROGRESS NOTES
w/heat        min [] Estim Attended,             type/location:       []  w/ice   []  w/heat         []  w/US   []  TENS insruct            min []  Mechanical Traction,        type/lbs:        []  pro      []  sup           []  int       []  cont            []  before manual           []  after manual     min []  Ultrasound,         settings/location:     10 min  unbilled [x]  Ice     []  Heat            location/position: lumbar         min []  Vasopneumatic Device,      press/temp:   pre-treatment girth :    post-treatment girth :    measured at (landmark       location) : If using vaso (only need to measure limb vaso being performed on)        min []  Other:      Skin assessment post-treatment (if applicable):    [x]  intact    []  redness- no adverse reaction                 []redness - adverse reaction:          [x]  Patient Education billed concurrently with other procedures   [x] Review HEP    [] Progressed/Changed HEP, detail:    [] Other detail:         Other Objective/Functional Measures  --    Pain Level at end of session (0-10 scale): 0      Assessment   Good tolerance to exercise progression today  Patient will continue to benefit from skilled PT / OT services to modify and progress therapeutic interventions, analyze and address functional mobility deficits, analyze and address ROM deficits, analyze and address strength deficits, analyze and address soft tissue restrictions, analyze and cue for proper movement patterns, and analyze and modify for postural abnormalities to address functional deficits and attain remaining goals.     Progress toward goals / Updated goals:  []  See Progress Note/Recertification  NT      PLAN  Yes  Continue plan of care  Re-Cert Due: 2/20/08  []  Upgrade activities as tolerated  []  Discharge due to :  []  Other:      Maria Guadalupe Naranjo PT       11/17/2023       9:02 AM

## 2023-11-20 ENCOUNTER — HOSPITAL ENCOUNTER (OUTPATIENT)
Facility: HOSPITAL | Age: 68
Setting detail: RECURRING SERIES
Discharge: HOME | End: 2023-11-23
Payer: MEDICARE

## 2023-11-20 PROCEDURE — 97110 THERAPEUTIC EXERCISES: CPT

## 2023-11-20 NOTE — PROGRESS NOTES
PHYSICAL THERAPY - MEDICARE DAILY TREATMENT NOTE (updated 3/23)      Date: 2023          Patient Name:  Yang Light :  1955   Medical   Diagnosis:  Chronic left-sided low back pain without sciatica [M54.50, G89.29]  Neck pain [M54.2] Treatment Diagnosis:  M54.2  NECK PAIN and M54.59, G89.29  CHRONIC LOWER BACK PAIN    Referral Source:  [place], [place] Insurance:   Payor: Briana Hernandez / Plan: MEDICARE PART A AND B / Product Type: *No Product type* /                     Patient  verified yes     Visit #   Current  / Total 6 24   Time   In / Out 12:00 P 12:55 P   Total Treatment Time 55   Total Timed Codes 45   1:1 Treatment Time 30      [facility] Totals Reminder:  bill using total billable   min of TIMED therapeutic procedures and modalities. 8-22 min = 1 unit; 23-37 min = 2 units; 38-52 min = 3 units; 53-67 min = 4 units; 68-82 min = 5 units            SUBJECTIVE    Pain Level (0-10 scale): 0    Any medication changes, allergies to medications, adverse drug reactions, diagnosis change, or new procedure performed?: [x] No    [] Yes (see summary sheet for update)  Medications: Verified on Patient Summary List    Subjective functional status/changes: \"The balance thing bothers me more than anything\"    OBJECTIVE      Therapeutic Procedures: Tx Min Billable or 1:1 Min (if diff from Tx Min) Procedure, Rationale, Specifics   45 30 [id number] Therapeutic Exercise (timed):  increase ROM, strength, coordination, balance, and proprioception to improve patient's ability to progress to PLOF and address remaining functional goals. (see flow sheet as applicable)     Details if applicable:     45 30    Total Total         Modalities Rationale:     decrease inflammation and decrease pain to improve patient's ability to progress to PLOF and address remaining functional goals.        min [] Estim Unattended,             type/location:       []  w/ice    []  w/heat        min [] Estim Attended,

## 2023-11-28 ENCOUNTER — HOSPITAL ENCOUNTER (OUTPATIENT)
Facility: HOSPITAL | Age: 68
Setting detail: RECURRING SERIES
Discharge: HOME | End: 2023-12-01
Payer: MEDICARE

## 2023-11-28 PROCEDURE — 97110 THERAPEUTIC EXERCISES: CPT

## 2023-11-28 NOTE — PROGRESS NOTES
PHYSICAL THERAPY - MEDICARE DAILY TREATMENT NOTE (updated 3/23)      Date: 2023          Patient Name:  Tanya Beltran :  1955   Medical   Diagnosis:  Chronic left-sided low back pain without sciatica [M54.50, G89.29]  Neck pain [M54.2] Treatment Diagnosis:  M54.2  NECK PAIN and M54.59, G89.29  CHRONIC LOWER BACK PAIN    Referral Source:  [place], [place] Insurance:   Payor: Feroz Lomas / Plan: MEDICARE PART A AND B / Product Type: *No Product type* /                     Patient  verified yes     Visit #   Current  / Total 7 24   Time   In / Out 930 A 10:25 A   Total Treatment Time 55   Total Timed Codes 45   1:1 Treatment Time 30      [facility] Totals Reminder:  bill using total billable   min of TIMED therapeutic procedures and modalities. 8-22 min = 1 unit; 23-37 min = 2 units; 38-52 min = 3 units; 53-67 min = 4 units; 68-82 min = 5 units            SUBJECTIVE    Pain Level (0-10 scale): 0    Any medication changes, allergies to medications, adverse drug reactions, diagnosis change, or new procedure performed?: [x] No    [] Yes (see summary sheet for update)  Medications: Verified on Patient Summary List    Subjective functional status/changes:     Pt reports overall minimal to no lower back pain. OBJECTIVE      Therapeutic Procedures: Tx Min Billable or 1:1 Min (if diff from Tx Min) Procedure, Rationale, Specifics   45 30 [id number] Therapeutic Exercise (timed):  increase ROM, strength, coordination, balance, and proprioception to improve patient's ability to progress to PLOF and address remaining functional goals. (see flow sheet as applicable)     Details if applicable:     45 30    Total Total         Modalities Rationale:     decrease inflammation and decrease pain to improve patient's ability to progress to PLOF and address remaining functional goals.        min [] Estim Unattended,             type/location:       []  w/ice    []  w/heat        min [] Estim Attended,

## 2023-11-30 ENCOUNTER — APPOINTMENT (OUTPATIENT)
Facility: HOSPITAL | Age: 68
End: 2023-11-30
Payer: MEDICARE

## 2023-12-05 ENCOUNTER — HOSPITAL ENCOUNTER (OUTPATIENT)
Facility: HOSPITAL | Age: 68
Setting detail: RECURRING SERIES
Discharge: HOME | End: 2023-12-08
Payer: MEDICARE

## 2023-12-05 PROCEDURE — 97110 THERAPEUTIC EXERCISES: CPT

## 2023-12-05 NOTE — PROGRESS NOTES
PHYSICAL THERAPY - MEDICARE DAILY TREATMENT NOTE (updated 3/23)      Date: 2023          Patient Name:  Kirti Ramirez :  1955   Medical   Diagnosis:  Chronic left-sided low back pain without sciatica [M54.50, G89.29]  Neck pain [M54.2] Treatment Diagnosis:  M54.2  NECK PAIN and M54.59, G89.29  CHRONIC LOWER BACK PAIN    Referral Source:  [place], [place] Insurance:   Payor: Doreen Frye / Plan: MEDICARE PART A AND B / Product Type: *No Product type* /                     Patient  verified yes     Visit #   Current  / Total 8 24   Time   In / Out 8:30A 9:10 A   Total Treatment Time 40   Total Timed Codes 40   1:1 Treatment Time 30      [facility] Totals Reminder:  bill using total billable   min of TIMED therapeutic procedures and modalities. 8-22 min = 1 unit; 23-37 min = 2 units; 38-52 min = 3 units; 53-67 min = 4 units; 68-82 min = 5 units            SUBJECTIVE    Pain Level (0-10 scale): 0    Any medication changes, allergies to medications, adverse drug reactions, diagnosis change, or new procedure performed?: [x] No    [] Yes (see summary sheet for update)  Medications: Verified on Patient Summary List    Subjective functional status/changes:     Pt reports he may have some discomfort after a busy day but is able to manage with ice and exercises. OBJECTIVE    Posture: fair to good sitting/standing posture     Gait: WNL     Active Movements:  ROM  AROM Comments:pain, area   Forward flexion  Fingers to floor --   Extension  ~75% avail range No pain   SB right Fingers inf to patella --   SB left Fingers inf to patella --      Strength:       R (0-5) L (0-5)   Hip Flexion (L1,2) 5 5   Knee Extension (L3,4) 5 5   Knee Flexion (S1,2) 5 5   Ankle Dorsiflexion (L4) 5 5   Bridge WNL  Supine SLR R and L WNL     Palpation:  no significant TTP lumbar paraspinals, B QL, B gluteals     SLS Airex: 30 sec min sway    Therapeutic Procedures:   Tx Min Billable or 1:1 Min (if diff from [employer]) Procedure,

## 2024-06-11 ENCOUNTER — OFFICE VISIT (OUTPATIENT)
Age: 69
End: 2024-06-11
Payer: MEDICARE

## 2024-06-11 VITALS
HEIGHT: 70 IN | DIASTOLIC BLOOD PRESSURE: 70 MMHG | HEART RATE: 78 BPM | OXYGEN SATURATION: 99 % | RESPIRATION RATE: 16 BRPM | BODY MASS INDEX: 29.92 KG/M2 | TEMPERATURE: 97.5 F | WEIGHT: 209 LBS | SYSTOLIC BLOOD PRESSURE: 122 MMHG

## 2024-06-11 DIAGNOSIS — K14.9 TONGUE DYSPLASIA: ICD-10-CM

## 2024-06-11 DIAGNOSIS — M54.2 NECK PAIN, CHRONIC: ICD-10-CM

## 2024-06-11 DIAGNOSIS — E55.9 VITAMIN D DEFICIENCY: ICD-10-CM

## 2024-06-11 DIAGNOSIS — H81.20 VESTIBULAR NEURONITIS, UNSPECIFIED LATERALITY: ICD-10-CM

## 2024-06-11 DIAGNOSIS — I10 ESSENTIAL (PRIMARY) HYPERTENSION: ICD-10-CM

## 2024-06-11 DIAGNOSIS — E78.5 HYPERLIPIDEMIA LDL GOAL <130: Primary | ICD-10-CM

## 2024-06-11 DIAGNOSIS — G89.29 NECK PAIN, CHRONIC: ICD-10-CM

## 2024-06-11 DIAGNOSIS — M65.331 TRIGGER MIDDLE FINGER OF RIGHT HAND: ICD-10-CM

## 2024-06-11 LAB
ALBUMIN SERPL-MCNC: 3.9 G/DL (ref 3.5–5)
ALBUMIN/GLOB SERPL: 1.3 (ref 1.1–2.2)
ALP SERPL-CCNC: 74 U/L (ref 45–117)
ALT SERPL-CCNC: 27 U/L (ref 12–78)
ANION GAP SERPL CALC-SCNC: 7 MMOL/L (ref 5–15)
AST SERPL-CCNC: 27 U/L (ref 15–37)
BILIRUB SERPL-MCNC: 0.7 MG/DL (ref 0.2–1)
BUN SERPL-MCNC: 15 MG/DL (ref 6–20)
BUN/CREAT SERPL: 14 (ref 12–20)
CALCIUM SERPL-MCNC: 9.3 MG/DL (ref 8.5–10.1)
CHLORIDE SERPL-SCNC: 106 MMOL/L (ref 97–108)
CHOLEST SERPL-MCNC: 140 MG/DL
CO2 SERPL-SCNC: 27 MMOL/L (ref 21–32)
CREAT SERPL-MCNC: 1.08 MG/DL (ref 0.7–1.3)
GLOBULIN SER CALC-MCNC: 2.9 G/DL (ref 2–4)
GLUCOSE SERPL-MCNC: 97 MG/DL (ref 65–100)
HDLC SERPL-MCNC: 54 MG/DL
HDLC SERPL: 2.6 (ref 0–5)
LDLC SERPL CALC-MCNC: 66.8 MG/DL (ref 0–100)
POTASSIUM SERPL-SCNC: 3.7 MMOL/L (ref 3.5–5.1)
PROT SERPL-MCNC: 6.8 G/DL (ref 6.4–8.2)
SODIUM SERPL-SCNC: 140 MMOL/L (ref 136–145)
VLDLC SERPL CALC-MCNC: 19.2 MG/DL

## 2024-06-11 PROCEDURE — G8427 DOCREV CUR MEDS BY ELIG CLIN: HCPCS | Performed by: FAMILY MEDICINE

## 2024-06-11 PROCEDURE — G8417 CALC BMI ABV UP PARAM F/U: HCPCS | Performed by: FAMILY MEDICINE

## 2024-06-11 PROCEDURE — 99215 OFFICE O/P EST HI 40 MIN: CPT | Performed by: FAMILY MEDICINE

## 2024-06-11 PROCEDURE — 1036F TOBACCO NON-USER: CPT | Performed by: FAMILY MEDICINE

## 2024-06-11 PROCEDURE — 3074F SYST BP LT 130 MM HG: CPT | Performed by: FAMILY MEDICINE

## 2024-06-11 PROCEDURE — 3017F COLORECTAL CA SCREEN DOC REV: CPT | Performed by: FAMILY MEDICINE

## 2024-06-11 PROCEDURE — 3078F DIAST BP <80 MM HG: CPT | Performed by: FAMILY MEDICINE

## 2024-06-11 PROCEDURE — 1123F ACP DISCUSS/DSCN MKR DOCD: CPT | Performed by: FAMILY MEDICINE

## 2024-06-11 ASSESSMENT — ENCOUNTER SYMPTOMS
COUGH: 0
EYE REDNESS: 0
EYE PAIN: 0
SHORTNESS OF BREATH: 0
WHEEZING: 0

## 2024-06-11 ASSESSMENT — PATIENT HEALTH QUESTIONNAIRE - PHQ9
SUM OF ALL RESPONSES TO PHQ QUESTIONS 1-9: 0
SUM OF ALL RESPONSES TO PHQ9 QUESTIONS 1 & 2: 0
SUM OF ALL RESPONSES TO PHQ QUESTIONS 1-9: 0
1. LITTLE INTEREST OR PLEASURE IN DOING THINGS: NOT AT ALL
2. FEELING DOWN, DEPRESSED OR HOPELESS: NOT AT ALL

## 2024-06-11 NOTE — PROGRESS NOTES
Chief Complaint   Patient presents with    Cholesterol Problem    Finger Pain     Rt middle trigger finger    Hand Pain     Left hand cramps after tennis    Neck Pain     Off and on    Oral Swelling     Rt side of tongue had ablation in march       \"Have you been to the ER, urgent care clinic since your last visit?  Hospitalized since your last visit?\"    NO    “Have you seen or consulted any other health care providers outside of [facility] since your last visit?”    Derm Dr Goncalves            Click Here for Release of Records Request

## 2024-06-11 NOTE — PROGRESS NOTES
Subjective     HPI    Yared Mckeon is a 68 y.o. male who comes in with cervical DDD and DJD, essential hypertension, bilateral shoulder pain, benign paroxysmal positional vertigo due to bilateral vestibular disorder, spinal stenosis of lumbar region, vitamin D deficiency and hyperlipidemia with LDL goal <130, who presents to the office today c/o finger pain, hand pain, neck pain, oral swelling, and for f/u of these health problems.    Pt reports having some intermittent L hand cramping after playing tennis. He plays tennis twice per week.     He endorses trigger finger in his R 3rd finger for the past 2 months.    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 He denies any hx of smoking.     He continues to have neck stiffness. He has a hx of neuritis.     Pt denies unusual SOB, chest pain, and any recent ER visits or hospitalizations.     Past Medical History:   Diagnosis Date    Allergic rhinitis 02/27/2010    Arthritis 05/17/2013    Cancer (HCC) 2004    SKIN, LIP (MOHS PROCEDURE)    History of obesity in adulthood 02/05/2018    BMI> 30    Hypertension     BORDERLINE; NO MEDS    Other and unspecified hyperlipidemia 02/27/2010    Vitamin D deficiency 07/25/2012     Past Surgical History:   Procedure Laterality Date    ANESTH,SURGERY OF SHOULDER      CATARACT REMOVAL Bilateral 87-88    CONGENITAL CATARACTS; W/ IOL    COLONOSCOPY      X2    LUMBAR LAMINECTOMY      ORTHOPEDIC SURGERY  08/2019    l-s spine     SINUS SURGERY PROC UNLISTED  2001    TONGUE SURGERY  05/08/2023    TONSILLECTOMY AND ADENOIDECTOMY  1960    VASECTOMY  1989     Current Outpatient Medications on File Prior to Visit   Medication Sig Dispense Refill    rosuvastatin (CRESTOR) 20 MG tablet Take 1 tablet by mouth daily 90 tablet 3    tamsulosin (FLOMAX) 0.4 MG capsule Take 1 capsule by mouth daily 90 capsule 3    hydroCHLOROthiazide (HYDRODIURIL) 25 MG tablet Take 1 tablet by mouth daily 90 tablet 3    ibuprofen

## 2024-10-31 DIAGNOSIS — I10 ESSENTIAL (PRIMARY) HYPERTENSION: ICD-10-CM

## 2024-10-31 DIAGNOSIS — N40.1 BPH ASSOCIATED WITH NOCTURIA: ICD-10-CM

## 2024-10-31 DIAGNOSIS — R35.1 BPH ASSOCIATED WITH NOCTURIA: ICD-10-CM

## 2024-10-31 DIAGNOSIS — E78.5 DYSLIPIDEMIA, GOAL LDL BELOW 130: ICD-10-CM

## 2024-11-01 NOTE — TELEPHONE ENCOUNTER
Last appointment: 6/11/24 Ezekiel, labs 6/2024  Next appointment: 12/17/24 Oneal  Previous refill encounter(s): 10/30/23 90 + 3 (all 3)    Requested Prescriptions     Pending Prescriptions Disp Refills    rosuvastatin (CRESTOR) 20 MG tablet [Pharmacy Med Name: ROSUVASTATIN CALCIUM 20 MG TAB] 90 tablet 2     Sig: Take 1 tablet by mouth daily    tamsulosin (FLOMAX) 0.4 MG capsule [Pharmacy Med Name: TAMSULOSIN HCL 0.4 MG CAPSULE] 90 capsule 2     Sig: Take 1 capsule by mouth daily    hydroCHLOROthiazide (HYDRODIURIL) 25 MG tablet [Pharmacy Med Name: HYDROCHLOROTHIAZIDE 25 MG TAB] 90 tablet 2     Sig: Take 1 tablet by mouth daily     For Pharmacy Admin Tracking Only    Program: Medication Refill  CPA in place:    Recommendation Provided To:   Intervention Detail: New Rx: 3, reason: Patient Preference  Intervention Accepted By:   Gap Closed?:    Time Spent (min): 5

## 2024-11-04 RX ORDER — TAMSULOSIN HYDROCHLORIDE 0.4 MG/1
0.4 CAPSULE ORAL DAILY
Qty: 90 CAPSULE | Refills: 2 | Status: SHIPPED | OUTPATIENT
Start: 2024-11-04

## 2024-11-04 RX ORDER — ROSUVASTATIN CALCIUM 20 MG/1
20 TABLET, COATED ORAL DAILY
Qty: 90 TABLET | Refills: 2 | Status: SHIPPED | OUTPATIENT
Start: 2024-11-04

## 2024-11-04 RX ORDER — HYDROCHLOROTHIAZIDE 25 MG/1
25 TABLET ORAL DAILY
Qty: 90 TABLET | Refills: 2 | Status: SHIPPED | OUTPATIENT
Start: 2024-11-04

## 2024-12-16 SDOH — ECONOMIC STABILITY: INCOME INSECURITY: HOW HARD IS IT FOR YOU TO PAY FOR THE VERY BASICS LIKE FOOD, HOUSING, MEDICAL CARE, AND HEATING?: PATIENT DECLINED

## 2024-12-16 SDOH — ECONOMIC STABILITY: FOOD INSECURITY: WITHIN THE PAST 12 MONTHS, YOU WORRIED THAT YOUR FOOD WOULD RUN OUT BEFORE YOU GOT MONEY TO BUY MORE.: PATIENT DECLINED

## 2024-12-16 SDOH — ECONOMIC STABILITY: FOOD INSECURITY: WITHIN THE PAST 12 MONTHS, THE FOOD YOU BOUGHT JUST DIDN'T LAST AND YOU DIDN'T HAVE MONEY TO GET MORE.: PATIENT DECLINED

## 2024-12-17 ENCOUNTER — OFFICE VISIT (OUTPATIENT)
Age: 69
End: 2024-12-17
Payer: MEDICARE

## 2024-12-17 VITALS
HEART RATE: 68 BPM | WEIGHT: 209.4 LBS | TEMPERATURE: 97.1 F | HEIGHT: 70 IN | BODY MASS INDEX: 29.98 KG/M2 | OXYGEN SATURATION: 97 % | DIASTOLIC BLOOD PRESSURE: 86 MMHG | RESPIRATION RATE: 16 BRPM | SYSTOLIC BLOOD PRESSURE: 132 MMHG

## 2024-12-17 DIAGNOSIS — K14.9 TONGUE DYSPLASIA: ICD-10-CM

## 2024-12-17 DIAGNOSIS — R35.1 BPH ASSOCIATED WITH NOCTURIA: ICD-10-CM

## 2024-12-17 DIAGNOSIS — N40.1 BPH ASSOCIATED WITH NOCTURIA: ICD-10-CM

## 2024-12-17 DIAGNOSIS — Z00.00 MEDICARE ANNUAL WELLNESS VISIT, SUBSEQUENT: ICD-10-CM

## 2024-12-17 DIAGNOSIS — E78.5 HYPERLIPIDEMIA LDL GOAL <130: Primary | ICD-10-CM

## 2024-12-17 DIAGNOSIS — E55.9 VITAMIN D DEFICIENCY: ICD-10-CM

## 2024-12-17 DIAGNOSIS — Z12.11 COLON CANCER SCREENING: ICD-10-CM

## 2024-12-17 DIAGNOSIS — M48.061 SPINAL STENOSIS OF LUMBAR REGION, UNSPECIFIED WHETHER NEUROGENIC CLAUDICATION PRESENT: ICD-10-CM

## 2024-12-17 DIAGNOSIS — H81.13 BENIGN PAROXYSMAL POSITIONAL VERTIGO DUE TO BILATERAL VESTIBULAR DISORDER: ICD-10-CM

## 2024-12-17 DIAGNOSIS — I10 BENIGN ESSENTIAL HYPERTENSION: ICD-10-CM

## 2024-12-17 DIAGNOSIS — R35.1 NOCTURIA: ICD-10-CM

## 2024-12-17 PROBLEM — M75.41 IMPINGEMENT SYNDROME OF RIGHT SHOULDER: Status: RESOLVED | Noted: 2022-03-18 | Resolved: 2024-12-17

## 2024-12-17 PROBLEM — S46.011A TRAUMATIC COMPLETE TEAR OF RIGHT ROTATOR CUFF: Status: RESOLVED | Noted: 2022-07-28 | Resolved: 2024-12-17

## 2024-12-17 PROBLEM — M54.2 NECK PAIN: Status: RESOLVED | Noted: 2022-03-18 | Resolved: 2024-12-17

## 2024-12-17 PROBLEM — M25.511 CHRONIC RIGHT SHOULDER PAIN: Status: RESOLVED | Noted: 2022-03-18 | Resolved: 2024-12-17

## 2024-12-17 PROBLEM — G89.29 CHRONIC RIGHT SHOULDER PAIN: Status: RESOLVED | Noted: 2022-03-18 | Resolved: 2024-12-17

## 2024-12-17 LAB
25(OH)D3 SERPL-MCNC: 46.2 NG/ML (ref 30–100)
ALBUMIN SERPL-MCNC: 4.1 G/DL (ref 3.5–5)
ALBUMIN/GLOB SERPL: 1.5 (ref 1.1–2.2)
ALP SERPL-CCNC: 79 U/L (ref 45–117)
ALT SERPL-CCNC: 30 U/L (ref 12–78)
ANION GAP SERPL CALC-SCNC: 7 MMOL/L (ref 2–12)
AST SERPL-CCNC: 26 U/L (ref 15–37)
BILIRUB SERPL-MCNC: 0.6 MG/DL (ref 0.2–1)
BUN SERPL-MCNC: 15 MG/DL (ref 6–20)
BUN/CREAT SERPL: 13 (ref 12–20)
CALCIUM SERPL-MCNC: 9.5 MG/DL (ref 8.5–10.1)
CHLORIDE SERPL-SCNC: 106 MMOL/L (ref 97–108)
CHOLEST SERPL-MCNC: 145 MG/DL
CO2 SERPL-SCNC: 27 MMOL/L (ref 21–32)
CREAT SERPL-MCNC: 1.16 MG/DL (ref 0.7–1.3)
GLOBULIN SER CALC-MCNC: 2.8 G/DL (ref 2–4)
GLUCOSE SERPL-MCNC: 92 MG/DL (ref 65–100)
HDLC SERPL-MCNC: 57 MG/DL
HDLC SERPL: 2.5 (ref 0–5)
LDLC SERPL CALC-MCNC: 66.8 MG/DL (ref 0–100)
POTASSIUM SERPL-SCNC: 3.5 MMOL/L (ref 3.5–5.1)
PROT SERPL-MCNC: 6.9 G/DL (ref 6.4–8.2)
SODIUM SERPL-SCNC: 140 MMOL/L (ref 136–145)
TRIGL SERPL-MCNC: 106 MG/DL
VLDLC SERPL CALC-MCNC: 21.2 MG/DL

## 2024-12-17 PROCEDURE — 99214 OFFICE O/P EST MOD 30 MIN: CPT | Performed by: STUDENT IN AN ORGANIZED HEALTH CARE EDUCATION/TRAINING PROGRAM

## 2024-12-17 RX ORDER — TAMSULOSIN HYDROCHLORIDE 0.4 MG/1
0.8 CAPSULE ORAL DAILY
Qty: 90 CAPSULE | Refills: 2 | Status: SHIPPED | OUTPATIENT
Start: 2024-12-17

## 2024-12-17 RX ORDER — SIMVASTATIN 10 MG
10 TABLET ORAL NIGHTLY
Qty: 90 TABLET | Refills: 1 | Status: SHIPPED | OUTPATIENT
Start: 2024-12-17

## 2024-12-17 ASSESSMENT — PATIENT HEALTH QUESTIONNAIRE - PHQ9
SUM OF ALL RESPONSES TO PHQ QUESTIONS 1-9: 0
1. LITTLE INTEREST OR PLEASURE IN DOING THINGS: NOT AT ALL
SUM OF ALL RESPONSES TO PHQ QUESTIONS 1-9: 0
SUM OF ALL RESPONSES TO PHQ9 QUESTIONS 1 & 2: 0
2. FEELING DOWN, DEPRESSED OR HOPELESS: NOT AT ALL

## 2024-12-17 ASSESSMENT — LIFESTYLE VARIABLES
HOW OFTEN DO YOU HAVE A DRINK CONTAINING ALCOHOL: MONTHLY OR LESS
HOW MANY STANDARD DRINKS CONTAINING ALCOHOL DO YOU HAVE ON A TYPICAL DAY: 1 OR 2

## 2024-12-17 NOTE — ASSESSMENT & PLAN NOTE
Chronic, not at goal (unstable), patient reports body aches with Crestor so we will switch to simvastatin 10 mg daily and follow-up in 6 months.  Advised patient that we may need to increase the dose if cholesterol does not remain well controlled.    Orders:    Lipid Panel; Future    simvastatin (ZOCOR) 10 MG tablet; Take 1 tablet by mouth nightly    Lipid Panel

## 2024-12-17 NOTE — ASSESSMENT & PLAN NOTE
Chronic, at goal (stable), continue current treatment plan    Orders:    Vitamin D 25 Hydroxy; Future    Vitamin D 25 Hydroxy

## 2024-12-17 NOTE — ASSESSMENT & PLAN NOTE
Chronic, at goal (stable), continue current treatment plan    Orders:    Total PSA with Free PSA Reflex; Future    Total PSA with Free PSA Reflex

## 2024-12-17 NOTE — PROGRESS NOTES
Chief Complaint   Patient presents with    Medicare AWV     \"Have you been to the ER, urgent care clinic since your last visit?  Hospitalized since your last visit?\"    NO    “Have you seen or consulted any other health care providers outside of [facility] since your last visit?”    NO        “Have you had a colorectal cancer screening such as a colonoscopy/FIT/Cologuard?    NO    Date of last Colonoscopy: 10/13/2014  No cologuard on file  No FIT/FOBT on file   No flexible sigmoidoscopy on file         Click Here for Release of Records Request             6/11/2024     8:44 AM   PHQ-9    Little interest or pleasure in doing things 0   Feeling down, depressed, or hopeless 0   PHQ-2 Score 0   PHQ-9 Total Score 0           Financial Resource Strain: Low Risk  (10/27/2023)    Overall Financial Resource Strain (CARDIA)     Difficulty of Paying Living Expenses: Not hard at all      Food Insecurity: Not on file (12/16/2024)          Health Maintenance Due   Topic Date Due    Pneumococcal 65+ years Vaccine (2 of 2 - PCV) 03/03/2022    Flu vaccine (1) 08/01/2024    COVID-19 Vaccine (4 - 2023-24 season) 09/01/2024    Colorectal Cancer Screen  10/13/2024    Annual Wellness Visit (Medicare)  10/30/2024        
daily and follow-up in 2 to 4 weeks.    Orders:    tamsulosin (FLOMAX) 0.4 MG capsule; Take 2 capsules by mouth daily    Benign essential hypertension    Chronic, stable but patient wishes to decrease the amount of medication he is taking.  Patient only on HCTZ for blood pressure at this time.  Advised patient to continue taking this medication but if he wishes to trial off, monitor his BP for 1 week without his HCTZ and to send me the blood pressure log to determine if HCTZ can be discontinued.  Follow-up in 2 to 4 weeks.    Orders:    Comprehensive Metabolic Panel; Future    Comprehensive Metabolic Panel    Benign paroxysmal positional vertigo due to bilateral vestibular disorder   Chronic, at goal (stable), continue current treatment plan    Orders:    Total PSA with Free PSA Reflex; Future    Total PSA with Free PSA Reflex    Spinal stenosis of lumbar region, unspecified whether neurogenic claudication present   Chronic, at goal (stable), continue current treatment plan         Tongue dysplasia   Chronic, at goal (stable), continue current treatment plan         Vitamin D deficiency   Chronic, at goal (stable), continue current treatment plan    Orders:    Vitamin D 25 Hydroxy; Future    Vitamin D 25 Hydroxy    Nocturia    Chronic, unstable.  Increasing Flomax as above to treat BPH.  Follow-up in 2 to 4 weeks.    Orders:    Total PSA with Free PSA Reflex; Future    Total PSA with Free PSA Reflex          Patient's complete Health Risk Assessment and screening values have been reviewed and are found in Flowsheets. The following problems were reviewed today and where indicated follow up appointments were made and/or referrals ordered.    Positive Risk Factor Screenings with Interventions:    Fall Risk:  Do you feel unsteady or are you worried about falling? : no  2 or more falls in past year?: (!) yes  Fall with injury in past year?: no     Interventions:    Reviewed medications, home hazards, visual acuity, and

## 2024-12-17 NOTE — ASSESSMENT & PLAN NOTE
Chronic, stable but patient wishes to decrease the amount of medication he is taking.  Patient only on HCTZ for blood pressure at this time.  Advised patient to continue taking this medication but if he wishes to trial off, monitor his BP for 1 week without his HCTZ and to send me the blood pressure log to determine if HCTZ can be discontinued.  Follow-up in 2 to 4 weeks.    Orders:    Comprehensive Metabolic Panel; Future    Comprehensive Metabolic Panel

## 2024-12-17 NOTE — ASSESSMENT & PLAN NOTE
Chronic, unstable.  Increasing Flomax as above to treat BPH.  Follow-up in 2 to 4 weeks.    Orders:    Total PSA with Free PSA Reflex; Future    Total PSA with Free PSA Reflex

## 2024-12-17 NOTE — PATIENT INSTRUCTIONS
information.      Personalized Preventive Plan for Yared Mckeon - 12/17/2024  Medicare offers a range of preventive health benefits. Some of the tests and screenings are paid in full while other may be subject to a deductible, co-insurance, and/or copay.    Some of these benefits include a comprehensive review of your medical history including lifestyle, illnesses that may run in your family, and various assessments and screenings as appropriate.    After reviewing your medical record and screening and assessments performed today your provider may have ordered immunizations, labs, imaging, and/or referrals for you.  A list of these orders (if applicable) as well as your Preventive Care list are included within your After Visit Summary for your review.    Other Preventive Recommendations:    A preventive eye exam performed by an eye specialist is recommended every 1-2 years to screen for glaucoma; cataracts, macular degeneration, and other eye disorders.  A preventive dental visit is recommended every 6 months.  Try to get at least 150 minutes of exercise per week or 10,000 steps per day on a pedometer .  Order or download the FREE \"Exercise & Physical Activity: Your Everyday Guide\" from [employer]. Call 1-163.545.6749 or search [employer] online.  You need 9098-1974 mg of calcium and 7893-3981 IU of vitamin D per day. It is possible to meet your calcium requirement with diet alone, but a vitamin D supplement is usually necessary to meet this goal.  When exposed to the sun, use a sunscreen that protects against both UVA and UVB radiation with an SPF of 30 or greater. Reapply every 2 to 3 hours or after sweating, drying off with a towel, or swimming.  Always wear a seat belt when traveling in a car. Always wear a helmet when riding a bicycle or motorcycle.

## 2024-12-19 LAB
PSA SERPL-MCNC: 3.8 NG/ML (ref 0–4)
REFLEX CRITERIA: NORMAL

## 2024-12-24 ENCOUNTER — TELEPHONE (OUTPATIENT)
Age: 69
End: 2024-12-24

## 2024-12-24 NOTE — TELEPHONE ENCOUNTER
Call and spoke to patient, two ID confirmed.   So insurance has followed up with this concern so he will go to his Specialist and will see MD on the 1/24/25

## 2025-01-23 SDOH — ECONOMIC STABILITY: FOOD INSECURITY: WITHIN THE PAST 12 MONTHS, THE FOOD YOU BOUGHT JUST DIDN'T LAST AND YOU DIDN'T HAVE MONEY TO GET MORE.: NEVER TRUE

## 2025-01-23 SDOH — ECONOMIC STABILITY: FOOD INSECURITY: WITHIN THE PAST 12 MONTHS, YOU WORRIED THAT YOUR FOOD WOULD RUN OUT BEFORE YOU GOT MONEY TO BUY MORE.: NEVER TRUE

## 2025-01-23 ASSESSMENT — PATIENT HEALTH QUESTIONNAIRE - PHQ9
SUM OF ALL RESPONSES TO PHQ QUESTIONS 1-9: 0
SUM OF ALL RESPONSES TO PHQ QUESTIONS 1-9: 0
2. FEELING DOWN, DEPRESSED OR HOPELESS: NOT AT ALL
SUM OF ALL RESPONSES TO PHQ QUESTIONS 1-9: 0
SUM OF ALL RESPONSES TO PHQ QUESTIONS 1-9: 0
SUM OF ALL RESPONSES TO PHQ9 QUESTIONS 1 & 2: 0
1. LITTLE INTEREST OR PLEASURE IN DOING THINGS: NOT AT ALL

## 2025-01-23 NOTE — PROGRESS NOTES
Chief Complaint   Patient presents with    Hypertension    Cholesterol Problem    Follow-up Chronic Condition     \"Have you been to the ER, urgent care clinic since your last visit?  Hospitalized since your last visit?\"    no    “Have you seen or consulted any other health care providers outside our system since your last visit?”     - Rectus diastasis M62.08 - Hernia   Ortho -  hip pain   Physical therapy - hip   “Have you had a colorectal cancer screening such as a colonoscopy/FIT/Cologuard?      Scheduled for 02/2025 Dr. Acevedo   Date of last Colonoscopy: 10/13/2014  No cologuard on file  No FIT/FOBT on file   No flexible sigmoidoscopy on file

## 2025-01-24 ENCOUNTER — OFFICE VISIT (OUTPATIENT)
Age: 70
End: 2025-01-24
Payer: MEDICARE

## 2025-01-24 VITALS
HEIGHT: 70 IN | BODY MASS INDEX: 30.78 KG/M2 | RESPIRATION RATE: 16 BRPM | TEMPERATURE: 97.7 F | OXYGEN SATURATION: 100 % | SYSTOLIC BLOOD PRESSURE: 130 MMHG | WEIGHT: 215 LBS | DIASTOLIC BLOOD PRESSURE: 80 MMHG | HEART RATE: 90 BPM

## 2025-01-24 DIAGNOSIS — E78.5 HYPERLIPIDEMIA LDL GOAL <130: ICD-10-CM

## 2025-01-24 DIAGNOSIS — I10 BENIGN ESSENTIAL HYPERTENSION: ICD-10-CM

## 2025-01-24 DIAGNOSIS — N40.1 BENIGN PROSTATIC HYPERPLASIA WITH URINARY FREQUENCY: Primary | ICD-10-CM

## 2025-01-24 DIAGNOSIS — M62.08 RECTUS DIASTASIS: ICD-10-CM

## 2025-01-24 DIAGNOSIS — R35.0 BENIGN PROSTATIC HYPERPLASIA WITH URINARY FREQUENCY: Primary | ICD-10-CM

## 2025-01-24 LAB
APPEARANCE UR: CLEAR
BACTERIA URNS QL MICRO: NEGATIVE /HPF
BILIRUB UR QL: NEGATIVE
COLOR UR: NORMAL
EPITH CASTS URNS QL MICRO: NORMAL /LPF
GLUCOSE UR STRIP.AUTO-MCNC: NEGATIVE MG/DL
HGB UR QL STRIP: NEGATIVE
HYALINE CASTS URNS QL MICRO: NORMAL /LPF (ref 0–5)
KETONES UR QL STRIP.AUTO: NEGATIVE MG/DL
LEUKOCYTE ESTERASE UR QL STRIP.AUTO: NEGATIVE
NITRITE UR QL STRIP.AUTO: NEGATIVE
PH UR STRIP: 7 (ref 5–8)
PROT UR STRIP-MCNC: NEGATIVE MG/DL
RBC #/AREA URNS HPF: NORMAL /HPF (ref 0–5)
SP GR UR REFRACTOMETRY: 1.01 (ref 1–1.03)
UROBILINOGEN UR QL STRIP.AUTO: 0.2 EU/DL (ref 0.2–1)
WBC URNS QL MICRO: NORMAL /HPF (ref 0–4)

## 2025-01-24 PROCEDURE — 99214 OFFICE O/P EST MOD 30 MIN: CPT | Performed by: STUDENT IN AN ORGANIZED HEALTH CARE EDUCATION/TRAINING PROGRAM

## 2025-01-24 RX ORDER — LYCOPENE 10 MG
CAPSULE ORAL
COMMUNITY

## 2025-01-24 RX ORDER — ASCORBIC ACID 1000 MG
TABLET ORAL
COMMUNITY

## 2025-01-24 RX ORDER — OXYBUTYNIN CHLORIDE 5 MG/1
5 TABLET, EXTENDED RELEASE ORAL DAILY
Qty: 30 TABLET | Refills: 3 | Status: SHIPPED | OUTPATIENT
Start: 2025-01-24

## 2025-01-24 NOTE — PROGRESS NOTES
Assessment/Plan:     1. Benign prostatic hyperplasia with urinary frequency-chronic, unstable.  Flow improved with the higher dose of Flomax but urgency/frequency is still present/bothersome so we will add oxybutynin and follow-up in 1 week.  -     Urinalysis with Microscopic; Future  -     oxyBUTYnin (DITROPAN XL) 5 MG extended release tablet; Take 1 tablet by mouth daily, Disp-30 tablet, R-3Normal  2. Hyperlipidemia LDL goal <130-chronic, stable.  Patient now tolerating simvastatin well so we will continue with simvastatin.  3. Benign essential hypertension -chronic, stable.  Continue current treatment plan.   4. Rectus diastasis -chronic, stable.  Continue current treatment plan.        Follow up:     Return in about 1 week (around 1/31/2025) for BPH f/u.      I have reviewed patient medical and social history and medications.  I have reviewed pertinent labs results and other data. I have discussed the diagnosis with the patient and the intended plan as seen in the above orders. The patient has received an after-visit summary and questions were answered concerning future plans. I have discussed medication side effects and warnings with the patient as well.        Subjective:     Chief Complaint   Patient presents with    Hypertension    Cholesterol Problem    Follow-up Chronic Condition       HPI:  Yared Mckeon is a 69 y.o. male that presents for: BPH and hyperlipidemia follow-up.  Patient reports improvement of flow with increased dose of Flomax but the urgency is still persistent.  He also was switched to simvastatin at last visit due to muscle aches and patient reports improvement of symptoms with simvastatin.  He also recently saw a general surgeon for evaluation of abdominal mass, initially suspected to be hernia but he was told that it was rectus diastases and did not recommend surgical intervention.  He is doing well overall and denies any chest pain, shortness of breath or any other complaints this

## 2025-02-03 ENCOUNTER — TELEMEDICINE (OUTPATIENT)
Age: 70
End: 2025-02-03
Payer: MEDICARE

## 2025-02-03 DIAGNOSIS — N40.1 BENIGN PROSTATIC HYPERPLASIA WITH URINARY FREQUENCY: Primary | ICD-10-CM

## 2025-02-03 DIAGNOSIS — N53.14 RETROGRADE EJACULATION: ICD-10-CM

## 2025-02-03 DIAGNOSIS — R35.0 BENIGN PROSTATIC HYPERPLASIA WITH URINARY FREQUENCY: Primary | ICD-10-CM

## 2025-02-03 PROCEDURE — 1123F ACP DISCUSS/DSCN MKR DOCD: CPT | Performed by: STUDENT IN AN ORGANIZED HEALTH CARE EDUCATION/TRAINING PROGRAM

## 2025-02-03 PROCEDURE — 1036F TOBACCO NON-USER: CPT | Performed by: STUDENT IN AN ORGANIZED HEALTH CARE EDUCATION/TRAINING PROGRAM

## 2025-02-03 PROCEDURE — G8417 CALC BMI ABV UP PARAM F/U: HCPCS | Performed by: STUDENT IN AN ORGANIZED HEALTH CARE EDUCATION/TRAINING PROGRAM

## 2025-02-03 PROCEDURE — G8428 CUR MEDS NOT DOCUMENT: HCPCS | Performed by: STUDENT IN AN ORGANIZED HEALTH CARE EDUCATION/TRAINING PROGRAM

## 2025-02-03 PROCEDURE — 3017F COLORECTAL CA SCREEN DOC REV: CPT | Performed by: STUDENT IN AN ORGANIZED HEALTH CARE EDUCATION/TRAINING PROGRAM

## 2025-02-03 PROCEDURE — 99214 OFFICE O/P EST MOD 30 MIN: CPT | Performed by: STUDENT IN AN ORGANIZED HEALTH CARE EDUCATION/TRAINING PROGRAM

## 2025-02-03 RX ORDER — OXYBUTYNIN CHLORIDE 5 MG/1
5 TABLET, EXTENDED RELEASE ORAL DAILY
Qty: 90 TABLET | Refills: 3
Start: 2025-02-03

## 2025-02-03 NOTE — PROGRESS NOTES
Telemedicine note    Encounter Date and Time: 02/03/25 at 8:12 AM EST    Yared Mckeon, was evaluated through a synchronous (real-time) audio-video encounter. The patient (or guardian if applicable) is aware that this is a billable service, which includes applicable co-pays. This Virtual Visit was conducted with patient's (and/or legal guardian's) consent. Patient identification was verified, and a caregiver was present when appropriate.   This visit was virtual due to scheduling/transportation issues.  The patient was located at Home: [address] [place] [id number]  Provider was located at Home (Appt Dept State): [place]  Confirm you are appropriately licensed, registered, or certified to deliver care in the state where the patient is located as indicated above. If you are not or unsure, please re-schedule the visit: Yes, I confirm.     --Kev No MD on 8/20/2024 at 11:37 AM    Subjective:     Chief Complaint   Patient presents with    Follow-up       HPI:  Yared Mckeon is a 69 y.o. male who was evaluated by synchronous (real-time) audio-video technology from home, through a secure patient portal.    Patient presenting for BPH follow-up.  He is doing well overall and reports significant improvement in symptoms oxybutynin.  He only wakes up 1 time during the night now to pee and is able to tolerate long car drives without having to stop multiple times.  He also denies any side effects from this medication.  He notes minimal to none ejaculation in the last 6 months even though he is able to achieve orgasm.  He thought maybe it has to do with his medications but he just wanted to discuss it.  He has had a vasectomy a long time ago and had a prostate biopsy a few years ago due to a small elevation in PSA.  He denies any chest pain, shortness of breath or any other complaints at this time.    ROS:   Review of Systems      Health Maintenance:  Health Maintenance Due   Topic Date Due

## 2025-02-26 ENCOUNTER — ANESTHESIA (OUTPATIENT)
Facility: HOSPITAL | Age: 70
End: 2025-02-26
Payer: MEDICARE

## 2025-02-26 ENCOUNTER — HOSPITAL ENCOUNTER (OUTPATIENT)
Facility: HOSPITAL | Age: 70
Setting detail: OUTPATIENT SURGERY
Discharge: HOME | End: 2025-02-26
Attending: SPECIALIST | Admitting: SPECIALIST
Payer: MEDICARE

## 2025-02-26 ENCOUNTER — ANESTHESIA EVENT (OUTPATIENT)
Facility: HOSPITAL | Age: 70
End: 2025-02-26
Payer: MEDICARE

## 2025-02-26 VITALS
WEIGHT: 210.8 LBS | DIASTOLIC BLOOD PRESSURE: 80 MMHG | TEMPERATURE: 98 F | BODY MASS INDEX: 30.18 KG/M2 | SYSTOLIC BLOOD PRESSURE: 122 MMHG | HEIGHT: 70 IN | HEART RATE: 72 BPM | OXYGEN SATURATION: 97 % | RESPIRATION RATE: 11 BRPM

## 2025-02-26 PROCEDURE — 3600007512: Performed by: SPECIALIST

## 2025-02-26 PROCEDURE — 7100000011 HC PHASE II RECOVERY - ADDTL 15 MIN: Performed by: SPECIALIST

## 2025-02-26 PROCEDURE — 3700000001 HC ADD 15 MINUTES (ANESTHESIA): Performed by: SPECIALIST

## 2025-02-26 PROCEDURE — 2580000003 HC RX 258: Performed by: NURSE ANESTHETIST, CERTIFIED REGISTERED

## 2025-02-26 PROCEDURE — 6360000002 HC RX W HCPCS: Performed by: NURSE ANESTHETIST, CERTIFIED REGISTERED

## 2025-02-26 PROCEDURE — 3700000000 HC ANESTHESIA ATTENDED CARE: Performed by: SPECIALIST

## 2025-02-26 PROCEDURE — 7100000010 HC PHASE II RECOVERY - FIRST 15 MIN: Performed by: SPECIALIST

## 2025-02-26 PROCEDURE — 2709999900 HC NON-CHARGEABLE SUPPLY: Performed by: SPECIALIST

## 2025-02-26 PROCEDURE — 3600007502: Performed by: SPECIALIST

## 2025-02-26 RX ORDER — SODIUM CHLORIDE 0.9 % (FLUSH) 0.9 %
5-40 SYRINGE (ML) INJECTION PRN
Status: DISCONTINUED | OUTPATIENT
Start: 2025-02-26 | End: 2025-02-26 | Stop reason: HOSPADM

## 2025-02-26 RX ORDER — SODIUM CHLORIDE 9 MG/ML
INJECTION, SOLUTION INTRAVENOUS
Status: DISCONTINUED | OUTPATIENT
Start: 2025-02-26 | End: 2025-02-26 | Stop reason: SDUPTHER

## 2025-02-26 RX ORDER — SODIUM CHLORIDE 9 MG/ML
INJECTION, SOLUTION INTRAVENOUS PRN
Status: DISCONTINUED | OUTPATIENT
Start: 2025-02-26 | End: 2025-02-26 | Stop reason: HOSPADM

## 2025-02-26 RX ORDER — SODIUM CHLORIDE 9 MG/ML
INJECTION, SOLUTION INTRAVENOUS CONTINUOUS
Status: DISCONTINUED | OUTPATIENT
Start: 2025-02-26 | End: 2025-02-26 | Stop reason: HOSPADM

## 2025-02-26 RX ORDER — SODIUM CHLORIDE 0.9 % (FLUSH) 0.9 %
5-40 SYRINGE (ML) INJECTION EVERY 12 HOURS SCHEDULED
Status: DISCONTINUED | OUTPATIENT
Start: 2025-02-26 | End: 2025-02-26 | Stop reason: HOSPADM

## 2025-02-26 RX ADMIN — PROPOFOL 25 MG: 10 INJECTION, EMULSION INTRAVENOUS at 09:43

## 2025-02-26 RX ADMIN — PROPOFOL 50 MG: 10 INJECTION, EMULSION INTRAVENOUS at 09:40

## 2025-02-26 RX ADMIN — PROPOFOL 25 MG: 10 INJECTION, EMULSION INTRAVENOUS at 09:48

## 2025-02-26 RX ADMIN — PROPOFOL 150 MG: 10 INJECTION, EMULSION INTRAVENOUS at 09:39

## 2025-02-26 RX ADMIN — PROPOFOL 25 MG: 10 INJECTION, EMULSION INTRAVENOUS at 09:51

## 2025-02-26 RX ADMIN — SODIUM CHLORIDE: 9 INJECTION, SOLUTION INTRAVENOUS at 09:30

## 2025-02-26 RX ADMIN — PROPOFOL 25 MG: 10 INJECTION, EMULSION INTRAVENOUS at 09:46

## 2025-02-26 RX ADMIN — PROPOFOL 25 MG: 10 INJECTION, EMULSION INTRAVENOUS at 09:42

## 2025-02-26 ASSESSMENT — PAIN - FUNCTIONAL ASSESSMENT: PAIN_FUNCTIONAL_ASSESSMENT: NONE - DENIES PAIN

## 2025-02-26 NOTE — OP NOTE
[facility]  [address], [place] [address]                 Colonoscopy Procedure Note    Indications:   See Preoperative Diagnosis above  Referring Physician: Kev No MD  Anesthesia/Sedation: MAC anesthesia Propofol  Endoscopist:  Dr. Lopez Acevedo  Assistant:  Circulator: Natty Sue, FRANCES; Nany Reynolds RN  Preoperative diagnosis: Special screening for malignant neoplasm of colon [Z12.11]    Procedure in Detail:  Informed consent was obtained for the procedure, including sedation.  Risks of perforation, hemorrhage, adverse drug reaction, and aspiration were discussed. The patient was placed in the left lateral decubitus position.  Based on the pre-procedure assessment, including review of the patient's medical history, medications, allergies, and review of systems, he had been deemed to be an appropriate candidate for  sedation; he was therefore sedated with the medications listed above.   The patient was monitored continuously with ECG tracing, pulse oximetry, blood pressure monitoring, and direct observations.      A rectal examination was performed. The [id number] was inserted into the rectum and advanced under direct vision to the cecum, which was identified by the ileocecal valve and appendiceal orifice.  The quality of the colonic preparation was fair.  A careful inspection was made as the colonoscope was withdrawn, including a retroflexed view of the rectum; findings and interventions are described below.  Appropriate photodocumentation was obtained.    Findings:  Rectum: normal  Sigmoid: normal  Descending Colon: normal  Transverse Colon: normal  Ascending Colon: normal  Cecum: normal    Specimens:    * No specimens in log *    EBL: None    Complications: None; patient tolerated the procedure well.    Recommendations:     - Repeat colonoscopy in 5 years.    - High fiber diet.        Signed By: Lopez Acevedo MD                        February 26, 2025

## 2025-02-26 NOTE — ANESTHESIA POSTPROCEDURE EVALUATION
Department of Anesthesiology  Postprocedure Note    Patient: Yared Mckeon  MRN: [id number]  YOB: 1955  Date of evaluation: 2/26/2025    Procedure Summary       Date: 02/26/25 Room / Location: [facility] 02 / [facility] ENDOSCOPY    Anesthesia Start: 0930 Anesthesia Stop: 0957    Procedure: COLONOSCOPY DIAGNOSTIC (Lower GI Region) Diagnosis:       Special screening for malignant neoplasm of colon      (Special screening for malignant neoplasm of colon [Z12.11])    Surgeons: Lopez Acevedo MD Responsible Provider: Roberto Robertson MD    Anesthesia Type: MAC ASA Status: 2            Anesthesia Type: MAC    Darian Phase I: Darian Score: 10    Darian Phase II: Darian Score: 10    Anesthesia Post Evaluation    Patient location during evaluation: bedside  Nausea & Vomiting: no nausea  Cardiovascular status: blood pressure returned to baseline  Respiratory status: acceptable  Hydration status: euvolemic    No notable events documented.

## 2025-02-26 NOTE — DISCHARGE INSTRUCTIONS
Yared Mckeon  [id number]  1955    COLON DISCHARGE INSTRUCTIONS  Discomfort:  Redness at IV site- apply warm compress to area; if redness or soreness persist- contact your physician  There may be a slight amount of blood passed from the rectum  Gaseous discomfort- walking, belching will help relieve any discomfort    DIET:   High fiber diet.   - however -  remember your colon is empty and a heavy meal will produce gas.   Avoid these foods:  vegetables, fried / greasy foods, carbonated drinks for today.   You may not drink alcoholic beverages for at least 12 hours    MEDICATIONS:   Regarding Aspirin or Nonsteroidal medications, please see below.    ACTIVITY:  You may resume your normal daily activities it is recommended that you spend the remainder of the day resting -  avoid any strenuous activity.  You may not operate a vehicle for 12 hours  You may not engage in an occupation involving machinery or appliances for rest of today  Avoid making any critical decisions for at least 24 hour    CALL M.D.  ANY SIGN OF:  Increasing pain, nausea, vomiting  Abdominal distension (swelling)  New increased bleeding (oral or rectal)  Fever (chills)  Pain in chest area  Bloody discharge from nose or mouth  Shortness of breath    You may take Tylenol as needed for pain. You may take any Advil, Aspirin, Ibuprofen, Motrin, Aleve, or Goody’s for 10 days,      Post procedure diagnosis: Normal  Post-procedure recommendations: Repeat colon in 5 years    Follow-up Instructions:   Call Dr. Acevedo  Results of procedure / biopsy in 10 days if biopsies were done  Telephone #  [phone]

## 2025-02-26 NOTE — ANESTHESIA PRE PROCEDURE
Department of Anesthesiology  Preprocedure Note       Name:  Yared Mckeon   Age:  69 y.o.  :  1955                                          MRN:  [id number]         Date:  2025      Surgeon: Surgeon(s):  Lopez Acevedo MD    Procedure: Procedure(s):  COLONOSCOPY DIAGNOSTIC    Medications prior to admission:   Prior to Admission medications    Medication Sig Start Date End Date Taking? Authorizing Provider   oxyBUTYnin (DITROPAN XL) 5 MG extended release tablet Take 1 tablet by mouth daily 2/3/25  Yes Kev No MD   Lycopene 10 MG CAPS Take by mouth   Yes ProviderAmisha MD   Coenzyme Q10 (CO Q 10) 10 MG CAPS Take by mouth   Yes ProviderAmisha MD   NONFORMULARY Cognilux  memory supplement   Yes Amisha Salinas MD   NONFORMULARY Prostadine   Yes ProviderAmisha MD   simvastatin (ZOCOR) 10 MG tablet Take 1 tablet by mouth nightly 24  Yes Kev No MD   tamsulosin (FLOMAX) 0.4 MG capsule Take 2 capsules by mouth daily 24  Yes Kev No MD   hydroCHLOROthiazide (HYDRODIURIL) 25 MG tablet Take 1 tablet by mouth daily 24  Yes Jesse Falcon MD   vitamin D 25 MCG (1000 UT) CAPS Take 2 capsules by mouth every other day 13  Yes Automatic Reconciliation, Ar       Current medications:    No current facility-administered medications for this encounter.       Allergies:    Allergies   Allergen Reactions   • Oxycodone Rash     Patient had full body chills 20 minutes after taking 5 mg. Redness, stuffy nose, and felt warm.   • Penicillins Rash       Problem List:    Patient Active Problem List   Diagnosis Code   • Benign essential hypertension I10   • Obesity (BMI 30.0-34.9) E66.811   • Seasonal allergic rhinitis due to pollen J30.1   • Vitamin D deficiency E55.9   • Hyperlipidemia LDL goal <130 E78.5   • DDD (degenerative disc disease), cervical M50.30   • Benign paroxysmal positional vertigo due to bilateral vestibular disorder H81.13   • Spinal

## 2025-02-26 NOTE — H&P
Pre-endoscopy H and P     The patient was seen and examined in the endoscopy suite. The airway was assessed and docuemented. The problem list and medications were reviewed.     Patient Active Problem List   Diagnosis    Benign essential hypertension    Obesity (BMI 30.0-34.9)    Seasonal allergic rhinitis due to pollen    Vitamin D deficiency    Hyperlipidemia LDL goal <130    DDD (degenerative disc disease), cervical    Benign paroxysmal positional vertigo due to bilateral vestibular disorder    Spinal stenosis of lumbar region    Nocturia    Tongue dysplasia    Trigger middle finger of right hand    Rectus diastasis     Social History     Socioeconomic History    Marital status:      Spouse name: Not on file    Number of children: Not on file    Years of education: Not on file    Highest education level: Not on file   Occupational History    Not on file   Tobacco Use    Smoking status: Never    Smokeless tobacco: Never   Substance and Sexual Activity    Alcohol use: Yes     Comment: very sporadic/every month or so    Drug use: No    Sexual activity: Yes     Partners: Female   Other Topics Concern    Not on file   Social History Narrative    Not on file     Social Determinants of Health     Financial Resource Strain: Patient Declined (12/16/2024)    Overall Financial Resource Strain (CARDIA)     Difficulty of Paying Living Expenses: Patient declined   Food Insecurity: No Food Insecurity (1/23/2025)    Hunger Vital Sign     Worried About Running Out of Food in the Last Year: Never true     Ran Out of Food in the Last Year: Never true   Transportation Needs: No Transportation Needs (1/23/2025)    PRAPARE - Transportation     Lack of Transportation (Medical): No     Lack of Transportation (Non-Medical): No   Physical Activity: Sufficiently Active (12/17/2024)    Exercise Vital Sign     Days of Exercise per Week: 2 days     Minutes of Exercise per Session: 90 min   Stress: Not on file   Social Connections: Not

## 2025-02-26 NOTE — PROGRESS NOTES
Initial RN admission and assessment performed and documented in Endoscopy navigator.     Patient evaluated by anesthesia in pre-procedure holding.     All procedural vital signs, airway assessment, and level of consciousness information monitored and recorded by anesthesia staff on the anesthesia record.     Report received from CRNA post procedure.  Patient transported to recovery area by RN.    Endoscopy post procedure time out was performed and specimens were verified with physician.    Endoscope was pre-cleaned at bedside immediately following procedure by temi woodward.

## 2025-04-15 ENCOUNTER — OFFICE VISIT (OUTPATIENT)
Age: 70
End: 2025-04-15
Payer: MEDICARE

## 2025-04-15 VITALS
OXYGEN SATURATION: 98 % | TEMPERATURE: 97.8 F | HEIGHT: 70 IN | SYSTOLIC BLOOD PRESSURE: 133 MMHG | RESPIRATION RATE: 16 BRPM | HEART RATE: 68 BPM | BODY MASS INDEX: 29.55 KG/M2 | DIASTOLIC BLOOD PRESSURE: 85 MMHG | WEIGHT: 206.4 LBS

## 2025-04-15 DIAGNOSIS — N40.1 BPH ASSOCIATED WITH NOCTURIA: Primary | ICD-10-CM

## 2025-04-15 DIAGNOSIS — Z01.810 PREOP CARDIOVASCULAR EXAM: ICD-10-CM

## 2025-04-15 DIAGNOSIS — R35.1 BPH ASSOCIATED WITH NOCTURIA: Primary | ICD-10-CM

## 2025-04-15 DIAGNOSIS — E78.5 HYPERLIPIDEMIA LDL GOAL <130: ICD-10-CM

## 2025-04-15 DIAGNOSIS — Z82.49 FAMILY HISTORY OF BLOOD CLOTS: ICD-10-CM

## 2025-04-15 DIAGNOSIS — I10 BENIGN ESSENTIAL HYPERTENSION: ICD-10-CM

## 2025-04-15 PROCEDURE — 99215 OFFICE O/P EST HI 40 MIN: CPT | Performed by: STUDENT IN AN ORGANIZED HEALTH CARE EDUCATION/TRAINING PROGRAM

## 2025-04-15 PROCEDURE — G8427 DOCREV CUR MEDS BY ELIG CLIN: HCPCS | Performed by: STUDENT IN AN ORGANIZED HEALTH CARE EDUCATION/TRAINING PROGRAM

## 2025-04-15 PROCEDURE — 1123F ACP DISCUSS/DSCN MKR DOCD: CPT | Performed by: STUDENT IN AN ORGANIZED HEALTH CARE EDUCATION/TRAINING PROGRAM

## 2025-04-15 PROCEDURE — G2211 COMPLEX E/M VISIT ADD ON: HCPCS | Performed by: STUDENT IN AN ORGANIZED HEALTH CARE EDUCATION/TRAINING PROGRAM

## 2025-04-15 PROCEDURE — 1036F TOBACCO NON-USER: CPT | Performed by: STUDENT IN AN ORGANIZED HEALTH CARE EDUCATION/TRAINING PROGRAM

## 2025-04-15 PROCEDURE — 1160F RVW MEDS BY RX/DR IN RCRD: CPT | Performed by: STUDENT IN AN ORGANIZED HEALTH CARE EDUCATION/TRAINING PROGRAM

## 2025-04-15 PROCEDURE — 93010 ELECTROCARDIOGRAM REPORT: CPT | Performed by: STUDENT IN AN ORGANIZED HEALTH CARE EDUCATION/TRAINING PROGRAM

## 2025-04-15 PROCEDURE — 3075F SYST BP GE 130 - 139MM HG: CPT | Performed by: STUDENT IN AN ORGANIZED HEALTH CARE EDUCATION/TRAINING PROGRAM

## 2025-04-15 PROCEDURE — 1159F MED LIST DOCD IN RCRD: CPT | Performed by: STUDENT IN AN ORGANIZED HEALTH CARE EDUCATION/TRAINING PROGRAM

## 2025-04-15 PROCEDURE — 93005 ELECTROCARDIOGRAM TRACING: CPT | Performed by: STUDENT IN AN ORGANIZED HEALTH CARE EDUCATION/TRAINING PROGRAM

## 2025-04-15 PROCEDURE — 3017F COLORECTAL CA SCREEN DOC REV: CPT | Performed by: STUDENT IN AN ORGANIZED HEALTH CARE EDUCATION/TRAINING PROGRAM

## 2025-04-15 PROCEDURE — 3079F DIAST BP 80-89 MM HG: CPT | Performed by: STUDENT IN AN ORGANIZED HEALTH CARE EDUCATION/TRAINING PROGRAM

## 2025-04-15 PROCEDURE — G8417 CALC BMI ABV UP PARAM F/U: HCPCS | Performed by: STUDENT IN AN ORGANIZED HEALTH CARE EDUCATION/TRAINING PROGRAM

## 2025-04-15 ASSESSMENT — PATIENT HEALTH QUESTIONNAIRE - PHQ9
1. LITTLE INTEREST OR PLEASURE IN DOING THINGS: NOT AT ALL
SUM OF ALL RESPONSES TO PHQ QUESTIONS 1-9: 0
SUM OF ALL RESPONSES TO PHQ QUESTIONS 1-9: 0
2. FEELING DOWN, DEPRESSED OR HOPELESS: NOT AT ALL
SUM OF ALL RESPONSES TO PHQ QUESTIONS 1-9: 0
SUM OF ALL RESPONSES TO PHQ QUESTIONS 1-9: 0

## 2025-04-15 NOTE — PROGRESS NOTES
Preoperative Evaluation for Yared Mckeon     4/15/2025  Chief Complaint   Patient presents with    Pre-op Exam       HPI:   Yared Mckeon is a 69 y.o. male referred for evaluation by:Self Referred for Pre- Op Evaluation.  Please see encounter details and orders for consultative summary.     Type of surgery and indication: Total hip replacement (Right)  Surgery site : [facility]  Anesthesia type: General   Date of procedure:  5/12/25    Review of Systems   Pt denies any CP, SOB, F/C, N/V/D, blood in stool or any other complaints at this time.  Inherent Risk of Surgery   Surgical risk:  Intermediate  Low:  Cataract, breast, dental, endoscopy, superficial  Intermediate:  Orthopedic, abdominal, thoracic, carotid endarterctomy, prostate, head and neck  High:  Vascular, aortic, emergent, high risk of blood loss, anticipated prolonged    Patient Cardiac Risk Assessment   Revised Cardiac Risk Index (RCRI)  High Risk Surgery  Hx of Heart Failure  Hx of ischemic heart disease  Hx of CVD  DM requiring insulin therapy  Preoperative serum Cr >2.0 mg/dl    Rate if cardiac death, nonfatal MI, nonfatal cardiac arrest by number of risk factor  None - 0.4%  1  1.0%  2 2.4%  3+ 5.4%    VALERIA/AHA 2007 Guidelines:   1) Surgery Emergency, Non-cardiac -> to surgery  2) If not, look at clinical predictors  Major Intermediate Minor   Unstable CAD (recent MI, severe angina) Mild angina Advanced Age   Decompensated CHF Prior MI Abnormal EKG   Severe Valvular Disease Compensated/Prior CHF Rhythm other than sinus   Arrhythmias (AV block, uncontrolled vent rate, sxs) Diabetes Low METS (<4)    Renal Insuficiency Hx of CVA     Uncontrolled HTN     Coumadin   high risk= mechanical heart valve, VTE with hypercoag state, VTE < 3 months  Low risk= AF w/o CVA, h/o DVT > 3 months and NO hypercoag state    METS     <4 METS >4 METS   Care for self Climb a flight of stairs or a hill   Walk indoors around housse Walk on level ground at

## 2025-04-15 NOTE — PROGRESS NOTES
Chief Complaint   Patient presents with    Pre-op Exam     \"Have you been to the ER, urgent care clinic since your last visit?  Hospitalized since your last visit?\"    NO    “Have you seen or consulted any other health care providers outside of [facility] since your last visit?”    YES - When: approximately 2 months ago.  Where and Why: [facility] - for apparent hernia; for hip pain.          Click Here for Release of Records Request             4/15/2025     2:05 PM   PHQ-9    Little interest or pleasure in doing things 0   Feeling down, depressed, or hopeless 0   PHQ-2 Score 0   PHQ-9 Total Score 0           Financial Resource Strain: Patient Declined (12/16/2024)    Overall Financial Resource Strain (CARDIA)     Difficulty of Paying Living Expenses: Patient declined      Food Insecurity: No Food Insecurity (1/23/2025)    Hunger Vital Sign     Worried About Running Out of Food in the Last Year: Never true     Ran Out of Food in the Last Year: Never true          Health Maintenance Due   Topic Date Due    Pneumococcal 50+ years Vaccine (2 of 2 - PCV) 03/03/2022    COVID-19 Vaccine (4 - 2024-25 season) 09/01/2024

## 2025-04-16 ENCOUNTER — RESULTS FOLLOW-UP (OUTPATIENT)
Age: 70
End: 2025-04-16

## 2025-04-16 DIAGNOSIS — E87.6 HYPOKALEMIA: Primary | ICD-10-CM

## 2025-04-16 LAB
ANION GAP SERPL CALC-SCNC: 8 MMOL/L (ref 2–12)
APPEARANCE UR: CLEAR
BACTERIA URNS QL MICRO: NEGATIVE /HPF
BILIRUB UR QL: NEGATIVE
BUN SERPL-MCNC: 19 MG/DL (ref 6–20)
BUN/CREAT SERPL: 15 (ref 12–20)
CALCIUM SERPL-MCNC: 9.4 MG/DL (ref 8.5–10.1)
CHLORIDE SERPL-SCNC: 105 MMOL/L (ref 97–108)
CO2 SERPL-SCNC: 27 MMOL/L (ref 21–32)
COLOR UR: NORMAL
CREAT SERPL-MCNC: 1.25 MG/DL (ref 0.7–1.3)
EPITH CASTS URNS QL MICRO: NORMAL /LPF
ERYTHROCYTE [DISTWIDTH] IN BLOOD BY AUTOMATED COUNT: 13 % (ref 11.5–14.5)
GLUCOSE SERPL-MCNC: 78 MG/DL (ref 65–100)
GLUCOSE UR STRIP.AUTO-MCNC: NEGATIVE MG/DL
HCT VFR BLD AUTO: 46.7 % (ref 36.6–50.3)
HGB BLD-MCNC: 15.8 G/DL (ref 12.1–17)
HGB UR QL STRIP: NEGATIVE
HYALINE CASTS URNS QL MICRO: NORMAL /LPF (ref 0–5)
INR PPP: 1.1 (ref 0.9–1.1)
KETONES UR QL STRIP.AUTO: NEGATIVE MG/DL
LEUKOCYTE ESTERASE UR QL STRIP.AUTO: NEGATIVE
MCH RBC QN AUTO: 30 PG (ref 26–34)
MCHC RBC AUTO-ENTMCNC: 33.8 G/DL (ref 30–36.5)
MCV RBC AUTO: 88.8 FL (ref 80–99)
NITRITE UR QL STRIP.AUTO: NEGATIVE
NRBC # BLD: 0 K/UL (ref 0–0.01)
NRBC BLD-RTO: 0 PER 100 WBC
PH UR STRIP: 5 (ref 5–8)
PLATELET # BLD AUTO: 242 K/UL (ref 150–400)
PMV BLD AUTO: 10.2 FL (ref 8.9–12.9)
POTASSIUM SERPL-SCNC: 3.3 MMOL/L (ref 3.5–5.1)
PROT UR STRIP-MCNC: NEGATIVE MG/DL
PROTHROMBIN TIME: 11.5 SEC (ref 9.2–11.2)
RBC # BLD AUTO: 5.26 M/UL (ref 4.1–5.7)
RBC #/AREA URNS HPF: NORMAL /HPF (ref 0–5)
SODIUM SERPL-SCNC: 140 MMOL/L (ref 136–145)
SP GR UR REFRACTOMETRY: 1.02 (ref 1–1.03)
URINE CULTURE IF INDICATED: NORMAL
UROBILINOGEN UR QL STRIP.AUTO: 0.2 EU/DL (ref 0.2–1)
WBC # BLD AUTO: 8.1 K/UL (ref 4.1–11.1)
WBC URNS QL MICRO: NORMAL /HPF (ref 0–4)

## 2025-04-16 NOTE — RESULT ENCOUNTER NOTE
INR/CBC/UA normal  Hypokalemia noted to 3.3, patient on HCTZ which can cause low potassium but he has been on this medication for a long time without any issues.  Will recheck BMP and magnesium and advised patient to make a lab appointment only to get this done later this week.    Called patient reviewed lab results/plan.  All questions were answered.

## 2025-04-17 ENCOUNTER — LAB (OUTPATIENT)
Age: 70
End: 2025-04-17

## 2025-04-17 DIAGNOSIS — E87.6 HYPOKALEMIA: ICD-10-CM

## 2025-04-17 LAB
ANION GAP SERPL CALC-SCNC: 5 MMOL/L (ref 2–12)
BUN SERPL-MCNC: 17 MG/DL (ref 6–20)
BUN/CREAT SERPL: 14 (ref 12–20)
CALCIUM SERPL-MCNC: 9.2 MG/DL (ref 8.5–10.1)
CHLORIDE SERPL-SCNC: 105 MMOL/L (ref 97–108)
CO2 SERPL-SCNC: 30 MMOL/L (ref 21–32)
CREAT SERPL-MCNC: 1.19 MG/DL (ref 0.7–1.3)
GLUCOSE SERPL-MCNC: 88 MG/DL (ref 65–100)
MAGNESIUM SERPL-MCNC: 2.1 MG/DL (ref 1.6–2.4)
POTASSIUM SERPL-SCNC: 3.5 MMOL/L (ref 3.5–5.1)
SODIUM SERPL-SCNC: 140 MMOL/L (ref 136–145)

## 2025-04-18 ENCOUNTER — RESULTS FOLLOW-UP (OUTPATIENT)
Age: 70
End: 2025-04-18

## 2025-04-22 DIAGNOSIS — N40.1 BENIGN PROSTATIC HYPERPLASIA WITH URINARY FREQUENCY: ICD-10-CM

## 2025-04-22 DIAGNOSIS — R35.0 BENIGN PROSTATIC HYPERPLASIA WITH URINARY FREQUENCY: ICD-10-CM

## 2025-04-22 RX ORDER — OXYBUTYNIN CHLORIDE 5 MG/1
5 TABLET, EXTENDED RELEASE ORAL DAILY
Qty: 90 TABLET | Refills: 3 | Status: SHIPPED | OUTPATIENT
Start: 2025-04-22

## 2025-04-22 NOTE — TELEPHONE ENCOUNTER
Last appointment: 4/15/25 Oneal  Next appointment: 7/23/25 Oneal  Previous refill encounter(s): 2/3/25 90 + 3 (entered as no print)    Requested Prescriptions     Pending Prescriptions Disp Refills    oxyBUTYnin (DITROPAN XL) 5 MG extended release tablet 90 tablet 3     Sig: Take 1 tablet by mouth daily     For Pharmacy Admin Tracking Only    Program: Medication Refill  CPA in place:    Recommendation Provided To:   Intervention Detail: New Rx: 1, reason: Patient Preference  Intervention Accepted By:   Gap Closed?:    Time Spent (min): 5

## 2025-05-28 ENCOUNTER — HOSPITAL ENCOUNTER (OUTPATIENT)
Facility: HOSPITAL | Age: 70
Setting detail: RECURRING SERIES
Discharge: HOME | End: 2025-05-31
Payer: MEDICARE

## 2025-05-28 PROCEDURE — 97110 THERAPEUTIC EXERCISES: CPT

## 2025-05-28 PROCEDURE — 97162 PT EVAL MOD COMPLEX 30 MIN: CPT

## 2025-05-28 NOTE — THERAPY EVALUATION
Physical Therapy at [facility],   a part of [facility]  [address] [address]  Phone:[phone] Fax:[phone]                                                                            PHYSICAL THERAPY - MEDICARE EVALUATION/PLAN OF CARE NOTE (updated 3/23)      Date: 2025          Patient Name:  Yared Mckeon :  1955   Medical   Diagnosis:  Right hip pain [M25.551] Treatment Diagnosis:  M25.551  RIGHT HIP PAIN    Referral Source:  Darwin Car MD Provider #:  [id number]                  Insurance: Payor: MEDICARE / Plan: MEDICARE PART A AND B / Product Type: *No Product type* /      Patient  verified yes     Visit #   Current  / Total 1 24   Time   In / Out 130 P 2:20 P   Total Treatment Time 50   Total Timed Codes 15   1:1 Treatment Time 15      [facility] Totals Reminder:  bill using total billable   min of TIMED therapeutic procedures and modalities.   8-22 min = 1 unit; 23-37 min = 2 units; 38-52 min = 3 units;  53-67 min = 4 units; 68-82 min = 5 units           SUBJECTIVE  If an interpreting service was utilized for treatment of this patient, the contents of this document represent the material reviewed with the patient via the .     Pain Level (0-10 scale): 0.5  []constant []intermittent [x]improving []worsening []no change since onset    Any medication changes, allergies to medications, adverse drug reactions, diagnosis change, or new procedure performed?: [x] No    [] Yes (see summary sheet for update)  Medications: Verified on Patient Summary List    Subjective functional status/changes:     Pt presents s/p R THR on 25 by Dr Evans. He is doing very well; has very little discomfort/pain. He occasionally takes ibuprofen. He has been trying to increase walking time. He had HHPT for only a couple visits bc he was doing so well. He used a walker for ~1-2 days then had SPC for ~1-2 weeks. Currently no AD. He wants

## 2025-05-30 ENCOUNTER — HOSPITAL ENCOUNTER (OUTPATIENT)
Facility: HOSPITAL | Age: 70
Setting detail: RECURRING SERIES
End: 2025-05-30
Payer: MEDICARE

## 2025-05-30 PROCEDURE — 97140 MANUAL THERAPY 1/> REGIONS: CPT

## 2025-05-30 PROCEDURE — 97110 THERAPEUTIC EXERCISES: CPT

## 2025-05-30 NOTE — PROGRESS NOTES
PHYSICAL THERAPY - MEDICARE DAILY TREATMENT NOTE (updated 3/23)      Date: 2025          Patient Name:  Yared Mckeon :  1955   Medical   Diagnosis:  Right hip pain [M25.551] Treatment Diagnosis:  M25.551  RIGHT HIP PAIN    Referral Source:  Darwin Car MD Insurance:   Payor: MEDICARE / Plan: MEDICARE PART A AND B / Product Type: *No Product type* /                     Patient  verified yes     Visit #   Current  / Total 2 24   Time   In / Out 1030 A 11:35 A   Total Treatment Time 65   Total Timed Codes 55   1:1 Treatment Time 55      [facility] Totals Reminder:  bill using total billable   min of TIMED therapeutic procedures and modalities.   8-22 min = 1 unit; 23-37 min = 2 units; 38-52 min = 3 units; 53-67 min = 4 units; 68-82 min = 5 units            SUBJECTIVE  If an interpreting service was utilized for treatment of this patient, the contents of this document represent the material reviewed with the patient via the .     Pain Level (0-10 scale): 0    Any medication changes, allergies to medications, adverse drug reactions, diagnosis change, or new procedure performed?: [x] No    [] Yes (see summary sheet for update)  Medications: Verified on Patient Summary List    Subjective functional status/changes:     Overall doing well. He is worried about the \"knot\" along his incision- it is not painful. He has sent a message to the nurse regarding this but hasn't heard back    OBJECTIVE      Therapeutic Procedures:  Tx Min Billable or 1:1 Min (if diff from Tx Min) Procedure, Rationale, Specifics   45  54360 Therapeutic Exercise (timed):  increase ROM, strength, coordination, balance, and proprioception to improve patient's ability to progress to PLOF and address remaining functional goals. (see flow sheet as applicable)     Details if applicable:     10  65850 Manual Therapy (timed):  increase tissue extensibility and decrease trigger points to improve patient's ability to progress

## 2025-06-02 ENCOUNTER — HOSPITAL ENCOUNTER (OUTPATIENT)
Facility: HOSPITAL | Age: 70
Setting detail: RECURRING SERIES
Discharge: HOME | End: 2025-06-05
Payer: MEDICARE

## 2025-06-02 DIAGNOSIS — I10 ESSENTIAL (PRIMARY) HYPERTENSION: ICD-10-CM

## 2025-06-02 PROCEDURE — 97140 MANUAL THERAPY 1/> REGIONS: CPT

## 2025-06-02 PROCEDURE — 97110 THERAPEUTIC EXERCISES: CPT

## 2025-06-02 RX ORDER — HYDROCHLOROTHIAZIDE 25 MG/1
25 TABLET ORAL DAILY
Qty: 90 TABLET | Refills: 2 | Status: SHIPPED | OUTPATIENT
Start: 2025-06-02

## 2025-06-02 NOTE — PROGRESS NOTES
PHYSICAL THERAPY - MEDICARE DAILY TREATMENT NOTE (updated 3/23)      Date: 2025          Patient Name:  Yared Mckeon :  1955   Medical   Diagnosis:  Right hip pain [M25.551] Treatment Diagnosis:  M25.551  RIGHT HIP PAIN    Referral Source:  Darwin Car MD Insurance:   Payor: MEDICARE / Plan: MEDICARE PART A AND B / Product Type: *No Product type* /                     Patient  verified yes     Visit #   Current  / Total 3 24   Time   In / Out 930 A 10:30 A   Total Treatment Time 60   Total Timed Codes 50   1:1 Treatment Time 30      [facility] Totals Reminder:  bill using total billable   min of TIMED therapeutic procedures and modalities.   8-22 min = 1 unit; 23-37 min = 2 units; 38-52 min = 3 units; 53-67 min = 4 units; 68-82 min = 5 units            SUBJECTIVE  If an interpreting service was utilized for treatment of this patient, the contents of this document represent the material reviewed with the patient via the .     Pain Level (0-10 scale): 0    Any medication changes, allergies to medications, adverse drug reactions, diagnosis change, or new procedure performed?: [x] No    [] Yes (see summary sheet for update)  Medications: Verified on Patient Summary List    Subjective functional status/changes:     States that he has not heard back regarding the \"knot\" along his incision. It hasn't changed much, its not red or irritated looking  Overall doing very well with min to no pain    OBJECTIVE      Therapeutic Procedures:  Tx Min Billable or 1:1 Min (if diff from Tx Min) Procedure, Rationale, Specifics   40 20 16568 Therapeutic Exercise (timed):  increase ROM, strength, coordination, balance, and proprioception to improve patient's ability to progress to PLOF and address remaining functional goals. (see flow sheet as applicable)     Details if applicable:     10 10 70620 Manual Therapy (timed):  increase tissue extensibility and decrease trigger points to improve patient's

## 2025-06-02 NOTE — TELEPHONE ENCOUNTER
Last appointment: 4/15/25 Oneal  Next appointment: 7/23/25 Oneal  Previous refill encounter(s): 11/4/24 90 + 2    Requested Prescriptions     Pending Prescriptions Disp Refills    hydroCHLOROthiazide (HYDRODIURIL) 25 MG tablet 90 tablet 2     Sig: Take 1 tablet by mouth daily     For Pharmacy Admin Tracking Only    Program: Medication Refill  CPA in place:    Recommendation Provided To:   Intervention Detail: New Rx: 1, reason: Patient Preference  Intervention Accepted By:   Gap Closed?:    Time Spent (min): 5

## 2025-06-04 DIAGNOSIS — E78.5 HYPERLIPIDEMIA LDL GOAL <130: ICD-10-CM

## 2025-06-04 RX ORDER — SIMVASTATIN 10 MG
10 TABLET ORAL NIGHTLY
Qty: 90 TABLET | Refills: 1 | Status: SHIPPED | OUTPATIENT
Start: 2025-06-04

## 2025-06-04 NOTE — TELEPHONE ENCOUNTER
Last appointment: 4/15/25 Oneal, lipid 12/2024  Next appointment: 7/23/25 Oneal  Previous refill encounter(s): 12/17/24 90 + 1    Requested Prescriptions     Pending Prescriptions Disp Refills    simvastatin (ZOCOR) 10 MG tablet 90 tablet 1     Sig: Take 1 tablet by mouth nightly     For Pharmacy Admin Tracking Only    Program: Medication Refill  CPA in place:    Recommendation Provided To:   Intervention Detail: New Rx: 1, reason: Patient Preference  Intervention Accepted By:   Gap Closed?:    Time Spent (min): 5

## 2025-06-05 ENCOUNTER — HOSPITAL ENCOUNTER (OUTPATIENT)
Facility: HOSPITAL | Age: 70
Setting detail: RECURRING SERIES
Discharge: HOME | End: 2025-06-08
Payer: MEDICARE

## 2025-06-05 PROCEDURE — 97110 THERAPEUTIC EXERCISES: CPT

## 2025-06-05 PROCEDURE — 97140 MANUAL THERAPY 1/> REGIONS: CPT

## 2025-06-05 NOTE — PROGRESS NOTES
modify for postural abnormalities to address functional deficits and attain remaining goals.    Progress toward goals / Updated goals:  []  See Progress Note/Recertification  NT      PLAN  Yes  Continue plan of care  Re-Cert Due: see eval  []  Upgrade activities as tolerated  []  Discharge due to:  []  Other:      Zonia Joyce, PT       6/5/2025       12:17 PM

## 2025-06-09 ENCOUNTER — HOSPITAL ENCOUNTER (OUTPATIENT)
Facility: HOSPITAL | Age: 70
Setting detail: RECURRING SERIES
Discharge: HOME | End: 2025-06-12
Payer: MEDICARE

## 2025-06-09 PROCEDURE — 97110 THERAPEUTIC EXERCISES: CPT

## 2025-06-09 PROCEDURE — 97140 MANUAL THERAPY 1/> REGIONS: CPT

## 2025-06-09 NOTE — PROGRESS NOTES
PHYSICAL THERAPY - MEDICARE DAILY TREATMENT NOTE (updated 3/23)      Date: 2025          Patient Name:  Yared Mckeon :  1955   Medical   Diagnosis:  Right hip pain [M25.551] Treatment Diagnosis:  M25.551  RIGHT HIP PAIN    Referral Source:  Darwin Car MD Insurance:   Payor: MEDICARE / Plan: MEDICARE PART A AND B / Product Type: *No Product type* /                     Patient  verified yes     Visit #   Current  / Total 5 24   Time   In / Out 9:00 A 10:00 AM   Total Treatment Time 60   Total Timed Codes 50   1:1 Treatment Time 38      [facility] Totals Reminder:  bill using total billable   min of TIMED therapeutic procedures and modalities.   8-22 min = 1 unit; 23-37 min = 2 units; 38-52 min = 3 units; 53-67 min = 4 units; 68-82 min = 5 units            SUBJECTIVE  If an interpreting service was utilized for treatment of this patient, the contents of this document represent the material reviewed with the patient via the .     Pain Level (0-10 scale): 0    Any medication changes, allergies to medications, adverse drug reactions, diagnosis change, or new procedure performed?: [x] No    [] Yes (see summary sheet for update)  Medications: Verified on Patient Summary List    Subjective functional status/changes:     States he has a follow up with Ortho MD, \"I hope he clears me to cut my grass.\" States overall his hip is doing well.     OBJECTIVE    FOTO Outcome Measure:Patient’s intake functional measure is 75 on a scale approximating 0 - 100 (higher number = greater function).This FS measure places the patient in Stage 4 and means the patient has advanced ambulation.    Therapeutic Procedures:  Tx Min Billable or 1:1 Min (if diff from Tx Min) Procedure, Rationale, Specifics   40 22 [id number] Therapeutic Exercise (timed):  increase ROM, strength, coordination, balance, and proprioception to improve patient's ability to progress to PLOF and address remaining functional goals. (see flow

## 2025-06-11 ENCOUNTER — HOSPITAL ENCOUNTER (OUTPATIENT)
Facility: HOSPITAL | Age: 70
Setting detail: RECURRING SERIES
Discharge: HOME | End: 2025-06-14
Payer: MEDICARE

## 2025-06-11 PROCEDURE — 97140 MANUAL THERAPY 1/> REGIONS: CPT

## 2025-06-11 PROCEDURE — 97110 THERAPEUTIC EXERCISES: CPT

## 2025-06-11 NOTE — PROGRESS NOTES
PHYSICAL THERAPY - MEDICARE DAILY TREATMENT NOTE (updated 3/23)      Date: 2025          Patient Name:  Yared Mckeon :  1955   Medical   Diagnosis:  Right hip pain [M25.551] Treatment Diagnosis:  M25.551  RIGHT HIP PAIN    Referral Source:  Darwin Car MD Insurance:   Payor: MEDICARE / Plan: MEDICARE PART A AND B / Product Type: *No Product type* /                     Patient  verified yes     Visit #   Current  / Total 6 24   Time   In / Out 9:00 A 10:00 AM   Total Treatment Time 55   Total Timed Codes 45   1:1 Treatment Time 38      [facility] Totals Reminder:  bill using total billable   min of TIMED therapeutic procedures and modalities.   8-22 min = 1 unit; 23-37 min = 2 units; 38-52 min = 3 units; 53-67 min = 4 units; 68-82 min = 5 units            SUBJECTIVE  If an interpreting service was utilized for treatment of this patient, the contents of this document represent the material reviewed with the patient via the .     Pain Level (0-10 scale): 0    Any medication changes, allergies to medications, adverse drug reactions, diagnosis change, or new procedure performed?: [x] No    [] Yes (see summary sheet for update)  Medications: Verified on Patient Summary List    Subjective functional status/changes:     MD appointment yesterday. States that PA is happy with progress thus far, he is able to mow the grass. He was told at 6 wk out he can chip/putt and at 12 wk out he can start hitting tennis balls in stationary position    OBJECTIVE      Therapeutic Procedures:  Tx Min Billable or 1:1 Min (if diff from Tx Min) Procedure, Rationale, Specifics   35 28 10836 Therapeutic Exercise (timed):  increase ROM, strength, coordination, balance, and proprioception to improve patient's ability to progress to PLOF and address remaining functional goals. (see flow sheet as applicable)     Details if applicable:    Melany Gillis PT assisted with 10 min TE   10 10 34467 Manual Therapy (timed):

## 2025-06-12 ENCOUNTER — APPOINTMENT (OUTPATIENT)
Facility: HOSPITAL | Age: 70
End: 2025-06-12
Payer: MEDICARE

## 2025-06-16 ENCOUNTER — HOSPITAL ENCOUNTER (OUTPATIENT)
Facility: HOSPITAL | Age: 70
Setting detail: RECURRING SERIES
Discharge: HOME | End: 2025-06-19
Payer: MEDICARE

## 2025-06-16 PROCEDURE — 97140 MANUAL THERAPY 1/> REGIONS: CPT

## 2025-06-16 PROCEDURE — 97110 THERAPEUTIC EXERCISES: CPT

## 2025-06-16 NOTE — PROGRESS NOTES
PHYSICAL THERAPY - MEDICARE DAILY TREATMENT NOTE (updated 3/23)      Date: 2025          Patient Name:  Yared Mckeon :  1955   Medical   Diagnosis:  Right hip pain [M25.551] Treatment Diagnosis:  M25.551  RIGHT HIP PAIN    Referral Source:  Darwin Car MD Insurance:   Payor: MEDICARE / Plan: MEDICARE PART A AND B / Product Type: *No Product type* /                     Patient  verified yes     Visit #   Current  / Total 7 24   Time   In / Out 9:55 A 10:55 AM   Total Treatment Time 60   Total Timed Codes 50   1:1 Treatment Time 30      [facility] Totals Reminder:  bill using total billable   min of TIMED therapeutic procedures and modalities.   8-22 min = 1 unit; 23-37 min = 2 units; 38-52 min = 3 units; 53-67 min = 4 units; 68-82 min = 5 units            SUBJECTIVE  If an interpreting service was utilized for treatment of this patient, the contents of this document represent the material reviewed with the patient via the .     Pain Level (0-10 scale): 0    Any medication changes, allergies to medications, adverse drug reactions, diagnosis change, or new procedure performed?: [x] No    [] Yes (see summary sheet for update)  Medications: Verified on Patient Summary List    Subjective functional status/changes:     Pt reports he's been doing well.     OBJECTIVE      Therapeutic Procedures:  Tx Min Billable or 1:1 Min (if diff from Tx Min) Procedure, Rationale, Specifics   40 20 [id number] Therapeutic Exercise (timed):  increase ROM, strength, coordination, balance, and proprioception to improve patient's ability to progress to PLOF and address remaining functional goals. (see flow sheet as applicable)     Details if applicable:       10 10 55467 Manual Therapy (timed):  increase tissue extensibility and decrease trigger points to improve patient's ability to progress to PLOF and address remaining functional goals.  The manual therapy interventions were performed at a separate and

## 2025-06-19 ENCOUNTER — HOSPITAL ENCOUNTER (OUTPATIENT)
Facility: HOSPITAL | Age: 70
Setting detail: RECURRING SERIES
Discharge: HOME | End: 2025-06-22
Payer: MEDICARE

## 2025-06-19 PROCEDURE — 97110 THERAPEUTIC EXERCISES: CPT

## 2025-06-19 PROCEDURE — 97140 MANUAL THERAPY 1/> REGIONS: CPT

## 2025-06-19 NOTE — PROGRESS NOTES
PHYSICAL THERAPY - MEDICARE DAILY TREATMENT NOTE (updated 3/23)      Date: 2025          Patient Name:  Yared Mckeon :  1955   Medical   Diagnosis:  Right hip pain [M25.551] Treatment Diagnosis:  M25.551  RIGHT HIP PAIN    Referral Source:  Darwin Car MD Insurance:   Payor: MEDICARE / Plan: MEDICARE PART A AND B / Product Type: *No Product type* /                     Patient  verified yes     Visit #   Current  / Total 8 24   Time   In / Out 10:00 A 11:10 AM   Total Treatment Time 70   Total Timed Codes 60   1:1 Treatment Time 30      [facility] Totals Reminder:  bill using total billable   min of TIMED therapeutic procedures and modalities.   8-22 min = 1 unit; 23-37 min = 2 units; 38-52 min = 3 units; 53-67 min = 4 units; 68-82 min = 5 units            SUBJECTIVE  If an interpreting service was utilized for treatment of this patient, the contents of this document represent the material reviewed with the patient via the .     Pain Level (0-10 scale): 0    Any medication changes, allergies to medications, adverse drug reactions, diagnosis change, or new procedure performed?: [x] No    [] Yes (see summary sheet for update)  Medications: Verified on Patient Summary List    Subjective functional status/changes:     Overall doing well; having some groin pain    OBJECTIVE      Therapeutic Procedures:  Tx Min Billable or 1:1 Min (if diff from Tx Min) Procedure, Rationale, Specifics   50 20 29169 Therapeutic Exercise (timed):  increase ROM, strength, coordination, balance, and proprioception to improve patient's ability to progress to PLOF and address remaining functional goals. (see flow sheet as applicable)     Details if applicable:       10 10 25396 Manual Therapy (timed):  increase tissue extensibility and decrease trigger points to improve patient's ability to progress to PLOF and address remaining functional goals.  The manual therapy interventions were performed at a separate

## 2025-06-23 ENCOUNTER — HOSPITAL ENCOUNTER (OUTPATIENT)
Facility: HOSPITAL | Age: 70
Setting detail: RECURRING SERIES
Discharge: HOME | End: 2025-06-26
Payer: MEDICARE

## 2025-06-23 PROCEDURE — 97110 THERAPEUTIC EXERCISES: CPT

## 2025-06-23 PROCEDURE — 97140 MANUAL THERAPY 1/> REGIONS: CPT

## 2025-06-23 NOTE — PROGRESS NOTES
PHYSICAL THERAPY - MEDICARE DAILY TREATMENT NOTE (updated 3/23)      Date: 2025          Patient Name:  Yared Mckeon :  1955   Medical   Diagnosis:  Right hip pain [M25.551] Treatment Diagnosis:  M25.551  RIGHT HIP PAIN    Referral Source:  Darwin Car MD Insurance:   Payor: MEDICARE / Plan: MEDICARE PART A AND B / Product Type: *No Product type* /                     Patient  verified yes     Visit #   Current  / Total 9 24   Time   In / Out 11:00 A 12:15 AM   Total Treatment Time 75   Total Timed Codes 65   1:1 Treatment Time 60      [facility] Totals Reminder:  bill using total billable   min of TIMED therapeutic procedures and modalities.   8-22 min = 1 unit; 23-37 min = 2 units; 38-52 min = 3 units; 53-67 min = 4 units; 68-82 min = 5 units            SUBJECTIVE  If an interpreting service was utilized for treatment of this patient, the contents of this document represent the material reviewed with the patient via the .     Pain Level (0-10 scale): 0    Any medication changes, allergies to medications, adverse drug reactions, diagnosis change, or new procedure performed?: [x] No    [] Yes (see summary sheet for update)  Medications: Verified on Patient Summary List    Subjective functional status/changes:     Continuing to do well. He has a spot on the top of his incision that he is a little concerned that     OBJECTIVE      Therapeutic Procedures:  Tx Min Billable or 1:1 Min (if diff from Tx Min) Procedure, Rationale, Specifics   55 77 [id number] Therapeutic Exercise (timed):  increase ROM, strength, coordination, balance, and proprioception to improve patient's ability to progress to PLOF and address remaining functional goals. (see flow sheet as applicable)     Details if applicable:    Kadi Funez PTA assisted with 15 min TE     10 10 92047 Manual Therapy (timed):  increase tissue extensibility and decrease trigger points to improve patient's ability to progress to PLOF and

## 2025-06-26 ENCOUNTER — HOSPITAL ENCOUNTER (OUTPATIENT)
Facility: HOSPITAL | Age: 70
Setting detail: RECURRING SERIES
Discharge: HOME | End: 2025-06-29
Payer: MEDICARE

## 2025-06-26 PROCEDURE — 97110 THERAPEUTIC EXERCISES: CPT

## 2025-06-26 PROCEDURE — 97140 MANUAL THERAPY 1/> REGIONS: CPT

## 2025-06-26 NOTE — PROGRESS NOTES
PHYSICAL THERAPY - MEDICARE DAILY TREATMENT/Progress NOTE (updated 3/23)      Date: 2025          Patient Name:  Yared Mckeon :  1955   Medical   Diagnosis:  Right hip pain [M25.551] Treatment Diagnosis:  M25.551  RIGHT HIP PAIN    Referral Source:  Darwin Car MD Insurance:   Payor: MEDICARE / Plan: MEDICARE PART A AND B / Product Type: *No Product type* /                     Patient  verified yes     Visit #   Current  / Total 10 24   Time   In / Out 12:00 P 1:00 P   Total Treatment Time 60   Total Timed Codes 50   1:1 Treatment Time 40      [facility] Totals Reminder:  bill using total billable   min of TIMED therapeutic procedures and modalities.   8-22 min = 1 unit; 23-37 min = 2 units; 38-52 min = 3 units; 53-67 min = 4 units; 68-82 min = 5 units            SUBJECTIVE  If an interpreting service was utilized for treatment of this patient, the contents of this document represent the material reviewed with the patient via the .     Pain Level (0-10 scale): 0    Any medication changes, allergies to medications, adverse drug reactions, diagnosis change, or new procedure performed?: [x] No    [] Yes (see summary sheet for update)  Medications: Verified on Patient Summary List    Subjective functional status/changes:     \"I'm tired today\" he has already run errands and walking for exercise this AM  [place] be at the beach for the next ~2 weeks-- he plans to follow up when he returns    OBJECTIVE      Therapeutic Procedures:  Tx Min Billable or 1:1 Min (if diff from Tx Min) Procedure, Rationale, Specifics   40 30 10423 Therapeutic Exercise (timed):  increase ROM, strength, coordination, balance, and proprioception to improve patient's ability to progress to PLOF and address remaining functional goals. (see flow sheet as applicable)     Details if applicable:    Kadi Funez PTA assisted with 15 min TE     10 10 [id number] Manual Therapy (timed):  increase tissue extensibility and

## 2025-06-27 DIAGNOSIS — R35.1 BPH ASSOCIATED WITH NOCTURIA: ICD-10-CM

## 2025-06-27 DIAGNOSIS — N40.1 BPH ASSOCIATED WITH NOCTURIA: ICD-10-CM

## 2025-06-27 RX ORDER — TAMSULOSIN HYDROCHLORIDE 0.4 MG/1
0.8 CAPSULE ORAL DAILY
Qty: 180 CAPSULE | Refills: 3 | Status: SHIPPED | OUTPATIENT
Start: 2025-06-27

## 2025-06-27 NOTE — TELEPHONE ENCOUNTER
Last appointment: 04/15/2025 MD No   Next appointment: 12/19/2025 MD No  Previous refill encounter(s):   12/17/2024 Flomax #90 with 2 refills (2 qd)     For Pharmacy Admin Tracking Only    Program: Medication Refill  Intervention Detail: New Rx: 1, reason: Patient Preference  Time Spent (min): 5    Requested Prescriptions     Pending Prescriptions Disp Refills    tamsulosin (FLOMAX) 0.4 MG capsule 180 capsule 0     Sig: Take 2 capsules by mouth daily

## 2025-07-21 ENCOUNTER — HOSPITAL ENCOUNTER (OUTPATIENT)
Facility: HOSPITAL | Age: 70
Setting detail: RECURRING SERIES
Discharge: HOME | End: 2025-07-24
Payer: MEDICARE

## 2025-07-21 ENCOUNTER — APPOINTMENT (OUTPATIENT)
Facility: HOSPITAL | Age: 70
End: 2025-07-21
Payer: MEDICARE

## 2025-07-21 PROCEDURE — 97110 THERAPEUTIC EXERCISES: CPT

## 2025-07-21 PROCEDURE — 97140 MANUAL THERAPY 1/> REGIONS: CPT

## 2025-07-21 NOTE — PROGRESS NOTES
PHYSICAL THERAPY - MEDICARE DAILY TREATMENT  NOTE (updated 3/23)      Date: 2025          Patient Name:  Yared Mckeon :  1955   Medical   Diagnosis:  Right hip pain [M25.551] Treatment Diagnosis:  M25.551  RIGHT HIP PAIN    Referral Source:  Darwin Car MD Insurance:   Payor: MEDICARE / Plan: MEDICARE PART A AND B / Product Type: *No Product type* /                     Patient  verified yes     Visit #   Current  / Total 11 24   Time   In / Out 1:30 P 2:50 P   Total Treatment Time 80   Total Timed Codes 60   1:1 Treatment Time 40      [facility] Totals Reminder:  bill using total billable   min of TIMED therapeutic procedures and modalities.   8-22 min = 1 unit; 23-37 min = 2 units; 38-52 min = 3 units; 53-67 min = 4 units; 68-82 min = 5 units            SUBJECTIVE  If an interpreting service was utilized for treatment of this patient, the contents of this document represent the material reviewed with the patient via the .     Pain Level (0-10 scale): 0    Any medication changes, allergies to medications, adverse drug reactions, diagnosis change, or new procedure performed?: [x] No    [] Yes (see summary sheet for update)  Medications: Verified on Patient Summary List    Subjective functional status/changes:     States that he already went for a 2.5 mile walk today. He states that his hip \"lets me know its there\" occasionally however it is doing very well.  He felt good while at the beach the last 3 weeks, did some walking in the sand    OBJECTIVE      Therapeutic Procedures:  Tx Min Billable or 1:1 Min (if diff from Tx Min) Procedure, Rationale, Specifics   50 30 49016 Therapeutic Exercise (timed):  increase ROM, strength, coordination, balance, and proprioception to improve patient's ability to progress to PLOF and address remaining functional goals. (see flow sheet as applicable)     Details if applicable:    Kadi Funez PTA assisted with 15 min TE     10 10 32790 Manual

## 2025-07-23 ENCOUNTER — OFFICE VISIT (OUTPATIENT)
Age: 70
End: 2025-07-23
Payer: MEDICARE

## 2025-07-23 VITALS
DIASTOLIC BLOOD PRESSURE: 82 MMHG | HEART RATE: 71 BPM | TEMPERATURE: 97.6 F | RESPIRATION RATE: 18 BRPM | SYSTOLIC BLOOD PRESSURE: 118 MMHG | WEIGHT: 203 LBS | BODY MASS INDEX: 29.06 KG/M2 | OXYGEN SATURATION: 97 % | HEIGHT: 70 IN

## 2025-07-23 DIAGNOSIS — H61.23 BILATERAL IMPACTED CERUMEN: ICD-10-CM

## 2025-07-23 DIAGNOSIS — R97.20 ELEVATED PSA: ICD-10-CM

## 2025-07-23 DIAGNOSIS — M16.11 ARTHRITIS OF RIGHT HIP: ICD-10-CM

## 2025-07-23 DIAGNOSIS — E78.5 HYPERLIPIDEMIA LDL GOAL <130: ICD-10-CM

## 2025-07-23 DIAGNOSIS — I10 BENIGN ESSENTIAL HYPERTENSION: Primary | ICD-10-CM

## 2025-07-23 LAB
CHOLEST SERPL-MCNC: 187 MG/DL
HDLC SERPL-MCNC: 56 MG/DL
HDLC SERPL: 3.3 (ref 0–5)
LDLC SERPL CALC-MCNC: 101.8 MG/DL (ref 0–100)
TRIGL SERPL-MCNC: 146 MG/DL
VLDLC SERPL CALC-MCNC: 29.2 MG/DL

## 2025-07-23 PROCEDURE — 1123F ACP DISCUSS/DSCN MKR DOCD: CPT | Performed by: STUDENT IN AN ORGANIZED HEALTH CARE EDUCATION/TRAINING PROGRAM

## 2025-07-23 PROCEDURE — 3017F COLORECTAL CA SCREEN DOC REV: CPT | Performed by: STUDENT IN AN ORGANIZED HEALTH CARE EDUCATION/TRAINING PROGRAM

## 2025-07-23 PROCEDURE — 1126F AMNT PAIN NOTED NONE PRSNT: CPT | Performed by: STUDENT IN AN ORGANIZED HEALTH CARE EDUCATION/TRAINING PROGRAM

## 2025-07-23 PROCEDURE — 1036F TOBACCO NON-USER: CPT | Performed by: STUDENT IN AN ORGANIZED HEALTH CARE EDUCATION/TRAINING PROGRAM

## 2025-07-23 PROCEDURE — PBSHW REMOVAL IMPACTED CERUMEN IRRIGATION/LVG UNILAT: Performed by: STUDENT IN AN ORGANIZED HEALTH CARE EDUCATION/TRAINING PROGRAM

## 2025-07-23 PROCEDURE — 69209 REMOVE IMPACTED EAR WAX UNI: CPT | Performed by: STUDENT IN AN ORGANIZED HEALTH CARE EDUCATION/TRAINING PROGRAM

## 2025-07-23 PROCEDURE — 99214 OFFICE O/P EST MOD 30 MIN: CPT | Performed by: STUDENT IN AN ORGANIZED HEALTH CARE EDUCATION/TRAINING PROGRAM

## 2025-07-23 PROCEDURE — 3074F SYST BP LT 130 MM HG: CPT | Performed by: STUDENT IN AN ORGANIZED HEALTH CARE EDUCATION/TRAINING PROGRAM

## 2025-07-23 PROCEDURE — 1159F MED LIST DOCD IN RCRD: CPT | Performed by: STUDENT IN AN ORGANIZED HEALTH CARE EDUCATION/TRAINING PROGRAM

## 2025-07-23 PROCEDURE — G8427 DOCREV CUR MEDS BY ELIG CLIN: HCPCS | Performed by: STUDENT IN AN ORGANIZED HEALTH CARE EDUCATION/TRAINING PROGRAM

## 2025-07-23 PROCEDURE — G8417 CALC BMI ABV UP PARAM F/U: HCPCS | Performed by: STUDENT IN AN ORGANIZED HEALTH CARE EDUCATION/TRAINING PROGRAM

## 2025-07-23 PROCEDURE — 3079F DIAST BP 80-89 MM HG: CPT | Performed by: STUDENT IN AN ORGANIZED HEALTH CARE EDUCATION/TRAINING PROGRAM

## 2025-07-23 PROCEDURE — G2211 COMPLEX E/M VISIT ADD ON: HCPCS | Performed by: STUDENT IN AN ORGANIZED HEALTH CARE EDUCATION/TRAINING PROGRAM

## 2025-07-23 ASSESSMENT — PATIENT HEALTH QUESTIONNAIRE - PHQ9
1. LITTLE INTEREST OR PLEASURE IN DOING THINGS: NOT AT ALL
2. FEELING DOWN, DEPRESSED OR HOPELESS: NOT AT ALL
SUM OF ALL RESPONSES TO PHQ QUESTIONS 1-9: 0

## 2025-07-23 NOTE — PROGRESS NOTES
Yared Mckeon (:  1955) is a 69 y.o. male, Established patient, here for evaluation of the following chief complaint(s):  Hypertension and Cholesterol Problem         Assessment & Plan  1. Postoperative status following right hip replacement: Stable.  - Engaging in physical therapy and reports occasional soreness, likely due to overexertion.  - No pain reported, occasional awareness of the surgical site.  - Advised to continue physical therapy regimen and avoid overexertion.  - Walking without a limp, progressing well in recovery.    2. Hypercholesterolemia.  - Simvastatin dosage reduced from 20 mg to 10 mg.  - No reported side effects from the medication.  - Cholesterol panel to be ordered to monitor effectiveness of current dosage.  - Weight management discussed, slight increase noted recently.    3. Elevated PSA.  - PSA levels have fluctuated but remain below 4. Last PSA level was 3.8 in 2024.  - PSA test to be conducted today.  - If PSA level exceeds 4, referral to urologist for further evaluation, potentially including prostate MRI or biopsy.    4. Cerumen impaction.  - Both ears blocked with cerumen, right ear completely blocked.  - Earwax removal to be performed today.  - Debrox used previously, noted to soften cerumen.    5.  Hypertension: Chronic, stable  - Continue HCTZ 25 mg daily    Follow-up  - 6 months.    Results  Labs   - PSA levels: 2024, 3.8  1. Benign essential hypertension  2. Hyperlipidemia LDL goal <130  -     Lipid Panel; Future  3. Elevated PSA  -     Total PSA with Free PSA Reflex; Future  4. Bilateral impacted cerumen  -     REMOVAL IMPACTED CERUMEN IRRIGATION/LVG UNILAT    Return in about 5 months (around 2025) for Chronic conditions follow up, Medicare wellness.       Subjective   History of Present Illness  The patient is a 69-year-old male here for a 3-month follow-up.    Right Hip Replacement  - Underwent a right hip replacement in 2025  - Attending

## 2025-07-23 NOTE — PROGRESS NOTES
Chief Complaint   Patient presents with    Hypertension    Cholesterol Problem         \"Have you been to the ER, urgent care clinic since your last visit?  Hospitalized since your last visit?\"    NO    “Have you seen or consulted any other health care providers outside of [facility] since your last visit?”    NO          Click Here for Release of Records Request           7/23/2025     8:19 AM   PHQ-9    Little interest or pleasure in doing things 0   Feeling down, depressed, or hopeless 0   PHQ-2 Score 0   PHQ-9 Total Score 0           Financial Resource Strain: Patient Declined (12/16/2024)    Overall Financial Resource Strain (CARDIA)     Difficulty of Paying Living Expenses: Patient declined      Food Insecurity: No Food Insecurity (1/23/2025)    Hunger Vital Sign     Worried About Running Out of Food in the Last Year: Never true     Ran Out of Food in the Last Year: Never true          Health Maintenance Due   Topic Date Due    Pneumococcal 50+ years Vaccine (2 of 2 - PCV) 03/03/2022    COVID-19 Vaccine (4 - 2024-25 season) 09/01/2024

## 2025-07-24 ENCOUNTER — HOSPITAL ENCOUNTER (OUTPATIENT)
Facility: HOSPITAL | Age: 70
Setting detail: RECURRING SERIES
Discharge: HOME | End: 2025-07-27
Payer: MEDICARE

## 2025-07-24 LAB
PSA SERPL-MCNC: 3.1 NG/ML (ref 0–4)
REFLEX CRITERIA: NORMAL

## 2025-07-24 PROCEDURE — 97140 MANUAL THERAPY 1/> REGIONS: CPT

## 2025-07-24 PROCEDURE — 97110 THERAPEUTIC EXERCISES: CPT

## 2025-07-24 NOTE — PROGRESS NOTES
PHYSICAL THERAPY - MEDICARE DAILY TREATMENT  NOTE (updated 3/23)      Date: 2025          Patient Name:  Yared Mckeon :  1955   Medical   Diagnosis:  Right hip pain [M25.551] Treatment Diagnosis:  M25.551  RIGHT HIP PAIN    Referral Source:  Darwin Car MD Insurance:   Payor: MEDICARE / Plan: MEDICARE PART A AND B / Product Type: *No Product type* /                     Patient  verified yes     Visit #   Current  / Total 12 24   Time   In / Out 930 A 1030 A   Total Treatment Time 60   Total Timed Codes 50   1:1 Treatment Time 30      [facility] Totals Reminder:  bill using total billable   min of TIMED therapeutic procedures and modalities.   8-22 min = 1 unit; 23-37 min = 2 units; 38-52 min = 3 units; 53-67 min = 4 units; 68-82 min = 5 units            SUBJECTIVE  If an interpreting service was utilized for treatment of this patient, the contents of this document represent the material reviewed with the patient via the .     Pain Level (0-10 scale): 0    Any medication changes, allergies to medications, adverse drug reactions, diagnosis change, or new procedure performed?: [x] No    [] Yes (see summary sheet for update)  Medications: Verified on Patient Summary List    Subjective functional status/changes:     Reports quad soreness after PT last visit. Overall doing well; he already put a bunch of mulch out in his yard this AM    OBJECTIVE      Therapeutic Procedures:  Tx Min Billable or 1:1 Min (if diff from Tx Min) Procedure, Rationale, Specifics   40 20 27441 Therapeutic Exercise (timed):  increase ROM, strength, coordination, balance, and proprioception to improve patient's ability to progress to PLOF and address remaining functional goals. (see flow sheet as applicable)     Details if applicable:    Kadi Funez PTA assisted with 15 min TE     10 10 42206 Manual Therapy (timed):  increase tissue extensibility and decrease trigger points to improve patient's ability to

## 2025-07-30 ENCOUNTER — HOSPITAL ENCOUNTER (OUTPATIENT)
Facility: HOSPITAL | Age: 70
Setting detail: RECURRING SERIES
Discharge: HOME | End: 2025-08-02
Payer: MEDICARE

## 2025-07-30 PROCEDURE — 97140 MANUAL THERAPY 1/> REGIONS: CPT

## 2025-07-30 PROCEDURE — 97110 THERAPEUTIC EXERCISES: CPT

## 2025-07-30 NOTE — PROGRESS NOTES
PHYSICAL THERAPY - MEDICARE DAILY TREATMENT/Progress  NOTE (updated 3/23)      Date: 2025          Patient Name:  Yaerd Mckeon :  1955   Medical   Diagnosis:  Right hip pain [M25.551] Treatment Diagnosis:  M25.551  RIGHT HIP PAIN    Referral Source:  Darwin Car MD Insurance:   Payor: MEDICARE / Plan: MEDICARE PART A AND B / Product Type: *No Product type* /                     Patient  verified yes     Visit #   Current  / Total 13 24   Time   In / Out 8:55 A 10:05 A   Total Treatment Time 70   Total Timed Codes 60   1:1 Treatment Time 60      [facility] Totals Reminder:  bill using total billable   min of TIMED therapeutic procedures and modalities.   8-22 min = 1 unit; 23-37 min = 2 units; 38-52 min = 3 units; 53-67 min = 4 units; 68-82 min = 5 units            SUBJECTIVE  If an interpreting service was utilized for treatment of this patient, the contents of this document represent the material reviewed with the patient via the .     Pain Level (0-10 scale): 0    Any medication changes, allergies to medications, adverse drug reactions, diagnosis change, or new procedure performed?: [x] No    [] Yes (see summary sheet for update)  Medications: Verified on Patient Summary List    Subjective functional status/changes:     Overall doing well; feeling stronger. MD appt Aug 14    OBJECTIVE      Therapeutic Procedures:  Tx Min Billable or 1:1 Min (if diff from Tx Min) Procedure, Rationale, Specifics   50 50 61314 Therapeutic Exercise (timed):  increase ROM, strength, coordination, balance, and proprioception to improve patient's ability to progress to PLOF and address remaining functional goals. (see flow sheet as applicable)     Details if applicable:    Kadi Funez PTA assisted with 15 min TE     10 10 09324 Manual Therapy (timed):  increase tissue extensibility and decrease trigger points to improve patient's ability to progress to PLOF and address remaining functional goals.

## 2025-08-06 ENCOUNTER — HOSPITAL ENCOUNTER (OUTPATIENT)
Facility: HOSPITAL | Age: 70
Setting detail: RECURRING SERIES
Discharge: HOME | End: 2025-08-09
Payer: MEDICARE

## 2025-08-06 PROCEDURE — 97110 THERAPEUTIC EXERCISES: CPT

## 2025-08-06 PROCEDURE — 97140 MANUAL THERAPY 1/> REGIONS: CPT

## 2025-08-11 ENCOUNTER — HOSPITAL ENCOUNTER (OUTPATIENT)
Facility: HOSPITAL | Age: 70
Setting detail: RECURRING SERIES
Discharge: HOME | End: 2025-08-14
Payer: MEDICARE

## 2025-08-11 ENCOUNTER — APPOINTMENT (OUTPATIENT)
Facility: HOSPITAL | Age: 70
End: 2025-08-11
Payer: MEDICARE

## 2025-08-11 PROCEDURE — 97110 THERAPEUTIC EXERCISES: CPT

## 2025-08-11 PROCEDURE — 97140 MANUAL THERAPY 1/> REGIONS: CPT

## 2025-08-25 ENCOUNTER — HOSPITAL ENCOUNTER (OUTPATIENT)
Facility: HOSPITAL | Age: 70
Setting detail: RECURRING SERIES
Discharge: HOME | End: 2025-08-28
Payer: MEDICARE

## 2025-08-25 PROCEDURE — 97140 MANUAL THERAPY 1/> REGIONS: CPT

## 2025-08-25 PROCEDURE — 97110 THERAPEUTIC EXERCISES: CPT

## (undated) DEVICE — PREP SKN PREVAIL 40ML APPL --

## (undated) DEVICE — GOWN,SIRUS,NONRNF,SETINSLV,2XL,18/CS: Brand: MEDLINE

## (undated) DEVICE — STERILE POLYISOPRENE POWDER-FREE SURGICAL GLOVES WITH EMOLLIENT COATING: Brand: PROTEXIS

## (undated) DEVICE — BONE WAX WHITE: Brand: BONE WAX WHITE

## (undated) DEVICE — SUTURE VCRL SZ 3-0 L27IN ABSRB UD CP-2 L26MM 1/2 CIR REV J868H

## (undated) DEVICE — SUPPLEMENT DIGESTIVE H2O SOL GI-EASE

## (undated) DEVICE — SOLUTION IV 250ML 0.9% SOD CHL CLR INJ FLX BG CONT PRT CLSR

## (undated) DEVICE — SUTURE ABSRB BRAID COAT UD OS-6 NO 1 27IN VCRL J535H

## (undated) DEVICE — LAMINECTOMY RICHMOND-LF: Brand: MEDLINE INDUSTRIES, INC.

## (undated) DEVICE — 4-PORT MANIFOLD: Brand: NEPTUNE 2

## (undated) DEVICE — SOLUTION IV 1000ML 0.9% SOD CHL

## (undated) DEVICE — POSITIONER HD REST FOAM CMFRT TCH

## (undated) DEVICE — SUTURE VCRL 2-0 L27IN ABSRB UD CP-2 L26MM 1/2 CIR REV CUT J869H

## (undated) DEVICE — DRAIN KT WND 10FR RND 400ML --

## (undated) DEVICE — FLOSEAL HEMOSTATIC MATRIX, 10 ML: Brand: FLOSEAL

## (undated) DEVICE — SPONGE GZ W4XL4IN COT 12 PLY TYP VII WVN C FLD DSGN

## (undated) DEVICE — SURGIFOAM SPNG SZ 100

## (undated) DEVICE — INFECTION CONTROL KIT SYS

## (undated) DEVICE — ADHESIVE TISS CLOSURE 22X4 CM 4 CC HI VISC EXOFIN

## (undated) DEVICE — BIPOLAR IRRIGATOR INTEGRATED TUBING AND BIPOLAR CORD SET, DISPOSABLE